# Patient Record
Sex: MALE | Race: WHITE | Employment: OTHER | URBAN - METROPOLITAN AREA
[De-identification: names, ages, dates, MRNs, and addresses within clinical notes are randomized per-mention and may not be internally consistent; named-entity substitution may affect disease eponyms.]

---

## 2017-02-14 PROBLEM — D72.820 LYMPHOCYTOSIS: Status: ACTIVE | Noted: 2017-02-14

## 2017-03-15 PROBLEM — L97.822 SKIN ULCER OF LEFT KNEE WITH FAT LAYER EXPOSED (HCC): Status: ACTIVE | Noted: 2017-03-15

## 2018-02-24 PROBLEM — T14.8XXA WOUND INFECTION: Status: ACTIVE | Noted: 2018-02-24

## 2018-02-24 PROBLEM — L08.9 WOUND INFECTION: Status: ACTIVE | Noted: 2018-02-24

## 2018-02-24 PROBLEM — R55 SYNCOPE AND COLLAPSE: Status: ACTIVE | Noted: 2018-02-24

## 2018-06-20 ENCOUNTER — HOSPITAL ENCOUNTER (OUTPATIENT)
Age: 83
Discharge: HOME | End: 2018-06-22
Payer: MEDICARE

## 2018-06-20 LAB
INR BLD: 3
PROTHROMBIN TIME: 33.1 SEC (ref 9.3–12.4)

## 2018-06-20 PROCEDURE — 85610 PROTHROMBIN TIME: CPT

## 2018-06-23 ENCOUNTER — APPOINTMENT (OUTPATIENT)
Dept: CT IMAGING | Age: 83
DRG: 193 | End: 2018-06-23
Payer: MEDICARE

## 2018-06-23 ENCOUNTER — HOSPITAL ENCOUNTER (INPATIENT)
Age: 83
LOS: 3 days | Discharge: HOME | DRG: 193 | End: 2018-06-26
Attending: EMERGENCY MEDICINE | Admitting: FAMILY MEDICINE
Payer: MEDICARE

## 2018-06-23 DIAGNOSIS — J44.1 COPD EXACERBATION (HCC): ICD-10-CM

## 2018-06-23 DIAGNOSIS — R09.02 HYPOXIA: ICD-10-CM

## 2018-06-23 DIAGNOSIS — A41.9 SEPSIS DUE TO PNEUMONIA (HCC): Primary | ICD-10-CM

## 2018-06-23 DIAGNOSIS — R07.2 PRECORDIAL CHEST PAIN: ICD-10-CM

## 2018-06-23 DIAGNOSIS — J18.9 PNEUMONIA OF BOTH LOWER LOBES DUE TO INFECTIOUS ORGANISM: ICD-10-CM

## 2018-06-23 DIAGNOSIS — D72.829 LEUKOCYTOSIS, UNSPECIFIED TYPE: ICD-10-CM

## 2018-06-23 DIAGNOSIS — J18.9 SEPSIS DUE TO PNEUMONIA (HCC): Primary | ICD-10-CM

## 2018-06-23 LAB
ALBUMIN SERPL-MCNC: 4 G/DL (ref 3.5–5.2)
ALP BLD-CCNC: 119 U/L (ref 40–129)
ALT SERPL-CCNC: 12 U/L (ref 0–40)
ANION GAP SERPL CALCULATED.3IONS-SCNC: 14 MMOL/L (ref 7–16)
ANISOCYTOSIS: ABNORMAL
APTT: 42.6 SEC (ref 24.5–35.1)
AST SERPL-CCNC: 18 U/L (ref 0–39)
B.E.: 1.2 MMOL/L (ref -3–3)
BASOPHILS ABSOLUTE: 0 E9/L (ref 0–0.2)
BASOPHILS RELATIVE PERCENT: 0 % (ref 0–2)
BILIRUB SERPL-MCNC: 0.9 MG/DL (ref 0–1.2)
BUN BLDV-MCNC: 15 MG/DL (ref 8–23)
CALCIUM SERPL-MCNC: 9.3 MG/DL (ref 8.6–10.2)
CHLORIDE BLD-SCNC: 100 MMOL/L (ref 98–107)
CO2: 23 MMOL/L (ref 22–29)
COHB: 1.5 % (ref 0–1.5)
CREAT SERPL-MCNC: 1 MG/DL (ref 0.7–1.2)
CRITICAL: ABNORMAL
DATE ANALYZED: ABNORMAL
DATE OF COLLECTION: ABNORMAL
EKG ATRIAL RATE: 101 BPM
EKG P AXIS: 34 DEGREES
EKG P-R INTERVAL: 150 MS
EKG Q-T INTERVAL: 340 MS
EKG QRS DURATION: 78 MS
EKG QTC CALCULATION (BAZETT): 440 MS
EKG R AXIS: 13 DEGREES
EKG T AXIS: 26 DEGREES
EKG VENTRICULAR RATE: 101 BPM
EOSINOPHILS ABSOLUTE: 0.91 E9/L (ref 0.05–0.5)
EOSINOPHILS RELATIVE PERCENT: 3.5 % (ref 0–6)
GFR AFRICAN AMERICAN: >60
GFR NON-AFRICAN AMERICAN: >60 ML/MIN/1.73
GLUCOSE BLD-MCNC: 124 MG/DL (ref 74–109)
HCO3: 23.3 MMOL/L (ref 22–26)
HCT VFR BLD CALC: 47.9 % (ref 37–54)
HEMOGLOBIN: 15 G/DL (ref 12.5–16.5)
HHB: 7.7 % (ref 0–5)
INR BLD: 1.4
LAB: ABNORMAL
LACTIC ACID: 1.1 MMOL/L (ref 0.5–2.2)
LYMPHOCYTES ABSOLUTE: 2.33 E9/L (ref 1.5–4)
LYMPHOCYTES RELATIVE PERCENT: 8.7 % (ref 20–42)
Lab: 1428
MCH RBC QN AUTO: 30.5 PG (ref 26–35)
MCHC RBC AUTO-ENTMCNC: 31.3 % (ref 32–34.5)
MCV RBC AUTO: 97.4 FL (ref 80–99.9)
METAMYELOCYTES RELATIVE PERCENT: 0.9 % (ref 0–1)
METHB: 0.3 % (ref 0–1.5)
MODE: ABNORMAL
MONOCYTES ABSOLUTE: 2.59 E9/L (ref 0.1–0.95)
MONOCYTES RELATIVE PERCENT: 10.4 % (ref 2–12)
NEUTROPHILS ABSOLUTE: 19.94 E9/L (ref 1.8–7.3)
NEUTROPHILS RELATIVE PERCENT: 76.5 % (ref 43–80)
NUCLEATED RED BLOOD CELLS: 0 /100 WBC
O2 CONTENT: 19.6 ML/DL
O2 SATURATION: 92.2 % (ref 92–98.5)
O2HB: 90.5 % (ref 94–97)
OPERATOR ID: 166
PATIENT TEMP: 37 C
PCO2: 30.7 MMHG (ref 35–45)
PDW BLD-RTO: 16.7 FL (ref 11.5–15)
PH BLOOD GAS: 7.5 (ref 7.35–7.45)
PLATELET # BLD: 173 E9/L (ref 130–450)
PMV BLD AUTO: 12.2 FL (ref 7–12)
PO2: 60.2 MMHG (ref 60–100)
POLYCHROMASIA: ABNORMAL
POTASSIUM SERPL-SCNC: 3.7 MMOL/L (ref 3.5–5)
PRO-BNP: 1367 PG/ML (ref 0–450)
PROTHROMBIN TIME: 16.3 SEC (ref 9.3–12.4)
RBC # BLD: 4.92 E12/L (ref 3.8–5.8)
SODIUM BLD-SCNC: 137 MMOL/L (ref 132–146)
SOURCE, BLOOD GAS: ABNORMAL
THB: 15.4 G/DL (ref 11.5–16.5)
TIME ANALYZED: 1437
TOTAL PROTEIN: 6.8 G/DL (ref 6.4–8.3)
TROPONIN: 0.04 NG/ML (ref 0–0.03)
TROPONIN: 0.05 NG/ML (ref 0–0.03)
WBC # BLD: 25.9 E9/L (ref 4.5–11.5)

## 2018-06-23 PROCEDURE — 94664 DEMO&/EVAL PT USE INHALER: CPT

## 2018-06-23 PROCEDURE — 94640 AIRWAY INHALATION TREATMENT: CPT

## 2018-06-23 PROCEDURE — 84484 ASSAY OF TROPONIN QUANT: CPT

## 2018-06-23 PROCEDURE — 82805 BLOOD GASES W/O2 SATURATION: CPT

## 2018-06-23 PROCEDURE — 99285 EMERGENCY DEPT VISIT HI MDM: CPT

## 2018-06-23 PROCEDURE — 93005 ELECTROCARDIOGRAM TRACING: CPT | Performed by: EMERGENCY MEDICINE

## 2018-06-23 PROCEDURE — 96367 TX/PROPH/DG ADDL SEQ IV INF: CPT

## 2018-06-23 PROCEDURE — 36415 COLL VENOUS BLD VENIPUNCTURE: CPT

## 2018-06-23 PROCEDURE — 96375 TX/PRO/DX INJ NEW DRUG ADDON: CPT

## 2018-06-23 PROCEDURE — 6370000000 HC RX 637 (ALT 250 FOR IP): Performed by: FAMILY MEDICINE

## 2018-06-23 PROCEDURE — 85025 COMPLETE CBC W/AUTO DIFF WBC: CPT

## 2018-06-23 PROCEDURE — 2580000003 HC RX 258: Performed by: FAMILY MEDICINE

## 2018-06-23 PROCEDURE — 85610 PROTHROMBIN TIME: CPT

## 2018-06-23 PROCEDURE — 96365 THER/PROPH/DIAG IV INF INIT: CPT

## 2018-06-23 PROCEDURE — 2060000000 HC ICU INTERMEDIATE R&B

## 2018-06-23 PROCEDURE — 85730 THROMBOPLASTIN TIME PARTIAL: CPT

## 2018-06-23 PROCEDURE — 6370000000 HC RX 637 (ALT 250 FOR IP): Performed by: EMERGENCY MEDICINE

## 2018-06-23 PROCEDURE — 6360000002 HC RX W HCPCS: Performed by: EMERGENCY MEDICINE

## 2018-06-23 PROCEDURE — 80053 COMPREHEN METABOLIC PANEL: CPT

## 2018-06-23 PROCEDURE — 83605 ASSAY OF LACTIC ACID: CPT

## 2018-06-23 PROCEDURE — 2580000003 HC RX 258: Performed by: EMERGENCY MEDICINE

## 2018-06-23 PROCEDURE — 71260 CT THORAX DX C+: CPT

## 2018-06-23 PROCEDURE — 6360000004 HC RX CONTRAST MEDICATION: Performed by: RADIOLOGY

## 2018-06-23 PROCEDURE — 87040 BLOOD CULTURE FOR BACTERIA: CPT

## 2018-06-23 PROCEDURE — 83880 ASSAY OF NATRIURETIC PEPTIDE: CPT

## 2018-06-23 RX ORDER — WARFARIN SODIUM 10 MG/1
10 TABLET ORAL DAILY
Status: DISCONTINUED | OUTPATIENT
Start: 2018-06-23 | End: 2018-06-26 | Stop reason: HOSPADM

## 2018-06-23 RX ORDER — SODIUM CHLORIDE 0.9 % (FLUSH) 0.9 %
10 SYRINGE (ML) INJECTION PRN
Status: DISCONTINUED | OUTPATIENT
Start: 2018-06-23 | End: 2018-06-26 | Stop reason: HOSPADM

## 2018-06-23 RX ORDER — IPRATROPIUM BROMIDE AND ALBUTEROL SULFATE 2.5; .5 MG/3ML; MG/3ML
1 SOLUTION RESPIRATORY (INHALATION)
Status: COMPLETED | OUTPATIENT
Start: 2018-06-23 | End: 2018-06-23

## 2018-06-23 RX ORDER — GABAPENTIN 100 MG/1
100 CAPSULE ORAL NIGHTLY
COMMUNITY
End: 2018-09-11 | Stop reason: ALTCHOICE

## 2018-06-23 RX ORDER — GABAPENTIN 100 MG/1
100 CAPSULE ORAL NIGHTLY
Status: DISCONTINUED | OUTPATIENT
Start: 2018-06-23 | End: 2018-06-26 | Stop reason: HOSPADM

## 2018-06-23 RX ORDER — ACETAMINOPHEN 325 MG/1
650 TABLET ORAL EVERY 4 HOURS PRN
Status: DISCONTINUED | OUTPATIENT
Start: 2018-06-23 | End: 2018-06-26 | Stop reason: HOSPADM

## 2018-06-23 RX ORDER — ASPIRIN 81 MG/1
324 TABLET, CHEWABLE ORAL ONCE
Status: COMPLETED | OUTPATIENT
Start: 2018-06-23 | End: 2018-06-23

## 2018-06-23 RX ORDER — LANOLIN ALCOHOL/MO/W.PET/CERES
3 CREAM (GRAM) TOPICAL NIGHTLY PRN
Status: DISCONTINUED | OUTPATIENT
Start: 2018-06-23 | End: 2018-06-26 | Stop reason: HOSPADM

## 2018-06-23 RX ORDER — SODIUM CHLORIDE 0.9 % (FLUSH) 0.9 %
10 SYRINGE (ML) INJECTION EVERY 12 HOURS SCHEDULED
Status: DISCONTINUED | OUTPATIENT
Start: 2018-06-23 | End: 2018-06-26 | Stop reason: HOSPADM

## 2018-06-23 RX ORDER — HYDROXYUREA 500 MG/1
500 CAPSULE ORAL DAILY
Status: DISCONTINUED | OUTPATIENT
Start: 2018-06-23 | End: 2018-06-26 | Stop reason: HOSPADM

## 2018-06-23 RX ORDER — NITROGLYCERIN 0.4 MG/1
0.4 TABLET SUBLINGUAL EVERY 5 MIN PRN
Status: DISCONTINUED | OUTPATIENT
Start: 2018-06-23 | End: 2018-06-26 | Stop reason: HOSPADM

## 2018-06-23 RX ORDER — METHYLPREDNISOLONE SODIUM SUCCINATE 125 MG/2ML
125 INJECTION, POWDER, LYOPHILIZED, FOR SOLUTION INTRAMUSCULAR; INTRAVENOUS ONCE
Status: COMPLETED | OUTPATIENT
Start: 2018-06-23 | End: 2018-06-23

## 2018-06-23 RX ORDER — LANOLIN ALCOHOL/MO/W.PET/CERES
3 CREAM (GRAM) TOPICAL NIGHTLY PRN
COMMUNITY
End: 2018-09-11 | Stop reason: DRUGHIGH

## 2018-06-23 RX ORDER — OXYCODONE HYDROCHLORIDE AND ACETAMINOPHEN 5; 325 MG/1; MG/1
1 TABLET ORAL EVERY 4 HOURS PRN
Status: DISCONTINUED | OUTPATIENT
Start: 2018-06-23 | End: 2018-06-26 | Stop reason: HOSPADM

## 2018-06-23 RX ORDER — OXYCODONE HYDROCHLORIDE AND ACETAMINOPHEN 5; 325 MG/1; MG/1
2 TABLET ORAL EVERY 4 HOURS PRN
Status: DISCONTINUED | OUTPATIENT
Start: 2018-06-23 | End: 2018-06-26 | Stop reason: HOSPADM

## 2018-06-23 RX ADMIN — AZITHROMYCIN MONOHYDRATE 500 MG: 500 INJECTION, POWDER, LYOPHILIZED, FOR SOLUTION INTRAVENOUS at 15:40

## 2018-06-23 RX ADMIN — IOPAMIDOL 70 ML: 755 INJECTION, SOLUTION INTRAVENOUS at 15:07

## 2018-06-23 RX ADMIN — IPRATROPIUM BROMIDE AND ALBUTEROL SULFATE 1 AMPULE: .5; 3 SOLUTION RESPIRATORY (INHALATION) at 14:29

## 2018-06-23 RX ADMIN — ASPIRIN 81 MG CHEWABLE TABLET 324 MG: 81 TABLET CHEWABLE at 14:07

## 2018-06-23 RX ADMIN — WARFARIN SODIUM 10 MG: 10 TABLET ORAL at 20:58

## 2018-06-23 RX ADMIN — MELATONIN 3 MG ORAL TABLET 3 MG: 3 TABLET ORAL at 23:00

## 2018-06-23 RX ADMIN — METHYLPREDNISOLONE SODIUM SUCCINATE 125 MG: 125 INJECTION, POWDER, FOR SOLUTION INTRAMUSCULAR; INTRAVENOUS at 14:07

## 2018-06-23 RX ADMIN — IPRATROPIUM BROMIDE AND ALBUTEROL SULFATE 1 AMPULE: .5; 3 SOLUTION RESPIRATORY (INHALATION) at 14:30

## 2018-06-23 RX ADMIN — GABAPENTIN 100 MG: 100 CAPSULE ORAL at 20:58

## 2018-06-23 RX ADMIN — Medication 10 ML: at 21:00

## 2018-06-23 RX ADMIN — DEXTROSE MONOHYDRATE 1 G: 50 INJECTION, SOLUTION INTRAVENOUS at 14:07

## 2018-06-23 RX ADMIN — NITROGLYCERIN 0.4 MG: 0.4 TABLET SUBLINGUAL at 14:07

## 2018-06-23 ASSESSMENT — PAIN DESCRIPTION - DESCRIPTORS: DESCRIPTORS: BURNING

## 2018-06-23 ASSESSMENT — PAIN DESCRIPTION - LOCATION: LOCATION: CHEST

## 2018-06-23 ASSESSMENT — PAIN SCALES - GENERAL
PAINLEVEL_OUTOF10: 0
PAINLEVEL_OUTOF10: 5

## 2018-06-23 ASSESSMENT — PAIN DESCRIPTION - PAIN TYPE: TYPE: ACUTE PAIN

## 2018-06-23 ASSESSMENT — HEART SCORE
ECG: 1
ECG: 1

## 2018-06-24 LAB
ALBUMIN SERPL-MCNC: 3.7 G/DL (ref 3.5–5.2)
ALP BLD-CCNC: 60 U/L (ref 40–129)
ALT SERPL-CCNC: 9 U/L (ref 0–40)
ANION GAP SERPL CALCULATED.3IONS-SCNC: 14 MMOL/L (ref 7–16)
ANISOCYTOSIS: ABNORMAL
AST SERPL-CCNC: 13 U/L (ref 0–39)
BASOPHILS ABSOLUTE: 0 E9/L (ref 0–0.2)
BASOPHILS RELATIVE PERCENT: 0 % (ref 0–2)
BILIRUB SERPL-MCNC: 0.4 MG/DL (ref 0–1.2)
BUN BLDV-MCNC: 18 MG/DL (ref 8–23)
CALCIUM SERPL-MCNC: 9 MG/DL (ref 8.6–10.2)
CHLORIDE BLD-SCNC: 102 MMOL/L (ref 98–107)
CO2: 20 MMOL/L (ref 22–29)
CREAT SERPL-MCNC: 0.9 MG/DL (ref 0.7–1.2)
EOSINOPHILS ABSOLUTE: 0 E9/L (ref 0.05–0.5)
EOSINOPHILS RELATIVE PERCENT: 0 % (ref 0–6)
FILM ARRAY ADENOVIRUS: ABNORMAL
FILM ARRAY BORDETELLA PERTUSSIS: ABNORMAL
FILM ARRAY CHLAMYDOPHILIA PNEUMONIAE: ABNORMAL
FILM ARRAY CORONAVIRUS 229E: ABNORMAL
FILM ARRAY CORONAVIRUS HKU1: ABNORMAL
FILM ARRAY CORONAVIRUS NL63: ABNORMAL
FILM ARRAY CORONAVIRUS OC43: ABNORMAL
FILM ARRAY INFLUENZA A VIRUS 09H1: ABNORMAL
FILM ARRAY INFLUENZA A VIRUS H1: ABNORMAL
FILM ARRAY INFLUENZA A VIRUS H3: ABNORMAL
FILM ARRAY INFLUENZA A VIRUS: ABNORMAL
FILM ARRAY INFLUENZA B: ABNORMAL
FILM ARRAY METAPNEUMOVIRUS: ABNORMAL
FILM ARRAY MYCOPLASMA PNEUMONIAE: ABNORMAL
FILM ARRAY PARAINFLUENZA VIRUS 1: ABNORMAL
FILM ARRAY PARAINFLUENZA VIRUS 2: ABNORMAL
FILM ARRAY PARAINFLUENZA VIRUS 3: ABNORMAL
FILM ARRAY PARAINFLUENZA VIRUS 4: ABNORMAL
FILM ARRAY RESPIRATORY SYNCITIAL VIRUS: ABNORMAL
GFR AFRICAN AMERICAN: >60
GFR NON-AFRICAN AMERICAN: >60 ML/MIN/1.73
GLUCOSE BLD-MCNC: 171 MG/DL (ref 74–109)
HCT VFR BLD CALC: 45.5 % (ref 37–54)
HEMOGLOBIN: 14 G/DL (ref 12.5–16.5)
INR BLD: 1.3
LYMPHOCYTES ABSOLUTE: 0.71 E9/L (ref 1.5–4)
LYMPHOCYTES RELATIVE PERCENT: 1.8 % (ref 20–42)
MCH RBC QN AUTO: 30.4 PG (ref 26–35)
MCHC RBC AUTO-ENTMCNC: 30.8 % (ref 32–34.5)
MCV RBC AUTO: 98.7 FL (ref 80–99.9)
MONOCYTES ABSOLUTE: 0.71 E9/L (ref 0.1–0.95)
MONOCYTES RELATIVE PERCENT: 1.7 % (ref 2–12)
NEUTROPHILS ABSOLUTE: 34.24 E9/L (ref 1.8–7.3)
NEUTROPHILS RELATIVE PERCENT: 96.5 % (ref 43–80)
NUCLEATED RED BLOOD CELLS: 0 /100 WBC
ORGANISM: ABNORMAL
PDW BLD-RTO: 16.3 FL (ref 11.5–15)
PLATELET # BLD: 180 E9/L (ref 130–450)
PMV BLD AUTO: 12.2 FL (ref 7–12)
POTASSIUM SERPL-SCNC: 4.4 MMOL/L (ref 3.5–5)
PROTHROMBIN TIME: 15.3 SEC (ref 9.3–12.4)
RBC # BLD: 4.61 E12/L (ref 3.8–5.8)
SODIUM BLD-SCNC: 136 MMOL/L (ref 132–146)
TOTAL PROTEIN: 6.5 G/DL (ref 6.4–8.3)
TROPONIN: 0.04 NG/ML (ref 0–0.03)
WBC # BLD: 35.3 E9/L (ref 4.5–11.5)

## 2018-06-24 PROCEDURE — 2060000000 HC ICU INTERMEDIATE R&B

## 2018-06-24 PROCEDURE — 2580000003 HC RX 258: Performed by: FAMILY MEDICINE

## 2018-06-24 PROCEDURE — APPSS60 APP SPLIT SHARED TIME 46-60 MINUTES: Performed by: NURSE PRACTITIONER

## 2018-06-24 PROCEDURE — 87502 INFLUENZA DNA AMP PROBE: CPT

## 2018-06-24 PROCEDURE — 85025 COMPLETE CBC W/AUTO DIFF WBC: CPT

## 2018-06-24 PROCEDURE — 6360000002 HC RX W HCPCS: Performed by: INTERNAL MEDICINE

## 2018-06-24 PROCEDURE — 87581 M.PNEUMON DNA AMP PROBE: CPT

## 2018-06-24 PROCEDURE — 87450 HC DIRECT STREP B ANTIGEN: CPT

## 2018-06-24 PROCEDURE — 87486 CHLMYD PNEUM DNA AMP PROBE: CPT

## 2018-06-24 PROCEDURE — 36415 COLL VENOUS BLD VENIPUNCTURE: CPT

## 2018-06-24 PROCEDURE — 80053 COMPREHEN METABOLIC PANEL: CPT

## 2018-06-24 PROCEDURE — 93005 ELECTROCARDIOGRAM TRACING: CPT | Performed by: NURSE PRACTITIONER

## 2018-06-24 PROCEDURE — 85610 PROTHROMBIN TIME: CPT

## 2018-06-24 PROCEDURE — 99223 1ST HOSP IP/OBS HIGH 75: CPT | Performed by: INTERNAL MEDICINE

## 2018-06-24 PROCEDURE — 6370000000 HC RX 637 (ALT 250 FOR IP): Performed by: FAMILY MEDICINE

## 2018-06-24 PROCEDURE — 2580000003 HC RX 258: Performed by: INTERNAL MEDICINE

## 2018-06-24 PROCEDURE — 87503 INFLUENZA DNA AMP PROB ADDL: CPT

## 2018-06-24 PROCEDURE — 84484 ASSAY OF TROPONIN QUANT: CPT

## 2018-06-24 PROCEDURE — 87798 DETECT AGENT NOS DNA AMP: CPT

## 2018-06-24 RX ORDER — ALBUTEROL SULFATE 2.5 MG/3ML
2.5 SOLUTION RESPIRATORY (INHALATION) EVERY 4 HOURS PRN
Status: DISCONTINUED | OUTPATIENT
Start: 2018-06-24 | End: 2018-06-26 | Stop reason: HOSPADM

## 2018-06-24 RX ADMIN — WATER 1 G: 1 INJECTION INTRAMUSCULAR; INTRAVENOUS; SUBCUTANEOUS at 16:34

## 2018-06-24 RX ADMIN — HYDROXYUREA 500 MG: 500 CAPSULE ORAL at 09:09

## 2018-06-24 RX ADMIN — Medication 10 ML: at 09:09

## 2018-06-24 RX ADMIN — AZITHROMYCIN MONOHYDRATE 500 MG: 500 INJECTION, POWDER, LYOPHILIZED, FOR SOLUTION INTRAVENOUS at 16:36

## 2018-06-24 RX ADMIN — Medication 10 ML: at 16:34

## 2018-06-24 RX ADMIN — MELATONIN 3 MG ORAL TABLET 3 MG: 3 TABLET ORAL at 23:11

## 2018-06-24 RX ADMIN — GABAPENTIN 100 MG: 100 CAPSULE ORAL at 20:23

## 2018-06-24 RX ADMIN — Medication 10 ML: at 20:23

## 2018-06-24 RX ADMIN — Medication 10 ML: at 16:37

## 2018-06-24 RX ADMIN — WARFARIN SODIUM 10 MG: 10 TABLET ORAL at 18:18

## 2018-06-24 ASSESSMENT — PAIN SCALES - GENERAL
PAINLEVEL_OUTOF10: 0

## 2018-06-25 LAB
EKG ATRIAL RATE: 86 BPM
EKG P AXIS: 49 DEGREES
EKG P-R INTERVAL: 162 MS
EKG Q-T INTERVAL: 380 MS
EKG QRS DURATION: 84 MS
EKG QTC CALCULATION (BAZETT): 454 MS
EKG R AXIS: 12 DEGREES
EKG T AXIS: 11 DEGREES
EKG VENTRICULAR RATE: 86 BPM
INR BLD: 2
L. PNEUMOPHILA SEROGP 1 UR AG: NORMAL
PROTHROMBIN TIME: 22.7 SEC (ref 9.3–12.4)
STREP PNEUMONIAE ANTIGEN, URINE: NORMAL

## 2018-06-25 PROCEDURE — 6360000002 HC RX W HCPCS: Performed by: INTERNAL MEDICINE

## 2018-06-25 PROCEDURE — 6370000000 HC RX 637 (ALT 250 FOR IP): Performed by: FAMILY MEDICINE

## 2018-06-25 PROCEDURE — 2580000003 HC RX 258: Performed by: FAMILY MEDICINE

## 2018-06-25 PROCEDURE — 99232 SBSQ HOSP IP/OBS MODERATE 35: CPT | Performed by: INTERNAL MEDICINE

## 2018-06-25 PROCEDURE — 2060000000 HC ICU INTERMEDIATE R&B

## 2018-06-25 PROCEDURE — 2700000000 HC OXYGEN THERAPY PER DAY

## 2018-06-25 PROCEDURE — 93010 ELECTROCARDIOGRAM REPORT: CPT | Performed by: INTERNAL MEDICINE

## 2018-06-25 PROCEDURE — 85610 PROTHROMBIN TIME: CPT

## 2018-06-25 PROCEDURE — 94150 VITAL CAPACITY TEST: CPT

## 2018-06-25 PROCEDURE — 2580000003 HC RX 258: Performed by: INTERNAL MEDICINE

## 2018-06-25 PROCEDURE — 94640 AIRWAY INHALATION TREATMENT: CPT

## 2018-06-25 PROCEDURE — 36415 COLL VENOUS BLD VENIPUNCTURE: CPT

## 2018-06-25 PROCEDURE — 94667 MNPJ CHEST WALL 1ST: CPT

## 2018-06-25 RX ORDER — ALBUTEROL SULFATE 2.5 MG/3ML
2.5 SOLUTION RESPIRATORY (INHALATION) EVERY 4 HOURS
Status: DISCONTINUED | OUTPATIENT
Start: 2018-06-25 | End: 2018-06-26 | Stop reason: HOSPADM

## 2018-06-25 RX ADMIN — AZITHROMYCIN MONOHYDRATE 500 MG: 500 INJECTION, POWDER, LYOPHILIZED, FOR SOLUTION INTRAVENOUS at 16:20

## 2018-06-25 RX ADMIN — Medication 10 ML: at 08:23

## 2018-06-25 RX ADMIN — GABAPENTIN 100 MG: 100 CAPSULE ORAL at 21:02

## 2018-06-25 RX ADMIN — Medication 10 ML: at 16:18

## 2018-06-25 RX ADMIN — WATER 1 G: 1 INJECTION INTRAMUSCULAR; INTRAVENOUS; SUBCUTANEOUS at 16:18

## 2018-06-25 RX ADMIN — ALBUTEROL SULFATE 2.5 MG: 2.5 SOLUTION RESPIRATORY (INHALATION) at 16:05

## 2018-06-25 RX ADMIN — ALBUTEROL SULFATE 2.5 MG: 2.5 SOLUTION RESPIRATORY (INHALATION) at 23:41

## 2018-06-25 RX ADMIN — Medication 10 ML: at 21:02

## 2018-06-25 RX ADMIN — ALBUTEROL SULFATE 2.5 MG: 2.5 SOLUTION RESPIRATORY (INHALATION) at 19:14

## 2018-06-25 RX ADMIN — HYDROXYUREA 500 MG: 500 CAPSULE ORAL at 08:23

## 2018-06-25 RX ADMIN — WARFARIN SODIUM 10 MG: 10 TABLET ORAL at 17:55

## 2018-06-25 ASSESSMENT — PAIN SCALES - GENERAL
PAINLEVEL_OUTOF10: 0

## 2018-06-26 VITALS
WEIGHT: 145 LBS | BODY MASS INDEX: 21.48 KG/M2 | HEIGHT: 69 IN | OXYGEN SATURATION: 93 % | DIASTOLIC BLOOD PRESSURE: 59 MMHG | HEART RATE: 85 BPM | TEMPERATURE: 97.3 F | SYSTOLIC BLOOD PRESSURE: 116 MMHG | RESPIRATION RATE: 18 BRPM

## 2018-06-26 LAB
INR BLD: 2.9
PROTHROMBIN TIME: 32.3 SEC (ref 9.3–12.4)

## 2018-06-26 PROCEDURE — 94640 AIRWAY INHALATION TREATMENT: CPT

## 2018-06-26 PROCEDURE — 85610 PROTHROMBIN TIME: CPT

## 2018-06-26 PROCEDURE — 36415 COLL VENOUS BLD VENIPUNCTURE: CPT

## 2018-06-26 PROCEDURE — 6370000000 HC RX 637 (ALT 250 FOR IP): Performed by: FAMILY MEDICINE

## 2018-06-26 PROCEDURE — 99232 SBSQ HOSP IP/OBS MODERATE 35: CPT | Performed by: INTERNAL MEDICINE

## 2018-06-26 PROCEDURE — 94668 MNPJ CHEST WALL SBSQ: CPT

## 2018-06-26 PROCEDURE — 6360000002 HC RX W HCPCS: Performed by: INTERNAL MEDICINE

## 2018-06-26 PROCEDURE — 2580000003 HC RX 258: Performed by: FAMILY MEDICINE

## 2018-06-26 RX ORDER — AZITHROMYCIN 500 MG/1
500 TABLET, FILM COATED ORAL DAILY
Qty: 5 TABLET | Refills: 0 | Status: SHIPPED | OUTPATIENT
Start: 2018-06-26 | End: 2018-07-01

## 2018-06-26 RX ORDER — AZITHROMYCIN 250 MG/1
500 TABLET, FILM COATED ORAL DAILY
Status: DISCONTINUED | OUTPATIENT
Start: 2018-06-26 | End: 2018-06-26 | Stop reason: HOSPADM

## 2018-06-26 RX ADMIN — ALBUTEROL SULFATE 2.5 MG: 2.5 SOLUTION RESPIRATORY (INHALATION) at 08:04

## 2018-06-26 RX ADMIN — ALBUTEROL SULFATE 2.5 MG: 2.5 SOLUTION RESPIRATORY (INHALATION) at 12:35

## 2018-06-26 RX ADMIN — Medication 10 ML: at 09:20

## 2018-06-26 RX ADMIN — DORNASE ALFA 2.5 MG: 1 SOLUTION RESPIRATORY (INHALATION) at 08:04

## 2018-06-26 RX ADMIN — ALBUTEROL SULFATE 2.5 MG: 2.5 SOLUTION RESPIRATORY (INHALATION) at 03:51

## 2018-06-26 RX ADMIN — HYDROXYUREA 500 MG: 500 CAPSULE ORAL at 09:20

## 2018-06-26 ASSESSMENT — PAIN SCALES - GENERAL: PAINLEVEL_OUTOF10: 0

## 2018-06-28 LAB — BLOOD CULTURE, ROUTINE: NORMAL

## 2018-06-29 LAB — CULTURE, BLOOD 2: NORMAL

## 2018-07-10 ENCOUNTER — HOSPITAL ENCOUNTER (OUTPATIENT)
Age: 83
Discharge: HOME | End: 2018-07-12
Payer: MEDICARE

## 2018-07-10 LAB
INR BLD: 3.7
PROTHROMBIN TIME: 40.8 SEC (ref 9.3–12.4)

## 2018-07-10 PROCEDURE — 85610 PROTHROMBIN TIME: CPT

## 2018-07-24 ENCOUNTER — HOSPITAL ENCOUNTER (OUTPATIENT)
Age: 83
Discharge: HOME | End: 2018-07-26
Payer: MEDICARE

## 2018-07-24 LAB
INR BLD: 3.8
PROTHROMBIN TIME: 41.9 SEC (ref 9.3–12.4)

## 2018-07-24 PROCEDURE — 85610 PROTHROMBIN TIME: CPT

## 2018-08-08 ENCOUNTER — HOSPITAL ENCOUNTER (OUTPATIENT)
Age: 83
Discharge: HOME | End: 2018-08-10
Payer: MEDICARE

## 2018-08-08 LAB
INR BLD: 2.3
PROTHROMBIN TIME: 26.3 SEC (ref 9.3–12.4)

## 2018-08-08 PROCEDURE — 85610 PROTHROMBIN TIME: CPT

## 2018-08-17 ENCOUNTER — HOSPITAL ENCOUNTER (OUTPATIENT)
Age: 83
Setting detail: OBSERVATION
Discharge: HOME | End: 2018-08-18
Attending: EMERGENCY MEDICINE | Admitting: FAMILY MEDICINE
Payer: MEDICARE

## 2018-08-17 ENCOUNTER — APPOINTMENT (OUTPATIENT)
Dept: GENERAL RADIOLOGY | Age: 83
End: 2018-08-17
Payer: MEDICARE

## 2018-08-17 DIAGNOSIS — R07.9 CHEST PAIN, UNSPECIFIED TYPE: Primary | ICD-10-CM

## 2018-08-17 LAB
ALBUMIN SERPL-MCNC: 4.1 G/DL (ref 3.5–5.2)
ALP BLD-CCNC: 61 U/L (ref 40–129)
ALT SERPL-CCNC: 10 U/L (ref 0–40)
ANION GAP SERPL CALCULATED.3IONS-SCNC: 12 MMOL/L (ref 7–16)
AST SERPL-CCNC: 19 U/L (ref 0–39)
BILIRUB SERPL-MCNC: 0.5 MG/DL (ref 0–1.2)
BUN BLDV-MCNC: 23 MG/DL (ref 8–23)
CALCIUM SERPL-MCNC: 8.8 MG/DL (ref 8.6–10.2)
CHLORIDE BLD-SCNC: 102 MMOL/L (ref 98–107)
CO2: 22 MMOL/L (ref 22–29)
CREAT SERPL-MCNC: 1 MG/DL (ref 0.7–1.2)
EKG ATRIAL RATE: 78 BPM
EKG P AXIS: 38 DEGREES
EKG P-R INTERVAL: 146 MS
EKG Q-T INTERVAL: 378 MS
EKG QRS DURATION: 84 MS
EKG QTC CALCULATION (BAZETT): 430 MS
EKG R AXIS: 22 DEGREES
EKG T AXIS: 27 DEGREES
EKG VENTRICULAR RATE: 78 BPM
GFR AFRICAN AMERICAN: >60
GFR NON-AFRICAN AMERICAN: >60 ML/MIN/1.73
GLUCOSE BLD-MCNC: 102 MG/DL (ref 74–109)
HCT VFR BLD CALC: 46.2 % (ref 37–54)
HEMOGLOBIN: 14.4 G/DL (ref 12.5–16.5)
INR BLD: 3
MCH RBC QN AUTO: 30.8 PG (ref 26–35)
MCHC RBC AUTO-ENTMCNC: 31.2 % (ref 32–34.5)
MCV RBC AUTO: 98.7 FL (ref 80–99.9)
PDW BLD-RTO: 16.1 FL (ref 11.5–15)
PLATELET # BLD: 233 E9/L (ref 130–450)
PMV BLD AUTO: 11.5 FL (ref 7–12)
POTASSIUM SERPL-SCNC: 4.6 MMOL/L (ref 3.5–5)
PROTHROMBIN TIME: 34.5 SEC (ref 9.3–12.4)
RBC # BLD: 4.68 E12/L (ref 3.8–5.8)
SODIUM BLD-SCNC: 136 MMOL/L (ref 132–146)
TOTAL PROTEIN: 6.6 G/DL (ref 6.4–8.3)
TROPONIN: <0.01 NG/ML (ref 0–0.03)
WBC # BLD: 21.8 E9/L (ref 4.5–11.5)

## 2018-08-17 PROCEDURE — 85027 COMPLETE CBC AUTOMATED: CPT

## 2018-08-17 PROCEDURE — 71046 X-RAY EXAM CHEST 2 VIEWS: CPT

## 2018-08-17 PROCEDURE — 84484 ASSAY OF TROPONIN QUANT: CPT

## 2018-08-17 PROCEDURE — G0378 HOSPITAL OBSERVATION PER HR: HCPCS

## 2018-08-17 PROCEDURE — 93005 ELECTROCARDIOGRAM TRACING: CPT | Performed by: EMERGENCY MEDICINE

## 2018-08-17 PROCEDURE — 85610 PROTHROMBIN TIME: CPT

## 2018-08-17 PROCEDURE — 6370000000 HC RX 637 (ALT 250 FOR IP): Performed by: EMERGENCY MEDICINE

## 2018-08-17 PROCEDURE — 99285 EMERGENCY DEPT VISIT HI MDM: CPT

## 2018-08-17 PROCEDURE — 80053 COMPREHEN METABOLIC PANEL: CPT

## 2018-08-17 PROCEDURE — 36415 COLL VENOUS BLD VENIPUNCTURE: CPT

## 2018-08-17 RX ORDER — SODIUM CHLORIDE 0.9 % (FLUSH) 0.9 %
10 SYRINGE (ML) INJECTION EVERY 12 HOURS SCHEDULED
Status: DISCONTINUED | OUTPATIENT
Start: 2018-08-17 | End: 2018-08-18 | Stop reason: HOSPADM

## 2018-08-17 RX ORDER — ASPIRIN 81 MG/1
324 TABLET, CHEWABLE ORAL ONCE
Status: COMPLETED | OUTPATIENT
Start: 2018-08-17 | End: 2018-08-17

## 2018-08-17 RX ORDER — ACETAMINOPHEN 325 MG/1
650 TABLET ORAL EVERY 4 HOURS PRN
Status: DISCONTINUED | OUTPATIENT
Start: 2018-08-17 | End: 2018-08-18 | Stop reason: HOSPADM

## 2018-08-17 RX ORDER — SODIUM CHLORIDE 0.9 % (FLUSH) 0.9 %
10 SYRINGE (ML) INJECTION PRN
Status: DISCONTINUED | OUTPATIENT
Start: 2018-08-17 | End: 2018-08-18 | Stop reason: HOSPADM

## 2018-08-17 RX ADMIN — ASPIRIN 81 MG CHEWABLE TABLET 324 MG: 81 TABLET CHEWABLE at 21:53

## 2018-08-17 ASSESSMENT — PAIN SCALES - GENERAL
PAINLEVEL_OUTOF10: 0
PAINLEVEL_OUTOF10: 0
PAINLEVEL_OUTOF10: 7

## 2018-08-17 ASSESSMENT — PAIN DESCRIPTION - DESCRIPTORS: DESCRIPTORS: CONSTANT

## 2018-08-17 ASSESSMENT — PAIN DESCRIPTION - LOCATION: LOCATION: CHEST

## 2018-08-17 ASSESSMENT — PAIN DESCRIPTION - PAIN TYPE: TYPE: ACUTE PAIN

## 2018-08-18 VITALS
DIASTOLIC BLOOD PRESSURE: 70 MMHG | BODY MASS INDEX: 22.97 KG/M2 | HEIGHT: 69 IN | TEMPERATURE: 97.7 F | SYSTOLIC BLOOD PRESSURE: 122 MMHG | OXYGEN SATURATION: 94 % | WEIGHT: 155.1 LBS | HEART RATE: 78 BPM | RESPIRATION RATE: 20 BRPM

## 2018-08-18 LAB
ALBUMIN SERPL-MCNC: 3.8 G/DL (ref 3.5–5.2)
ALP BLD-CCNC: 53 U/L (ref 40–129)
ALT SERPL-CCNC: 10 U/L (ref 0–40)
ANION GAP SERPL CALCULATED.3IONS-SCNC: 16 MMOL/L (ref 7–16)
AST SERPL-CCNC: 15 U/L (ref 0–39)
BILIRUB SERPL-MCNC: 0.5 MG/DL (ref 0–1.2)
BUN BLDV-MCNC: 26 MG/DL (ref 8–23)
CALCIUM SERPL-MCNC: 8.5 MG/DL (ref 8.6–10.2)
CHLORIDE BLD-SCNC: 103 MMOL/L (ref 98–107)
CK MB: 1.4 NG/ML (ref 0–7.7)
CO2: 22 MMOL/L (ref 22–29)
CREAT SERPL-MCNC: 1 MG/DL (ref 0.7–1.2)
GFR AFRICAN AMERICAN: >60
GFR NON-AFRICAN AMERICAN: >60 ML/MIN/1.73
GLUCOSE BLD-MCNC: 80 MG/DL (ref 74–109)
HCT VFR BLD CALC: 43.4 % (ref 37–54)
HEMOGLOBIN: 13.4 G/DL (ref 12.5–16.5)
INR BLD: 3.5
MCH RBC QN AUTO: 30.7 PG (ref 26–35)
MCHC RBC AUTO-ENTMCNC: 30.9 % (ref 32–34.5)
MCV RBC AUTO: 99.3 FL (ref 80–99.9)
PDW BLD-RTO: 16 FL (ref 11.5–15)
PLATELET # BLD: 212 E9/L (ref 130–450)
PMV BLD AUTO: 11.9 FL (ref 7–12)
POTASSIUM SERPL-SCNC: 4.4 MMOL/L (ref 3.5–5)
PROTHROMBIN TIME: 39.9 SEC (ref 9.3–12.4)
RBC # BLD: 4.37 E12/L (ref 3.8–5.8)
SODIUM BLD-SCNC: 141 MMOL/L (ref 132–146)
TOTAL CK: 20 U/L (ref 20–200)
TOTAL PROTEIN: 6.1 G/DL (ref 6.4–8.3)
TROPONIN: <0.01 NG/ML (ref 0–0.03)
WBC # BLD: 18.5 E9/L (ref 4.5–11.5)

## 2018-08-18 PROCEDURE — 82553 CREATINE MB FRACTION: CPT

## 2018-08-18 PROCEDURE — G0378 HOSPITAL OBSERVATION PER HR: HCPCS

## 2018-08-18 PROCEDURE — 2580000003 HC RX 258: Performed by: NEUROMUSCULOSKELETAL MEDICINE & OMM

## 2018-08-18 PROCEDURE — 84484 ASSAY OF TROPONIN QUANT: CPT

## 2018-08-18 PROCEDURE — 82550 ASSAY OF CK (CPK): CPT

## 2018-08-18 PROCEDURE — APPSS60 APP SPLIT SHARED TIME 46-60 MINUTES: Performed by: NURSE PRACTITIONER

## 2018-08-18 PROCEDURE — 85610 PROTHROMBIN TIME: CPT

## 2018-08-18 PROCEDURE — 80053 COMPREHEN METABOLIC PANEL: CPT

## 2018-08-18 PROCEDURE — 36415 COLL VENOUS BLD VENIPUNCTURE: CPT

## 2018-08-18 PROCEDURE — 6370000000 HC RX 637 (ALT 250 FOR IP): Performed by: INTERNAL MEDICINE

## 2018-08-18 PROCEDURE — 85027 COMPLETE CBC AUTOMATED: CPT

## 2018-08-18 PROCEDURE — 6370000000 HC RX 637 (ALT 250 FOR IP): Performed by: NEUROMUSCULOSKELETAL MEDICINE & OMM

## 2018-08-18 PROCEDURE — 99223 1ST HOSP IP/OBS HIGH 75: CPT | Performed by: INTERNAL MEDICINE

## 2018-08-18 RX ORDER — HYDROXYUREA 500 MG/1
500 CAPSULE ORAL DAILY
Status: DISCONTINUED | OUTPATIENT
Start: 2018-08-18 | End: 2018-08-18 | Stop reason: HOSPADM

## 2018-08-18 RX ORDER — ASPIRIN 81 MG/1
81 TABLET ORAL DAILY
Qty: 30 TABLET | Refills: 3 | Status: SHIPPED | OUTPATIENT
Start: 2018-08-18 | End: 2018-09-11 | Stop reason: ALTCHOICE

## 2018-08-18 RX ORDER — GABAPENTIN 100 MG/1
100 CAPSULE ORAL NIGHTLY
Status: DISCONTINUED | OUTPATIENT
Start: 2018-08-18 | End: 2018-08-18 | Stop reason: HOSPADM

## 2018-08-18 RX ORDER — NITROGLYCERIN 0.4 MG/1
TABLET SUBLINGUAL
Qty: 25 TABLET | Refills: 3 | Status: SHIPPED | OUTPATIENT
Start: 2018-08-18

## 2018-08-18 RX ORDER — ASPIRIN 81 MG/1
81 TABLET ORAL DAILY
Status: DISCONTINUED | OUTPATIENT
Start: 2018-08-18 | End: 2018-08-18 | Stop reason: HOSPADM

## 2018-08-18 RX ORDER — WARFARIN SODIUM 10 MG/1
10 TABLET ORAL DAILY
Status: DISCONTINUED | OUTPATIENT
Start: 2018-08-18 | End: 2018-08-18 | Stop reason: HOSPADM

## 2018-08-18 RX ORDER — NITROGLYCERIN 0.4 MG/1
0.4 TABLET SUBLINGUAL EVERY 5 MIN PRN
Status: DISCONTINUED | OUTPATIENT
Start: 2018-08-18 | End: 2018-08-18 | Stop reason: HOSPADM

## 2018-08-18 RX ORDER — LANOLIN ALCOHOL/MO/W.PET/CERES
3 CREAM (GRAM) TOPICAL NIGHTLY PRN
Status: DISCONTINUED | OUTPATIENT
Start: 2018-08-18 | End: 2018-08-18 | Stop reason: HOSPADM

## 2018-08-18 RX ADMIN — Medication 10 ML: at 08:23

## 2018-08-18 RX ADMIN — HYDROXYUREA 500 MG: 500 CAPSULE ORAL at 08:23

## 2018-08-18 RX ADMIN — Medication 10 ML: at 00:30

## 2018-08-18 RX ADMIN — ASPIRIN 81 MG: 81 TABLET, COATED ORAL at 11:57

## 2018-08-18 ASSESSMENT — PAIN SCALES - GENERAL
PAINLEVEL_OUTOF10: 0
PAINLEVEL_OUTOF10: 0

## 2018-08-18 NOTE — PROGRESS NOTES
Perfect served [facility] for cardiology consult and that patient had a nonsymptomatic 2.9 second pause this morning. Patient has no complaints of chest pain or shortness of breath.  Radha Lazo  8/18/2018

## 2018-08-18 NOTE — H&P
accordingly. DISPOSITION:  To home when medically stable.         Dano Elizondo, DO    D: 08/18/2018 8:08:00       T: 08/18/2018 8:09:44     [email or web address]  Job#: [id number]     Doc#: [id number]    CC:

## 2018-08-18 NOTE — CONSULTS
Patient seen and examined. Chart, labs, imaging studies, EKG and rhythm strips reviewed. Full consult to follow. We were asked to see the patient for chest pain. PHYSICAL EXAMINATION:  VITAL SIGNS:  Oral temperature 96.7, heart rate 67 beats per minute,  respiratory rate 18, blood pressure 113/57, oxygen saturation 93% on room  air. CONSTITUTIONAL:  In general, this is a well-developed, well-nourished  elderly  male who appears his stated age who is awake, alert,  cooperative, in no apparent distress. HEENT:  Eyes:  Conjunctivae pink. No noted xanthomas. Oral mucosa pink  and moist.  NECK:  No stridor. Symmetrical, nontender. No JVD. Left carotid bruit  noted. HEART:  Inspection shows no noted pulsations on palpation. No heaves or  thrills. PMI nondisplaced. Heart auscultation regular rate and rhythm,  2/6 systolic murmur. No rub. PERIPHERAL VASCULAR:  Bilateral BKA noted with bilateral prosthesis in  place. ABDOMEN:  Soft, nontender with light palpation. Bowel sounds present x4  quadrants. No palpable masses. No abdominal bruit. MUSCULOSKELETAL:  Good muscle strength and tone. No atrophy or abnormal  movements. :  Deferred. SKIN:  Warm and dry. No stasis dermatitis or ulcerations. NEURO:  Alert and oriented x3. Speech is clear and appropriate. Follows  simple commands. Moves all extremities. Flat affect.       IMPRESSION:  1. Chest pain, atypical, doubt cardiac, lasted several hours with no acute EKG changes and no rise in cardiac enzymes. 2. Moderate AS on echo in 02/2018 with normal EF   3. Hx of PAF: On Coumadin therapy with therapeutic INR. 4. Hx of Syncopal episode   5. Hx of up to 4.4 second pauses while on Cardizem which was stopped: 48 hour Holter monitor in 03/2018 reportedly showed SR/Junctional rhythm with runs of SVT   6. Hx of HTN. Normal BP this admission   7. Hx of HLD: Not on statins   8. Remote ex smoker   9.  Hx of Prostate CA treated with seed
WBC is 18.5, hemoglobin 13.4, hematocrit 43.4, platelets  879,038. INR 3.5. Calcium 8.5, GFR greater than 60. Chest x-ray:   Blunting of the costophrenic angles posteriorly, possibly representing tiny  pleural effusions, left basilar atelectasis. IMPRESSION as per Dr. Janel Arita:  1. Chest pain, atypical.  Doubt cardiac. Lasted several hours with no  acute EKG changes and no rise in cardiac enzymes. 2.  Moderate aortic stenosis on echocardiogram in 02/2018 with normal EF. 3.  History of paroxysmal atrial fibrillation:  On Coumadin therapy with  therapeutic INR. 4.  History of syncopal episode. 5.  History of up to 4.4-second pauses while on Cardizem which was stopped:  48-hour Holter monitor in 03/2018 reportedly showed sinus rhythm/junctional  rhythm with runs of SVT. 6.  History of hypertension. Normal BP this admission. 7.  History of hyperlipidemia:  On statins. 8.  Remote ex-smoker. 9.  History of prostate cancer treated with seed implants. 10.  Leukocytosis. Myeloproliferative disorder. PLAN as per Dr. Janel Arita:  1. Start low-dose aspirin therapy. 2.  Sublingual nitroglycerin p.r.n.  3.  Consider low-dose beta-blocker therapy. 4.  May be discharged from Cardiology standpoint when okay with others. 5.  Cardiology will sign off. Please call if needed. LAURA Geronimo     D: 08/18/2018 11:07:51       T: 08/18/2018 11:12:32     [email or web address]  Job#: [id number]     Doc#: [id number]    CC:    I personally and independently saw and examined patient and reviewed all done pertinent laboratory data, imaging studies, ECGs and rhythm strips. I have reviewed and agree with the APN history and physical exam as documented in the above note.     Electronically signed by Susy Felix MD on 8/19/2018 at 1:01 PM

## 2018-08-19 NOTE — DISCHARGE SUMMARY
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)    Critical lower limb ischemia    Hx of right BKA ([address]    Hyperlipidemia    Hypertension    Lymphocytosis    Skin ulcer of left knee with fat layer exposed ([address]    Syncope and collapse    Wound infection    Blurry vision, bilateral    PAF (paroxysmal atrial fibrillation) ([address]    Anticoagulated on Coumadin    Aortic stenosis, moderate    Sinus node dysfunction ([address]    Peripheral arterial disease ([address]    Sepsis due to pneumonia ([address]    Chest pain       Admission Condition:  fair    Discharged Condition:  fair    Hospital Course:   See chart for clinical details. Discharge Medications:    See medication reconciliation under discharge insructions. Consults:  cardiology    Significant Diagnostic Studies:  labs: negative cardiac enzymes. Treatments: Will add low dose ASA to medical regimen.     Discharge Exam:  /70   Pulse 78   Temp 97.7 °F (36.5 °C) (Oral)   Resp 20   Ht 5' 9\" (1.753 m)   Wt 155 lb 1.6 oz (70.4 kg) Comment: weighed w/ prostetic legs  SpO2 94%   BMI 22.90 kg/m²     General Appearance:    Alert, cooperative, no distress, appears stated age   Head:    Normocephalic, without obvious abnormality, atraumatic   Eyes:    PERRL, conjunctiva/corneas clear, EOM's intact, fundi     benign, both eyes        Ears:

## 2018-08-20 ENCOUNTER — HOSPITAL ENCOUNTER (OUTPATIENT)
Age: 83
Discharge: HOME | End: 2018-08-22
Payer: MEDICARE

## 2018-08-20 ENCOUNTER — TELEPHONE (OUTPATIENT)
Dept: ADMINISTRATIVE | Age: 83
End: 2018-08-20

## 2018-08-20 LAB
INR BLD: 3.3
PROTHROMBIN TIME: 37.8 SEC (ref 9.3–12.4)

## 2018-08-20 PROCEDURE — 85610 PROTHROMBIN TIME: CPT

## 2018-09-04 ENCOUNTER — HOSPITAL ENCOUNTER (OUTPATIENT)
Age: 83
Discharge: HOME | End: 2018-09-06
Payer: MEDICARE

## 2018-09-04 LAB
INR BLD: 3.4
PROTHROMBIN TIME: 38.3 SEC (ref 9.3–12.4)

## 2018-09-04 PROCEDURE — 85610 PROTHROMBIN TIME: CPT

## 2018-09-11 ENCOUNTER — HOSPITAL ENCOUNTER (INPATIENT)
Age: 83
LOS: 1 days | Discharge: HOME | DRG: 303 | End: 2018-09-12
Attending: EMERGENCY MEDICINE | Admitting: FAMILY MEDICINE
Payer: MEDICARE

## 2018-09-11 ENCOUNTER — APPOINTMENT (OUTPATIENT)
Dept: GENERAL RADIOLOGY | Age: 83
DRG: 303 | End: 2018-09-11
Payer: MEDICARE

## 2018-09-11 DIAGNOSIS — Z79.01 ANTICOAGULATED: ICD-10-CM

## 2018-09-11 DIAGNOSIS — R07.9 CHEST PAIN, UNSPECIFIED TYPE: Primary | ICD-10-CM

## 2018-09-11 DIAGNOSIS — I21.4 NSTEMI (NON-ST ELEVATED MYOCARDIAL INFARCTION) (HCC): ICD-10-CM

## 2018-09-11 DIAGNOSIS — Z86.79 HISTORY OF ATRIAL FIBRILLATION: ICD-10-CM

## 2018-09-11 DIAGNOSIS — Z86.79 HISTORY OF PERIPHERAL ARTERIAL DISEASE: ICD-10-CM

## 2018-09-11 LAB
ALBUMIN SERPL-MCNC: 4 G/DL (ref 3.5–5.2)
ALP BLD-CCNC: 58 U/L (ref 40–129)
ALT SERPL-CCNC: 9 U/L (ref 0–40)
ANION GAP SERPL CALCULATED.3IONS-SCNC: 12 MMOL/L (ref 7–16)
ANISOCYTOSIS: ABNORMAL
APTT: 44.6 SEC (ref 24.5–35.1)
AST SERPL-CCNC: 19 U/L (ref 0–39)
BASOPHILS ABSOLUTE: 0.1 E9/L (ref 0–0.2)
BASOPHILS RELATIVE PERCENT: 0.5 % (ref 0–2)
BILIRUB SERPL-MCNC: 0.6 MG/DL (ref 0–1.2)
BUN BLDV-MCNC: 20 MG/DL (ref 8–23)
CALCIUM SERPL-MCNC: 9.2 MG/DL (ref 8.6–10.2)
CHLORIDE BLD-SCNC: 100 MMOL/L (ref 98–107)
CK MB: 5.8 NG/ML (ref 0–7.7)
CO2: 25 MMOL/L (ref 22–29)
CREAT SERPL-MCNC: 1 MG/DL (ref 0.7–1.2)
EOSINOPHILS ABSOLUTE: 0.16 E9/L (ref 0.05–0.5)
EOSINOPHILS RELATIVE PERCENT: 0.8 % (ref 0–6)
GFR AFRICAN AMERICAN: >60
GFR NON-AFRICAN AMERICAN: >60 ML/MIN/1.73
GLUCOSE BLD-MCNC: 102 MG/DL (ref 74–109)
HCT VFR BLD CALC: 47.8 % (ref 37–54)
HEMOGLOBIN: 14.4 G/DL (ref 12.5–16.5)
IMMATURE GRANULOCYTES #: 0.23 E9/L
IMMATURE GRANULOCYTES %: 1.2 % (ref 0–5)
INR BLD: 2.2
LV EF: 48 %
LVEF MODALITY: NORMAL
LYMPHOCYTES ABSOLUTE: 1.85 E9/L (ref 1.5–4)
LYMPHOCYTES RELATIVE PERCENT: 9.7 % (ref 20–42)
MCH RBC QN AUTO: 30.1 PG (ref 26–35)
MCHC RBC AUTO-ENTMCNC: 30.1 % (ref 32–34.5)
MCV RBC AUTO: 100 FL (ref 80–99.9)
MONOCYTES ABSOLUTE: 2.57 E9/L (ref 0.1–0.95)
MONOCYTES RELATIVE PERCENT: 13.5 % (ref 2–12)
NEUTROPHILS ABSOLUTE: 14.19 E9/L (ref 1.8–7.3)
NEUTROPHILS RELATIVE PERCENT: 74.3 % (ref 43–80)
PDW BLD-RTO: 15.1 FL (ref 11.5–15)
PLATELET # BLD: 131 E9/L (ref 130–450)
PMV BLD AUTO: 11.3 FL (ref 7–12)
POLYCHROMASIA: ABNORMAL
POTASSIUM SERPL-SCNC: 4.1 MMOL/L (ref 3.5–5)
PRO-BNP: 6087 PG/ML (ref 0–450)
PROTHROMBIN TIME: 23.9 SEC (ref 9.3–12.4)
RBC # BLD: 4.78 E12/L (ref 3.8–5.8)
SODIUM BLD-SCNC: 137 MMOL/L (ref 132–146)
TOTAL CK: 35 U/L (ref 20–200)
TOTAL PROTEIN: 6.6 G/DL (ref 6.4–8.3)
TROPONIN: 0.05 NG/ML (ref 0–0.03)
WBC # BLD: 19.1 E9/L (ref 4.5–11.5)

## 2018-09-11 PROCEDURE — 99285 EMERGENCY DEPT VISIT HI MDM: CPT

## 2018-09-11 PROCEDURE — G0378 HOSPITAL OBSERVATION PER HR: HCPCS

## 2018-09-11 PROCEDURE — 71045 X-RAY EXAM CHEST 1 VIEW: CPT

## 2018-09-11 PROCEDURE — 85610 PROTHROMBIN TIME: CPT

## 2018-09-11 PROCEDURE — 93005 ELECTROCARDIOGRAM TRACING: CPT

## 2018-09-11 PROCEDURE — 83880 ASSAY OF NATRIURETIC PEPTIDE: CPT

## 2018-09-11 PROCEDURE — 36415 COLL VENOUS BLD VENIPUNCTURE: CPT

## 2018-09-11 PROCEDURE — 82550 ASSAY OF CK (CPK): CPT

## 2018-09-11 PROCEDURE — APPSS180 APP SPLIT SHARED TIME > 60 MINUTES: Performed by: NURSE PRACTITIONER

## 2018-09-11 PROCEDURE — 2060000000 HC ICU INTERMEDIATE R&B

## 2018-09-11 PROCEDURE — 84484 ASSAY OF TROPONIN QUANT: CPT

## 2018-09-11 PROCEDURE — 82553 CREATINE MB FRACTION: CPT

## 2018-09-11 PROCEDURE — 85730 THROMBOPLASTIN TIME PARTIAL: CPT

## 2018-09-11 PROCEDURE — 99222 1ST HOSP IP/OBS MODERATE 55: CPT | Performed by: INTERNAL MEDICINE

## 2018-09-11 PROCEDURE — 93306 TTE W/DOPPLER COMPLETE: CPT

## 2018-09-11 PROCEDURE — 80053 COMPREHEN METABOLIC PANEL: CPT

## 2018-09-11 PROCEDURE — 6370000000 HC RX 637 (ALT 250 FOR IP): Performed by: INTERNAL MEDICINE

## 2018-09-11 PROCEDURE — 85025 COMPLETE CBC W/AUTO DIFF WBC: CPT

## 2018-09-11 RX ORDER — LANOLIN ALCOHOL/MO/W.PET/CERES
3 CREAM (GRAM) TOPICAL NIGHTLY PRN
Status: DISCONTINUED | OUTPATIENT
Start: 2018-09-11 | End: 2018-09-12 | Stop reason: HOSPADM

## 2018-09-11 RX ORDER — NITROGLYCERIN 0.4 MG/1
0.4 TABLET SUBLINGUAL EVERY 5 MIN PRN
Status: DISCONTINUED | OUTPATIENT
Start: 2018-09-11 | End: 2018-09-12 | Stop reason: HOSPADM

## 2018-09-11 RX ORDER — ATORVASTATIN CALCIUM 40 MG/1
40 TABLET, FILM COATED ORAL NIGHTLY
Status: DISCONTINUED | OUTPATIENT
Start: 2018-09-11 | End: 2018-09-12 | Stop reason: HOSPADM

## 2018-09-11 RX ORDER — ASPIRIN 81 MG/1
81 TABLET ORAL DAILY
Status: DISCONTINUED | OUTPATIENT
Start: 2018-09-11 | End: 2018-09-12 | Stop reason: HOSPADM

## 2018-09-11 RX ORDER — HYDROXYUREA 500 MG/1
500 CAPSULE ORAL EVERY MORNING
Status: DISCONTINUED | OUTPATIENT
Start: 2018-09-11 | End: 2018-09-12 | Stop reason: HOSPADM

## 2018-09-11 RX ORDER — UREA 10 %
2 LOTION (ML) TOPICAL NIGHTLY PRN
Status: DISCONTINUED | OUTPATIENT
Start: 2018-09-11 | End: 2018-09-12 | Stop reason: HOSPADM

## 2018-09-11 RX ORDER — PANTOPRAZOLE SODIUM 40 MG/1
40 TABLET, DELAYED RELEASE ORAL
COMMUNITY

## 2018-09-11 RX ORDER — PANTOPRAZOLE SODIUM 40 MG/1
40 TABLET, DELAYED RELEASE ORAL
Status: DISCONTINUED | OUTPATIENT
Start: 2018-09-12 | End: 2018-09-12 | Stop reason: HOSPADM

## 2018-09-11 RX ORDER — METOPROLOL SUCCINATE 50 MG/1
50 TABLET, EXTENDED RELEASE ORAL DAILY
Status: DISCONTINUED | OUTPATIENT
Start: 2018-09-11 | End: 2018-09-12 | Stop reason: HOSPADM

## 2018-09-11 RX ADMIN — METOPROLOL SUCCINATE 50 MG: 50 TABLET, EXTENDED RELEASE ORAL at 12:46

## 2018-09-11 RX ADMIN — ASPIRIN 81 MG: 81 TABLET, COATED ORAL at 12:46

## 2018-09-11 RX ADMIN — ATORVASTATIN CALCIUM 40 MG: 40 TABLET, FILM COATED ORAL at 20:08

## 2018-09-11 RX ADMIN — NITROGLYCERIN 1 INCH: 20 OINTMENT TOPICAL at 12:47

## 2018-09-11 RX ADMIN — NITROGLYCERIN 1 INCH: 20 OINTMENT TOPICAL at 18:19

## 2018-09-11 RX ADMIN — NITROGLYCERIN 1 INCH: 20 OINTMENT TOPICAL at 23:31

## 2018-09-11 ASSESSMENT — PAIN DESCRIPTION - FREQUENCY
FREQUENCY: CONTINUOUS
FREQUENCY: INTERMITTENT

## 2018-09-11 ASSESSMENT — PAIN SCALES - GENERAL
PAINLEVEL_OUTOF10: 0
PAINLEVEL_OUTOF10: 4
PAINLEVEL_OUTOF10: 0
PAINLEVEL_OUTOF10: 0
PAINLEVEL_OUTOF10: 10

## 2018-09-11 ASSESSMENT — PAIN DESCRIPTION - ONSET: ONSET: ON-GOING

## 2018-09-11 ASSESSMENT — PAIN DESCRIPTION - ORIENTATION
ORIENTATION: LEFT
ORIENTATION: LEFT

## 2018-09-11 ASSESSMENT — PAIN DESCRIPTION - PAIN TYPE
TYPE: ACUTE PAIN
TYPE: ACUTE PAIN

## 2018-09-11 ASSESSMENT — PAIN DESCRIPTION - PROGRESSION
CLINICAL_PROGRESSION: NOT CHANGED
CLINICAL_PROGRESSION: NOT CHANGED

## 2018-09-11 ASSESSMENT — PAIN DESCRIPTION - LOCATION
LOCATION: CHEST
LOCATION: CHEST

## 2018-09-11 ASSESSMENT — PAIN DESCRIPTION - DESCRIPTORS
DESCRIPTORS: BURNING;ACHING
DESCRIPTORS: PATIENT UNABLE TO DESCRIBE

## 2018-09-11 NOTE — CONSULTS
Full Consult Dictated. Job #[id number].     Electronically signed by LAURA Chavez CNP on 9/11/18 at 1:02 PM

## 2018-09-11 NOTE — ED PROVIDER NOTES
surgical history that includes ECHO Complete 2D W Doppler W Color (1/25/2012); Tooth Extraction; Appendectomy; and Leg amputation below knee (Bilateral). Social History:  reports that he quit smoking about 39 years ago. His smoking use included Cigars and Cigarettes. He quit after 30.00 years of use. He has never used smokeless tobacco. He reports that he drinks about 3.0 oz of alcohol per week . He reports that he does not use drugs. Family History: family history includes Cancer in his sister; Heart Disease in his father; Stroke in his father. The patients home medications have been reviewed. Allergies: Patient has no known allergies. ---------------------------------------------------PHYSICAL EXAM--------------------------------------    Constitutional/General: Alert and oriented x3, well appearing, non toxic in NAD  Head: Normocephalic and atraumatic  Eyes: PERRL, EOMI  Mouth: Oropharynx clear, handling secretions  Neck: Supple, full ROM  Respiratory: Lungs clear to auscultation bilaterally, no wheezes, rales, or rhonchi. Not in respiratory distress  Cardiovascular:  Regular rate. Regular rhythm. No murmurs, gallops, or rubs. 2+ distal pulses  Chest: No chest wall tenderness  GI:  Abdomen Soft, Non tender, Non distended. +BS. No rebound, guarding, or rigidity. No pulsatile masses. Musculoskeletal: Moves all extremities. Has bilateral BKA. Warm and well perfused, no edema. Integument: skin warm and dry. No rashes. Neurologic: GCS 15, no focal deficits, symmetric strength 5/5 in the upper and lower extremities bilaterally  Psychiatric: Normal Affect      -------------------------------------------------- RESULTS -------------------------------------------------  I have personally reviewed all laboratory and imaging results for this patient. Results are listed below.      LABS:  Results for orders placed or performed during the hospital encounter of 09/11/18   CBC Auto Differential pain. Differential diagnosis includes, but is not limited to, ACS, unstable angina, CHF, pleural edema/effusion, PNA, bronchitis/URI, pericardial effusion, pericarditis/myocarditis, esophagitis, GERD, PUD, gastritis, anxiety, pleurisy, and costochondritis. EKG showing TWI and changes compared to most recent on 8/17/18. Pt with normal CMP. Elevated WBC count of 19.1 on CBC, which is chronically elevated. BNP elevated at 6,087 and troponin elevated at 0.05. INR therapeutic at 2.2. CXR stable. Given persistent chest pain, elevated troponin, and EKG changes, concerned for ischemic etiology for chest pain. Pt to be admitted with Cardiology consult. Pt updated and agreeable with the plan. This patient's ED course included: a personal history and physicial examination, re-evaluation prior to disposition, cardiac monitoring and continuous pulse oximetry    This patient has remained hemodynamically stable during their ED course. Consultations:  Cardiology     Counseling: The emergency provider has spoken with the patient and discussed todays results, in addition to providing specific details for the plan of care and counseling regarding the diagnosis and prognosis. Questions are answered at this time and they are agreeable with the plan.       --------------------------------- IMPRESSION AND DISPOSITION ---------------------------------    IMPRESSION  1. Chest pain, unspecified type    2. NSTEMI (non-ST elevated myocardial infarction) ([facility]    3. History of atrial fibrillation    4. History of peripheral arterial disease    5. Anticoagulated        DISPOSITION  Disposition: Admit to telemetry  Patient condition is fair    SCRIBE ATTESTATION  9/11/18, 8:25 AM.    This note is prepared by Carolyn Locke acting as Scribe for Dillan Fenton MD.    Dillan Fenton MD:  The scribe's documentation has been prepared under my direction and personally reviewed by me in its entirety.   I confirm that the note above accurately reflects all work, treatment, procedures, and medical decision making performed by me.             Negro Reagan MD  09/12/18 [id number]

## 2018-09-11 NOTE — ED NOTES
Report faxed to [phone] ready for transport to bed 403.      Rober Salvador RN  09/11/18 2012

## 2018-09-11 NOTE — CONSULTS
[address] [address]                                   CONSULTATION    PATIENT NAME: Ileana De La Vega                  :        1929  MED REC NO:   [id number]                            ROOM:       0403  ACCOUNT NO:   [de-identified]                           ADMIT DATE: 2018  PROVIDER:     Jasmyne Munoz DO    CONSULT DATE:  2018    CONSULTANT:  Jasmyne Munoz DO.    REQUESTING PHYSICIAN:  Dulce Maria Villegas DO.    REASON FOR CONSULTATION:  For chest pain. HISTORY OF PRESENT ILLNESS:  The patient is an 80-year-old  male  who is previously known to Dr. Mony Silva; most recently seen in the hospital  consultation on 2018 with Dr. Claritza Gant. During that hospital admission,  the patient had complaints of left-sided chest pain that he described as  \"heart burn\" for which, he reports taking Protonix. He was started on it a  few days prior to that admission and had no relief. His pain at that time  was no different with exertion, eating or drinking, and he denied any  accompanying shortness of breath, diaphoresis, nausea, vomiting, dizziness,  or feeling lightheaded at that time. He was evaluated by Cardiology and  was noted to have negative cardiac enzymes with no acute EKG changes,  therefore no additional cardiac testing was ordered during that admission. Past medical history includes hypertension; hyperlipidemia; paroxysmal  AFib, on chronic anticoagulation with Coumadin; peripheral vascular  disease, status post bilateral BKAs; current tobacco abuse; prostate  cancer, status post seed implantation; history of bradycardia with a 4.4  second pause while on Cardizem (discontinued on 2018); a recent Holter  monitor showing normal sinus rhythm with intermittent junctional rhythm  with multiple runs of SVT; reported current marijuana use per the patient's  wife.     The patient presented to [facility]. beats  per minutes. In the Emergency Department, he was given aspirin 81 mg,  Toprol-XL 50 mg, and nitroglycerin paste 1 inch. Cardiology was consulted  in the Emergency Department for chest pain. Upon initial consultation, the  patient is currently sitting up in bed and appears to be in no acute  distress. He reported having two episodes of left chest wall burning since  he arrived to the Emergency Department, again with no radiating pain or  associated symptoms. Vital signs have remained stable and currently on  telemetry monitor. He is in normal sinus rhythm. PAST MEDICAL/PAST SURGICAL HISTORY:  1. Hypertension. 2.  Hyperlipidemia. 3.  Paroxysmal atrial fibrillation:  A. Chronic anticoagulation with Coumadin. 4.  Moderate aortic stenosis:  A.  Echocardiogram on 11/20/2015 showing no wall motion abnormality, EF of  13%, stage I diastolic dysfunction. Left atrium is mildly enlarged, mild  AS. The aortic valve area is 1.8 cm squared with a maximum gradient of  _____ mmHg and a mean gradient of 10 mmHg. B:  Echocardiogram on 02/26/2018 (interpreted by Dr. Carlos Garcia) showing  moderately thickened trileaflet aortic valve with decreased excursion, mild  concentric LVH. EF visually estimated at 50% to 60%. Left atrium is  normal.  AV peak velocity of 2.6 mm per second, PROSPER 1.112 cm squared, AV  possibly bicuspid, mild aortic stenosis, no pulmonary hypertension, no  pericardial effusion. 5.  Nuclear medicine cardiac perfusion scan on 03/21/2012:  No evidence of  ischemia or infarction. Normal global and regional left ventricular  function. 6.  Peripheral vascular disease:  A. Developed ischemic right lower extremity and underwent a right BKA at  [facility] on 01/04/2016. B. Left lower extremity gangrene, status post left BKA, 02/2017 at [facility]. Prior to that, had delayed healing of left patellar wound. 7.  Current tobacco use. He smokes one cigar per day.   8.  History of prostate cancer, status post seed implantation in 2004, on  Hydrea. 9.  Status post appendectomy. 10.  Status post cancerous growth removal from scalp on 02/21/2018. 11. [address], 02/25/2018 with a \"syncopal  episode\" with visual changes, diaphoresis, disoriented and pale, watching  TV, sinus rhythm on EKG. Cardiac enzymes x2 negative. 2 - 4.4, second  pauses and bradycardia, on p.o. Cardizem. Cardizem stopped on 02/25/2018. 12.  A 48-hour Holter monitor on 03/07/2018 showing sinus rhythm with  intermittent junctional rhythm. UT and QRS within normal limits. 18 runs  of SVT. No diarrhea _____. No patient triggered events. 13.  Intermittent marijuana use per the patient's wife. 14.  Myeloproliferative disease. [facility] admission on 08/17/2018 with chest pain. The patient had left-sided burning and discomfort. No associated symptoms. No rise in cardiac enzymes and no EKG changes. No further testing was  needed at that time. FAMILY HISTORY:  Noncontributory due to advanced age. SOCIAL HISTORY:  The patient reported that he smokes a cigar usually once  per day. He occasionally has a glass of wine when he goes out to dinner,  and he occasionally uses marijuana, reported per the patient's wife. Caffeine intake includes three to four cups of coffee daily. ALLERGIES:  No known allergies. HOME MEDICATIONS:  Include melatonin, Protonix, hydroxyurea, and Coumadin. The patient has a p.r.n. prescription for nitroglycerin sublingual.    CURRENT HOSPITAL MEDICATIONS:  Include aspirin 81 mg daily, Lipitor 40 mg  daily, Toprol-XL 50 mg daily, and nitroglycerin paste 1 inch every 6 hours. REVIEW OF SYSTEMS:  CONSTITUTIONAL:  He denies any fevers, chills, night sweats, weight loss,  or fatigue. HEENT:  Denies headaches, nose bleeds, blurred vision, oral pain, abscess,  or lesion. MUSCULOSKELETAL:  Denies any falls.   Denies any pain in his bilateral lower  extremities with dermatitis or ulcerations. NEUROLOGIC AND PSYCHIATRIC:  The patient is alert and oriented x3. Speech  is clear and appropriate. Follows all commands and has a very pleasant  affect. LABORATORY DATA AND X-RAY STUDIES:  Most recent laboratory results on  09/11/2018:  Troponin was 0.05 with no rise in CK or CK-MB. ProBNP was  6087. Sodium 137, potassium 4.1, BUN 20, creatinine 1.0, white blood count  19.1, hemoglobin 14.4, hematocrit 47.8, and platelet count of 981. INR of  2.2. For remainder of diagnostic and laboratory tests, please see history  of present illness. Wing Baldwin DO    D: 09/11/2018 13:01:28       T: 09/11/2018 14:18:13     [id number]  Job#: [id number]     Doc#: [id number]    CC:    I independently performed an evaluation on the patient. I have reviewed the above documentation completed by the advance practitioner. Please see my additional contributions to the HPI, physical exam, assessment and medical decision making.     See accompanying documentation for full consult.     ASSESSMENT AND PLAN:  1. Chest pain/Elevated troponin/Elevated BNP:   ASA/statin/BB/nitrate.     Serial CE.     Stress versus cath.     Echo given CP/elevated BNP/AS.     2. VHD: Moderate AS by echo 2/18.      3. PAF: On therapeutic warfarin.      4. Hx of syncope and pauses     5. HTN: Liv May D.O.   Cardiologist  Cardiology, [address] [place] [address]

## 2018-09-11 NOTE — ED NOTES
Alicia Auguste is a 80 y.o. male who presented to the ED on 09/11/18 complaining of   Chief Complaint   Patient presents with    Chest Pain     L on and off, this episode onset 0300       Patient was evaluated at the main on 08/17/18 and dx with reflux, patient states that the pain has continued and wants re eval.      Rodríguez Najera RN  09/11/18 [id number]

## 2018-09-11 NOTE — CONSULTS
I independently performed an evaluation on the patient. I have reviewed the above documentation completed by the advance practitioner. Please see my additional contributions to the HPI, physical exam, assessment and medical decision making. See accompanying documentation for full consult. ASSESSMENT AND PLAN:  1. Chest pain/Elevated troponin/Elevated BNP:   ASA/statin/BB/nitrate. Serial CE. Stress versus cath. Echo given CP/elevated BNP/AS. 2. VHD: Moderate AS by echo 2/18. 3. PAF: On therapeutic warfarin. 4. Hx of syncope and pauses    5. HTN: Observe. Dariana Slaughter D.O.   Cardiologist  Cardiology, [facility]

## 2018-09-12 ENCOUNTER — APPOINTMENT (OUTPATIENT)
Dept: NUCLEAR MEDICINE | Age: 83
DRG: 303 | End: 2018-09-12
Payer: MEDICARE

## 2018-09-12 VITALS
WEIGHT: 140.5 LBS | DIASTOLIC BLOOD PRESSURE: 64 MMHG | BODY MASS INDEX: 20.81 KG/M2 | SYSTOLIC BLOOD PRESSURE: 103 MMHG | HEART RATE: 70 BPM | HEIGHT: 69 IN | OXYGEN SATURATION: 94 % | TEMPERATURE: 97.6 F | RESPIRATION RATE: 18 BRPM

## 2018-09-12 LAB
INR BLD: 2.2
LV EF: 56 %
LVEF MODALITY: NORMAL
PROTHROMBIN TIME: 24.6 SEC (ref 9.3–12.4)
TROPONIN: 0.08 NG/ML (ref 0–0.03)

## 2018-09-12 PROCEDURE — 84484 ASSAY OF TROPONIN QUANT: CPT

## 2018-09-12 PROCEDURE — 85610 PROTHROMBIN TIME: CPT

## 2018-09-12 PROCEDURE — A9500 TC99M SESTAMIBI: HCPCS | Performed by: RADIOLOGY

## 2018-09-12 PROCEDURE — 36415 COLL VENOUS BLD VENIPUNCTURE: CPT

## 2018-09-12 PROCEDURE — 99233 SBSQ HOSP IP/OBS HIGH 50: CPT | Performed by: INTERNAL MEDICINE

## 2018-09-12 PROCEDURE — G0378 HOSPITAL OBSERVATION PER HR: HCPCS

## 2018-09-12 PROCEDURE — 6360000002 HC RX W HCPCS: Performed by: INTERNAL MEDICINE

## 2018-09-12 PROCEDURE — 6370000000 HC RX 637 (ALT 250 FOR IP): Performed by: INTERNAL MEDICINE

## 2018-09-12 PROCEDURE — 3430000000 HC RX DIAGNOSTIC RADIOPHARMACEUTICAL: Performed by: RADIOLOGY

## 2018-09-12 PROCEDURE — 78452 HT MUSCLE IMAGE SPECT MULT: CPT

## 2018-09-12 PROCEDURE — 6370000000 HC RX 637 (ALT 250 FOR IP): Performed by: FAMILY MEDICINE

## 2018-09-12 PROCEDURE — 93017 CV STRESS TEST TRACING ONLY: CPT

## 2018-09-12 PROCEDURE — 96374 THER/PROPH/DIAG INJ IV PUSH: CPT

## 2018-09-12 RX ORDER — ASPIRIN 81 MG/1
81 TABLET ORAL DAILY
Qty: 30 TABLET | Refills: 3 | Status: SHIPPED | OUTPATIENT
Start: 2018-09-13 | End: 2019-02-08

## 2018-09-12 RX ORDER — ISOSORBIDE MONONITRATE 30 MG/1
30 TABLET, EXTENDED RELEASE ORAL DAILY
Status: DISCONTINUED | OUTPATIENT
Start: 2018-09-12 | End: 2018-09-12 | Stop reason: HOSPADM

## 2018-09-12 RX ORDER — ISOSORBIDE MONONITRATE 30 MG/1
30 TABLET, EXTENDED RELEASE ORAL DAILY
Qty: 30 TABLET | Refills: 3 | Status: ON HOLD | OUTPATIENT
Start: 2018-09-12 | End: 2020-08-10 | Stop reason: HOSPADM

## 2018-09-12 RX ORDER — ATORVASTATIN CALCIUM 40 MG/1
40 TABLET, FILM COATED ORAL NIGHTLY
Qty: 30 TABLET | Refills: 3 | Status: SHIPPED | OUTPATIENT
Start: 2018-09-12 | End: 2019-09-20 | Stop reason: SDUPTHER

## 2018-09-12 RX ORDER — METOPROLOL SUCCINATE 50 MG/1
50 TABLET, EXTENDED RELEASE ORAL DAILY
Qty: 30 TABLET | Refills: 3 | Status: SHIPPED | OUTPATIENT
Start: 2018-09-13

## 2018-09-12 RX ADMIN — Medication 30 MILLICURIE: at 12:48

## 2018-09-12 RX ADMIN — REGADENOSON 0.4 MG: 0.08 INJECTION, SOLUTION INTRAVENOUS at 13:08

## 2018-09-12 RX ADMIN — ISOSORBIDE MONONITRATE 30 MG: 30 TABLET, EXTENDED RELEASE ORAL at 15:37

## 2018-09-12 RX ADMIN — Medication 10 MILLICURIE: at 12:48

## 2018-09-12 RX ADMIN — HYDROXYUREA 500 MG: 500 CAPSULE ORAL at 09:03

## 2018-09-12 RX ADMIN — NITROGLYCERIN 1 INCH: 20 OINTMENT TOPICAL at 05:27

## 2018-09-12 ASSESSMENT — PAIN SCALES - GENERAL
PAINLEVEL_OUTOF10: 0
PAINLEVEL_OUTOF10: 0

## 2018-09-12 NOTE — CARE COORDINATION
Social Work/Discharge Planning:  Met with patient and his wife Hazel Blood and completed initial assessment. Explained Social Work role and discussed transition of care/discharge planning. Patient lives with his wife in a one story house with a ramp to enter. PTA patient independent with a cane. Patient has a front wheeled walker, shower chair, wheelchair and a bedside commode. Patient denies any need for home health care at discharge. Will continue to follow and assist with discharge planning.   Electronically signed by CECIL Dee on 9/12/2018 at 11:13 AM

## 2018-09-12 NOTE — PROGRESS NOTES
P Quality Flow/Interdisciplinary Rounds Progress Note        Quality Flow Rounds held on September 12, 2018    Disciplines Attending:  Bedside Nurse, ,  and Nursing Unit [facility] Ethan Duque was admitted on 9/11/2018  7:50 AM    Anticipated Discharge Date:  Expected Discharge Date: 09/14/18    Disposition:    Liborio Score:  Liborio Scale Score: 20    Readmission Risk              Risk of Unplanned Readmission:        12             Discussed patient goal for the day, patient clinical progression, and barriers to discharge.   The following Goal(s) of the Day/Commitment(s) have been identified:    Stress vs heart cath      [facility]  September 12, 2018

## 2018-09-12 NOTE — PROGRESS NOTES
Dulce Maria Buccino, DO   500 mg at 09/12/18 0903    pantoprazole (PROTONIX) tablet 40 mg  40 mg Oral QAM AC Dulce Maria Buccino, DO        melatonin tablet 3 mg  3 mg Oral Nightly PRN Dulce Maria Buccino, DO        And    melatonin tablet 2 mg  2 mg Oral Nightly PRN Dulce Maria Buccino, DO           Review of systems:   Heart: as above   Lungs: as above   Eyes: denies changes in vision or discharge. Ears: denies changes in hearing or pain. Nose: denies epistaxis or masses   Throat: denies sore throat or trouble swallowing. Neuro: denies numbness, tingling, tremors. Skin: denies rashes or itching. : denies hematuria, dysuria   GI: denies vomiting, diarrhea   Psych: denies mood changed, anxiety, depression. Physical Exam   /65   Pulse 68   Temp 97.6 °F (36.4 °C) (Oral)   Resp 16   Ht 5' 9\" (1.753 m)   Wt 140 lb 8 oz (63.7 kg)   SpO2 94%   BMI 20.75 kg/m²   Constitutional: Oriented to person, place, and time. No distress. Well developed. Head: Normocephalic and atraumatic. Neck: Neck supple. No hepatojugular reflux. No JVD present. Carotid bruit is not present. No tracheal deviation present. No thyromegaly present. Cardiovascular: Normal rate, regular rhythm, normal heart sounds. intact distal pulses. No gallop and no friction rub. No murmur heard. Pulmonary: Breath sounds normal. No respiratory distress. No wheezes. No rales. Chest: Effort normal. No tenderness. Abdominal: Soft. Bowel sounds are normal. No distension or mass. No tenderness, rebound or guarding. Musculoskeletal: . No tenderness. No clubbing or cyanosis. Extremitites: Intact distal pulses. No edema  Neurological: Alert and oriented to person, place, and time. Skin: Skin is warm and dry. No rash noted. Not diaphoretic. No erythema. Psychiatric: Normal mood and affect. Behavior is normal.   Lymphadenopathy: No cervical adenopathy. No groin adenopathy.       CBC:   Lab Results   Component Value Date    WBC 19.1 09/11/2018    RBC 4.78 09/11/2018    HGB 14.4 09/11/2018    HCT 47.8 09/11/2018    .0 09/11/2018    MCH 30.1 09/11/2018    MCHC 30.1 09/11/2018    RDW 15.1 09/11/2018     09/11/2018    MPV 11.3 09/11/2018     BMP:   Lab Results   Component Value Date     09/11/2018    K 4.1 09/11/2018     09/11/2018    CO2 25 09/11/2018    BUN 20 09/11/2018    LABALBU 4.0 09/11/2018    LABALBU 4.1 01/24/2012    CREATININE 1.0 09/11/2018    CALCIUM 9.2 09/11/2018    GFRAA >60 09/11/2018    LABGLOM >60 09/11/2018     Magnesium:  No results found for: MG  Cardiac Enzymes:   Lab Results   Component Value Date    CKTOTAL 35 09/11/2018    CKTOTAL 20 08/18/2018    CKTOTAL 30 02/25/2018    CKMB 5.8 09/11/2018    CKMB 1.4 08/18/2018    CKMB 1.3 02/25/2018    TROPONINI 0.05 (H) 09/11/2018    TROPONINI <0.01 08/18/2018    TROPONINI <0.01 08/17/2018      PT/INR:    Lab Results   Component Value Date    PROTIME 24.6 09/12/2018    PROTIME 16.0 02/02/2012    INR 2.2 09/12/2018     TSH:    Lab Results   Component Value Date    TSH 1.056 01/24/2012     Rhythm Strip: normal sinus rhythm. Echo Summary 9/11/18:   Ejection fraction is visually estimated at 45-50%.   The apex is hypokinetic.   Mild left ventricular concentric hypertrophy noted.  [place] is doppler evidence of stage I diastolic dysfunction.   Normal right ventricle structure and function.   Left atrial volume index of 34 ml per meters squared BSA.   Mild aortic stenosis is present.   Physiologic and/or trace mitral regurgitation is present.     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 Chest pain/Elevated troponin/Elevated BNP:   ASA/statin/BB/nitrate.     Serial CE marginally elevated. Echo with wall motion abnormality at apex. INR still 2.2 today.     Stress to risk stratify today. If significant burden of ischemia then cath.     2. VHD: Mild AS by echo 9/11/18.      3. PAF: Typically on therapeutic warfarin.      4. Hx of syncope and pauses     5. HTN: Jose Gonzalez D.O. Cardiologist  Cardiology, [facility]icians    Addendum:    Low risk stress. Advanced age and deconditioned. Medically manage, if further angina on medical therapy then consider cath. Abel Crocker D.O.   Cardiologist  Cardiology, [facility]

## 2018-09-12 NOTE — CARE COORDINATION
MET  WITH  PT  AT  BEDSIDE;  INTRODUCED  MYSELF  AS AN RN  CASE  MANAGER  AND  EXPLAINED  MY  ROLE. DISCUSSED  CURRENT  COURSE OF  TREATMENT/PLAN  OF  CARE. PT  AWAITING  RESULTS OF  STRESS T EST  TAKEN  TODAY; POSSIBLE  DISCHARGE  IF  NEGATIVE,  PT  BILATERAL  BKA  WITH  PROSTHESIS. USES  CANES/WALKER.  VERY  ACTIVE, TRAVELS  TO  JONNY  AND  OTHER  CASINOS  FREQUENTLY. PLAN IS  FOR  HOME  AT  DISCHARGE. WILL  FOLLOW  FOR  ANY  NEEDS . Opal Crump RN,CM.

## 2018-09-12 NOTE — PROGRESS NOTES
Spoke with Felicia Jj, NP with cardiology regarding patient stress test results. States Dr. Chivo Farr will review medications and patient is okay to discharge. Updated , states to have patient see him in one week.

## 2018-09-15 LAB
EKG ATRIAL RATE: 77 BPM
EKG P AXIS: 41 DEGREES
EKG P-R INTERVAL: 164 MS
EKG Q-T INTERVAL: 374 MS
EKG QRS DURATION: 78 MS
EKG QTC CALCULATION (BAZETT): 423 MS
EKG R AXIS: 13 DEGREES
EKG T AXIS: -19 DEGREES
EKG VENTRICULAR RATE: 77 BPM

## 2018-09-19 ENCOUNTER — HOSPITAL ENCOUNTER (OUTPATIENT)
Age: 83
Discharge: HOME | End: 2018-09-21
Payer: MEDICARE

## 2018-09-19 LAB
INR BLD: 3.7
PROTHROMBIN TIME: 41.6 SEC (ref 9.3–12.4)

## 2018-09-19 PROCEDURE — 85610 PROTHROMBIN TIME: CPT

## 2018-09-26 ENCOUNTER — HOSPITAL ENCOUNTER (OUTPATIENT)
Age: 83
Discharge: HOME | End: 2018-09-28
Payer: MEDICARE

## 2018-09-26 LAB
ALBUMIN SERPL-MCNC: 4.2 G/DL (ref 3.5–5.2)
ALP BLD-CCNC: 81 U/L (ref 40–129)
ALT SERPL-CCNC: 15 U/L (ref 0–40)
ANION GAP SERPL CALCULATED.3IONS-SCNC: 24 MMOL/L (ref 7–16)
ANISOCYTOSIS: ABNORMAL
AST SERPL-CCNC: 29 U/L (ref 0–39)
BASOPHILS ABSOLUTE: 0 E9/L (ref 0–0.2)
BASOPHILS RELATIVE PERCENT: 0.4 % (ref 0–2)
BILIRUB SERPL-MCNC: 0.7 MG/DL (ref 0–1.2)
BUN BLDV-MCNC: 20 MG/DL (ref 8–23)
BURR CELLS: ABNORMAL
CALCIUM SERPL-MCNC: 9.3 MG/DL (ref 8.6–10.2)
CHLORIDE BLD-SCNC: 100 MMOL/L (ref 98–107)
CHOLESTEROL, TOTAL: 90 MG/DL (ref 0–199)
CO2: 19 MMOL/L (ref 22–29)
CREAT SERPL-MCNC: 1.1 MG/DL (ref 0.7–1.2)
EOSINOPHILS ABSOLUTE: 0 E9/L (ref 0.05–0.5)
EOSINOPHILS RELATIVE PERCENT: 0.7 % (ref 0–6)
GFR AFRICAN AMERICAN: >60
GFR NON-AFRICAN AMERICAN: >60 ML/MIN/1.73
GLUCOSE BLD-MCNC: 27 MG/DL (ref 74–109)
HBA1C MFR BLD: 5.1 % (ref 4–5.6)
HCT VFR BLD CALC: 46.7 % (ref 37–54)
HDLC SERPL-MCNC: 25 MG/DL
HEMOGLOBIN: 13.1 G/DL (ref 12.5–16.5)
LDL CHOLESTEROL CALCULATED: 49 MG/DL (ref 0–99)
LYMPHOCYTES ABSOLUTE: 2.73 E9/L (ref 1.5–4)
LYMPHOCYTES RELATIVE PERCENT: 8.7 % (ref 20–42)
MCH RBC QN AUTO: 30.4 PG (ref 26–35)
MCHC RBC AUTO-ENTMCNC: 28.1 % (ref 32–34.5)
MCV RBC AUTO: 108.4 FL (ref 80–99.9)
MONOCYTES ABSOLUTE: 5.15 E9/L (ref 0.1–0.95)
MONOCYTES RELATIVE PERCENT: 17.4 % (ref 2–12)
NEUTROPHILS ABSOLUTE: 22.42 E9/L (ref 1.8–7.3)
NEUTROPHILS RELATIVE PERCENT: 73.9 % (ref 43–80)
OVALOCYTES: ABNORMAL
PDW BLD-RTO: 16.9 FL (ref 11.5–15)
PLATELET # BLD: 423 E9/L (ref 130–450)
PMV BLD AUTO: 12.1 FL (ref 7–12)
POIKILOCYTES: ABNORMAL
POTASSIUM SERPL-SCNC: 4.5 MMOL/L (ref 3.5–5)
PROSTATE SPECIFIC ANTIGEN: <0.01 NG/ML (ref 0–4)
RBC # BLD: 4.31 E12/L (ref 3.8–5.8)
SODIUM BLD-SCNC: 143 MMOL/L (ref 132–146)
T3 TOTAL: 176.2 NG/DL (ref 80–200)
T3 UPTAKE PERCENT: 35.5 % (ref 22.5–37)
TOTAL PROTEIN: 7 G/DL (ref 6.4–8.3)
TRIGL SERPL-MCNC: 81 MG/DL (ref 0–149)
TSH SERPL DL<=0.05 MIU/L-ACNC: 1.07 UIU/ML (ref 0.27–4.2)
VLDLC SERPL CALC-MCNC: 16 MG/DL
WBC # BLD: 30.3 E9/L (ref 4.5–11.5)

## 2018-09-26 PROCEDURE — 84480 ASSAY TRIIODOTHYRONINE (T3): CPT

## 2018-09-26 PROCEDURE — G0103 PSA SCREENING: HCPCS

## 2018-09-26 PROCEDURE — 84479 ASSAY OF THYROID (T3 OR T4): CPT

## 2018-09-26 PROCEDURE — 84443 ASSAY THYROID STIM HORMONE: CPT

## 2018-09-26 PROCEDURE — 83036 HEMOGLOBIN GLYCOSYLATED A1C: CPT

## 2018-09-26 PROCEDURE — 80053 COMPREHEN METABOLIC PANEL: CPT

## 2018-09-26 PROCEDURE — 84436 ASSAY OF TOTAL THYROXINE: CPT

## 2018-09-26 PROCEDURE — 85025 COMPLETE CBC W/AUTO DIFF WBC: CPT

## 2018-09-26 PROCEDURE — 80061 LIPID PANEL: CPT

## 2018-09-27 LAB — T4 TOTAL: 8.2 MCG/DL (ref 4.5–11.7)

## 2018-09-27 NOTE — DISCHARGE SUMMARY
[address] [address]                                 DISCHARGE SUMMARY    PATIENT NAME: Kale Thornton                  :        1929  MED REC NO:   [id number]                            ROOM:       0403  ACCOUNT NO:   [de-identified]                           ADMIT DATE: 2018  PROVIDER:     Santos Claros DO                  [facility], [place], operated by [facility] DATE: 2018    This is an observation status. FINAL DIAGNOSES:  1. Chest pain secondary to coronary artery disease with unstable angina  with Cardiology consult, stress test, aspirin, statin, beta-blocker, and  nitrate. 2.  History of aortic stenosis, moderate. 3.  GERD. 4.  Paroxysmal atrial fibrillation. 5.  Peripheral vascular disease. 6.  Hyperlipidemia. 7.  Myelodysplastic syndrome. CHIEF COMPLAINT, PRESENTING ILLNESS, AND PHYSICAL FINDINGS:  The patient is  an elderly male in his [de-identified] with moderate aortic stenosis, gastroesophageal  reflux disease, paroxysmal atrial fibrillation, peripheral vascular  disease, hyperlipidemia, myelodysplastic syndrome who presents to this  hospital emergency room with a chest pain. His cardiac enzymes were  negative. He was admitted with Cardiology consultation requested. His EKG  failed to reveal any evidence of acute changes. PHYSICAL ASSESSMENT:  VITAL SIGNS:  Reveals his temperature and vital signs to be stable. ENT:  Negative. HEART:  Regular. LUNGS:  Clear. ABDOMEN:  Noted to be soft with positive bowel sounds. EXTREMITIES:  Reveal no evidence for edema and/or cyanosis. HOSPITAL COURSE:  Cardiac enzymes came back negative x3. Cardiology was  consulted. Stress test shows no evidence for ongoing ischemia. Nitrate  was added to his aspirin, statin, and beta-blocker. It was felt that his  chest pain was secondary to coronary artery disease with unstable angina.    The patient was pain free at the time of his

## 2018-10-02 ENCOUNTER — OFFICE VISIT (OUTPATIENT)
Dept: CARDIOLOGY CLINIC | Age: 83
End: 2018-10-02
Payer: MEDICARE

## 2018-10-02 VITALS
WEIGHT: 154 LBS | SYSTOLIC BLOOD PRESSURE: 102 MMHG | BODY MASS INDEX: 22.81 KG/M2 | HEART RATE: 71 BPM | DIASTOLIC BLOOD PRESSURE: 62 MMHG | HEIGHT: 69 IN | RESPIRATION RATE: 14 BRPM

## 2018-10-02 DIAGNOSIS — I35.0 AORTIC STENOSIS, MODERATE: ICD-10-CM

## 2018-10-02 DIAGNOSIS — R07.2 PRECORDIAL PAIN: Primary | ICD-10-CM

## 2018-10-02 DIAGNOSIS — Z79.01 WARFARIN ANTICOAGULATION: ICD-10-CM

## 2018-10-02 DIAGNOSIS — I48.0 PAROXYSMAL ATRIAL FIBRILLATION (HCC): ICD-10-CM

## 2018-10-02 PROCEDURE — G8427 DOCREV CUR MEDS BY ELIG CLIN: HCPCS | Performed by: INTERNAL MEDICINE

## 2018-10-02 PROCEDURE — G8484 FLU IMMUNIZE NO ADMIN: HCPCS | Performed by: INTERNAL MEDICINE

## 2018-10-02 PROCEDURE — G8420 CALC BMI NORM PARAMETERS: HCPCS | Performed by: INTERNAL MEDICINE

## 2018-10-02 PROCEDURE — 1123F ACP DISCUSS/DSCN MKR DOCD: CPT | Performed by: INTERNAL MEDICINE

## 2018-10-02 PROCEDURE — 1111F DSCHRG MED/CURRENT MED MERGE: CPT | Performed by: INTERNAL MEDICINE

## 2018-10-02 PROCEDURE — G8598 ASA/ANTIPLAT THER USED: HCPCS | Performed by: INTERNAL MEDICINE

## 2018-10-02 PROCEDURE — 1036F TOBACCO NON-USER: CPT | Performed by: INTERNAL MEDICINE

## 2018-10-02 PROCEDURE — 1101F PT FALLS ASSESS-DOCD LE1/YR: CPT | Performed by: INTERNAL MEDICINE

## 2018-10-02 PROCEDURE — 99214 OFFICE O/P EST MOD 30 MIN: CPT | Performed by: INTERNAL MEDICINE

## 2018-10-02 PROCEDURE — 93000 ELECTROCARDIOGRAM COMPLETE: CPT | Performed by: INTERNAL MEDICINE

## 2018-10-02 PROCEDURE — 4040F PNEUMOC VAC/ADMIN/RCVD: CPT | Performed by: INTERNAL MEDICINE

## 2018-10-02 NOTE — PROGRESS NOTES
9/11/18 echo results and 90s 12/18 stress test results. Assessment of medication compliance. Discussed issues that would prompt earlier evaluation. Same cardiac medications. Follow-up office visit in 1 year.

## 2018-10-08 ENCOUNTER — HOSPITAL ENCOUNTER (OUTPATIENT)
Age: 83
Discharge: HOME | End: 2018-10-10

## 2018-10-08 PROCEDURE — 88342 IMHCHEM/IMCYTCHM 1ST ANTB: CPT

## 2018-10-08 PROCEDURE — 88305 TISSUE EXAM BY PATHOLOGIST: CPT

## 2018-10-19 ENCOUNTER — HOSPITAL ENCOUNTER (OUTPATIENT)
Age: 83
Discharge: HOME | End: 2018-10-21
Payer: MEDICARE

## 2018-10-19 LAB
ALBUMIN SERPL-MCNC: 3.2 G/DL (ref 3.5–5.2)
ALP BLD-CCNC: 125 U/L (ref 40–129)
ALT SERPL-CCNC: 11 U/L (ref 0–40)
ANION GAP SERPL CALCULATED.3IONS-SCNC: 17 MMOL/L (ref 7–16)
ANISOCYTOSIS: ABNORMAL
AST SERPL-CCNC: 22 U/L (ref 0–39)
BASOPHILS ABSOLUTE: 0.07 E9/L (ref 0–0.2)
BASOPHILS RELATIVE PERCENT: 0.3 % (ref 0–2)
BILIRUB SERPL-MCNC: 0.6 MG/DL (ref 0–1.2)
BUN BLDV-MCNC: 14 MG/DL (ref 8–23)
BURR CELLS: ABNORMAL
CALCIUM SERPL-MCNC: 8.2 MG/DL (ref 8.6–10.2)
CHLORIDE BLD-SCNC: 106 MMOL/L (ref 98–107)
CO2: 19 MMOL/L (ref 22–29)
CREAT SERPL-MCNC: 1.1 MG/DL (ref 0.7–1.2)
EOSINOPHILS ABSOLUTE: 0.24 E9/L (ref 0.05–0.5)
EOSINOPHILS RELATIVE PERCENT: 1.1 % (ref 0–6)
GFR AFRICAN AMERICAN: >60
GFR NON-AFRICAN AMERICAN: >60 ML/MIN/1.73
GLUCOSE BLD-MCNC: 70 MG/DL (ref 74–109)
HCT VFR BLD CALC: 42.6 % (ref 37–54)
HEMOGLOBIN: 12.2 G/DL (ref 12.5–16.5)
HYPOCHROMIA: ABNORMAL
IMMATURE GRANULOCYTES #: 0.27 E9/L
IMMATURE GRANULOCYTES %: 1.2 % (ref 0–5)
INR BLD: 9.9
LYMPHOCYTES ABSOLUTE: 1.57 E9/L (ref 1.5–4)
LYMPHOCYTES RELATIVE PERCENT: 6.9 % (ref 20–42)
MCH RBC QN AUTO: 29.8 PG (ref 26–35)
MCHC RBC AUTO-ENTMCNC: 28.6 % (ref 32–34.5)
MCV RBC AUTO: 104.2 FL (ref 80–99.9)
MONOCYTES ABSOLUTE: 4.5 E9/L (ref 0.1–0.95)
MONOCYTES RELATIVE PERCENT: 19.8 % (ref 2–12)
NEUTROPHILS ABSOLUTE: 16.05 E9/L (ref 1.8–7.3)
NEUTROPHILS RELATIVE PERCENT: 70.7 % (ref 43–80)
OVALOCYTES: ABNORMAL
PDW BLD-RTO: 15.8 FL (ref 11.5–15)
PLATELET # BLD: 497 E9/L (ref 130–450)
PMV BLD AUTO: 11.9 FL (ref 7–12)
POIKILOCYTES: ABNORMAL
POLYCHROMASIA: ABNORMAL
POTASSIUM SERPL-SCNC: 3.9 MMOL/L (ref 3.5–5)
PROTHROMBIN TIME: 108.2 SEC (ref 9.3–12.4)
RBC # BLD: 4.09 E12/L (ref 3.8–5.8)
SCHISTOCYTES: ABNORMAL
SODIUM BLD-SCNC: 142 MMOL/L (ref 132–146)
TEAR DROP CELLS: ABNORMAL
TOTAL PROTEIN: 6.8 G/DL (ref 6.4–8.3)
WBC # BLD: 22.7 E9/L (ref 4.5–11.5)

## 2018-10-19 PROCEDURE — 85025 COMPLETE CBC W/AUTO DIFF WBC: CPT

## 2018-10-19 PROCEDURE — 80053 COMPREHEN METABOLIC PANEL: CPT

## 2018-10-19 PROCEDURE — 95819 EEG AWAKE AND ASLEEP: CPT | Performed by: PSYCHIATRY & NEUROLOGY

## 2018-10-19 PROCEDURE — 85610 PROTHROMBIN TIME: CPT

## 2018-10-24 ENCOUNTER — HOSPITAL ENCOUNTER (OUTPATIENT)
Age: 83
Discharge: HOME | End: 2018-10-26
Payer: MEDICARE

## 2018-10-24 LAB
ANISOCYTOSIS: ABNORMAL
BASOPHILS ABSOLUTE: 0.09 E9/L (ref 0–0.2)
BASOPHILS RELATIVE PERCENT: 0.3 % (ref 0–2)
BURR CELLS: ABNORMAL
EOSINOPHILS ABSOLUTE: 0.24 E9/L (ref 0.05–0.5)
EOSINOPHILS RELATIVE PERCENT: 0.9 % (ref 0–6)
HCT VFR BLD CALC: 39.3 % (ref 37–54)
HEMOGLOBIN: 11.5 G/DL (ref 12.5–16.5)
IMMATURE GRANULOCYTES #: 0.45 E9/L
IMMATURE GRANULOCYTES %: 1.7 % (ref 0–5)
INR BLD: 3.9
LYMPHOCYTES ABSOLUTE: 1.62 E9/L (ref 1.5–4)
LYMPHOCYTES RELATIVE PERCENT: 6 % (ref 20–42)
MCH RBC QN AUTO: 29.3 PG (ref 26–35)
MCHC RBC AUTO-ENTMCNC: 29.3 % (ref 32–34.5)
MCV RBC AUTO: 100.3 FL (ref 80–99.9)
MONOCYTES ABSOLUTE: 5.03 E9/L (ref 0.1–0.95)
MONOCYTES RELATIVE PERCENT: 18.6 % (ref 2–12)
NEUTROPHILS ABSOLUTE: 19.68 E9/L (ref 1.8–7.3)
NEUTROPHILS RELATIVE PERCENT: 72.5 % (ref 43–80)
OVALOCYTES: ABNORMAL
PDW BLD-RTO: 15.6 FL (ref 11.5–15)
PLATELET # BLD: 497 E9/L (ref 130–450)
PMV BLD AUTO: 11.8 FL (ref 7–12)
POIKILOCYTES: ABNORMAL
POLYCHROMASIA: ABNORMAL
PROTHROMBIN TIME: 43.4 SEC (ref 9.3–12.4)
RBC # BLD: 3.92 E12/L (ref 3.8–5.8)
TEAR DROP CELLS: ABNORMAL
WBC # BLD: 27.1 E9/L (ref 4.5–11.5)

## 2018-10-24 PROCEDURE — 85610 PROTHROMBIN TIME: CPT

## 2018-10-24 PROCEDURE — 85025 COMPLETE CBC W/AUTO DIFF WBC: CPT

## 2018-10-29 ENCOUNTER — HOSPITAL ENCOUNTER (OUTPATIENT)
Age: 83
Discharge: HOME | End: 2018-10-31
Payer: MEDICARE

## 2018-10-29 LAB
INR BLD: 1.3
PROTHROMBIN TIME: 15.2 SEC (ref 9.3–12.4)

## 2018-10-29 PROCEDURE — 85610 PROTHROMBIN TIME: CPT

## 2018-12-07 ENCOUNTER — HOSPITAL ENCOUNTER (OUTPATIENT)
Age: 83
Discharge: HOME | End: 2018-12-09
Payer: MEDICARE

## 2018-12-07 DIAGNOSIS — Z79.01 LONG TERM CURRENT USE OF ANTICOAGULANT: ICD-10-CM

## 2018-12-07 DIAGNOSIS — I48.91 ATRIAL FIBRILLATION, UNSPECIFIED TYPE (HCC): ICD-10-CM

## 2018-12-07 LAB
INR BLD: 2.3
PROTHROMBIN TIME: 26 SEC (ref 9.3–12.4)

## 2018-12-07 PROCEDURE — 85610 PROTHROMBIN TIME: CPT

## 2019-01-16 ENCOUNTER — HOSPITAL ENCOUNTER (OUTPATIENT)
Age: 84
Discharge: HOME | End: 2019-01-18
Payer: MEDICARE

## 2019-01-16 DIAGNOSIS — I48.91 ATRIAL FIBRILLATION, UNSPECIFIED TYPE (HCC): ICD-10-CM

## 2019-01-16 LAB
INR BLD: 1.7
PROTHROMBIN TIME: 19.3 SEC (ref 9.3–12.4)

## 2019-01-16 PROCEDURE — 85610 PROTHROMBIN TIME: CPT

## 2019-02-08 ENCOUNTER — HOSPITAL ENCOUNTER (OUTPATIENT)
Age: 84
Discharge: HOME | End: 2019-02-10
Payer: MEDICARE

## 2019-02-08 PROBLEM — J00 ACUTE RHINITIS: Status: ACTIVE | Noted: 2019-02-08

## 2019-02-08 LAB
INR BLD: 1.9
PROTHROMBIN TIME: 21.4 SEC (ref 9.3–12.4)

## 2019-02-08 PROCEDURE — 85610 PROTHROMBIN TIME: CPT

## 2019-03-19 ENCOUNTER — HOSPITAL ENCOUNTER (OUTPATIENT)
Age: 84
Discharge: HOME | End: 2019-03-21
Payer: MEDICARE

## 2019-03-19 DIAGNOSIS — I48.0 PAROXYSMAL ATRIAL FIBRILLATION (HCC): ICD-10-CM

## 2019-03-19 DIAGNOSIS — Z79.01 LONG TERM CURRENT USE OF ANTICOAGULANT: ICD-10-CM

## 2019-03-19 LAB
INR BLD: 2
PROTHROMBIN TIME: 23.3 SEC (ref 9.3–12.4)

## 2019-03-19 PROCEDURE — 85610 PROTHROMBIN TIME: CPT

## 2019-05-08 ENCOUNTER — HOSPITAL ENCOUNTER (OUTPATIENT)
Age: 84
Discharge: HOME | End: 2019-05-10
Payer: MEDICARE

## 2019-05-08 DIAGNOSIS — I48.0 PAROXYSMAL ATRIAL FIBRILLATION (HCC): ICD-10-CM

## 2019-05-08 DIAGNOSIS — I48.91 ATRIAL FIBRILLATION, UNSPECIFIED TYPE (HCC): ICD-10-CM

## 2019-05-08 LAB
INR BLD: 2.5
PROTHROMBIN TIME: 28.1 SEC (ref 9.3–12.4)

## 2019-05-08 PROCEDURE — 85610 PROTHROMBIN TIME: CPT

## 2019-05-29 ENCOUNTER — HOSPITAL ENCOUNTER (OUTPATIENT)
Age: 84
Discharge: HOME | End: 2019-05-31
Payer: MEDICARE

## 2019-05-29 DIAGNOSIS — I48.91 ATRIAL FIBRILLATION, UNSPECIFIED TYPE (HCC): ICD-10-CM

## 2019-05-29 DIAGNOSIS — T14.8XXA BRUISING: ICD-10-CM

## 2019-05-29 DIAGNOSIS — Z79.01 ANTICOAGULATED ON COUMADIN: ICD-10-CM

## 2019-05-29 LAB
INR BLD: 3.3
PLATELET CONFIRMATION: NORMAL
PROTHROMBIN TIME: 36.9 SEC (ref 9.3–12.4)

## 2019-05-29 PROCEDURE — 85610 PROTHROMBIN TIME: CPT

## 2019-05-29 PROCEDURE — 80053 COMPREHEN METABOLIC PANEL: CPT

## 2019-05-29 PROCEDURE — 85025 COMPLETE CBC W/AUTO DIFF WBC: CPT

## 2019-05-30 LAB
ALBUMIN SERPL-MCNC: 4 G/DL (ref 3.5–5.2)
ALP BLD-CCNC: 73 U/L (ref 40–129)
ALT SERPL-CCNC: 14 U/L (ref 0–40)
ANION GAP SERPL CALCULATED.3IONS-SCNC: 16 MMOL/L (ref 7–16)
AST SERPL-CCNC: 20 U/L (ref 0–39)
BILIRUB SERPL-MCNC: 0.6 MG/DL (ref 0–1.2)
BUN BLDV-MCNC: 22 MG/DL (ref 8–23)
CALCIUM SERPL-MCNC: 8.7 MG/DL (ref 8.6–10.2)
CHLORIDE BLD-SCNC: 103 MMOL/L (ref 98–107)
CO2: 25 MMOL/L (ref 22–29)
CREAT SERPL-MCNC: 0.9 MG/DL (ref 0.7–1.2)
GFR AFRICAN AMERICAN: >60
GFR NON-AFRICAN AMERICAN: >60 ML/MIN/1.73
GLUCOSE BLD-MCNC: 33 MG/DL (ref 74–99)
POTASSIUM SERPL-SCNC: 4.6 MMOL/L (ref 3.5–5)
SODIUM BLD-SCNC: 144 MMOL/L (ref 132–146)
TOTAL PROTEIN: 6.4 G/DL (ref 6.4–8.3)

## 2019-05-31 LAB
ANISOCYTOSIS: ABNORMAL
BASOPHILS ABSOLUTE: 0.16 E9/L (ref 0–0.2)
BASOPHILS RELATIVE PERCENT: 0.9 % (ref 0–2)
BURR CELLS: ABNORMAL
EOSINOPHILS ABSOLUTE: 0.31 E9/L (ref 0.05–0.5)
EOSINOPHILS RELATIVE PERCENT: 1.7 % (ref 0–6)
HCT VFR BLD CALC: 41.9 % (ref 37–54)
HEMOGLOBIN: 12.2 G/DL (ref 12.5–16.5)
LYMPHOCYTES ABSOLUTE: 2.72 E9/L (ref 1.5–4)
LYMPHOCYTES RELATIVE PERCENT: 14.8 % (ref 20–42)
MCH RBC QN AUTO: 34.9 PG (ref 26–35)
MCHC RBC AUTO-ENTMCNC: 29.1 % (ref 32–34.5)
MCV RBC AUTO: 119.7 FL (ref 80–99.9)
MONOCYTES ABSOLUTE: 1.45 E9/L (ref 0.1–0.95)
MONOCYTES RELATIVE PERCENT: 7.8 % (ref 2–12)
NEUTROPHILS ABSOLUTE: 13.58 E9/L (ref 1.8–7.3)
NEUTROPHILS RELATIVE PERCENT: 74.8 % (ref 43–80)
OVALOCYTES: ABNORMAL
PDW BLD-RTO: 17.1 FL (ref 11.5–15)
PLATELET # BLD: 57 E9/L (ref 130–450)
PMV BLD AUTO: 13.9 FL (ref 7–12)
POIKILOCYTES: ABNORMAL
POLYCHROMASIA: ABNORMAL
RBC # BLD: 3.5 E12/L (ref 3.8–5.8)
WBC # BLD: 18.1 E9/L (ref 4.5–11.5)

## 2019-05-31 NOTE — RESULT ENCOUNTER NOTE
Spoke with pts wife and informed of both PT and low Platelet count. Was forwarded to Dr Kennedy Blair with note advising them to follow up with pt .

## 2019-07-08 ENCOUNTER — HOSPITAL ENCOUNTER (OUTPATIENT)
Age: 84
Discharge: HOME | End: 2019-07-10
Payer: MEDICARE

## 2019-07-08 DIAGNOSIS — I48.91 ATRIAL FIBRILLATION, UNSPECIFIED TYPE (HCC): ICD-10-CM

## 2019-07-08 DIAGNOSIS — Z79.01 ANTICOAGULATED ON COUMADIN: ICD-10-CM

## 2019-07-08 LAB
INR BLD: 5.6
PROTHROMBIN TIME: 61.6 SEC (ref 9.3–12.4)

## 2019-07-08 PROCEDURE — 85610 PROTHROMBIN TIME: CPT

## 2019-07-29 ENCOUNTER — HOSPITAL ENCOUNTER (OUTPATIENT)
Age: 84
Discharge: HOME | End: 2019-07-31
Payer: MEDICARE

## 2019-07-29 DIAGNOSIS — Z79.01 ANTICOAGULATED ON COUMADIN: ICD-10-CM

## 2019-07-29 DIAGNOSIS — I48.91 ATRIAL FIBRILLATION, UNSPECIFIED TYPE (HCC): ICD-10-CM

## 2019-07-29 DIAGNOSIS — I10 ESSENTIAL HYPERTENSION: ICD-10-CM

## 2019-07-29 LAB
ALBUMIN SERPL-MCNC: 3.9 G/DL (ref 3.5–5.2)
ALP BLD-CCNC: 62 U/L (ref 40–129)
ALT SERPL-CCNC: 8 U/L (ref 0–40)
ANION GAP SERPL CALCULATED.3IONS-SCNC: 20 MMOL/L (ref 7–16)
AST SERPL-CCNC: 14 U/L (ref 0–39)
BILIRUB SERPL-MCNC: 0.7 MG/DL (ref 0–1.2)
BUN BLDV-MCNC: 21 MG/DL (ref 8–23)
CALCIUM SERPL-MCNC: 8.8 MG/DL (ref 8.6–10.2)
CHLORIDE BLD-SCNC: 96 MMOL/L (ref 98–107)
CO2: 19 MMOL/L (ref 22–29)
CREAT SERPL-MCNC: 0.9 MG/DL (ref 0.7–1.2)
GFR AFRICAN AMERICAN: >60
GFR NON-AFRICAN AMERICAN: >60 ML/MIN/1.73
GLUCOSE BLD-MCNC: 61 MG/DL (ref 74–99)
INR BLD: 1.5
POTASSIUM SERPL-SCNC: 4.1 MMOL/L (ref 3.5–5)
PROTHROMBIN TIME: 17.1 SEC (ref 9.3–12.4)
SODIUM BLD-SCNC: 135 MMOL/L (ref 132–146)
TOTAL PROTEIN: 6.5 G/DL (ref 6.4–8.3)

## 2019-07-29 PROCEDURE — 80053 COMPREHEN METABOLIC PANEL: CPT

## 2019-07-29 PROCEDURE — 85610 PROTHROMBIN TIME: CPT

## 2019-07-29 PROCEDURE — 85025 COMPLETE CBC W/AUTO DIFF WBC: CPT

## 2019-07-30 LAB
ANISOCYTOSIS: ABNORMAL
BASOPHILS ABSOLUTE: 0 E9/L (ref 0–0.2)
BASOPHILS RELATIVE PERCENT: 0.2 % (ref 0–2)
BURR CELLS: ABNORMAL
EOSINOPHILS ABSOLUTE: 0.46 E9/L (ref 0.05–0.5)
EOSINOPHILS RELATIVE PERCENT: 2.7 % (ref 0–6)
HCT VFR BLD CALC: 40.6 % (ref 37–54)
HEMOGLOBIN: 12 G/DL (ref 12.5–16.5)
LYMPHOCYTES ABSOLUTE: 2.05 E9/L (ref 1.5–4)
LYMPHOCYTES RELATIVE PERCENT: 11.5 % (ref 20–42)
MCH RBC QN AUTO: 37.7 PG (ref 26–35)
MCHC RBC AUTO-ENTMCNC: 29.6 % (ref 32–34.5)
MCV RBC AUTO: 127.7 FL (ref 80–99.9)
MONOCYTES ABSOLUTE: 1.88 E9/L (ref 0.1–0.95)
MONOCYTES RELATIVE PERCENT: 10.6 % (ref 2–12)
NEUTROPHILS ABSOLUTE: 12.83 E9/L (ref 1.8–7.3)
NEUTROPHILS RELATIVE PERCENT: 75.2 % (ref 43–80)
OVALOCYTES: ABNORMAL
PDW BLD-RTO: 17.2 FL (ref 11.5–15)
PLATELET # BLD: 228 E9/L (ref 130–450)
PMV BLD AUTO: 13.2 FL (ref 7–12)
POIKILOCYTES: ABNORMAL
RBC # BLD: 3.18 E12/L (ref 3.8–5.8)
WBC # BLD: 17.1 E9/L (ref 4.5–11.5)

## 2019-08-28 ENCOUNTER — OFFICE VISIT (OUTPATIENT)
Dept: CARDIOLOGY CLINIC | Age: 84
End: 2019-08-28
Payer: MEDICARE

## 2019-08-28 VITALS
RESPIRATION RATE: 16 BRPM | DIASTOLIC BLOOD PRESSURE: 60 MMHG | HEART RATE: 73 BPM | WEIGHT: 150 LBS | SYSTOLIC BLOOD PRESSURE: 108 MMHG | HEIGHT: 69 IN | BODY MASS INDEX: 22.22 KG/M2

## 2019-08-28 DIAGNOSIS — I35.0 AORTIC STENOSIS, MODERATE: ICD-10-CM

## 2019-08-28 DIAGNOSIS — Z79.01 ANTICOAGULATED ON COUMADIN: ICD-10-CM

## 2019-08-28 DIAGNOSIS — I49.5 SINUS NODE DYSFUNCTION (HCC): ICD-10-CM

## 2019-08-28 DIAGNOSIS — I48.0 PAF (PAROXYSMAL ATRIAL FIBRILLATION) (HCC): Primary | ICD-10-CM

## 2019-08-28 DIAGNOSIS — I10 ESSENTIAL HYPERTENSION: ICD-10-CM

## 2019-08-28 PROCEDURE — G8598 ASA/ANTIPLAT THER USED: HCPCS | Performed by: INTERNAL MEDICINE

## 2019-08-28 PROCEDURE — 99213 OFFICE O/P EST LOW 20 MIN: CPT | Performed by: INTERNAL MEDICINE

## 2019-08-28 PROCEDURE — G8420 CALC BMI NORM PARAMETERS: HCPCS | Performed by: INTERNAL MEDICINE

## 2019-08-28 PROCEDURE — G8427 DOCREV CUR MEDS BY ELIG CLIN: HCPCS | Performed by: INTERNAL MEDICINE

## 2019-08-28 PROCEDURE — 4040F PNEUMOC VAC/ADMIN/RCVD: CPT | Performed by: INTERNAL MEDICINE

## 2019-08-28 PROCEDURE — 93000 ELECTROCARDIOGRAM COMPLETE: CPT | Performed by: INTERNAL MEDICINE

## 2019-08-28 PROCEDURE — 1036F TOBACCO NON-USER: CPT | Performed by: INTERNAL MEDICINE

## 2019-08-28 PROCEDURE — 1123F ACP DISCUSS/DSCN MKR DOCD: CPT | Performed by: INTERNAL MEDICINE

## 2019-08-28 RX ORDER — DIMENHYDRINATE 50 MG
TABLET ORAL DAILY
COMMUNITY

## 2019-08-28 RX ORDER — ACETAMINOPHEN 160 MG
TABLET,DISINTEGRATING ORAL DAILY
COMMUNITY

## 2019-08-28 RX ORDER — GABAPENTIN 300 MG/1
300 CAPSULE ORAL PRN
Status: ON HOLD | COMMUNITY
Start: 2019-08-05 | End: 2020-08-10 | Stop reason: HOSPADM

## 2019-09-16 ENCOUNTER — HOSPITAL ENCOUNTER (OUTPATIENT)
Age: 84
Discharge: HOME | End: 2019-09-18
Payer: MEDICARE

## 2019-09-16 DIAGNOSIS — I48.91 ATRIAL FIBRILLATION, UNSPECIFIED TYPE (HCC): ICD-10-CM

## 2019-09-16 DIAGNOSIS — E78.5 HYPERLIPIDEMIA, UNSPECIFIED HYPERLIPIDEMIA TYPE: ICD-10-CM

## 2019-09-16 DIAGNOSIS — Z12.5 SCREENING PSA (PROSTATE SPECIFIC ANTIGEN): ICD-10-CM

## 2019-09-16 LAB
ALBUMIN SERPL-MCNC: 4.2 G/DL (ref 3.5–5.2)
ALP BLD-CCNC: 53 U/L (ref 40–129)
ALT SERPL-CCNC: 14 U/L (ref 0–40)
ANION GAP SERPL CALCULATED.3IONS-SCNC: 13 MMOL/L (ref 7–16)
ANISOCYTOSIS: ABNORMAL
AST SERPL-CCNC: 20 U/L (ref 0–39)
BASOPHILS ABSOLUTE: 0 E9/L (ref 0–0.2)
BASOPHILS RELATIVE PERCENT: 0.2 % (ref 0–2)
BILIRUB SERPL-MCNC: 0.8 MG/DL (ref 0–1.2)
BUN BLDV-MCNC: 20 MG/DL (ref 8–23)
BURR CELLS: ABNORMAL
CALCIUM SERPL-MCNC: 8.8 MG/DL (ref 8.6–10.2)
CHLORIDE BLD-SCNC: 105 MMOL/L (ref 98–107)
CHOLESTEROL, TOTAL: 102 MG/DL (ref 0–199)
CO2: 25 MMOL/L (ref 22–29)
CREAT SERPL-MCNC: 0.9 MG/DL (ref 0.7–1.2)
EOSINOPHILS ABSOLUTE: 0.51 E9/L (ref 0.05–0.5)
EOSINOPHILS RELATIVE PERCENT: 3.5 % (ref 0–6)
GFR AFRICAN AMERICAN: >60
GFR NON-AFRICAN AMERICAN: >60 ML/MIN/1.73
GLUCOSE FASTING: 90 MG/DL (ref 74–99)
HCT VFR BLD CALC: 38.2 % (ref 37–54)
HDLC SERPL-MCNC: 43 MG/DL
HEMOGLOBIN: 11.6 G/DL (ref 12.5–16.5)
INR BLD: 2.4
LDL CHOLESTEROL CALCULATED: 47 MG/DL (ref 0–99)
LYMPHOCYTES ABSOLUTE: 1.47 E9/L (ref 1.5–4)
LYMPHOCYTES RELATIVE PERCENT: 9.6 % (ref 20–42)
MCH RBC QN AUTO: 40 PG (ref 26–35)
MCHC RBC AUTO-ENTMCNC: 30.4 % (ref 32–34.5)
MCV RBC AUTO: 131.7 FL (ref 80–99.9)
MONOCYTES ABSOLUTE: 0.88 E9/L (ref 0.1–0.95)
MONOCYTES RELATIVE PERCENT: 6.1 % (ref 2–12)
NEUTROPHILS ABSOLUTE: 11.91 E9/L (ref 1.8–7.3)
NEUTROPHILS RELATIVE PERCENT: 80.9 % (ref 43–80)
OVALOCYTES: ABNORMAL
PDW BLD-RTO: 16.5 FL (ref 11.5–15)
PLATELET # BLD: 84 E9/L (ref 130–450)
PLATELET CONFIRMATION: NORMAL
PMV BLD AUTO: 13.5 FL (ref 7–12)
POIKILOCYTES: ABNORMAL
POTASSIUM SERPL-SCNC: 5 MMOL/L (ref 3.5–5)
PROSTATE SPECIFIC ANTIGEN: <0.01 NG/ML (ref 0–4)
PROTHROMBIN TIME: 27.1 SEC (ref 9.3–12.4)
RBC # BLD: 2.9 E12/L (ref 3.8–5.8)
SODIUM BLD-SCNC: 143 MMOL/L (ref 132–146)
TOTAL PROTEIN: 6.8 G/DL (ref 6.4–8.3)
TRIGL SERPL-MCNC: 60 MG/DL (ref 0–149)
VLDLC SERPL CALC-MCNC: 12 MG/DL
WBC # BLD: 14.7 E9/L (ref 4.5–11.5)

## 2019-09-16 PROCEDURE — G0103 PSA SCREENING: HCPCS

## 2019-09-16 PROCEDURE — 85025 COMPLETE CBC W/AUTO DIFF WBC: CPT

## 2019-09-16 PROCEDURE — 85610 PROTHROMBIN TIME: CPT

## 2019-09-16 PROCEDURE — 80053 COMPREHEN METABOLIC PANEL: CPT

## 2019-09-16 PROCEDURE — 80061 LIPID PANEL: CPT

## 2019-10-22 ENCOUNTER — HOSPITAL ENCOUNTER (OUTPATIENT)
Age: 84
Discharge: HOME | End: 2019-10-24
Payer: MEDICARE

## 2019-10-22 DIAGNOSIS — Z79.01 WARFARIN ANTICOAGULATION: ICD-10-CM

## 2019-10-22 DIAGNOSIS — I48.91 ATRIAL FIBRILLATION, UNSPECIFIED TYPE (HCC): ICD-10-CM

## 2019-10-22 LAB
INR BLD: 2.7
PROTHROMBIN TIME: 31.2 SEC (ref 9.3–12.4)

## 2019-10-22 PROCEDURE — 85610 PROTHROMBIN TIME: CPT

## 2019-12-03 ENCOUNTER — HOSPITAL ENCOUNTER (OUTPATIENT)
Age: 84
Discharge: HOME | End: 2019-12-05
Payer: MEDICARE

## 2019-12-03 DIAGNOSIS — Z79.01 ANTICOAGULATED ON COUMADIN: ICD-10-CM

## 2019-12-03 DIAGNOSIS — I48.91 ATRIAL FIBRILLATION, UNSPECIFIED TYPE (HCC): ICD-10-CM

## 2019-12-03 LAB
INR BLD: 3
PROTHROMBIN TIME: 34.1 SEC (ref 9.3–12.4)

## 2019-12-03 PROCEDURE — 85610 PROTHROMBIN TIME: CPT

## 2020-01-14 ENCOUNTER — HOSPITAL ENCOUNTER (OUTPATIENT)
Age: 85
Discharge: HOME | End: 2020-01-16
Payer: MEDICARE

## 2020-01-14 LAB
ALBUMIN SERPL-MCNC: 4.4 G/DL (ref 3.5–5.2)
ALP BLD-CCNC: 93 U/L (ref 40–129)
ALT SERPL-CCNC: 16 U/L (ref 0–40)
ANION GAP SERPL CALCULATED.3IONS-SCNC: 17 MMOL/L (ref 7–16)
ANISOCYTOSIS: ABNORMAL
AST SERPL-CCNC: 18 U/L (ref 0–39)
ATYPICAL LYMPHOCYTE RELATIVE PERCENT: 0.9 % (ref 0–4)
BASOPHILS ABSOLUTE: 0 E9/L (ref 0–0.2)
BASOPHILS RELATIVE PERCENT: 0.3 % (ref 0–2)
BILIRUB SERPL-MCNC: 0.5 MG/DL (ref 0–1.2)
BUN BLDV-MCNC: 23 MG/DL (ref 8–23)
CALCIUM SERPL-MCNC: 9.3 MG/DL (ref 8.6–10.2)
CHLORIDE BLD-SCNC: 103 MMOL/L (ref 98–107)
CO2: 25 MMOL/L (ref 22–29)
CREAT SERPL-MCNC: 1 MG/DL (ref 0.7–1.2)
EOSINOPHILS ABSOLUTE: 0 E9/L (ref 0.05–0.5)
EOSINOPHILS RELATIVE PERCENT: 0.8 % (ref 0–6)
GFR AFRICAN AMERICAN: >60
GFR NON-AFRICAN AMERICAN: >60 ML/MIN/1.73
GLUCOSE BLD-MCNC: 64 MG/DL (ref 74–99)
HCT VFR BLD CALC: 42.9 % (ref 37–54)
HEMOGLOBIN: 12.5 G/DL (ref 12.5–16.5)
INR BLD: 3.1
LYMPHOCYTES ABSOLUTE: 1.12 E9/L (ref 1.5–4)
LYMPHOCYTES RELATIVE PERCENT: 2.6 % (ref 20–42)
MCH RBC QN AUTO: 37.2 PG (ref 26–35)
MCHC RBC AUTO-ENTMCNC: 29.1 % (ref 32–34.5)
MCV RBC AUTO: 127.7 FL (ref 80–99.9)
MONOCYTES ABSOLUTE: 1.12 E9/L (ref 0.1–0.95)
MONOCYTES RELATIVE PERCENT: 4.4 % (ref 2–12)
MYELOCYTE PERCENT: 0.9 % (ref 0–0)
NEUTROPHILS ABSOLUTE: 25.85 E9/L (ref 1.8–7.3)
NEUTROPHILS RELATIVE PERCENT: 91.2 % (ref 43–80)
OVALOCYTES: ABNORMAL
PDW BLD-RTO: 14.9 FL (ref 11.5–15)
PLATELET # BLD: 127 E9/L (ref 130–450)
PMV BLD AUTO: 13 FL (ref 7–12)
POIKILOCYTES: ABNORMAL
POTASSIUM SERPL-SCNC: 4.1 MMOL/L (ref 3.5–5)
PROTHROMBIN TIME: 35.5 SEC (ref 9.3–12.4)
RBC # BLD: 3.36 E12/L (ref 3.8–5.8)
SODIUM BLD-SCNC: 145 MMOL/L (ref 132–146)
TOTAL PROTEIN: 6.9 G/DL (ref 6.4–8.3)
WBC # BLD: 28.1 E9/L (ref 4.5–11.5)

## 2020-01-14 PROCEDURE — 85610 PROTHROMBIN TIME: CPT

## 2020-01-14 PROCEDURE — 80053 COMPREHEN METABOLIC PANEL: CPT

## 2020-01-14 PROCEDURE — 85025 COMPLETE CBC W/AUTO DIFF WBC: CPT

## 2020-04-06 ENCOUNTER — HOSPITAL ENCOUNTER (OUTPATIENT)
Age: 85
Discharge: HOME | End: 2020-04-08
Payer: MEDICARE

## 2020-04-06 LAB
INR BLD: 3.8
PROTHROMBIN TIME: 44.6 SEC (ref 9.3–12.4)

## 2020-04-06 PROCEDURE — 85610 PROTHROMBIN TIME: CPT

## 2020-07-03 ENCOUNTER — APPOINTMENT (OUTPATIENT)
Dept: GENERAL RADIOLOGY | Age: 85
End: 2020-07-03
Payer: MEDICARE

## 2020-07-03 ENCOUNTER — HOSPITAL ENCOUNTER (EMERGENCY)
Age: 85
Discharge: HOME | End: 2020-07-03
Attending: EMERGENCY MEDICINE
Payer: MEDICARE

## 2020-07-03 VITALS
TEMPERATURE: 97.7 F | RESPIRATION RATE: 16 BRPM | WEIGHT: 149 LBS | HEART RATE: 79 BPM | HEIGHT: 67 IN | OXYGEN SATURATION: 98 % | SYSTOLIC BLOOD PRESSURE: 129 MMHG | DIASTOLIC BLOOD PRESSURE: 68 MMHG | BODY MASS INDEX: 23.39 KG/M2

## 2020-07-03 LAB
INR BLD: 3.7
PROTHROMBIN TIME: 44.9 SEC (ref 9.3–12.4)

## 2020-07-03 PROCEDURE — 99284 EMERGENCY DEPT VISIT MOD MDM: CPT

## 2020-07-03 PROCEDURE — 73562 X-RAY EXAM OF KNEE 3: CPT

## 2020-07-03 PROCEDURE — 96374 THER/PROPH/DIAG INJ IV PUSH: CPT

## 2020-07-03 PROCEDURE — 36415 COLL VENOUS BLD VENIPUNCTURE: CPT

## 2020-07-03 PROCEDURE — 6360000002 HC RX W HCPCS: Performed by: STUDENT IN AN ORGANIZED HEALTH CARE EDUCATION/TRAINING PROGRAM

## 2020-07-03 PROCEDURE — 85610 PROTHROMBIN TIME: CPT

## 2020-07-03 RX ORDER — FENTANYL CITRATE 50 UG/ML
25 INJECTION, SOLUTION INTRAMUSCULAR; INTRAVENOUS ONCE
Status: COMPLETED | OUTPATIENT
Start: 2020-07-03 | End: 2020-07-03

## 2020-07-03 RX ORDER — HYDROCODONE BITARTRATE AND ACETAMINOPHEN 5; 325 MG/1; MG/1
1 TABLET ORAL EVERY 4 HOURS PRN
Qty: 4 TABLET | Refills: 0 | Status: SHIPPED | OUTPATIENT
Start: 2020-07-03 | End: 2020-07-05

## 2020-07-03 RX ADMIN — FENTANYL CITRATE 25 MCG: 50 INJECTION, SOLUTION INTRAMUSCULAR; INTRAVENOUS at 19:13

## 2020-07-03 ASSESSMENT — ENCOUNTER SYMPTOMS
ABDOMINAL PAIN: 0
NAUSEA: 0
PHOTOPHOBIA: 0
VOMITING: 0
DIARRHEA: 0
CHEST TIGHTNESS: 0
BACK PAIN: 0
ABDOMINAL DISTENTION: 0
SHORTNESS OF BREATH: 0

## 2020-07-03 ASSESSMENT — PAIN DESCRIPTION - LOCATION: LOCATION: KNEE

## 2020-07-03 ASSESSMENT — PAIN DESCRIPTION - ORIENTATION: ORIENTATION: RIGHT

## 2020-07-03 ASSESSMENT — PAIN SCALES - GENERAL: PAINLEVEL_OUTOF10: 8

## 2020-07-03 NOTE — ED PROVIDER NOTES
Hyacinth Zhao is a 80year old male who presented to ED with mechanical fall that occurred while at home while working out. Patient was walking in place when he lost his balance and fell on his right knee. Patient began to have pain and took 1 Vicodin at 4 PM following the injury. Patient removed his prosthesis and felt increasing pain without numbness or weakness. Patient arrived from home by EMS. Patient is on coumadin. Patient has a history of PVD that cause his double amputation over one year ago. Patient has not had any recent surgery, fever, chills, nausea, vomiting. Patient has not tried to ambulate following fall. Patient did not suffer any additional injuries and did not hit his head. The history is provided by the patient. Knee Problem   Location:  Knee  Time since incident:  2 hours  Injury: yes    Mechanism of injury comment:  Fall  Knee location:  R knee  Pain details:     Quality:  Throbbing    Radiates to:  Does not radiate    Severity:  Moderate    Onset quality:  Sudden    Progression:  Worsening  Chronicity:  New  Dislocation: no    Prior injury to area:  No  Relieved by:  Nothing  Worsened by:  Nothing  Ineffective treatments: vicodin   Associated symptoms: no back pain, no decreased ROM, no fever, no neck pain, no numbness and no tingling    Risk factors: no concern for non-accidental trauma and no frequent fractures         Review of Systems   Constitutional: Negative for chills and fever. Eyes: Negative for photophobia and visual disturbance. Respiratory: Negative for chest tightness and shortness of breath. Cardiovascular: Negative for chest pain. Gastrointestinal: Negative for abdominal distention, abdominal pain, diarrhea, nausea and vomiting. Genitourinary: Negative for dysuria. Musculoskeletal: Negative for back pain, neck pain and neck stiffness. Allergic/Immunologic: Negative for immunocompromised state. Neurological: Negative for dizziness and headaches. Psychiatric/Behavioral: Negative for confusion. Physical Exam  Vitals signs and nursing note reviewed. Constitutional:       General: He is not in acute distress. Appearance: Normal appearance. He is not ill-appearing, toxic-appearing or diaphoretic. HENT:      Head: Normocephalic and atraumatic. Mouth/Throat:      Pharynx: Oropharynx is clear. Eyes:      General: No scleral icterus. Conjunctiva/sclera: Conjunctivae normal.      Pupils: Pupils are equal, round, and reactive to light. Neck:      Musculoskeletal: Normal range of motion and neck supple. No neck rigidity or muscular tenderness. Cardiovascular:      Rate and Rhythm: Normal rate and regular rhythm. Pulmonary:      Effort: Pulmonary effort is normal.      Breath sounds: Normal breath sounds. Abdominal:      General: Bowel sounds are normal. There is no distension. Palpations: Abdomen is soft. Tenderness: There is no abdominal tenderness. There is no guarding or rebound. Musculoskeletal:         General: Swelling (mild swelling to the right knee) and tenderness present. Right lower leg: No edema. Left lower leg: No edema. Comments: Bilateral below the knee amputation   No skin changes to RLE  Mild tenderness medial knee  Full ROM  Normal skin color   Skin:     General: Skin is warm and dry. Capillary Refill: Capillary refill takes less than 2 seconds. Neurological:      General: No focal deficit present. Mental Status: He is alert and oriented to person, place, and time. Psychiatric:         Mood and Affect: Mood normal.          Procedures     MDM  Number of Diagnoses or Management Options  Diagnosis management comments: Michael Ford is a 80-year-old male who presented to the emergency room with right knee pain following a mechanical fall. INR 3.7. Patient does not have any evidence of hematoma or effusion to knee.   Patient has very mild swelling with point tenderness medial fracture or dislocation in the   right kidney. IMPRESSION:.          ------------------------- NURSING NOTES AND VITALS REVIEWED ---------------------------  Date / Time Roomed:  7/3/2020  6:44 PM  ED Bed Assignment:  25/25    The nursing notes within the ED encounter and vital signs as below have been reviewed. /68   Pulse 79   Temp 97.7 °F (36.5 °C)   Resp 16   Ht 5' 7\" (1.702 m)   Wt 149 lb (67.6 kg)   SpO2 98%   BMI 23.34 kg/m²   Oxygen Saturation Interpretation: Normal      ------------------------------------------ PROGRESS NOTES ------------------------------------------  9:02 PM EDT  I have spoken with the patient and discussed todays results, in addition to providing specific details for the plan of care and counseling regarding the diagnosis and prognosis. Their questions are answered at this time and they are agreeable with the plan. I discussed at length with them reasons for immediate return here for re evaluation. They will followup with their primary care physician by calling their office tomorrow. --------------------------------- ADDITIONAL PROVIDER NOTES ---------------------------------  At this time the patient is without objective evidence of an acute process requiring hospitalization or inpatient management. They have remained hemodynamically stable throughout their entire ED visit and are stable for discharge with outpatient follow-up. The plan has been discussed in detail and they are aware of the specific conditions for emergent return, as well as the importance of follow-up. New Prescriptions    HYDROCODONE-ACETAMINOPHEN (NORCO) 5-325 MG PER TABLET    Take 1 tablet by mouth every 4 hours as needed for Pain for up to 2 days. Intended supply: 3 days. Take lowest dose possible to manage pain       Diagnosis:  1. Acute pain of right knee    2.  Fall, initial encounter        Disposition:  Patient's disposition: Discharge to home  Patient's condition is stable.               Gil Chapman MD  Resident  07/03/20 7124

## 2020-07-04 NOTE — ED NOTES
Bed: 25  Expected date:   Expected time:   Means of arrival:   Comments:  ems     Marlo Jarquin RN  07/03/20 [id number]
[facility] Dusty Saba RN  07/03/20 2021
The patient is a 88y Male complaining of hip pain/injury.

## 2020-07-24 ENCOUNTER — HOSPITAL ENCOUNTER (INPATIENT)
Age: 85
LOS: 5 days | Discharge: NURSING, REHAB OR LONG-TERM CARE | DRG: 481 | End: 2020-07-29
Attending: EMERGENCY MEDICINE | Admitting: FAMILY MEDICINE
Payer: MEDICARE

## 2020-07-24 ENCOUNTER — HOSPITAL ENCOUNTER (OUTPATIENT)
Dept: GENERAL RADIOLOGY | Age: 85
Discharge: HOME | DRG: 481 | End: 2020-07-26
Payer: MEDICARE

## 2020-07-24 ENCOUNTER — OFFICE VISIT (OUTPATIENT)
Dept: ORTHOPEDIC SURGERY | Age: 85
DRG: 481 | End: 2020-07-24
Payer: MEDICARE

## 2020-07-24 ENCOUNTER — APPOINTMENT (OUTPATIENT)
Dept: GENERAL RADIOLOGY | Age: 85
DRG: 481 | End: 2020-07-24
Payer: MEDICARE

## 2020-07-24 PROBLEM — S72.001A CLOSED FRACTURE OF RIGHT HIP (HCC): Status: ACTIVE | Noted: 2020-07-24

## 2020-07-24 LAB
ABO/RH: NORMAL
ALBUMIN SERPL-MCNC: 4 G/DL (ref 3.5–5.2)
ALP BLD-CCNC: 184 U/L (ref 40–129)
ALT SERPL-CCNC: 17 U/L (ref 0–40)
ANION GAP SERPL CALCULATED.3IONS-SCNC: 14 MMOL/L (ref 7–16)
ANISOCYTOSIS: ABNORMAL
ANTIBODY SCREEN: NORMAL
APTT: 38 SEC (ref 24.5–35.1)
AST SERPL-CCNC: 23 U/L (ref 0–39)
BASOPHILS ABSOLUTE: 0.45 E9/L (ref 0–0.2)
BASOPHILS RELATIVE PERCENT: 0.9 % (ref 0–2)
BILIRUB SERPL-MCNC: 1.1 MG/DL (ref 0–1.2)
BUN BLDV-MCNC: 21 MG/DL (ref 8–23)
CALCIUM SERPL-MCNC: 9 MG/DL (ref 8.6–10.2)
CHLORIDE BLD-SCNC: 103 MMOL/L (ref 98–107)
CO2: 23 MMOL/L (ref 22–29)
CREAT SERPL-MCNC: 1.1 MG/DL (ref 0.7–1.2)
EKG ATRIAL RATE: 81 BPM
EKG P AXIS: 66 DEGREES
EKG P-R INTERVAL: 156 MS
EKG Q-T INTERVAL: 392 MS
EKG QRS DURATION: 72 MS
EKG QTC CALCULATION (BAZETT): 455 MS
EKG R AXIS: 70 DEGREES
EKG T AXIS: -37 DEGREES
EKG VENTRICULAR RATE: 81 BPM
EOSINOPHILS ABSOLUTE: 0 E9/L (ref 0.05–0.5)
EOSINOPHILS RELATIVE PERCENT: 0.5 % (ref 0–6)
GFR AFRICAN AMERICAN: >60
GFR NON-AFRICAN AMERICAN: >60 ML/MIN/1.73
GLUCOSE BLD-MCNC: 104 MG/DL (ref 74–99)
HCT VFR BLD CALC: 33 % (ref 37–54)
HEMOGLOBIN: 10.4 G/DL (ref 12.5–16.5)
INR BLD: 1.4
LYMPHOCYTES ABSOLUTE: 3.02 E9/L (ref 1.5–4)
LYMPHOCYTES RELATIVE PERCENT: 6.1 % (ref 20–42)
MCH RBC QN AUTO: 39.8 PG (ref 26–35)
MCHC RBC AUTO-ENTMCNC: 31.5 % (ref 32–34.5)
MCV RBC AUTO: 126.4 FL (ref 80–99.9)
MONOCYTES ABSOLUTE: 2.01 E9/L (ref 0.1–0.95)
MONOCYTES RELATIVE PERCENT: 4.3 % (ref 2–12)
NEUTROPHILS ABSOLUTE: 44.77 E9/L (ref 1.8–7.3)
NEUTROPHILS RELATIVE PERCENT: 88.7 % (ref 43–80)
PDW BLD-RTO: 17.4 FL (ref 11.5–15)
PLATELET # BLD: 237 E9/L (ref 130–450)
PMV BLD AUTO: 12 FL (ref 7–12)
POLYCHROMASIA: ABNORMAL
POTASSIUM SERPL-SCNC: 4.1 MMOL/L (ref 3.5–5)
PROTHROMBIN TIME: 16.2 SEC (ref 9.3–12.4)
RBC # BLD: 2.61 E12/L (ref 3.8–5.8)
SODIUM BLD-SCNC: 140 MMOL/L (ref 132–146)
TOTAL PROTEIN: 6.6 G/DL (ref 6.4–8.3)
WBC # BLD: 50.3 E9/L (ref 4.5–11.5)

## 2020-07-24 PROCEDURE — 85025 COMPLETE CBC W/AUTO DIFF WBC: CPT

## 2020-07-24 PROCEDURE — 99202 OFFICE O/P NEW SF 15 MIN: CPT

## 2020-07-24 PROCEDURE — 73502 X-RAY EXAM HIP UNI 2-3 VIEWS: CPT

## 2020-07-24 PROCEDURE — 80053 COMPREHEN METABOLIC PANEL: CPT

## 2020-07-24 PROCEDURE — 6370000000 HC RX 637 (ALT 250 FOR IP): Performed by: FAMILY MEDICINE

## 2020-07-24 PROCEDURE — 96374 THER/PROPH/DIAG INJ IV PUSH: CPT

## 2020-07-24 PROCEDURE — G8420 CALC BMI NORM PARAMETERS: HCPCS | Performed by: PHYSICIAN ASSISTANT

## 2020-07-24 PROCEDURE — 99285 EMERGENCY DEPT VISIT HI MDM: CPT

## 2020-07-24 PROCEDURE — 86923 COMPATIBILITY TEST ELECTRIC: CPT

## 2020-07-24 PROCEDURE — 1123F ACP DISCUSS/DSCN MKR DOCD: CPT | Performed by: PHYSICIAN ASSISTANT

## 2020-07-24 PROCEDURE — 86900 BLOOD TYPING SEROLOGIC ABO: CPT

## 2020-07-24 PROCEDURE — 93010 ELECTROCARDIOGRAM REPORT: CPT | Performed by: INTERNAL MEDICINE

## 2020-07-24 PROCEDURE — 93005 ELECTROCARDIOGRAM TRACING: CPT | Performed by: EMERGENCY MEDICINE

## 2020-07-24 PROCEDURE — 86901 BLOOD TYPING SEROLOGIC RH(D): CPT

## 2020-07-24 PROCEDURE — 4040F PNEUMOC VAC/ADMIN/RCVD: CPT | Performed by: PHYSICIAN ASSISTANT

## 2020-07-24 PROCEDURE — 86850 RBC ANTIBODY SCREEN: CPT

## 2020-07-24 PROCEDURE — 96375 TX/PRO/DX INJ NEW DRUG ADDON: CPT

## 2020-07-24 PROCEDURE — 85610 PROTHROMBIN TIME: CPT

## 2020-07-24 PROCEDURE — 99222 1ST HOSP IP/OBS MODERATE 55: CPT | Performed by: INTERNAL MEDICINE

## 2020-07-24 PROCEDURE — 2060000000 HC ICU INTERMEDIATE R&B

## 2020-07-24 PROCEDURE — 71045 X-RAY EXAM CHEST 1 VIEW: CPT

## 2020-07-24 PROCEDURE — 73552 X-RAY EXAM OF FEMUR 2/>: CPT

## 2020-07-24 PROCEDURE — 85730 THROMBOPLASTIN TIME PARTIAL: CPT

## 2020-07-24 PROCEDURE — 6360000002 HC RX W HCPCS: Performed by: EMERGENCY MEDICINE

## 2020-07-24 PROCEDURE — P9016 RBC LEUKOCYTES REDUCED: HCPCS

## 2020-07-24 PROCEDURE — 6360000002 HC RX W HCPCS: Performed by: STUDENT IN AN ORGANIZED HEALTH CARE EDUCATION/TRAINING PROGRAM

## 2020-07-24 PROCEDURE — G8428 CUR MEDS NOT DOCUMENT: HCPCS | Performed by: PHYSICIAN ASSISTANT

## 2020-07-24 PROCEDURE — 1036F TOBACCO NON-USER: CPT | Performed by: PHYSICIAN ASSISTANT

## 2020-07-24 PROCEDURE — 99202 OFFICE O/P NEW SF 15 MIN: CPT | Performed by: PHYSICIAN ASSISTANT

## 2020-07-24 PROCEDURE — APPSS60 APP SPLIT SHARED TIME 46-60 MINUTES: Performed by: NURSE PRACTITIONER

## 2020-07-24 RX ORDER — ATORVASTATIN CALCIUM 40 MG/1
40 TABLET, FILM COATED ORAL NIGHTLY
Status: DISCONTINUED | OUTPATIENT
Start: 2020-07-24 | End: 2020-07-29 | Stop reason: HOSPADM

## 2020-07-24 RX ORDER — ISOSORBIDE MONONITRATE 30 MG/1
30 TABLET, EXTENDED RELEASE ORAL DAILY
Status: DISCONTINUED | OUTPATIENT
Start: 2020-07-24 | End: 2020-07-29 | Stop reason: HOSPADM

## 2020-07-24 RX ORDER — OXYCODONE HYDROCHLORIDE AND ACETAMINOPHEN 5; 325 MG/1; MG/1
1 TABLET ORAL EVERY 6 HOURS PRN
Status: DISCONTINUED | OUTPATIENT
Start: 2020-07-24 | End: 2020-07-25 | Stop reason: DRUGHIGH

## 2020-07-24 RX ORDER — MORPHINE SULFATE 4 MG/ML
4 INJECTION, SOLUTION INTRAMUSCULAR; INTRAVENOUS
Status: DISCONTINUED | OUTPATIENT
Start: 2020-07-24 | End: 2020-07-25 | Stop reason: SDUPTHER

## 2020-07-24 RX ORDER — METOPROLOL SUCCINATE 50 MG/1
50 TABLET, EXTENDED RELEASE ORAL DAILY
Status: DISCONTINUED | OUTPATIENT
Start: 2020-07-24 | End: 2020-07-29 | Stop reason: HOSPADM

## 2020-07-24 RX ORDER — ONDANSETRON 2 MG/ML
4 INJECTION INTRAMUSCULAR; INTRAVENOUS EVERY 6 HOURS PRN
Status: DISCONTINUED | OUTPATIENT
Start: 2020-07-24 | End: 2020-07-25 | Stop reason: SDUPTHER

## 2020-07-24 RX ORDER — FENTANYL CITRATE 50 UG/ML
25 INJECTION, SOLUTION INTRAMUSCULAR; INTRAVENOUS ONCE
Status: COMPLETED | OUTPATIENT
Start: 2020-07-24 | End: 2020-07-24

## 2020-07-24 RX ORDER — KETOROLAC TROMETHAMINE 30 MG/ML
15 INJECTION, SOLUTION INTRAMUSCULAR; INTRAVENOUS ONCE
Status: COMPLETED | OUTPATIENT
Start: 2020-07-24 | End: 2020-07-24

## 2020-07-24 RX ORDER — HYDROXYUREA 500 MG/1
500 CAPSULE ORAL DAILY
Status: DISCONTINUED | OUTPATIENT
Start: 2020-07-24 | End: 2020-07-29 | Stop reason: HOSPADM

## 2020-07-24 RX ADMIN — FENTANYL CITRATE 25 MCG: 50 INJECTION INTRAMUSCULAR; INTRAVENOUS at 12:18

## 2020-07-24 RX ADMIN — METOPROLOL SUCCINATE 50 MG: 50 TABLET, EXTENDED RELEASE ORAL at 15:16

## 2020-07-24 RX ADMIN — ONDANSETRON 4 MG: 2 INJECTION INTRAMUSCULAR; INTRAVENOUS at 12:18

## 2020-07-24 RX ADMIN — ATORVASTATIN CALCIUM 40 MG: 40 TABLET, FILM COATED ORAL at 19:55

## 2020-07-24 RX ADMIN — HYDROXYUREA 500 MG: 500 CAPSULE ORAL at 15:16

## 2020-07-24 RX ADMIN — KETOROLAC TROMETHAMINE 15 MG: 30 INJECTION, SOLUTION INTRAMUSCULAR at 12:18

## 2020-07-24 ASSESSMENT — PAIN SCALES - GENERAL
PAINLEVEL_OUTOF10: 7
PAINLEVEL_OUTOF10: 6
PAINLEVEL_OUTOF10: 9
PAINLEVEL_OUTOF10: 8

## 2020-07-24 ASSESSMENT — PAIN DESCRIPTION - LOCATION: LOCATION: HIP

## 2020-07-24 ASSESSMENT — PAIN DESCRIPTION - ORIENTATION: ORIENTATION: RIGHT

## 2020-07-24 NOTE — CONSULTS
Inpatient Cardiology Consultation      Reason for Consult:  PAF (maintaining SR), cardiac risk assessment    Consulting Physician: Dr. Ethel Acosta     Requesting Physician:  Dr. Victoria Frederick     Date of Consultation: 2020    HISTORY OF PRESENT ILLNESS:   Mr. Galina Cotto is a 80year old male who is known to Dr. [facility]; most recent OP visit 2019 -- clinically stable. PMH: Hypertension; hyperlipidemia; PAF on chronic anticoagulation with Coumadin; peripheral vascular disease, status post bilateral BKAs; current tobacco abuse; prostate cancer, status post seed implantation; history of bradycardia with a 4.4 second pause while on Cardizem (discontinued on 2018); a recent Holter monitor showing normal sinus rhythm with intermittent junctional rhythm with multiple runs of SVT; reported current marijuana use per the patient's wife. [facility]- 2020 Mechanical fall -- lost his balance and fell onto his right knee. Discharged home. [facility]-ED 2020 with complaints of abnormal outpatient Xray / complaining of right hip pain x 2 weeks / Xray 2 weeks ago negative. Denies CP, SOB, palpitations or feeling of his heart racing. Patient is on Coumadin therapy. INR on 2020 (1.2); instructed to take Coumadin 5 mg with alternating days of 7 mg per Dr. Raphael Brady. XR Right Femur: proximal right femur fracture, no fracture of the mid or distal right femoral shaft. CXR: pending. BMP pending  Labs: WBC 50.3, H/H 10.4/33.0, platelet count 367. INR 1.4. EK2020 SR, PVCs, NSSTT changes, rate 81 bpm.   Patient was seen in radiology and denies CP and SOB. Please note: past medical records were reviewed per electronic medical record (EMR) - see detailed reports under Past Medical/ Surgical History. Past Medical History:    1. Hypertension. 2.  Hyperlipidemia. 3.  Paroxysmal atrial fibrillation:  · Chronic anticoagulation with Coumadin.   4.  Moderate aortic stenosis:  · Echocardiogram on 2015 showing no wall motion abnormality, EF of 76%, stage I diastolic dysfunction. Left atrium is mildly enlarged, mild AS. The aortic valve area is 1.8 cm squared with a maximum gradient of mmHg and a mean gradient of 10 mmHg. · Echocardiogram on 02/26/2018 (interpreted by Dr. Goldie Marcial) showing moderately thickened trileaflet aortic valve with decreased excursion, mild concentric LVH. EF visually estimated at 50% to 60%. Left atrium is normal.  AV peak velocity of 2.6 mm per second, PROSPER 1.112 cm squared, AV possibly bicuspid, mild aortic stenosis, no pulmonary hypertension, no pericardial effusion. 5.  Nuclear medicine cardiac perfusion scan on 03/21/2012:  No evidence of ischemia or infarction. Normal global and regional left ventricular  function. 6.  Peripheral vascular disease:  · Developed ischemic right lower extremity and underwent a right BKA at [facility] on 01/04/2016. · Left lower extremity gangrene, status post left BKA, 02/2017 at [facility]. · Prior to that, had delayed healing of left patellar wound. 7.  Current tobacco use. He smokes one cigar per day. 8.  History of prostate cancer, status post seed implantation in 2004, on  Hydrea. 9.  Status post appendectomy. 10.  Status post cancerous growth removal from scalp on 02/21/2018. 11. [facility], 02/25/2018 with a \"syncopal episode\" with visual changes, diaphoresis, disoriented and pale, watching TV, sinus rhythm on EKG. Cardiac enzymes x2 negative. 2 - 4.4, second pauses and bradycardia, on p.o. Cardizem. Cardizem stopped on 02/25/2018. 12.  A 48-hour Holter monitor on 03/07/2018 showing sinus rhythm with intermittent junctional rhythm. OH and QRS within normal limits. 18 runs  of SVT. No patient triggered events. 13.  Intermittent marijuana use per the patient's wife. 14.  Myeloproliferative disease. [facility] admission on 08/17/2018 with chest pain.  The patient had left-sided burning and discomfort. No associated symptoms. No rise in cardiac enzymes and no EKG changes. No further testing was needed at that time. 16. [facility]-ED 9/11/2018 CP/elevated troponin. Troponin 0.05, 0.08.   17. ECHO: 9/11/2018 (Dr. Aide Leggett): Ejection fraction is visually estimated at 45-50%. The apex is hypokinetic. Mild left ventricular concentric hypertrophy noted. There is doppler evidence of stage I diastolic dysfunction. Normal right ventricle structure and function. Left atrial volume index of 34 ml per meters squared BSA. Mild aortic stenosis is present. Physiologic and/or trace mitral regurgitation is present. 18. Lexiscan MPS: 9/2018: No significant reversible perfusion defect, EF 56%, Wall motion appears to be hypokinetic. Medications Prior to admit:  Prior to Admission medications    Medication Sig Start Date End Date Taking? Authorizing Provider   atorvastatin (LIPITOR) 40 MG tablet Take 1 tablet by mouth nightly 7/8/20   [facility], DO   warfarin (COUMADIN) 7.5 MG tablet Take 1 tablet by mouth daily 4/24/20   [facility], DO   warfarin (COUMADIN) 5 MG tablet Take 1 tablet by mouth daily 4/7/20   [facility], DO   clonazePAM (KLONOPIN) 0.5 MG tablet Take 1 tablet by mouth 2 times daily as needed for Anxiety for up to 60 days. 10/25/19 12/24/19  [facility], DO   Cholecalciferol (VITAMIN D3) 2000 units CAPS Take by mouth daily    Historical Provider, MD   Coenzyme Q10 (CO Q-10) 100 MG CAPS Take by mouth daily    Historical Provider, MD   gabapentin (NEURONTIN) 300 MG capsule Take 300 mg by mouth as needed.   8/5/19   Historical Provider, MD   isosorbide mononitrate (IMDUR) 30 MG extended release tablet Take 1 tablet by mouth daily 9/12/18   [facility], DO   metoprolol succinate (TOPROL XL) 50 MG extended release tablet Take 1 tablet by mouth daily 9/13/18   [facility], DO   Melatonin 5 MG CAPS Take 1 capsule by mouth nightly as needed    Historical Provider, MD   pantoprazole (PROTONIX) 40 MG tablet Take 40 mg by mouth every morning (before breakfast)    Historical Provider, MD   nitroGLYCERIN (NITROSTAT) 0.4 MG SL tablet up to max of 3 total doses. If no relief after 1 dose, call 911. Patient taking differently: Place 0.4 mg under the tongue every 5 minutes as needed for Chest pain up to max of 3 total doses. If no relief after 1 dose, call 911. 8/18/18   Woo Cifuentes, DO   hydroxyurea (HYDREA) 500 MG chemo capsule Take 500 mg by mouth every morning     Historical Provider, MD       Current Medications:    Current Facility-Administered Medications: morphine sulfate (PF) injection 4 mg, 4 mg, Intravenous, Q3H PRN  ondansetron (ZOFRAN) injection 4 mg, 4 mg, Intravenous, Q6H PRN  oxyCODONE-acetaminophen (PERCOCET) 5-325 MG per tablet 1 tablet, 1 tablet, Oral, Q6H PRN  atorvastatin (LIPITOR) tablet 40 mg, 40 mg, Oral, Nightly  metoprolol succinate (TOPROL XL) extended release tablet 50 mg, 50 mg, Oral, Daily  hydroxyurea (HYDREA) chemo capsule 500 mg, 500 mg, Oral, Daily  isosorbide mononitrate (IMDUR) extended release tablet 30 mg, 30 mg, Oral, Daily    Allergies:  Patient has no known allergies. Social History: Former smoker: quit in 1979 -- 30 pack years. Denies alcohol and illicit drug use (wife reported that he occasionally uses   Patient lives with his wife; uses a walker; has bilateral below the knee prosthetics. Denies home oxygen use. Family History: Noncontributory due to advanced age. REVIEW OF SYSTEMS:     · Constitutional: Denies fatigue, fevers, chills or night sweats  · Eyes: Denies visual changes or drainage  · ENT: Denies headaches or hearing loss. No mouth sores or sore throat. No epistaxis   · Cardiovascular: Denies chest pain, pressure or palpitations. No lower extremity swelling. · Respiratory: Denies [place], cough, orthopnea or PND. No hemoptysis   · Gastrointestinal: Denies hematemesis or anorexia.  No hematochezia or melena    · Genitourinary: Denies urgency, dysuria or hematuria. · Musculoskeletal: +Right knee pain / right hip pain. + uses a walker. · Integumentary: Denies rash, hives or pruritis   · Neurological: Denies dizziness, headaches or seizures. No numbness or tingling  · Psychiatric: Denies anxiety or depression. · Endocrine: Denies temperature intolerance. No recent weight change. .  · Hematologic/Lymphatic: Denies abnormal bruising or bleeding. No swollen lymph nodes    PHYSICAL EXAM:   /63   Pulse 58   Temp 97.3 °F (36.3 °C)   Resp 16   Ht 5' 9\" (1.753 m)   Wt 150 lb (68 kg)   SpO2 97%   BMI 22.15 kg/m²   CONST:  Well developed, well nourished elderly male who appears of stated age. Awake, alert and cooperative. No apparent distress. HEENT:   Head- Normocephalic, atraumatic   Eyes- Conjunctivae pink, anicteric  Throat- Oral mucosa pink and moist  Neck-  No stridor, trachea midline, no jugular venous distention. No carotid bruit. CHEST: Chest symmetrical and non-tender to palpation. No accessory muscle use or intercostal retractions  RESPIRATORY: Lung sounds - clear throughout fields   CARDIOVASCULAR:     Heart Inspection- shows no noted pulsations  Heart Palpation- no heaves or thrills; PMI is non-displaced   Heart Ausculation- Regular rate and rhythm, no murmur. No s3, s4 or rub   PV: Bilateral below the knee amputations with bilateral BK prosthetics. Swelling over right knee and inner right thigh. no lower extremity edema. No varicosities. Good popliteal pulses bilaterally. ABDOMEN: Soft, non-tender to light palpation. Bowel sounds present. No palpable masses no organomegaly; no abdominal bruit  MS: Good muscle strength and tone. No atrophy or abnormal movements. : Deferred  SKIN: Warm and dry no statis dermatitis or ulcers   NEURO / PSYCH: Oriented to person, place and time. Speech clear and appropriate. Follows all commands. Pleasant affect     DATA:    ECG: Please see HPI.   Tele strips: (Bedside monitor showing sinus rhythm with occasional PVC, heart rate 70). Diagnostic:    Labs:   CBC:   Recent Labs     07/24/20  1206   WBC 50.3*   HGB 10.4*   HCT 33.0*        HgA1c:   Lab Results   Component Value Date    LABA1C 5.1 09/26/2018     PT/INR:   Recent Labs     07/23/20   INR 1.30     FASTING LIPID PANEL:  Lab Results   Component Value Date    CHOL 102 09/16/2019    HDL 43 09/16/2019    LDLCALC 47 09/16/2019    TRIG 60 09/16/2019     XR Right Femur: 7/24/2020 proximal right femur fracture, no fracture of the mid or distal right femoral shaft. CXR: 7/24/2020 pending. Assessment/plan:  1. Admitted with abnormal x-ray right femur showing proximal right femoral fracture (7/24/2020). 2.  PAF: On chronic anticoagulation. INR 1.4. Currently in sinus rhythm. 3. VHD: Mild AS on TTE 9/11/2018   4. Chronic HF with reduced EF: LVEF 45-50% on TTE 9/11/2018. Compensated. 5.  PVD status post bilateral BKA's (1/2016, 2/2017). 6.  Current tobacco use: Smokes 1 to 2 cigars/day; 30 pack year cigarette smoker. 7.  HTN: controlled. //56  8. HLD: on statin therapy   9. Nonischemic Lexiscan MPS 9/2018       -For this patient, patient is at an acceptable risk for surgery (RCRI risk: Class I Risk, 0.4% risk of MACE)  -Cardiac risk not prohibitive if non-operative risk is greater. No cardiac test or change in medication likely to alter risk. -Recommend an ECHO to further assess LV function and AS (okay to be done after surgery). -Hold Coumadin prior to surgery  -Resume coumadin after surgery and once okay with orthopedics for a goal INR 2.0-3.0.   -Continue rest of same cardiac medications  -Will follow. Above assessment and plan as per Dr. Fabricio Maciel. Electronically signed by LAURA Vazquez - CNP on 7/24/20 at 3:17 PM EDT        Reason for consult: Preop clearance prior to right femur fracture. Patient seen with LAURA Lynn. Agree with the findings and A/P. Management plan was discussed.  I have personally interviewed the patient, independently performed a focused cardiac exam, reviewed the pertinent laboratory and diagnostic testing results and directly participated in the medical decision-making. HPI: 26-year-old chronic smoker of cigar is seen in consultation for clearance prior to right femur surgery. He has history of hypertension, hyperlipidemia, PAF on warfarin, mild aortic stenosis by echocardiogram done in September 2018, peripheral arterial disease, status post bilateral below-knee amputation, SVT, prostate CA treated with seed implantation. Patient had a mechanical fall 2 weeks ago. He uses 2 canes to ambulate on his prosthesis. He is able to exercise with lifting with his upper extremities. He denies chest pain, dyspnea, palpitations, dizziness or syncope. Reviewed the PMH, social history, FH and ROS from APRN note. Agree with the findings. See the full consult note for details. PE:   BP (!) 111/52   Pulse 83   Temp 97.8 °F (36.6 °C)   Resp 22   Ht 5' 9\" (1.753 m)   Wt 150 lb (68 kg)   SpO2 97%   BMI 22.15 kg/m²   CONST: Elderly male who appears of stated age. Awake, alert, cooperative, no apparent distress. HEENT: Head- normocephalic, atraumatic. Neck:  no jugular venous distention. Radiating heart murmur versus bilateral carotid bruit. LUNGS: Bibasilar Rales. CARDIOVASCULAR:  RRR, 3/6 systolic murmur best heard at the apex with decreased S2 intensity, no s3, s4 or rub noted. PV: Status post bilateral below-knee amputation. Bruising noted over the right thigh. ABDOMEN: Soft, non-tender to light palpation. Bowel sounds present. No palpable masses no hepatosplenomegaly or splenomegaly; no abdominal bruit / pulsation  SKIN: Warm and dry. NEURO / PSYCH: Oriented to person, place and time. Speech clear and appropriate. Follows all commands. Pleasant affect.      EKG as per my interpretation: Sinus rhythm at 81 bpm, leads misplacement in the precordial leads, PVCs and nonspecific ST-T wave changes. Lab Review     Recent Labs     07/24/20  1206   WBC 50.3*   HGB 10.4*   HCT 33.0*          Recent Labs     07/24/20  1206      K 4.1      CO2 23   BUN 21   CREATININE 1.1       Recent Labs     07/24/20  1206   AST 23   ALT 17   ALKPHOS 184*         Last 3 Troponin:    Lab Results   Component Value Date    TROPONINI 0.08 09/12/2018    TROPONINI 0.05 09/11/2018    TROPONINI <0.01 08/18/2018        Recent Labs     07/23/20 07/24/20  1206   INR 1.30 1.4       Echocardiogram done in September 2018 revealed mild aortic stenosis, mild concentric left ventricular hypertrophy with apical hypokinesis, grade 1 diastolic dysfunction with an ejection fraction of 45 to 50%. Assessment:  -Preop clearance: Patient orthopedic surgery carries an intermediate risk for perioperative cardiovascular events. However, since patient is asymptomatic and since the urgency of the surgery, he is cleared to undergo surgery from a cardiac standpoint of view. -PAF: Currently in sinus rhythm with subtherapeutic INR. -Aortic stenosis: Mild by echocardiogram done 2 years ago. -Probable ischemic cardiomyopathy with mild systolic dysfunction by echocardiogram done 2 years ago. Currently compensated. -Hypertension: Controlled. -Hyperlipidemia.  -Anemia.  -Significant leukocytosis. Plan:  -Hold Coumadin prior to surgery and resume it ASAP in post op.  -Obtain an echocardiogram to reassess the significance of his aortic stenosis and his EF.  -Continue other cardiac medications.  -Avoid fluid overload to prevent CHF. -Good pain management in postop.  -Follow-up with  [facility] after hospital discharge. Thank you for the consult. Will follow. Electronically signed by Pavithra Patel MD on 7/24/2020 at 3:32 PM  [facility] Cardiology.

## 2020-07-24 NOTE — PROGRESS NOTES
Patient presented to the orthopedic office today due to persistent right knee and right hip pain after fall on 7/3/2020. Patient has history of bilateral BKA at [facility]. Patient was having increased pain to the right hip that was not improving, patient had been unable to tolerate wearing his prosthesis to the RLE due to pain. While patient on x-ray table patient's RLE is shortened and appears externally rotated with his prosthesis on. XR demonstrated obvious intertrochanteric hip fracture, displaced cephalad. Patient NVI distally. No pain to the left hip or hemipelvis. Denies pain to palpation elsewhere. Discussed with patient and his wife that this will need surgically stabilized to allow patient to weightbearing, discussed plan of care to go to the ED for admission to medical team and consultation by the orthopedic surgeon on call for surgical fixation of the right hip fracture. Questions answered for patient and his wife and patient was transported to the ED by [facility] RN. Called ED for handoff to Dr. Alessandro Ferrer.    Electronically signed by Cici Patterson PA-C on 7/24/2020 at 12:45 PM

## 2020-07-24 NOTE — PROGRESS NOTES
Dr. Steve Butt notified of WBC count of 50.3, physician states this is normal due to pts history of myelodysplasia syndrome.

## 2020-07-24 NOTE — ED PROVIDER NOTES
use included cigars and cigarettes. He started smoking about 71 years ago. He has a 30.00 pack-year smoking history. He has never used smokeless tobacco. He reports current alcohol use of about 5.0 standard drinks of alcohol per week. He reports current drug use. Drug: Marijuana. Family History: family history includes Cancer in his sister; Heart Disease in his father; Stroke in his father. . Unless otherwise noted, family history is non contributory    The patients home medications have been reviewed. Allergies: Patient has no known allergies. ---------------------------------------------------PHYSICAL EXAM--------------------------------------    Constitutional/General: Alert and oriented x3  Head: Normocephalic and atraumatic  Eyes: PERRL, EOMI, sclera non icteric  Mouth: Oropharynx clear, handling secretions, no trismus, no asymmetry of the posterior oropharynx or uvular edema  Neck: Supple, full ROM, no stridor, no meningeal signs  Respiratory: Lungs clear to auscultation bilaterally,Not in respiratory distress  Cardiovascular:  Regular rate. Regular rhythm. 2+ distal pulses. Equal extremity pulses. Chest: No chest wall tenderness  GI:  Abdomen Soft, Non tender, Non distended. No rebound, guarding, or rigidity. Musculoskeletal: Moves all extremities x 3. Prior below the knee amputations. His right hip is slightly externally rotated and is tender to palpation. Integument: skin warm and dry. No rashes. Neurologic: GCS 15, no focal deficits, symmetric strength 5/5 in the upper and lower extremities bilaterally  Psychiatric: Normal Affect      EKG: Interpreted by emergency department physician, Dr. Mague Smalls   This EKG is signed and interpreted by me. Rate: 81  Rhythm: Sinus  Interpretation: Sinus rhythm with PVC, normal axis, CA is 156, QRS of 2, QTc is 455.   Comparison: no previous EKG available      -------------------------------------------------- RESULTS -------------------------------------------------  I have personally reviewed all laboratory and imaging results for this patient. Results are listed below. LABS: (Lab results interpreted by me)  Results for orders placed or performed during the hospital encounter of 07/24/20   EKG 12 Lead   Result Value Ref Range    Ventricular Rate 81 BPM    Atrial Rate 81 BPM    P-R Interval 156 ms    QRS Duration 72 ms    Q-T Interval 392 ms    QTc Calculation (Bazett) 455 ms    P Axis 66 degrees    R Axis 70 degrees    T Axis -37 degrees   ,       RADIOLOGY:  Interpreted by Radiologist unless otherwise specified  XR CHEST 1 VIEW    (Results Pending)                    ------------------------- NURSING NOTES AND VITALS REVIEWED ---------------------------   The nursing notes within the ED encounter and vital signs as below have been reviewed by myself  /63   Pulse 83   Temp 97.3 °F (36.3 °C)   Resp 16   Ht 5' 9\" (1.753 m)   Wt 150 lb (68 kg)   SpO2 97%   BMI 22.15 kg/m²     Oxygen Saturation Interpretation: Normal    The cardiac monitor revealed NSR  with a heart rate in the 80s as interpreted by me. The cardiac monitor was ordered secondary to the patient's heart rate and to monitor the patient for dysrhythmia. CPT 16137    The patients available past medical records and past encounters were reviewed.         ------------------------------ ED COURSE/MEDICAL DECISION MAKING----------------------  Medications   ketorolac (TORADOL) injection 15 mg (has no administration in time range)   fentaNYL (SUBLIMAZE) injection 25 mcg (has no administration in time range)   morphine sulfate (PF) injection 4 mg (has no administration in time range)   ondansetron (ZOFRAN) injection 4 mg (has no administration in time range)   oxyCODONE-acetaminophen (PERCOCET) 5-325 MG per tablet 1 tablet (has no administration in time range)   atorvastatin (LIPITOR) tablet 40 mg (has no administration in time range)   metoprolol succinate (TOPROL XL) extended release tablet 50 mg (has no administration in time range)   hydroxyurea (HYDREA) chemo capsule 500 mg (has no administration in time range)   isosorbide mononitrate (IMDUR) extended release tablet 30 mg (has no administration in time range)                    Medical Decision Making:     I, Dr. Marylen Schooling in the primary provider of record    Outpatient x-ray reviewed which does demonstrate a right displaced intertrochanteric hip fracture. Preop labs ordered, spoke with medicine had the patient admitted. Pain medicines initiated. Orthopedics was consulted      Re-Evaluations:       Time: 1230  Re-evaluation. Patients symptoms show no change  Repeat physical examination is not changed        This patient's ED course included: a personal history and physicial examination, re-evaluation prior to disposition, IV medications, cardiac monitoring, continuous pulse oximetry and complex medical decision making and emergency management    This patient has remained hemodynamically stable during their ED course. Consultations:  Dulce Maria Villegas DO   Ortho            Counseling: The emergency provider has spoken with the patient and discussed todays results, in addition to providing specific details for the plan of care and counseling regarding the diagnosis and prognosis. Questions are answered at this time and they are agreeable with the plan.       --------------------------------- IMPRESSION AND DISPOSITION ---------------------------------    IMPRESSION  1. Closed fracture of right hip, initial encounter ([facility] [address]    2. Chronic anticoagulation        DISPOSITION  Disposition: Admit to telemetry  Patient condition is stable        NOTE: This report was transcribed using voice recognition software.  Every effort was made to ensure accuracy; however, inadvertent computerized transcription errors may be present       Georgina Diss, DO  07/24/20 0699

## 2020-07-24 NOTE — CONSULTS
Department of Orthopedic Surgery  Resident Consult Note          CHIEF COMPLAINT: Right hip pain    HISTORY OF PRESENT ILLNESS:                The patient is a 80 y.o. male who presents with right hip pain after fall from 2 weeks ago. Patient states since that time he has been having trouble bearing weight through the leg. 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 Patient states he redone roughly 3 years ago. He was originally seen in office and sent over to the ER for further evaluation and treatment. He denies any loss of consciousness or any other orthopedic complaints at this time. 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 ([facility] 12/28/2015    Blood circulation, collateral     Cancer ([facility] 2004    prostate, seed implant    Chronic kidney disease     Critical lower limb ischemia 12/29/2015    Hyperlipidemia     Pneumonia      Past Surgical History:        Procedure Laterality Date    APPENDECTOMY      ECHOCARDIOGRAM COMPLETE 2D W DOPPLER W COLOR  1/25/2012         LEG AMPUTATION BELOW KNEE Bilateral     Right    TOOTH EXTRACTION       Current Medications:   Current Facility-Administered Medications: morphine sulfate (PF) injection 4 mg, 4 mg, Intravenous, Q3H PRN  ondansetron (ZOFRAN) injection 4 mg, 4 mg, Intravenous, Q6H PRN  oxyCODONE-acetaminophen (PERCOCET) 5-325 MG per tablet 1 tablet, 1 tablet, Oral, Q6H PRN  atorvastatin (LIPITOR) tablet 40 mg, 40 mg, Oral, Nightly  metoprolol succinate (TOPROL XL) extended release tablet 50 mg, 50 mg, Oral, Daily  hydroxyurea (HYDREA) chemo capsule 500 mg, 500 mg, Oral, Daily  isosorbide mononitrate (IMDUR) extended release tablet 30 mg, 30 mg, Oral, Daily  Allergies:  Patient has no known allergies. Social History:   TOBACCO:   reports that he quit smoking about 40 years ago. His smoking use included cigars and cigarettes.  He started smoking about 71 years ago. He has a 30.00 pack-year smoking history. He has never used smokeless tobacco.  ETOH:   reports current alcohol use of about 5.0 standard drinks of alcohol per week. DRUGS:   reports current drug use. Drug: Marijuana. ACTIVITIES OF DAILY LIVING:    OCCUPATION:    Family History:       Problem Relation Age of Onset    Stroke Father     Heart Disease Father     Cancer Sister        General ROS: negative  Cardiovascular ROS: no chest pain or dyspnea on exertion  Respiratory ROS: no cough, shortness of breath, or wheezing  Gastrointestinal ROS: no abdominal pain, change in bowel habits, or black or bloody stools  Neurological ROS: no TIA or stroke symptoms  Musculoskeletal ROS: Right hip pain    PHYSICAL EXAM:    VITALS:  BP (!) 121/56   Pulse 58   Temp 97.4 °F (36.3 °C) (Oral)   Resp 16   Ht 5' 9\" (1.753 m)   Wt 150 lb (68 kg)   SpO2 99%   BMI 22.15 kg/m²   CONSTITUTIONAL:  awake, alert, cooperative, no apparent distress, and appears stated age  MUSCULOSKELETAL:  RLE  · Skin intact  · BKA  · No tenderness palpation about the knee  · Compartment soft compressible  · Sensation intact distally about the stump  · Positive pain with logroll  · Leg is shortened and externally rotated compared to his other side    SECONDARY EXAM    LUE: skin intact, -TTP, Radial pulses +2, cap refill <2 seconds, +sensation to radial/ulnar/median nerves sensation, +motor to AIN/PIN/ulnar nerves, compartments soft and compressible    RUE: skin intact, -TTP, Radial pulses +2, cap refill <2 seconds, +sensation to radial/ulnar/median nerves sensation, +motor to AIN/PIN/ulnar nerves, compartments soft and compressible    LLE:skin intact, patient has a BKA. Compartments soft and compressible sensation intact about stump.   No tenderness palpation about the knee or pain with logroll        DATA:    CBC:   Lab Results   Component Value Date    WBC 50.3 07/24/2020    RBC 2.61 07/24/2020    HGB 10.4 07/24/2020 HCT 33.0 07/24/2020    .4 07/24/2020    MCH 39.8 07/24/2020    MCHC 31.5 07/24/2020    RDW 17.4 07/24/2020     07/24/2020    MPV 12.0 07/24/2020     PT/INR:    Lab Results   Component Value Date    PROTIME 16.2 07/24/2020    PROTIME 28.4 02/18/2020    INR 1.4 07/24/2020     Radiology Review:      X-ray right hip, femur, knee: Demonstrates previous BKA. There is disuse osteopenia present diffusely. There is also a displaced intertrochanteric hip fracture. With shortening external rotation and varus angulation.     IMPRESSION:  · Right intertrochanteric hip fracture    PLAN:  · Nonweightbearing right lower extremity  · Admit to medicine  · Medical optimization  · Pain control per admitting  · N.p.o. after midnight  · Treatment consent  · Preoperative antibiotics  · Type and screen  · We will plan for operative intervention in the near future likely 7/25  · Discussed plan with Dr. Hazel Ruggiero

## 2020-07-25 ENCOUNTER — APPOINTMENT (OUTPATIENT)
Dept: GENERAL RADIOLOGY | Age: 85
DRG: 481 | End: 2020-07-25
Payer: MEDICARE

## 2020-07-25 ENCOUNTER — ANESTHESIA EVENT (OUTPATIENT)
Dept: OPERATING ROOM | Age: 85
DRG: 481 | End: 2020-07-25
Payer: MEDICARE

## 2020-07-25 ENCOUNTER — ANESTHESIA (OUTPATIENT)
Dept: OPERATING ROOM | Age: 85
DRG: 481 | End: 2020-07-25
Payer: MEDICARE

## 2020-07-25 VITALS
RESPIRATION RATE: 3 BRPM | OXYGEN SATURATION: 100 % | SYSTOLIC BLOOD PRESSURE: 120 MMHG | TEMPERATURE: 98.2 F | DIASTOLIC BLOOD PRESSURE: 66 MMHG

## 2020-07-25 PROBLEM — S72.144A CLOSED NONDISPLACED INTERTROCHANTERIC FRACTURE OF RIGHT FEMUR (HCC): Status: ACTIVE | Noted: 2020-07-24

## 2020-07-25 LAB
INR BLD: 1.4
LV EF: 52 %
LVEF MODALITY: NORMAL
PROTHROMBIN TIME: 15.7 SEC (ref 9.3–12.4)

## 2020-07-25 PROCEDURE — 7100000000 HC PACU RECOVERY - FIRST 15 MIN: Performed by: ORTHOPAEDIC SURGERY

## 2020-07-25 PROCEDURE — C1713 ANCHOR/SCREW BN/BN,TIS/BN: HCPCS | Performed by: ORTHOPAEDIC SURGERY

## 2020-07-25 PROCEDURE — 36415 COLL VENOUS BLD VENIPUNCTURE: CPT

## 2020-07-25 PROCEDURE — 3700000001 HC ADD 15 MINUTES (ANESTHESIA): Performed by: ORTHOPAEDIC SURGERY

## 2020-07-25 PROCEDURE — 0QH836Z INSERTION OF INTRAMEDULLARY INTERNAL FIXATION DEVICE INTO RIGHT FEMORAL SHAFT, PERCUTANEOUS APPROACH: ICD-10-PCS | Performed by: ORTHOPAEDIC SURGERY

## 2020-07-25 PROCEDURE — 3600000005 HC SURGERY LEVEL 5 BASE: Performed by: ORTHOPAEDIC SURGERY

## 2020-07-25 PROCEDURE — 6360000002 HC RX W HCPCS

## 2020-07-25 PROCEDURE — 2709999900 HC NON-CHARGEABLE SUPPLY: Performed by: ORTHOPAEDIC SURGERY

## 2020-07-25 PROCEDURE — 99232 SBSQ HOSP IP/OBS MODERATE 35: CPT | Performed by: INTERNAL MEDICINE

## 2020-07-25 PROCEDURE — 7100000001 HC PACU RECOVERY - ADDTL 15 MIN: Performed by: ORTHOPAEDIC SURGERY

## 2020-07-25 PROCEDURE — 2580000003 HC RX 258

## 2020-07-25 PROCEDURE — 93306 TTE W/DOPPLER COMPLETE: CPT

## 2020-07-25 PROCEDURE — 3700000000 HC ANESTHESIA ATTENDED CARE: Performed by: ORTHOPAEDIC SURGERY

## 2020-07-25 PROCEDURE — C1769 GUIDE WIRE: HCPCS | Performed by: ORTHOPAEDIC SURGERY

## 2020-07-25 PROCEDURE — 6370000000 HC RX 637 (ALT 250 FOR IP): Performed by: ORTHOPAEDIC SURGERY

## 2020-07-25 PROCEDURE — 2500000003 HC RX 250 WO HCPCS

## 2020-07-25 PROCEDURE — 73502 X-RAY EXAM HIP UNI 2-3 VIEWS: CPT

## 2020-07-25 PROCEDURE — 2580000003 HC RX 258: Performed by: ORTHOPAEDIC SURGERY

## 2020-07-25 PROCEDURE — 6360000002 HC RX W HCPCS: Performed by: ORTHOPAEDIC SURGERY

## 2020-07-25 PROCEDURE — 3600000015 HC SURGERY LEVEL 5 ADDTL 15MIN: Performed by: ORTHOPAEDIC SURGERY

## 2020-07-25 PROCEDURE — 3209999900 FLUORO FOR SURGICAL PROCEDURES

## 2020-07-25 PROCEDURE — 85610 PROTHROMBIN TIME: CPT

## 2020-07-25 PROCEDURE — 6370000000 HC RX 637 (ALT 250 FOR IP): Performed by: FAMILY MEDICINE

## 2020-07-25 PROCEDURE — 2500000003 HC RX 250 WO HCPCS: Performed by: STUDENT IN AN ORGANIZED HEALTH CARE EDUCATION/TRAINING PROGRAM

## 2020-07-25 PROCEDURE — 2720000010 HC SURG SUPPLY STERILE: Performed by: ORTHOPAEDIC SURGERY

## 2020-07-25 PROCEDURE — 2580000003 HC RX 258: Performed by: STUDENT IN AN ORGANIZED HEALTH CARE EDUCATION/TRAINING PROGRAM

## 2020-07-25 PROCEDURE — 2060000000 HC ICU INTERMEDIATE R&B

## 2020-07-25 PROCEDURE — 6360000002 HC RX W HCPCS: Performed by: ANESTHESIOLOGY

## 2020-07-25 DEVICE — SCREW BNE L110MM DIA10.35MM G TI CANN PERF FOR PROX FEM: Type: IMPLANTABLE DEVICE | Site: HIP | Status: FUNCTIONAL

## 2020-07-25 DEVICE — SCREW BNE L42MM DIA5MM TIB LT GRN TI ST CANN LOK FULL THRD: Type: IMPLANTABLE DEVICE | Site: HIP | Status: FUNCTIONAL

## 2020-07-25 DEVICE — IMPLANTABLE DEVICE: Type: IMPLANTABLE DEVICE | Site: HIP | Status: FUNCTIONAL

## 2020-07-25 RX ORDER — PROPOFOL 10 MG/ML
INJECTION, EMULSION INTRAVENOUS PRN
Status: DISCONTINUED | OUTPATIENT
Start: 2020-07-25 | End: 2020-07-25 | Stop reason: SDUPTHER

## 2020-07-25 RX ORDER — MORPHINE SULFATE 2 MG/ML
2 INJECTION, SOLUTION INTRAMUSCULAR; INTRAVENOUS
Status: DISCONTINUED | OUTPATIENT
Start: 2020-07-25 | End: 2020-07-29 | Stop reason: HOSPADM

## 2020-07-25 RX ORDER — SODIUM CHLORIDE 0.9 % (FLUSH) 0.9 %
10 SYRINGE (ML) INJECTION EVERY 12 HOURS SCHEDULED
Status: DISCONTINUED | OUTPATIENT
Start: 2020-07-25 | End: 2020-07-29 | Stop reason: HOSPADM

## 2020-07-25 RX ORDER — LIDOCAINE HYDROCHLORIDE 20 MG/ML
INJECTION, SOLUTION INTRAVENOUS PRN
Status: DISCONTINUED | OUTPATIENT
Start: 2020-07-25 | End: 2020-07-25 | Stop reason: SDUPTHER

## 2020-07-25 RX ORDER — MORPHINE SULFATE 4 MG/ML
4 INJECTION, SOLUTION INTRAMUSCULAR; INTRAVENOUS
Status: DISCONTINUED | OUTPATIENT
Start: 2020-07-25 | End: 2020-07-29 | Stop reason: HOSPADM

## 2020-07-25 RX ORDER — NEOSTIGMINE METHYLSULFATE 1 MG/ML
INJECTION, SOLUTION INTRAVENOUS PRN
Status: DISCONTINUED | OUTPATIENT
Start: 2020-07-25 | End: 2020-07-25 | Stop reason: SDUPTHER

## 2020-07-25 RX ORDER — PROMETHAZINE HYDROCHLORIDE 25 MG/ML
25 INJECTION, SOLUTION INTRAMUSCULAR; INTRAVENOUS PRN
Status: DISCONTINUED | OUTPATIENT
Start: 2020-07-25 | End: 2020-07-25 | Stop reason: HOSPADM

## 2020-07-25 RX ORDER — SENNA AND DOCUSATE SODIUM 50; 8.6 MG/1; MG/1
1 TABLET, FILM COATED ORAL 2 TIMES DAILY
Status: DISCONTINUED | OUTPATIENT
Start: 2020-07-25 | End: 2020-07-29 | Stop reason: HOSPADM

## 2020-07-25 RX ORDER — SODIUM CHLORIDE 0.9 % (FLUSH) 0.9 %
10 SYRINGE (ML) INJECTION PRN
Status: DISCONTINUED | OUTPATIENT
Start: 2020-07-25 | End: 2020-07-29 | Stop reason: HOSPADM

## 2020-07-25 RX ORDER — FENTANYL CITRATE 50 UG/ML
INJECTION, SOLUTION INTRAMUSCULAR; INTRAVENOUS PRN
Status: DISCONTINUED | OUTPATIENT
Start: 2020-07-25 | End: 2020-07-25 | Stop reason: SDUPTHER

## 2020-07-25 RX ORDER — HYDROCODONE BITARTRATE AND ACETAMINOPHEN 5; 325 MG/1; MG/1
1 TABLET ORAL EVERY 6 HOURS PRN
Qty: 28 TABLET | Refills: 0 | Status: ON HOLD | OUTPATIENT
Start: 2020-07-25 | End: 2020-08-10

## 2020-07-25 RX ORDER — CEFAZOLIN SODIUM 1 G/3ML
INJECTION, POWDER, FOR SOLUTION INTRAMUSCULAR; INTRAVENOUS PRN
Status: DISCONTINUED | OUTPATIENT
Start: 2020-07-25 | End: 2020-07-25 | Stop reason: SDUPTHER

## 2020-07-25 RX ORDER — OXYCODONE HYDROCHLORIDE 5 MG/1
5 TABLET ORAL EVERY 4 HOURS PRN
Status: DISCONTINUED | OUTPATIENT
Start: 2020-07-25 | End: 2020-07-29 | Stop reason: HOSPADM

## 2020-07-25 RX ORDER — GLYCOPYRROLATE 1 MG/5 ML
SYRINGE (ML) INTRAVENOUS PRN
Status: DISCONTINUED | OUTPATIENT
Start: 2020-07-25 | End: 2020-07-25 | Stop reason: SDUPTHER

## 2020-07-25 RX ORDER — PHENYLEPHRINE HYDROCHLORIDE 10 MG/ML
INJECTION INTRAVENOUS PRN
Status: DISCONTINUED | OUTPATIENT
Start: 2020-07-25 | End: 2020-07-25 | Stop reason: SDUPTHER

## 2020-07-25 RX ORDER — DEXAMETHASONE SODIUM PHOSPHATE 10 MG/ML
INJECTION INTRAMUSCULAR; INTRAVENOUS PRN
Status: DISCONTINUED | OUTPATIENT
Start: 2020-07-25 | End: 2020-07-25 | Stop reason: SDUPTHER

## 2020-07-25 RX ORDER — OXYCODONE HYDROCHLORIDE 10 MG/1
10 TABLET ORAL EVERY 4 HOURS PRN
Status: DISCONTINUED | OUTPATIENT
Start: 2020-07-25 | End: 2020-07-29 | Stop reason: HOSPADM

## 2020-07-25 RX ORDER — MEPERIDINE HYDROCHLORIDE 25 MG/ML
12.5 INJECTION INTRAMUSCULAR; INTRAVENOUS; SUBCUTANEOUS EVERY 5 MIN PRN
Status: DISCONTINUED | OUTPATIENT
Start: 2020-07-25 | End: 2020-07-25 | Stop reason: HOSPADM

## 2020-07-25 RX ORDER — ACETAMINOPHEN 325 MG/1
650 TABLET ORAL EVERY 4 HOURS PRN
Status: DISCONTINUED | OUTPATIENT
Start: 2020-07-25 | End: 2020-07-29 | Stop reason: HOSPADM

## 2020-07-25 RX ORDER — PROMETHAZINE HYDROCHLORIDE 25 MG/1
12.5 TABLET ORAL EVERY 6 HOURS PRN
Status: DISCONTINUED | OUTPATIENT
Start: 2020-07-25 | End: 2020-07-29 | Stop reason: HOSPADM

## 2020-07-25 RX ORDER — SODIUM CHLORIDE 9 MG/ML
INJECTION, SOLUTION INTRAVENOUS CONTINUOUS PRN
Status: DISCONTINUED | OUTPATIENT
Start: 2020-07-25 | End: 2020-07-25 | Stop reason: SDUPTHER

## 2020-07-25 RX ORDER — ONDANSETRON 2 MG/ML
INJECTION INTRAMUSCULAR; INTRAVENOUS PRN
Status: DISCONTINUED | OUTPATIENT
Start: 2020-07-25 | End: 2020-07-25 | Stop reason: SDUPTHER

## 2020-07-25 RX ORDER — ROCURONIUM BROMIDE 10 MG/ML
INJECTION, SOLUTION INTRAVENOUS PRN
Status: DISCONTINUED | OUTPATIENT
Start: 2020-07-25 | End: 2020-07-25 | Stop reason: SDUPTHER

## 2020-07-25 RX ORDER — LABETALOL HYDROCHLORIDE 5 MG/ML
5 INJECTION, SOLUTION INTRAVENOUS EVERY 10 MIN PRN
Status: DISCONTINUED | OUTPATIENT
Start: 2020-07-25 | End: 2020-07-25 | Stop reason: HOSPADM

## 2020-07-25 RX ORDER — SODIUM CHLORIDE 9 MG/ML
INJECTION, SOLUTION INTRAVENOUS CONTINUOUS
Status: DISCONTINUED | OUTPATIENT
Start: 2020-07-25 | End: 2020-07-26

## 2020-07-25 RX ORDER — ONDANSETRON 2 MG/ML
4 INJECTION INTRAMUSCULAR; INTRAVENOUS EVERY 6 HOURS PRN
Status: DISCONTINUED | OUTPATIENT
Start: 2020-07-25 | End: 2020-07-29 | Stop reason: HOSPADM

## 2020-07-25 RX ORDER — WARFARIN SODIUM 5 MG/1
7.5 TABLET ORAL
Status: COMPLETED | OUTPATIENT
Start: 2020-07-25 | End: 2020-07-25

## 2020-07-25 RX ADMIN — WARFARIN SODIUM 7.5 MG: 5 TABLET ORAL at 17:11

## 2020-07-25 RX ADMIN — MORPHINE SULFATE 2 MG: 2 INJECTION, SOLUTION INTRAMUSCULAR; INTRAVENOUS at 14:40

## 2020-07-25 RX ADMIN — FENTANYL CITRATE 25 MCG: 50 INJECTION, SOLUTION INTRAMUSCULAR; INTRAVENOUS at 11:45

## 2020-07-25 RX ADMIN — Medication 10 ML: at 21:00

## 2020-07-25 RX ADMIN — FENTANYL CITRATE 25 MCG: 50 INJECTION, SOLUTION INTRAMUSCULAR; INTRAVENOUS at 11:47

## 2020-07-25 RX ADMIN — OXYCODONE HYDROCHLORIDE 10 MG: 10 TABLET ORAL at 21:15

## 2020-07-25 RX ADMIN — HYDROMORPHONE HYDROCHLORIDE 0.5 MG: 1 INJECTION, SOLUTION INTRAMUSCULAR; INTRAVENOUS; SUBCUTANEOUS at 13:13

## 2020-07-25 RX ADMIN — ENOXAPARIN SODIUM 40 MG: 40 INJECTION SUBCUTANEOUS at 12:42

## 2020-07-25 RX ADMIN — HYDROMORPHONE HYDROCHLORIDE 0.5 MG: 1 INJECTION, SOLUTION INTRAMUSCULAR; INTRAVENOUS; SUBCUTANEOUS at 12:53

## 2020-07-25 RX ADMIN — PHENYLEPHRINE HYDROCHLORIDE 100 MCG: 10 INJECTION INTRAVENOUS at 10:41

## 2020-07-25 RX ADMIN — ROCURONIUM BROMIDE 30 MG: 10 INJECTION, SOLUTION INTRAVENOUS at 10:39

## 2020-07-25 RX ADMIN — Medication 0.6 MG: at 11:43

## 2020-07-25 RX ADMIN — TRANEXAMIC ACID 1 G: 100 INJECTION, SOLUTION INTRAVENOUS at 11:00

## 2020-07-25 RX ADMIN — MORPHINE SULFATE 2 MG: 2 INJECTION, SOLUTION INTRAMUSCULAR; INTRAVENOUS at 17:44

## 2020-07-25 RX ADMIN — Medication 3 MG: at 11:43

## 2020-07-25 RX ADMIN — ISOSORBIDE MONONITRATE 30 MG: 30 TABLET ORAL at 09:28

## 2020-07-25 RX ADMIN — PROPOFOL 100 MG: 10 INJECTION, EMULSION INTRAVENOUS at 10:39

## 2020-07-25 RX ADMIN — LIDOCAINE HYDROCHLORIDE 60 MG: 20 INJECTION, SOLUTION INTRAVENOUS at 10:39

## 2020-07-25 RX ADMIN — ONDANSETRON HYDROCHLORIDE 4 MG: 2 INJECTION, SOLUTION INTRAMUSCULAR; INTRAVENOUS at 11:50

## 2020-07-25 RX ADMIN — METOPROLOL SUCCINATE 50 MG: 50 TABLET, EXTENDED RELEASE ORAL at 09:28

## 2020-07-25 RX ADMIN — SODIUM CHLORIDE, PRESERVATIVE FREE 10 ML: 5 INJECTION INTRAVENOUS at 14:40

## 2020-07-25 RX ADMIN — PHENYLEPHRINE HYDROCHLORIDE 100 MCG: 10 INJECTION INTRAVENOUS at 11:18

## 2020-07-25 RX ADMIN — CEFAZOLIN 2000 MG: 1 INJECTION, POWDER, FOR SOLUTION INTRAMUSCULAR; INTRAVENOUS at 11:07

## 2020-07-25 RX ADMIN — DOCUSATE SODIUM 50 MG AND SENNOSIDES 8.6 MG 1 TABLET: 8.6; 5 TABLET, FILM COATED ORAL at 16:03

## 2020-07-25 RX ADMIN — ROCURONIUM BROMIDE 10 MG: 10 INJECTION, SOLUTION INTRAVENOUS at 11:10

## 2020-07-25 RX ADMIN — SODIUM CHLORIDE: 9 INJECTION, SOLUTION INTRAVENOUS at 16:03

## 2020-07-25 RX ADMIN — ATORVASTATIN CALCIUM 40 MG: 40 TABLET, FILM COATED ORAL at 21:15

## 2020-07-25 RX ADMIN — Medication 2 G: at 18:33

## 2020-07-25 RX ADMIN — SODIUM CHLORIDE: 9 INJECTION, SOLUTION INTRAVENOUS at 11:54

## 2020-07-25 RX ADMIN — DEXAMETHASONE SODIUM PHOSPHATE 10 MG: 10 INJECTION INTRAMUSCULAR; INTRAVENOUS at 10:39

## 2020-07-25 RX ADMIN — HYDROXYUREA 500 MG: 500 CAPSULE ORAL at 09:28

## 2020-07-25 RX ADMIN — PHENYLEPHRINE HYDROCHLORIDE 100 MCG: 10 INJECTION INTRAVENOUS at 11:00

## 2020-07-25 RX ADMIN — HYDROMORPHONE HYDROCHLORIDE 0.5 MG: 1 INJECTION, SOLUTION INTRAMUSCULAR; INTRAVENOUS; SUBCUTANEOUS at 12:25

## 2020-07-25 RX ADMIN — SODIUM CHLORIDE: 9 INJECTION, SOLUTION INTRAVENOUS at 10:35

## 2020-07-25 RX ADMIN — FENTANYL CITRATE 50 MCG: 50 INJECTION, SOLUTION INTRAMUSCULAR; INTRAVENOUS at 10:39

## 2020-07-25 RX ADMIN — DOCUSATE SODIUM 50 MG AND SENNOSIDES 8.6 MG 1 TABLET: 8.6; 5 TABLET, FILM COATED ORAL at 21:15

## 2020-07-25 RX ADMIN — HYDROMORPHONE HYDROCHLORIDE 0.5 MG: 1 INJECTION, SOLUTION INTRAMUSCULAR; INTRAVENOUS; SUBCUTANEOUS at 12:40

## 2020-07-25 ASSESSMENT — PULMONARY FUNCTION TESTS
PIF_VALUE: 16
PIF_VALUE: 21
PIF_VALUE: 22
PIF_VALUE: 15
PIF_VALUE: 1
PIF_VALUE: 22
PIF_VALUE: 8
PIF_VALUE: 1
PIF_VALUE: 22
PIF_VALUE: 3
PIF_VALUE: 21
PIF_VALUE: 21
PIF_VALUE: 22
PIF_VALUE: 3
PIF_VALUE: 21
PIF_VALUE: 20
PIF_VALUE: 21
PIF_VALUE: 4
PIF_VALUE: 21
PIF_VALUE: 22
PIF_VALUE: 22
PIF_VALUE: 15
PIF_VALUE: 19
PIF_VALUE: 22
PIF_VALUE: 21
PIF_VALUE: 13
PIF_VALUE: 19
PIF_VALUE: 1
PIF_VALUE: 21
PIF_VALUE: 20
PIF_VALUE: 22
PIF_VALUE: 21
PIF_VALUE: 3
PIF_VALUE: 16
PIF_VALUE: 22
PIF_VALUE: 1
PIF_VALUE: 22
PIF_VALUE: 5
PIF_VALUE: 21
PIF_VALUE: 21
PIF_VALUE: 14
PIF_VALUE: 2
PIF_VALUE: 3
PIF_VALUE: 23
PIF_VALUE: 5
PIF_VALUE: 16
PIF_VALUE: 23
PIF_VALUE: 22
PIF_VALUE: 19
PIF_VALUE: 20
PIF_VALUE: 22
PIF_VALUE: 22
PIF_VALUE: 21
PIF_VALUE: 26
PIF_VALUE: 20
PIF_VALUE: 21
PIF_VALUE: 22
PIF_VALUE: 19
PIF_VALUE: 21
PIF_VALUE: 20
PIF_VALUE: 22
PIF_VALUE: 21
PIF_VALUE: 20
PIF_VALUE: 21
PIF_VALUE: 22
PIF_VALUE: 3
PIF_VALUE: 3
PIF_VALUE: 20
PIF_VALUE: 22
PIF_VALUE: 21
PIF_VALUE: 22
PIF_VALUE: 4
PIF_VALUE: 6
PIF_VALUE: 3
PIF_VALUE: 1
PIF_VALUE: 19
PIF_VALUE: 5
PIF_VALUE: 4
PIF_VALUE: 20

## 2020-07-25 ASSESSMENT — PAIN SCALES - GENERAL
PAINLEVEL_OUTOF10: 7
PAINLEVEL_OUTOF10: 0
PAINLEVEL_OUTOF10: 8
PAINLEVEL_OUTOF10: 6
PAINLEVEL_OUTOF10: 10
PAINLEVEL_OUTOF10: 0
PAINLEVEL_OUTOF10: 10
PAINLEVEL_OUTOF10: 0
PAINLEVEL_OUTOF10: 0
PAINLEVEL_OUTOF10: 10
PAINLEVEL_OUTOF10: 10
PAINLEVEL_OUTOF10: 9
PAINLEVEL_OUTOF10: 0

## 2020-07-25 ASSESSMENT — PAIN DESCRIPTION - PROGRESSION
CLINICAL_PROGRESSION: GRADUALLY IMPROVING
CLINICAL_PROGRESSION: GRADUALLY IMPROVING
CLINICAL_PROGRESSION: GRADUALLY WORSENING
CLINICAL_PROGRESSION: NOT CHANGED

## 2020-07-25 ASSESSMENT — PAIN DESCRIPTION - FREQUENCY
FREQUENCY: CONTINUOUS

## 2020-07-25 ASSESSMENT — PAIN DESCRIPTION - DESCRIPTORS
DESCRIPTORS: CONSTANT
DESCRIPTORS: CONSTANT;SHARP
DESCRIPTORS: ACHING;CONSTANT
DESCRIPTORS: CONSTANT
DESCRIPTORS: CONSTANT

## 2020-07-25 ASSESSMENT — PAIN DESCRIPTION - ORIENTATION
ORIENTATION: RIGHT

## 2020-07-25 ASSESSMENT — PAIN DESCRIPTION - PAIN TYPE
TYPE: SURGICAL PAIN
TYPE: SURGICAL PAIN;PHANTOM PAIN

## 2020-07-25 ASSESSMENT — PAIN DESCRIPTION - LOCATION
LOCATION: HIP
LOCATION: HIP;LEG
LOCATION: HIP

## 2020-07-25 ASSESSMENT — PAIN DESCRIPTION - ONSET: ONSET: ON-GOING

## 2020-07-25 NOTE — ANESTHESIA PRE PROCEDURE
capsule Take 500 mg by mouth every morning     Historical Provider, MD       Current medications:    Current Facility-Administered Medications   Medication Dose Route Frequency Provider Last Rate Last Dose    ceFAZolin (ANCEF) 2 g in sterile water 20 mL IV syringe  2 g Intravenous See Admin Instructions Kolby Claudio,         morphine sulfate (PF) injection 4 mg  4 mg Intravenous Q3H PRN Kolby Claudio,         ondansetron [facility]) injection 4 mg  4 mg Intravenous Q6H PRN Kolby Claudio, DO   4 mg at 07/24/20 1218    oxyCODONE-acetaminophen (PERCOCET) 5-325 MG per tablet 1 tablet  1 tablet Oral Q6H PRN Kolby ShanonDO        atorvastatin (LIPITOR) tablet 40 mg  40 mg Oral Nightly Dulce Maria Buccino, DO   40 mg at 07/24/20 1955    metoprolol succinate (TOPROL XL) extended release tablet 50 mg  50 mg Oral Daily Dulce Maria Buccino, DO   50 mg at 07/24/20 1516    hydroxyurea (HYDREA) chemo capsule 500 mg  500 mg Oral Daily Dulce Maria Buccino, DO   500 mg at 07/24/20 1516    isosorbide mononitrate (IMDUR) extended release tablet 30 mg  30 mg Oral Daily Dulce Maria Buccino, DO           Allergies:  No Known Allergies    Problem List:    Patient Active Problem List   Diagnosis Code    Atrial fibrillation ([facility]) I48.91    PVD (peripheral vascular disease) I73.9    Hyperlipemia E78.5    Anticoagulant long-term use Z79.01    Claudication of left lower extremity ([facility]) I73.9    Pneumonia of both lower lobes due to infectious organism ([facility] J18.1    Paroxysmal atrial fibrillation ([facility]) I48.0    Warfarin anticoagulation Z79.01    Warfarin overdosage T45.511A    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 ([facility]) I70.244    Critical lower limb ischemia I99.8    Hx of right BKA ([facility]) Z89.511    Hyperlipidemia E78.5    Hypertension I10    Lymphocytosis D72.820    Skin ulcer of left knee with fat layer exposed ([facility] X92.011    Syncope and collapse R55    Wound Encounters:   07/25/20 (!) 99/50   07/03/20 129/68   10/25/19 (!) 148/80       NPO Status:                                                                                 BMI:   Wt Readings from Last 3 Encounters:   07/24/20 150 lb (68 kg)   07/03/20 149 lb (67.6 kg)   10/25/19 149 lb (67.6 kg)     Body mass index is 22.15 kg/m². CBC:   Lab Results   Component Value Date    WBC 50.3 07/24/2020    RBC 2.61 07/24/2020    HGB 10.4 07/24/2020    HCT 33.0 07/24/2020    .4 07/24/2020    RDW 17.4 07/24/2020     07/24/2020       CMP:   Lab Results   Component Value Date     07/24/2020    K 4.1 07/24/2020     07/24/2020    CO2 23 07/24/2020    BUN 21 07/24/2020    CREATININE 1.1 07/24/2020    GFRAA >60 07/24/2020    LABGLOM >60 07/24/2020    GLUCOSE 104 07/24/2020    GLUCOSE 94 01/24/2012    PROT 6.6 07/24/2020    CALCIUM 9.0 07/24/2020    BILITOT 1.1 07/24/2020    ALKPHOS 184 07/24/2020    AST 23 07/24/2020    ALT 17 07/24/2020       POC Tests: No results for input(s): POCGLU, POCNA, POCK, POCCL, POCBUN, POCHEMO, POCHCT in the last 72 hours.     Coags:   Lab Results   Component Value Date    PROTIME 15.7 07/25/2020    PROTIME 28.4 02/18/2020    INR 1.4 07/25/2020    APTT 38.0 07/24/2020       HCG (If Applicable): No results found for: PREGTESTUR, PREGSERUM, HCG, HCGQUANT     ABGs: No results found for: PHART, PO2ART, KRI7CBH, RMG9IEE, BEART, G6BACAYH     Type & Screen (If Applicable):  No results found for: LABABO, LABRH    Drug/Infectious Status (If Applicable):  No results found for: HIV, HEPCAB    COVID-19 Screening (If Applicable): No results found for: COVID19      Anesthesia Evaluation  Patient summary reviewed no history of anesthetic complications:   Airway: Mallampati: II  TM distance: >3 FB   Neck ROM: full  Mouth opening: > = 3 FB Dental:    (+) upper dentures      Pulmonary:Negative Pulmonary ROS breath sounds clear to auscultation                             Cardiovascular:    (+) hypertension:, dysrhythmias (Paroxysmal Afib): atrial fibrillation, hyperlipidemia      ECG reviewed  Rhythm: regular  Rate: normal  Echocardiogram reviewed  Stress test reviewed                Neuro/Psych:   Negative Neuro/Psych ROS              GI/Hepatic/Renal:   (+) renal disease: CRI,           Endo/Other:    (+) blood dyscrasia: anticoagulation therapy:., malignancy/cancer (Prostate). ROS comment: Closed nondisplaced intertrochanteric fracture of right femur Abdominal:           Vascular:   + PVD, aortic or cerebral (Claudication of left lower extremity), .        ROS comment: Skin ulcer of left knee with fat layer exposed. Anesthesia Plan      general     ASA 3       Induction: intravenous. MIPS: Postoperative opioids intended and Prophylactic antiemetics administered. Anesthetic plan and risks discussed with patient. Plan discussed with CRNA.                   Gene Read MD   7/25/2020

## 2020-07-25 NOTE — PROGRESS NOTES
Maylon Renetta, DO        morphine sulfate (PF) injection 4 mg  4 mg Intravenous Q3H PRN Maylon Renetta, DO        ondansetron [facility]) injection 4 mg  4 mg Intravenous Q6H PRN Maylon Renetta, DO   4 mg at 07/24/20 1218    oxyCODONE-acetaminophen (PERCOCET) 5-325 MG per tablet 1 tablet  1 tablet Oral Q6H PRN Maylon Renetta, DO        atorvastatin (LIPITOR) tablet 40 mg  40 mg Oral Nightly Dulce Maria Buccino, DO   40 mg at 07/24/20 1955    metoprolol succinate (TOPROL XL) extended release tablet 50 mg  50 mg Oral Daily Dulce Maria Buccino, DO   50 mg at 07/24/20 1516    hydroxyurea (HYDREA) chemo capsule 500 mg  500 mg Oral Daily Dulce Maria Buccino, DO   500 mg at 07/24/20 1516    isosorbide mononitrate (IMDUR) extended release tablet 30 mg  30 mg Oral Daily Dulce Maria Buccino, DO             Physical Exam:  BP (!) 93/53   Pulse 77   Temp 98.4 °F (36.9 °C) (Oral)   Resp 18   Ht 5' 9\" (1.753 m)   Wt 150 lb (68 kg)   SpO2 93%   BMI 22.15 kg/m²   Weight change: Wt Readings from Last 3 Encounters:   07/24/20 150 lb (68 kg)   07/03/20 149 lb (67.6 kg)   10/25/19 149 lb (67.6 kg)     The patient is sleeping comfortably and in no discomfort or distress. No gross musculoskeletal deformity is present and that of known bilateral below-knee amputations. No significant skin or nail changes are present. Gross examination of head, eyes, nose and throat are negative. Jugular venous pressure is normal and no carotid bruits are present. Normal respiratory effort is noted with no accessory muscle usage present. Lung fields are clear to ascultation. Cardiac examination is notable for a regular rate and rhythm with no palpable thrill. No gallop rhythm and a mid peaking systolic ejection murmur at the right upper sternal border radiating to the left sternal border and both carotid distributions are identified. A benign abdominal examination is present with no masses or organomegaly.  Intact pulses are present throughout all extremities. No focal neurologic deficits are present. Intake/Output:    Intake/Output Summary (Last 24 hours) at 7/25/2020 0623  Last data filed at 7/24/2020 1819  Gross per 24 hour   Intake 120 ml   Output --   Net 120 ml     No intake/output data recorded. Laboratory Tests:  Lab Results   Component Value Date    CREATININE 1.1 07/24/2020    BUN 21 07/24/2020     07/24/2020    K 4.1 07/24/2020     07/24/2020    CO2 23 07/24/2020     No results for input(s): CKTOTAL, CKMB in the last 72 hours. Invalid input(s): TROPONONI  No results found for: BNP  Lab Results   Component Value Date    WBC 50.3 07/24/2020    RBC 2.61 07/24/2020    HGB 10.4 07/24/2020    HCT 33.0 07/24/2020    .4 07/24/2020    MCH 39.8 07/24/2020    MCHC 31.5 07/24/2020    RDW 17.4 07/24/2020     07/24/2020    MPV 12.0 07/24/2020     Recent Labs     07/24/20  1206   ALKPHOS 184*   ALT 17   AST 23   PROT 6.6   BILITOT 1.1   LABALBU 4.0     No results found for: MG  Lab Results   Component Value Date    PROTIME 15.7 07/25/2020    PROTIME 28.4 02/18/2020    INR 1.4 07/25/2020     Lab Results   Component Value Date    TSH 1.070 09/26/2018     No components found for: CHLPL  Lab Results   Component Value Date    TRIG 60 09/16/2019    TRIG 81 09/26/2018    TRIG 65 02/26/2018     Lab Results   Component Value Date    HDL 43 09/16/2019    HDL 25 09/26/2018    HDL 40 02/26/2018     Lab Results   Component Value Date    LDLCALC 47 09/16/2019    LDLCALC 49 09/26/2018    [address] 02/26/2018       Cardiac Tests:  Telemetry findings reviewed: sinus rhythm with occasional ventricular ectopy, no new tachy/bradyarrhythmias overnight      ASSESSMENT / PLAN: On a clinical basis, the patient appears stable from a cardiovascular standpoint with plans of orthopedic intervention of his right femoral fracture later today.   Continued administration of his beta-blocker will be advisable to assist in reducing risk of perioperative ischemic events and arrhythmia stabilization in the face of his paroxysmal atrial fibrillation with needs of cautious periprocedural fluid administration to reduce risk of iatrogenic volume overload. Once appropriate hemostasis has been achieved and likely within the next 24 hours resumption of oral anticoagulation will be advisable based on the lag time following the resumption of his warfarin both to assist reducing risk of embolic events as well as to reduce risk of perioperative thromboembolic complications related to his orthopedic surgical procedure. Continued aggressive risk factor modification of blood pressure and serum lipids will remain essential to reducing risk of future atherosclerotic development. His echocardiogram will be reviewed with the findings not influencing his needs of noncardiac surgery      Note: This report was completed utilizing computer voice recognition software. Every effort has been made to ensure accuracy, however; inadvertent computerized transcription errors may be present. Blayne Quispe.  [facility], [address] [facility]

## 2020-07-25 NOTE — ANESTHESIA POSTPROCEDURE EVALUATION
Department of Anesthesiology  Postprocedure Note    Patient: Risa Roberts  MRN: [id number]  YOB: 1929  Date of evaluation: 7/25/2020  Time:  6:46 PM     Procedure Summary     Date:  07/25/20 Room / Location:  [facility] OR  / [place] [facility]    Anesthesia Start:  6571 Anesthesia Stop:  7555    Procedure:  RIGHT HIP CEPHALO-MEDULLARY NAIL (Right Hip) Diagnosis:  (.)    Surgeon:  Anu Smith MD Responsible Provider:  Alexis Chang MD    Anesthesia Type:  general ASA Status:  3          Anesthesia Type: general    Kellie Phase I: Kellie Score: 9    Kellie Phase II:      Last vitals: Reviewed and per EMR flowsheets.        Anesthesia Post Evaluation    Patient location during evaluation: PACU  Patient participation: complete - patient participated  Level of consciousness: awake  Airway patency: patent  Nausea & Vomiting: no nausea and no vomiting  Complications: no  Cardiovascular status: hemodynamically stable  Respiratory status: acceptable  Hydration status: stable

## 2020-07-25 NOTE — CONSULTS
Orthopaedic Consultation  BECKY Rangel MD    CHIEF COMPLAINT: Right hip pain     HISTORY OF PRESENT ILLNESS:                 The patient is a 80 y.o. male who presents with right hip pain after fall from 2 weeks ago. Patient states since that time he has been having trouble bearing weight through the leg. 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 Patient states he redone roughly 3 years ago. He was originally seen in office and sent over to the ER for further evaluation and treatment.   He denies any loss of consciousness or any other orthopedic complaints at this time.     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 ([facility] 12/28/2015    Blood circulation, collateral      Cancer ([facility] 2004     prostate, seed implant    Chronic kidney disease      Critical lower limb ischemia 12/29/2015    Hyperlipidemia      Pneumonia          Past Surgical History:    Past Surgical History             Procedure Laterality Date    APPENDECTOMY        ECHOCARDIOGRAM COMPLETE 2D W DOPPLER W COLOR   1/25/2012          LEG AMPUTATION BELOW KNEE Bilateral       Right    TOOTH EXTRACTION            Current Medications:     Current Hospital Medications   Current Facility-Administered Medications: morphine sulfate (PF) injection 4 mg, 4 mg, Intravenous, Q3H PRN  ondansetron (ZOFRAN) injection 4 mg, 4 mg, Intravenous, Q6H PRN  oxyCODONE-acetaminophen (PERCOCET) 5-325 MG per tablet 1 tablet, 1 tablet, Oral, Q6H PRN  atorvastatin (LIPITOR) tablet 40 mg, 40 mg, Oral, Nightly  metoprolol succinate (TOPROL XL) extended release tablet 50 mg, 50 mg, Oral, Daily  hydroxyurea (HYDREA) chemo capsule 500 mg, 500 mg, Oral, Daily  isosorbide mononitrate (IMDUR) extended release tablet 30 mg, 30 mg, Oral, Daily     Allergies:  Patient has no known allergies.     Social History:   TOBACCO:   reports logroll           DATA:    CBC:         Lab Results   Component Value Date     WBC 50.3 07/24/2020     RBC 2.61 07/24/2020     HGB 10.4 07/24/2020     HCT 33.0 07/24/2020     .4 07/24/2020     MCH 39.8 07/24/2020     MCHC 31.5 07/24/2020     RDW 17.4 07/24/2020      07/24/2020     MPV 12.0 07/24/2020     PT/INR:          Lab Results   Component Value Date     PROTIME 16.2 07/24/2020     PROTIME 28.4 02/18/2020     INR 1.4 07/24/2020     Radiology Review:       X-ray right hip, femur, knee: Demonstrates previous BKA. There is disuse osteopenia present diffusely. There is also a displaced intertrochanteric hip fracture. With shortening external rotation and varus angulation.     ASSESSMENT:    80year-old male with right intertrochanteric hip fracture      PLAN:  I had a long discussion with the patient regarding displaced intertrochanteric hip fractures. Operative management was discussed. The risks and benefits of surgery were discussed and all questions were answered. She would like to proceed with surgery.     - Medical clearance  - NPO   - IVF  - NWB RLE  - Pain control  - Plan for IMN fixation right hip fracture     I spent over 50 minutes reviewing the EMR, radiographs, examining the patient, and discussing the injury and treatment plan with the patient and his wife.

## 2020-07-25 NOTE — OP NOTE
Operative Report  Jose Thakkar  [id number]  7/25/2020    Pre-operative diagnosis: Right intertrochanteric hip fracture    Post-operative diagnosis: Right intertrochanteric hip fracture    Procedure: Right Intramedullary trochanteric nail     Surgeon: Roger Bridges MD    Assistant:  Baldomero Vargas DO, PGY-2    Anesthesia:  General    Operative Indication:  80 y.o. male who fell sustaining a right intertrochanteric hip fracture. Patient was admitted for pain control, medical optimization and definitive surgical treatment. The rationale behind femoral intramedullary nail fixation as a means of fracture treatment and early mobilization / rehabilitation was explained and informed consent was obtained following a thorough review of risks, benefits, and alternative treatment options. The risks for surgery that were discussed include bleeding, infection, damage to blood vessels, damage to nerves, risk of further surgery, restricted range of motion, risk of continued discomfort, risk of malunion, risk of nonunion, risk of need for further reconstructive procedures, risk of need for altered activities and altered gait, risk of blood clots, pulmonary embolism, myocardial infarction, and risk of death were discussed. The patient understood these risks and consented for surgery. The typical post-operative recovery process was also discussed. EBL: 729 cc    Complications: None    Components:    1. Synthes TFNA Nail, 125° 11 mm short nail        2. Synthes lag screw 10.5 mm x 110 mm        3. Synthes 5.0 mm cortical screw size 42 mm            Operative Procedure: The patient was positioned supine on the fracture table and general anaesthesia was achieved. The patient was then positioned on the table and the right foot was placed in a fracture boot. The fracture was then manipulated under fluoroscopic control until we could obtain near anatomic alignment.  At this point, the left hip and leg was then prepped and draped in the usual sterile manner. A timeout was performed identifying the patient, operative site procedure being performed and administration of an IV antibiotic. A guide pin was then placed percutaneously into the tip of the greater trochanter and a small incision was made overlying the guide pin. An overlying drill was inserted to the proper depth. A Synthes TFNA Nail, 125° 11 mm short nail was chosen and inserted into the intramedullary canal to the proper depth. Proper rotation was obtained and the guide for the lag screw was inserted. A small incision was made for this as well. A guide pin was inserted and felt to be in proper position. The hole for the lag screw was then drilled and a 110 mm lag screw was placed. At this point, an incision was made distally and a distal screw was placed. Reduction was then confirmed with fluoroscopy. The wounds were then thoroughly irrigated with normal saline. The wound was closed with 0, 2-0 Vicryl sutures, and the skin was reapproximated with staples. The area was then infiltrated with a mixture of a 0.5% Marcaine. A sterile dressing was then applied. There were no complications and the patient tolerated the procedure well. The patient was taken to the recovery room in stable condition.     Kumar Rouse MD

## 2020-07-25 NOTE — H&P
[address]                  [address] [facility],  [facility]                              HISTORY AND PHYSICAL    PATIENT NAME: Jillian Jarrett                  :        1929  MED REC NO:   [id number]                            ROOM:       4720  ACCOUNT NO:   [de-identified]                           ADMIT DATE: 2020  PROVIDER:     Patrizia Yeboah DO    CHIEF COMPLAINT/REASON FOR THIS HOSPITAL ADMISSION:  Fractured right  hip. HISTORY OF PRESENT ILLNESS:  The patient is a 72-year-old male with  difficulty bearing weight to his leg, has a history of bilateral  below-the-knee amputations secondary to vascular disease. He was  originally seen in the office over the weekend at the [facility]  and sent over to the ER for evaluation and treatment. Imaging studies  performed revealed a right intertrochanteric hip fracture. The patient  was admitted. Orthopedics were consulted and Cardiology was asked to  see him for cardiac clearance. MEDICATIONS:  His recent and present medications are noted. PAST SURGICAL HISTORY:  Consistent with bilateral below-the-knee  amputations. SOCIAL HISTORY:  Nonsmoker. No use of illicit drugs or alcohol. FAMILY HISTORY:  Negative with regard to above chief complaint. ALLERGIES:  None known. REVIEW OF SYSTEMS:  Remainder of systems is otherwise negative except  the above-mentioned. PHYSICAL ASSESSMENT:  GENERAL:  Reveals a 72-year-old male in no acute distress. VITAL SIGNS:  /63, pulse rate of 58, temperature of 97.3,  respiratory rate of 16. ENT:  Negative. HEART:  Regular. LUNGS:  Clear to auscultation bilaterally. ABDOMEN:  Noted to be soft with positive bowel sounds. EXTREMITIES:  Reveal no evidence for edema and/or cyanosis. ASSESSMENT:  1. Right intertrochanteric hip fracture. 2.  Peripheral arterial disease, status post bilateral below-the-knee  amputations.   3. Hypertension. 4.  Hyperlipidemia. 5.  Paroxysmal atrial fibrillation, on chronic anticoagulation therapy  with Coumadin. 6.  Moderate aortic stenosis. 7.  History of prostate cancer, in remission. 8.  Myeloproliferative disease. PLAN OF TREATMENT:  See orders. Appropriate consultations. Discharge  plan to be determined based on hospital outcome.         Gunnar Finch DO    D: 07/24/2020 17:06:00       T: 07/24/2020 17:16:27     [email or web address]  Job#: [id number]     Doc#: [id number]

## 2020-07-26 LAB
ANION GAP SERPL CALCULATED.3IONS-SCNC: 13 MMOL/L (ref 7–16)
BUN BLDV-MCNC: 24 MG/DL (ref 8–23)
CALCIUM SERPL-MCNC: 7.9 MG/DL (ref 8.6–10.2)
CHLORIDE BLD-SCNC: 103 MMOL/L (ref 98–107)
CO2: 19 MMOL/L (ref 22–29)
CREAT SERPL-MCNC: 1.1 MG/DL (ref 0.7–1.2)
GFR AFRICAN AMERICAN: >60
GFR NON-AFRICAN AMERICAN: >60 ML/MIN/1.73
GLUCOSE BLD-MCNC: 100 MG/DL (ref 74–99)
HCT VFR BLD CALC: 22.9 % (ref 37–54)
HEMOGLOBIN: 7 G/DL (ref 12.5–16.5)
INR BLD: 1.6
MCH RBC QN AUTO: 39.3 PG (ref 26–35)
MCHC RBC AUTO-ENTMCNC: 30.6 % (ref 32–34.5)
MCV RBC AUTO: 128.7 FL (ref 80–99.9)
PDW BLD-RTO: 17.3 FL (ref 11.5–15)
PLATELET # BLD: 248 E9/L (ref 130–450)
PMV BLD AUTO: 12 FL (ref 7–12)
POTASSIUM REFLEX MAGNESIUM: 4.3 MMOL/L (ref 3.5–5)
PROTHROMBIN TIME: 17.7 SEC (ref 9.3–12.4)
RBC # BLD: 1.78 E12/L (ref 3.8–5.8)
SODIUM BLD-SCNC: 135 MMOL/L (ref 132–146)
WBC # BLD: 75 E9/L (ref 4.5–11.5)

## 2020-07-26 PROCEDURE — 85610 PROTHROMBIN TIME: CPT

## 2020-07-26 PROCEDURE — 6370000000 HC RX 637 (ALT 250 FOR IP): Performed by: FAMILY MEDICINE

## 2020-07-26 PROCEDURE — 2060000000 HC ICU INTERMEDIATE R&B

## 2020-07-26 PROCEDURE — 2580000003 HC RX 258: Performed by: ORTHOPAEDIC SURGERY

## 2020-07-26 PROCEDURE — 6360000002 HC RX W HCPCS: Performed by: ORTHOPAEDIC SURGERY

## 2020-07-26 PROCEDURE — 97535 SELF CARE MNGMENT TRAINING: CPT

## 2020-07-26 PROCEDURE — 99221 1ST HOSP IP/OBS SF/LOW 40: CPT | Performed by: PHYSICAL MEDICINE & REHABILITATION

## 2020-07-26 PROCEDURE — 85027 COMPLETE CBC AUTOMATED: CPT

## 2020-07-26 PROCEDURE — 97530 THERAPEUTIC ACTIVITIES: CPT

## 2020-07-26 PROCEDURE — 36415 COLL VENOUS BLD VENIPUNCTURE: CPT

## 2020-07-26 PROCEDURE — 97162 PT EVAL MOD COMPLEX 30 MIN: CPT

## 2020-07-26 PROCEDURE — 80048 BASIC METABOLIC PNL TOTAL CA: CPT

## 2020-07-26 PROCEDURE — 6370000000 HC RX 637 (ALT 250 FOR IP): Performed by: ORTHOPAEDIC SURGERY

## 2020-07-26 PROCEDURE — 99232 SBSQ HOSP IP/OBS MODERATE 35: CPT | Performed by: INTERNAL MEDICINE

## 2020-07-26 PROCEDURE — 97166 OT EVAL MOD COMPLEX 45 MIN: CPT

## 2020-07-26 RX ORDER — WARFARIN SODIUM 5 MG/1
5 TABLET ORAL
Status: COMPLETED | OUTPATIENT
Start: 2020-07-26 | End: 2020-07-26

## 2020-07-26 RX ADMIN — HYDROXYUREA 500 MG: 500 CAPSULE ORAL at 08:34

## 2020-07-26 RX ADMIN — ENOXAPARIN SODIUM 40 MG: 40 INJECTION SUBCUTANEOUS at 08:34

## 2020-07-26 RX ADMIN — DOCUSATE SODIUM 50 MG AND SENNOSIDES 8.6 MG 1 TABLET: 8.6; 5 TABLET, FILM COATED ORAL at 20:01

## 2020-07-26 RX ADMIN — Medication 2 G: at 03:37

## 2020-07-26 RX ADMIN — Medication 10 ML: at 20:02

## 2020-07-26 RX ADMIN — OXYCODONE HYDROCHLORIDE 10 MG: 10 TABLET ORAL at 06:38

## 2020-07-26 RX ADMIN — ISOSORBIDE MONONITRATE 30 MG: 30 TABLET ORAL at 08:34

## 2020-07-26 RX ADMIN — OXYCODONE HYDROCHLORIDE 10 MG: 10 TABLET ORAL at 20:02

## 2020-07-26 RX ADMIN — ATORVASTATIN CALCIUM 40 MG: 40 TABLET, FILM COATED ORAL at 20:01

## 2020-07-26 RX ADMIN — Medication 10 ML: at 08:34

## 2020-07-26 RX ADMIN — WARFARIN SODIUM 5 MG: 5 TABLET ORAL at 18:16

## 2020-07-26 RX ADMIN — DOCUSATE SODIUM 50 MG AND SENNOSIDES 8.6 MG 1 TABLET: 8.6; 5 TABLET, FILM COATED ORAL at 08:34

## 2020-07-26 RX ADMIN — METOPROLOL SUCCINATE 50 MG: 50 TABLET, EXTENDED RELEASE ORAL at 08:34

## 2020-07-26 ASSESSMENT — PAIN SCALES - GENERAL
PAINLEVEL_OUTOF10: 0
PAINLEVEL_OUTOF10: 8
PAINLEVEL_OUTOF10: 10
PAINLEVEL_OUTOF10: 0

## 2020-07-26 NOTE — PROGRESS NOTES
Department of Orthopedic Surgery  Resident Progress Note    Patient seen and examined, resting comfortably in bed. Pain controlled. No new complaints. Denies chest pain, shortness of breath, dizziness/lightheadedness. VITALS:  BP (!) 115/52   Pulse 79   Temp 98 °F (36.7 °C) (Temporal)   Resp 16   Ht 5' 9\" (1.753 m)   Wt 150 lb (68 kg)   SpO2 94%   BMI 22.15 kg/m²     General: alert and oriented to person, place and time, well-developed and well-nourished, in no acute distress    MUSCULOSKELETAL:   right lower extremity:  · Dressing C/D/I  · Compartments soft and compressible  · Patient actively moving at hip and knee joint  · +2/4 DP & PT pulses, Brisk Cap refill, Toes warm and perfused  · Distal sensation grossly intact to stump    CBC:   Lab Results   Component Value Date    WBC 75.0 07/26/2020    HGB 7.0 07/26/2020    HCT 22.9 07/26/2020     07/26/2020     PT/INR:    Lab Results   Component Value Date    PROTIME 17.7 07/26/2020    PROTIME 28.4 02/18/2020    INR 1.6 07/26/2020       ASSESSMENT  · S/P Right cephalomedullary nail on 7/25    PLAN      · Continue physical therapy and protocol: WBAT - RLE   · 24 hour abx coverage  · Deep venous thrombosis prophylaxis - coumadin, early mobilization  · PT/OT  · Pain Control: IV and PO  · Monitor H&H. Hgb 7.0 today, asymptomatic.  Consider transfusion if hgb drops or patient becomes symptomatic  · D/C Planning

## 2020-07-26 NOTE — PROGRESS NOTES
Physical Therapy    Physical Therapy Initial Assessment     Name: Alaina Juarez  : 1929  MRN: [id number]    Referring Provider:  Eben Lam DO    Date of Service: 2020    Evaluating PT:  Camila Keene PT, DPT PT [id number]    Room #:  3895/0087-Q  Diagnosis:  R Intertrochanteric Hip Fx  PMHx/PSHx:  HLD, Prostate Cancer, Bilateral BKA  Procedure/Surgery:  R Intramedullary Trochanteric Nail 2020  Precautions:  WBAT RLE, BLE BKA, BLE Prosthesis  Equipment Needs:      SUBJECTIVE:    Pt lives with spouse in a 1 story home with ramped entrance. Pt was independent and active ambulating community distance with 2x canes and BLE prostheses independently PTA. Equipment Owned: Manual W/c with removable arm rests; BLE Prostheses, SPC x 2; [employer]    OBJECTIVE:   Initial Evaluation  Date: 2020 Treatment Short Term/ Long Term   Goals   AM-PAC 6 Clicks 95/48     Was pt agreeable to Eval/treatment? Yes     Does pt have pain? Mild pain R Hip     Bed Mobility  Rolling: NT  Supine to sit: Kristan  Sit to supine: NT  Scooting: Kristan  Rolling: Independent    Supine to sit:  Independent    Sit to supine: Independent    Scooting: Independent     Transfers Sit to stand: ModA x 2 [employer]  Stand to sit: ModA x 2 [employer]  Stand pivot: ModA x 2 [employer]  Sit to stand: Modified Independent  Stand to sit: Modified Independent  Stand pivot: Modified Independent     Ambulation    5 feet with ModA x 2 [employer]  >150 feet with Modified Independent  [employer]   Stair negotiation: ascended and descended  NT  Not a goal of care   ROM BUE:  See OT Note  BLE:  WFL     Strength BUE:  See OT Note  R Hip 3-/5   R knee 3/5  LLE: 4/5  Improve Strength 1/3 MMT Grade   Balance Sitting EOB:  SBA  Dynamic Standing:  ModA x 2 [employer]  Sitting EOB:  Independent    Dynamic Standing:  Modified Independent  WW     Pt is A & O x 4  Sensation:  Pt denies numbness and tingling to extremities  Edema:  Mild R Residual Limb    Vitals:  HR 80  Spo2 99% RA  PRE  HR 82  Spo2 99% RA  POST        Patient education  Pt educated on role of Pt    Patient response to education:   Pt verbalized understanding Pt demonstrated skill Pt requires further education in this area   x x x     ASSESSMENT:    Comments:  Pt received in supine agreeable to PT evaluation, highly motivated. Pt requiring assistance for bed mobility, due to mild R hip pain and strength deficits. Pt tolerating ~ 10 min EOB, donning BLE prosthesis without assist. Pt requiring cues and assistance to perform sit to stand transfer using [employer]. Pt ambulating with antalgic gait pattern, at decreased speed requiring steadying assistance throughout. Patient would benefit from intensive rehabilitation program to restore patient to prior level of function which was independent and active prior to admit. Patient would benefit from PM&R consult. Treatment:  Patient practiced and was instructed in the following treatment:     Bed mobility- verbal cues and assistance to facilitate independence   Functional transfers-Verbal cues for proper positioning and sequencing to perform transfers safely with maximum independence.  Gait training-Verbal cues for proper positioning and sequencing using assistive device to maximize functional mobility independence. Pt's/ family goals   1. Get stronger and more independent at rehab    Patient and or family understand(s) diagnosis, prognosis, and plan of care. yes    PLAN:    PT care will be provided in accordance with the objectives noted above. Exercises and functional mobility practice will be used as well as appropriate assistive devices or modalities to obtain goals. Patient and family education will also be administered as needed. Frequency of treatments: 5-7x/week x 1-2 weeks.     Time in  1127  Time out  1157    Total Treatment Time  25 minutes     Evaluation Time includes thorough review of current medical information, gathering information on past medical history/social history and prior level of function, completion of standardized testing/informal observation of tasks, assessment of data and education on plan of care and goals.     CPT codes:  [] Low Complexity PT evaluation 89679  [x] Moderate Complexity PT evaluation 73559  [] High Complexity PT evaluation 70585  [] PT Re-evaluation 77927  [] Gait training 25465 0 minutes  [] Manual therapy 57286 0 minutes  [x] Therapeutic activities 09475 25 minutes  [] Therapeutic exercises 91948 0 minutes  [] Neuromuscular reeducation 10643 0 minutes       Darlene Arzola PT, DPT   [id number]

## 2020-07-26 NOTE — PROGRESS NOTES
Admit Date: 7/24/2020    Subjective:     Feels fine no complaint     Objective:     Patient Vitals for the past 8 hrs:   BP Temp Temp src Pulse Resp SpO2   07/26/20 0723 (!) 115/52 98 °F (36.7 °C) Temporal 79 16 94 %     I/O last 3 completed shifts: In: 9938 [P.O.:300; I.V.:3035]  Out: 500 [Urine:450; Blood:50]  I/O this shift: In: 26 [P.O.:520]  Out: -     HEENT: Normal  NECK: Thyroid normal. No carotid bruit. No lymphphadenopathy. CVS: IRR  RS: Clear. No wheeze. No rhonchi. Good airflow bilaterally. ABD: Soft. Non tender. No mass. Normal BS.   EXT: incision right hip clean ,BKA  NEURO: no focal deficit       Scheduled Meds:   warfarin  5 mg Oral Once    sodium chloride flush  10 mL Intravenous 2 times per day    sennosides-docusate sodium  1 tablet Oral BID    enoxaparin  40 mg Subcutaneous Daily    warfarin (COUMADIN) daily dosing (placeholder)   Other RX Placeholder    ceFAZolin  2 g Intravenous See Admin Instructions    atorvastatin  40 mg Oral Nightly    metoprolol succinate  50 mg Oral Daily    hydroxyurea  500 mg Oral Daily    isosorbide mononitrate  30 mg Oral Daily     Continuous Infusions:   sodium chloride 50 mL/hr at 07/26/20 0634       CBC with Differential:    Lab Results   Component Value Date    WBC 75.0 07/26/2020    RBC 1.78 07/26/2020    HGB 7.0 07/26/2020    HCT 22.9 07/26/2020     07/26/2020    .7 07/26/2020    MCH 39.3 07/26/2020    MCHC 30.6 07/26/2020    RDW 17.3 07/26/2020    NRBC 0.0 06/24/2018    BANDSPCT 2 10/12/2016    METASPCT 0.9 06/23/2018    LYMPHOPCT 6.1 07/24/2020    MONOPCT 4.3 07/24/2020    MYELOPCT 0.9 01/14/2020    BASOPCT 0.9 07/24/2020    MONOSABS 2.01 07/24/2020    LYMPHSABS 3.02 07/24/2020    EOSABS 0.00 07/24/2020    BASOSABS 0.45 07/24/2020     CMP:    Lab Results   Component Value Date     07/26/2020    K 4.3 07/26/2020     07/26/2020    CO2 19 07/26/2020    BUN 24 07/26/2020    CREATININE 1.1 07/26/2020    GFRAA >60 07/26/2020 LABGLOM >60 07/26/2020    PROT 6.6 07/24/2020    LABALBU 4.0 07/24/2020    LABALBU 4.1 01/24/2012    CALCIUM 7.9 07/26/2020    BILITOT 1.1 07/24/2020    ALKPHOS 184 07/24/2020    AST 23 07/24/2020    ALT 17 07/24/2020     PT/INR:    Lab Results   Component Value Date    PROTIME 17.7 07/26/2020    PROTIME 28.4 02/18/2020    INR 1.6 07/26/2020       Assessment:     Active Problems:    Closed nondisplaced intertrochanteric fracture of right femur (HCC)    A. Fib    HTN    PVD      Plan:   Continue same as per ortho.

## 2020-07-26 NOTE — PROGRESS NOTES
Comprehensive Nutrition Assessment    Type and Reason for Visit:  Initial, Positive Nutrition Screen    Nutrition Recommendations/Plan: Continue current diet, Start Ensure BID      Nutrition Assessment:  Pt nutritionally at risk w/ increased nutrient needs for surgical healing s/p R CMN. Noted BL BKA. Will provide ONS and monitor    Malnutrition Assessment:  Malnutrition Status:  Insufficient data    Context:  Acute Illness     Findings of the 6 clinical characteristics of malnutrition:  Energy Intake:  Mild decrease in energy intake (Comment)  Weight Loss:  Unable to assess(d/t lack of wt hx on file)     Body Fat Loss:  Unable to assess(d/t lack of proper PPE)     Muscle Mass Loss:  Unable to assess    Fluid Accumulation:  No significant fluid accumulation     Strength:  Not Performed    Estimated Daily Nutrient Needs:  Energy (kcal):  ; Weight Used for Energy Requirements:  Current     Protein (g):  85-95; Weight Used for Protein Requirements:  Current(1.2-1.4)        Fluid (ml/day):  ; Weight Used for Fluid Requirements:  Current      Nutrition Related Findings:  pt alert, abd WDL, BL BKA, no noted edema, +I/Os      Wounds:  Surgical Wound       Current Nutrition Therapies:    DIET GENERAL; Anthropometric Measures:  · Height: 5' 9\" (175.3 cm)  · Current Body Weight: 150 lb (68 kg)   · Admission Body Weight:      · Usual Body Weight: 150 lb (68 kg)(no method per EMR hx)     · Ideal Body Weight: 160 lbs; % Ideal Body Weight 93.8 %   · Adjusted Body Weight: 167.7;  Amputation   · Adjusted BMI: 24.7    · BMI Categories: Normal Weight (BMI 22.0 to 24.9) age over 72       Nutrition Diagnosis:   · Increased nutrient needs related to increase demand for energy/nutrients as evidenced by wounds(s/p CMN)      Nutrition Interventions:   Food and/or Nutrient Delivery:  Continue Current Diet, Start Oral Nutrition Supplement(Ensure BID)  Nutrition Education/Counseling:  Education not indicated Coordination of Nutrition Care:  Continued Inpatient Monitoring    Goals:  Consume >50% meals/ONS       Nutrition Monitoring and Evaluation:   Food/Nutrient Intake Outcomes:  Food and Nutrient Intake, Supplement Intake  Physical Signs/Symptoms Outcomes:  Biochemical Data, GI Status, Fluid Status or Edema, Nutrition Focused Physical Findings, Skin, Weight     Discharge Planning:     Too soon to determine     Electronically signed by Khalif Edgar MS, RD, LD on 7/26/20 at 11:09 AM EDT    Contact: [phone]

## 2020-07-26 NOTE — PROGRESS NOTES
OCCUPATIONAL THERAPY INITIAL EVALUATION      Date:2020  Patient Name: Teena Magallon  MRN: [id number]  : 1929  Room: [address] [place] [address]    Referring Provider: Collins Gramajo DO    Evaluating OT: Abhishek Kong OTR/L #[id number]    AM-PAC Daily Activity Raw Score:     Recommended Adaptive Equipment: WW, [facility]- To be further assessed      Diagnosis: closed fx of R hip,  S/P Right cephalomedullary nail on       Pertinent Medical History: CKD, B BLE,      Precautions:  Falls, WBAT RLE, BLE BKA and BLE prosthetic     Home Living: Pt lives with wife in a 1 story with ramp to enter    Bathroom setup: walk in shower with grab bars and shower chair  Equipment owned: w/c, ww, 2 canes,   Prior Level of Function: Modified [place]  with ADLs , Modified [place]  with IADLs; using 2 canes for ambulation in home and community. Driving: no    Pain Level: none at rest   Cognition: A&O: 4/4; Follows 2 step directions   Memory:  good    Sequencing:  good    Problem solving:  good    Judgement/safety:  good      Functional Assessment:   Initial Eval Status  Date: 20 Treatment Status  Date: STG/LTG  Frequency/duration  2-3x/week, 5-7 days   Feeding Independent   Independent    Grooming Stand by Assist   Independent    UB Dressing Stand by Assist   Independent    LB Dressing Minimal Assist   Modified [place]    Bathing Moderate Assist  Modified [place]    Toileting Maximal Assist   Modified [place]    Bed Mobility  Supine to sit: Minimal Assist   Sit to supine: NT   Supine to sit: Modified [place]   Sit to supine: Modified [place]    Functional Transfers Sit to stand: Moderate Assist x 2  Stand to sit: Moderate Assist x 2  Stand pivot:  Moderate Assist x 2  Stand by Assist    Functional Mobility  (Ambulation) Mod A x 2 with ww  3-4 side steps  Pt is very motivated  SBA with AAD   Balance Sitting:     Static:  CGA    Dynamic:CGA  Standing: mod A x 2     Activity Tolerance Fair+ Visual/  Perceptual Glasses: no          BUE  ROM/Strength/  Fine motor Coordination RUE: ROM WFL     Strength: grossly 4/5      Strength:  WFL     Coordination: WFL    LUE: ROM  WFL     Strength: grossly 4/5      Strength:  WFL     Coordination: WFL       Hand dominance: R    Hearing: WFL  Sensation:  No c/o numbness or tingling  Tone:  WFL  Edema: None noted                          Treatment: OT treatment provided this date includes:    ADL-  Instruction/training on safety and adapted techniques for completion of ADLs: donning prosthetic legs,     Mobility-  Instruction/training on safety and improved independence with bed mobility/functional transfers  and functional mobility. Pt education on hand and feet positioning to improve safety. Comments: Upon arrival, patient in bed. At end of session, patient up in chair with call light and phone within reach, all lines and tubes intact. Pt demonstrating good understanding of education/techniques. Patient would benefit from continued skilled OT  to improve safety, functional independence and quality of life.     Assessment of current deficits   Functional mobility [x]  ADLs [x] Strength [x]  Cognition []  Functional transfers  [x] IADLs [] Safety Awareness [x]  Endurance [x]  Fine Motor Coordination [] Balance [x] Vision/perception [] Sensation []   Gross Motor Coordination [] ROM [] Delirium []                  Motor Control []    Plan of Care:   ADL retraining [x]    Equipment needs [x]   Neuromuscular re-education [x]  Energy Conservation Techniques [x]  Functional Transfer training [x]  Patient and/or Family Education [x]  Functional Mobility training [x]              Environmental Modifications [x]  Cognitive re-training []    Compensatory techniques for ADLs [x]  Splinting Needs []    Positioning to improve overall function [x]   Therapeutic Activity [x]               Therapeutic Exercise  [x]  Visual/Perceptual: []     Delirium prevention/treatment  []   Other:  []    Rehab Potential: Good for established goals    Patient / Family Goal: get more therapy - Acute Rehab     Evaluation time includes thorough review of current medical information, gathering information on past medical & social history & PLOF, completion of standardized testing, informal observation of tasks, consultation with other medical professions/disciplines, assessment of data & development of POC/goals. · Evaluation Complexity: Mod Complexity  · History: Expanded review of medical records and additional review of physical, cognitive, or psychosocial history related to current functional performance  · Exam: 5+ performance deficits  · Assistance/Modification: mod/max assistance or modifications required to perform tasks. May have comorbidities that affect occupational performance.     Treatment Time In: 6751            Treatment Time Out:  1147             Treatment Charges: Mins Units   Ther Ex  14014     Manual Therapy 98774     Thera Activities 64531 8 1   ADL/Home Mgt 67362 15 1   Neuro Re-ed 80284     Group Therapy      Orthotic manage/training  95070     Non-Billable Time     Total Timed Treatment [address] [place] [address] [id number]

## 2020-07-26 NOTE — PROGRESS NOTES
Pharmacy Consultation Note  (Anticoagulant Dosing and Monitoring)    Initial consult date: 7/25/2020  Consulting physician: Dr. Cristian Hernandez    Allergies:  Patient has no known allergies. Ht Readings from Last 1 Encounters:   07/24/20 5' 9\" (1.753 m)     Wt Readings from Last 1 Encounters:   07/24/20 150 lb (68 kg)         Warfarin Indication Target   INR Range Home Dose   (if applicable) Diet/Feeding Tube   Atrial fibrillation 2-3 Warfarin 5 mg/7.5 mg alternating General diet       Vitamin K or Blood product  Administration Date     N/A            Warfarin drug-drug interactions  Start  Stop Home Med?  Comments                                 TSH:    Lab Results   Component Value Date    TSH 1.070 09/26/2018        Hepatic Function Panel:                      Lab Results   Component Value Date    ALKPHOS 184 07/24/2020    ALT 17 07/24/2020    AST 23 07/24/2020    PROT 6.6 07/24/2020    BILITOT 1.1 07/24/2020    LABALBU 4.0 07/24/2020    LABALBU 4.1 01/24/2012       Date Warfarin Dose INR Heparin or LMWH HBG/  HCT PLT Comment   7/25 7.5 mg 1.4 Enoxaparin 40 mg daily 10.4/33  (7/24) 237  (7/24)    7/26 5 mg 1.6 Enoxaparin 40 mg daily 7/22.9 248                                 Assessment:  · Patient is a 80year old male with atrial fibrillation on alternating doses of warfarin 5 mg and 7.5 mg  · Warfarin had been held for OR on 7/25  · INR goal = 2-3; INR today = 1.6 (subtherapeutic, increased from 1.4 yesterday)    Plan:  · Will give warfarin 5 mg tonight  · Daily PT/INR until the INR is stable within the therapeutic range  · Pharmacist will follow and monitor/adjust dosing as necessary    Amber Baugh, Karissa, BCCCP  7/26/2020  8:44 AM

## 2020-07-26 NOTE — PROGRESS NOTES
Peripheral arterial disease ([facility]    Sepsis due to pneumonia ([facility] [address]    Chest pain    Precordial pain    Acute rhinitis    Closed nondisplaced intertrochanteric fracture of right femur (HCC)    Aortic valve disease       Allergies:  No Known Allergies    Current Medications:  Current Facility-Administered Medications   Medication Dose Route Frequency Provider Last Rate Last Dose    0.9 % sodium chloride infusion   Intravenous Continuous Ching Mahesh,  mL/hr at 07/25/20 1603      sodium chloride flush 0.9 % injection 10 mL  10 mL Intravenous 2 times per day Ching Mahesh, DO   10 mL at 07/25/20 2100    sodium chloride flush 0.9 % injection 10 mL  10 mL Intravenous PRN Ching Mahesh, DO   10 mL at 07/25/20 1440    morphine (PF) injection 2 mg  2 mg Intravenous Q3H PRN Ching Mahesh, DO   2 mg at 07/25/20 1744    Or    morphine sulfate (PF) injection 4 mg  4 mg Intravenous Q3H PRN Ching Mahesh, DO        sennosides-docusate sodium (SENOKOT-S) 8.6-50 MG tablet 1 tablet  1 tablet Oral BID Ching Mahesh, DO   1 tablet at 07/25/20 2115    magnesium hydroxide (MILK OF MAGNESIA) 400 MG/5ML suspension 30 mL  30 mL Oral Daily PRN Ching Mahesh, DO        acetaminophen (TYLENOL) tablet 650 mg  650 mg Oral Q4H PRN Ching Mahesh, DO        oxyCODONE (ROXICODONE) immediate release tablet 5 mg  5 mg Oral Q4H PRN Ching Mahesh, DO        Or    oxyCODONE HCl (OXY-IR) immediate release tablet 10 mg  10 mg Oral Q4H PRN Ching Mahesh, DO   10 mg at 07/25/20 2115    promethazine (PHENERGAN) tablet 12.5 mg  12.5 mg Oral Q6H PRN Ching Mahesh, DO        Or    [facility] injection 4 mg  4 mg Intravenous Q6H PRN Ching Mahesh, DO        enoxaparin (LOVENOX) injection 40 mg  40 mg Subcutaneous Daily Ching Mahesh, DO   40 mg at 07/25/20 1242    warfarin (COUMADIN) daily dosing (placeholder)   Other RX Placeholder Ching Mahesh, DO        ceFAZolin (ANCEF) 2 g in sterile water 20 mL IV syringe 2 g Intravenous See Admin Instructions Norman Partida, DO        atorvastatin (LIPITOR) tablet 40 mg  40 mg Oral Nightly Dulce Maria Buccino, DO   40 mg at 07/25/20 2115    metoprolol succinate (TOPROL XL) extended release tablet 50 mg  50 mg Oral Daily Dulce Maria Buccino, DO   50 mg at 07/25/20 9372    hydroxyurea (HYDREA) chemo capsule 500 mg  500 mg Oral Daily Dulce Maria Buccino, DO   500 mg at 07/25/20 0059    isosorbide mononitrate (IMDUR) extended release tablet 30 mg  30 mg Oral Daily Dulce Maria Buccino, DO   30 mg at 07/25/20 3958      sodium chloride 100 mL/hr at 07/25/20 1603       Physical Exam:  BP (!) 126/57   Pulse 84   Temp 98.7 °F (37.1 °C) (Temporal)   Resp 16   Ht 5' 9\" (1.753 m)   Wt 150 lb (68 kg)   SpO2 96%   BMI 22.15 kg/m²   Weight change: Wt Readings from Last 3 Encounters:   07/24/20 150 lb (68 kg)   07/03/20 149 lb (67.6 kg)   10/25/19 149 lb (67.6 kg)     The patient is awake, alert and in no discomfort or distress. No gross musculoskeletal deformity is present beyond that of bilateral below-knee amputations. No significant skin or nail changes are present. Gross examination of head, eyes, nose and throat are negative. Jugular venous pressure is normal and no carotid bruits are present. Normal respiratory effort is noted with no accessory muscle usage present. Lung fields are clear to ascultation. Cardiac examination is notable for a regular rate and rhythm with no palpable thrill. No gallop rhythm and a mid peaking systolic ejection murmur at the right upper sternal border radiating to the left sternal border and both carotid distributions are identified. A benign abdominal examination is present with no masses or organomegaly. Intact pulses are present throughout upper extremities and no peripheral edema is present. No focal neurologic deficits are present.     Intake/Output:    Intake/Output Summary (Last 24 hours) at 7/26/2020 0625  Last data filed at 7/25/2020 1330  Gross per 24 hour Intake 1400 ml   Output 250 ml   Net 1150 ml     No intake/output data recorded. Laboratory Tests:  Lab Results   Component Value Date    CREATININE 1.1 07/24/2020    BUN 21 07/24/2020     07/24/2020    K 4.1 07/24/2020     07/24/2020    CO2 23 07/24/2020     No results for input(s): CKTOTAL, CKMB in the last 72 hours.     Invalid input(s): TROPONONI  No results found for: BNP  Lab Results   Component Value Date    WBC 50.3 07/24/2020    RBC 2.61 07/24/2020    HGB 10.4 07/24/2020    HCT 33.0 07/24/2020    .4 07/24/2020    MCH 39.8 07/24/2020    MCHC 31.5 07/24/2020    RDW 17.4 07/24/2020     07/24/2020    MPV 12.0 07/24/2020     Recent Labs     07/24/20  1206   ALKPHOS 184*   ALT 17   AST 23   PROT 6.6   BILITOT 1.1   LABALBU 4.0     No results found for: MG  Lab Results   Component Value Date    PROTIME 15.7 07/25/2020    PROTIME 28.4 02/18/2020    INR 1.4 07/25/2020     Lab Results   Component Value Date    TSH 1.070 09/26/2018     No components found for: CHLPL  Lab Results   Component Value Date    TRIG 60 09/16/2019    TRIG 81 09/26/2018    TRIG 65 02/26/2018     Lab Results   Component Value Date    HDL 43 09/16/2019    HDL 25 09/26/2018    HDL 40 02/26/2018     Lab Results   Component Value Date    LDLCALC 47 09/16/2019    LDLCALC 49 09/26/2018    [address] 02/26/2018       Cardiac Tests:  Telemetry findings reviewed: sinus rhythm with occasional ventricular ectopy, no new tachy/bradyarrhythmias overnight  Last Echocardiogram: An echocardiogram demonstrates evidence of a normal-sized left ventricular chamber with mild concentric left ventricular hypertrophy and left ventricular systolic function at the lower limits of normal with an estimated ejection fraction of approximately 50 to 55% with borderline left atrial enlargement, focal mitral leaflet thickening and annular calcification with mild mitral regurgitation and moderate aortic stenosis (mean transaortic gradient 29 mmHg)      ASSESSMENT / PLAN: On a clinical basis, the patient presently appears stable from a cardiovascular standpoint with no apparent perioperative cardiovascular difficulties and needs of continued careful monitoring of his volume status with plans to reduce fluid administration to reduce reduce risk of iatrogenic volume overload pending review of additional laboratory studies. Continued careful monitoring of his volume status will be necessary and aided by nutritional support to assist fluid mobilization. Careful monitoring of anticoagulation with its resumption will be necessary with goals of maintaining prothrombin times in the range of 2.0-2.5 times INR control to reduce risk of embolic events. His echocardiogram continues to demonstrate evidence of left ventricular systolic function at the lower limits of normal with moderate aortic stenosis again identified. Continued appropriate risk factor modification of blood pressure and serum lipids will remain essential to reducing risk of future atherosclerotic development. If he remains stable, likely transfer to subacute rehabilitation within the next 24 to 48 hours would be anticipated. Note: This report was completed utilizing computer voice recognition software. Every effort has been made to ensure accuracy, however; inadvertent computerized transcription errors may be present. Nico Carvajal.  Rainer Mccabe, [address] [facility]

## 2020-07-26 NOTE — CONSULTS
PM&R Consult Note  Patient: Prudence Course  Age/sex: 80 y.o. male  Medical Record #: [id number]      Referring physician: Cedric Collado DO  Consulting physician: Dr. Prashant Alfaro MD  Primary care provider: Cedric Collado DO      Chief complaint:   Impairments and acitivities limitations in ADLs and mobility secondary to right intertrochanteric hip fracture. HPI:   Prudence Course is a 80 y.o. male with past medical history significant for bilateral BKA who presented to [facility] on 7/[facility]2020 due to abnormal outpatient xrays. Patient was previously ambulatory with bilateral lower extremity prosthesis and bilateral canes. He had a fall about 2 weeks ago and since that time has had right hip pain with attempts to bear weight through the right leg. He was seen by Orthopedics in the office and xray revealed displaced right intertrochanteric hip fracture. He was admitted and underwent right intramedullary trochanteric nail on 7/25/2020. He is WB[facility] to the [facility]. Hospital course noted for acute blood loss anemia. He has participated in post operative therapy evaluations.        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 ([facility] [address] 12/28/2015    Blood circulation, collateral     Cancer ([facility] [address] 2004    prostate, seed implant    Chronic kidney disease     Critical lower limb ischemia 12/29/2015    Hyperlipidemia     Pneumonia      Past Surgical History:   Procedure Laterality Date    APPENDECTOMY      ECHOCARDIOGRAM COMPLETE 2D W DOPPLER W COLOR  1/25/2012         LEG AMPUTATION BELOW KNEE Bilateral     Right    TOOTH EXTRACTION         Family History   Problem Relation Age of Onset    Stroke Father     Heart Disease Father     Cancer Sister        No Known Allergies    Scheduled Meds:  warfarin, 5 mg, Once  sodium chloride flush, 10 mL, 2 times per day  sennosides-docusate sodium, 1 tablet, BID  enoxaparin, 40 mg, Daily  warfarin (COUMADIN) daily dosing (placeholder), , RX Placeholder  ceFAZolin, 2 g, See Admin Instructions  atorvastatin, 40 mg, Nightly  metoprolol succinate, 50 mg, Daily  hydroxyurea, 500 mg, Daily  isosorbide mononitrate, 30 mg, Daily        PRN Meds  sodium chloride flush, 10 mL, PRN  morphine, 2 mg, Q3H PRN    Or  morphine, 4 mg, Q3H PRN  magnesium hydroxide, 30 mL, Daily PRN  acetaminophen, 650 mg, Q4H PRN  oxyCODONE, 5 mg, Q4H PRN    Or  oxyCODONE, 10 mg, Q4H PRN  promethazine, 12.5 mg, Q6H PRN    Or  ondansetron, 4 mg, Q6H PRN        Social History:  Social History     Socioeconomic History    Marital status:      Spouse name: Not on file    Number of children: Not on file    Years of education: Not on file    Highest education level: Not on file   Occupational History    Not on file   Social Needs    Financial resource strain: Not on file    Food insecurity     Worry: Not on file     Inability: Not on file    Transportation needs     Medical: Not on file     Non-medical: Not on file   Tobacco Use    Smoking status: Former Smoker     Packs/day: 1.00     Years: 30.00     Pack years: 30.00     Types: Cigars, Cigarettes     Start date:      Last attempt to quit: 1979     Years since quittin.9    Smokeless tobacco: Never Used   Substance and Sexual Activity    Alcohol use:  Yes     Alcohol/week: 5.0 standard drinks     Types: 5 Glasses of wine per week    Drug use: Yes     Types: Marijuana    Sexual activity: Never   Lifestyle    Physical activity     Days per week: Not on file     Minutes per session: Not on file    Stress: Not on file   Relationships    Social connections     Talks on phone: Not on file     Gets together: Not on file     Attends [employer] service: Not on file     Active member of club or organization: Not on file     Attends meetings of clubs or organizations: Not on file     Relationship status: Not on file    Intimate partner violence     Fear of current or ex partner: Not on file     Emotionally abused: Not on file     Physically abused: Not on file     Forced sexual activity: Not on file   Other Topics Concern    Not on file   Social History Narrative    Not on file       Home situation: Lives with wife and sister in law in 1 level home with ramp entry      Functional History:  Premorbid ADL's: independent- Mod I   Premorbid Mobility: ambulating community distances with bilateral lower extremity prosthesis and bilateral canes. Present: significant decrease in mobility and function     Physical Therapy:  Bed mobility: Min A  Transfers: Mod Ax2  Ambulation: 5 ft Mod A x2 [employer]    Occupational Therapy:  Feeding: Independent  Grooming: SBA  UB dressing: SBA  LB dressing: Min A    Review Of Systems:   Constitutional: No Fever, chills  HEENT: No HA, blurry vision  Respiratory: No Cough, SOB  Cardiovascular: No CP  Gastrointestinal: No nausea, vomiting, diarrhea, constipation, abdominal discomfort; + continent   Genitourinary: No Dysuria  Musculoskeletal: Bilateral BKA; rest as per HPI  Neurologic: per HPI  Psychiatric: No Depression, No anxiety      Physical Examination:  Vitals:   Vitals:    07/25/20 1530 07/25/20 1930 07/26/20 0723 07/26/20 1104   BP: (!) 100/55 (!) 126/57 (!) 115/52    Pulse: 70 84 79    Resp: 16 16 16    Temp: 97.8 °F (36.6 °C) 98.7 °F (37.1 °C) 98 °F (36.7 °C)    TempSrc: Temporal Temporal Temporal    SpO2: 99% 96% 94%    Weight:       Height:    5' 9\" (1.753 m)       GEN: NAD, resting in bed   HEENT: NC/AT, PERRL, EOMI  RESP: CTAB, no R/R/W  CV: RRR  ABD: soft, NT, ND, normal BS   EXT: No erythema/clubbing/cyanosis.  Bilateral BKA  Skin: bilateral BKA, old,well healed incision scars  Psych: appropriate mood and affect    Neuro Exam:  AAOx4  Speech clear, content appropriate  Follows multi step commands      Cranial Nerves:  CN II-XII grossly intact     Coordination:  F - N: intact bilaterally    Sensory:    LT: intact throughout     Motor: Tone was normal    Motor Findings  Strength: Right  Strength: Left    Deltoid  5/5  5/5    Biceps  5/5  5/5    Triceps  5/5  5/5    Wrist Extensors  5/5  5/5    FDP 5/5 5/5   Finger abductors 5/5 5/5   Iliospoas  2/5  5/5    Quadriceps  2/5  5/5    Tibialis anterior        EHL     Gastroc soleus            DTRs:  Reflex Right Left   Biceps 2+ 2+   Triceps 2+ 2+   Brachioradialis 2+ 2+       Laboratory:    Lab Results   Component Value Date    WBC 75.0 (H) 07/26/2020    HGB 7.0 (L) 07/26/2020    HCT 22.9 (L) 07/26/2020    .7 (H) 07/26/2020     07/26/2020     Lab Results   Component Value Date     07/26/2020    K 4.3 07/26/2020     07/26/2020    CO2 19 07/26/2020    BUN 24 07/26/2020    CREATININE 1.1 07/26/2020    GLUCOSE 100 07/26/2020    GLUCOSE 94 01/24/2012    CALCIUM 7.9 07/26/2020      Lab Results   Component Value Date    ALT 17 07/24/2020    AST 23 07/24/2020    ALKPHOS 184 (H) 07/24/2020    BILITOT 1.1 07/24/2020       Lab Results   Component Value Date    COLORU Yellow 02/14/2017    NITRU Negative 02/14/2017    GLUCOSEU Negative 02/14/2017    KETUA Negative 02/14/2017    UROBILINOGEN 1.0 02/14/2017    BILIRUBINUR Negative 02/14/2017     Lab Results   Component Value Date    LABA1C 5.1 09/26/2018         Pertinent Imaging:    Right hip xray with pelvis:  Impression         Right hip intertrochanteric fracture which is displaced cephalad         Osteopenia            IMPRESSION:  Pepper Allen is a 80 y.o. male with impairments and acitivities limitations in ADLs and mobility secondary to right intertrochanteric hip fracture in patient with history of bilateral BKA. Demonstrating impaired strength, impaired ROM, impaired balance, decreased endurance, impaired functional mobility and impaired self care. Recommendations:   Patient is appropriate and would benefit from Acute Rehabilitation to improve functional outcomes when medically stable.      Thank you for the consult.       Suresh Myles MD  Physical Medicine and Rehabilitation

## 2020-07-27 LAB
ANION GAP SERPL CALCULATED.3IONS-SCNC: 15 MMOL/L (ref 7–16)
BLOOD BANK DISPENSE STATUS: NORMAL
BLOOD BANK DISPENSE STATUS: NORMAL
BLOOD BANK PRODUCT CODE: NORMAL
BLOOD BANK PRODUCT CODE: NORMAL
BPU ID: NORMAL
BPU ID: NORMAL
BUN BLDV-MCNC: 27 MG/DL (ref 8–23)
CALCIUM SERPL-MCNC: 7.9 MG/DL (ref 8.6–10.2)
CHLORIDE BLD-SCNC: 101 MMOL/L (ref 98–107)
CO2: 19 MMOL/L (ref 22–29)
CREAT SERPL-MCNC: 1.2 MG/DL (ref 0.7–1.2)
DESCRIPTION BLOOD BANK: NORMAL
DESCRIPTION BLOOD BANK: NORMAL
GFR AFRICAN AMERICAN: >60
GFR NON-AFRICAN AMERICAN: 57 ML/MIN/1.73
GLUCOSE BLD-MCNC: 92 MG/DL (ref 74–99)
HCT VFR BLD CALC: 21.1 % (ref 37–54)
HEMOGLOBIN: 6.4 G/DL (ref 12.5–16.5)
INR BLD: 2.3
MCH RBC QN AUTO: 39 PG (ref 26–35)
MCHC RBC AUTO-ENTMCNC: 30.3 % (ref 32–34.5)
MCV RBC AUTO: 128.7 FL (ref 80–99.9)
PDW BLD-RTO: 17.5 FL (ref 11.5–15)
PLATELET # BLD: 272 E9/L (ref 130–450)
PMV BLD AUTO: 12.2 FL (ref 7–12)
POTASSIUM REFLEX MAGNESIUM: 4 MMOL/L (ref 3.5–5)
PROTHROMBIN TIME: 26.2 SEC (ref 9.3–12.4)
RBC # BLD: 1.64 E12/L (ref 3.8–5.8)
SODIUM BLD-SCNC: 135 MMOL/L (ref 132–146)
WBC # BLD: 55.2 E9/L (ref 4.5–11.5)

## 2020-07-27 PROCEDURE — 2580000003 HC RX 258: Performed by: ORTHOPAEDIC SURGERY

## 2020-07-27 PROCEDURE — 85027 COMPLETE CBC AUTOMATED: CPT

## 2020-07-27 PROCEDURE — 36415 COLL VENOUS BLD VENIPUNCTURE: CPT

## 2020-07-27 PROCEDURE — 6370000000 HC RX 637 (ALT 250 FOR IP): Performed by: FAMILY MEDICINE

## 2020-07-27 PROCEDURE — 6370000000 HC RX 637 (ALT 250 FOR IP): Performed by: ORTHOPAEDIC SURGERY

## 2020-07-27 PROCEDURE — 99232 SBSQ HOSP IP/OBS MODERATE 35: CPT | Performed by: INTERNAL MEDICINE

## 2020-07-27 PROCEDURE — 80048 BASIC METABOLIC PNL TOTAL CA: CPT

## 2020-07-27 PROCEDURE — 85610 PROTHROMBIN TIME: CPT

## 2020-07-27 PROCEDURE — 2060000000 HC ICU INTERMEDIATE R&B

## 2020-07-27 PROCEDURE — 2580000003 HC RX 258: Performed by: FAMILY MEDICINE

## 2020-07-27 PROCEDURE — 36430 TRANSFUSION BLD/BLD COMPNT: CPT

## 2020-07-27 PROCEDURE — 6360000002 HC RX W HCPCS: Performed by: ORTHOPAEDIC SURGERY

## 2020-07-27 PROCEDURE — P9016 RBC LEUKOCYTES REDUCED: HCPCS

## 2020-07-27 RX ORDER — WARFARIN SODIUM 5 MG/1
5 TABLET ORAL DAILY
Status: DISCONTINUED | OUTPATIENT
Start: 2020-07-27 | End: 2020-07-27

## 2020-07-27 RX ORDER — 0.9 % SODIUM CHLORIDE 0.9 %
20 INTRAVENOUS SOLUTION INTRAVENOUS ONCE
Status: COMPLETED | OUTPATIENT
Start: 2020-07-27 | End: 2020-07-27

## 2020-07-27 RX ADMIN — Medication 10 ML: at 20:52

## 2020-07-27 RX ADMIN — ISOSORBIDE MONONITRATE 30 MG: 30 TABLET ORAL at 10:22

## 2020-07-27 RX ADMIN — HYDROXYUREA 500 MG: 500 CAPSULE ORAL at 10:22

## 2020-07-27 RX ADMIN — OXYCODONE 5 MG: 5 TABLET ORAL at 14:03

## 2020-07-27 RX ADMIN — METOPROLOL SUCCINATE 50 MG: 50 TABLET, EXTENDED RELEASE ORAL at 10:23

## 2020-07-27 RX ADMIN — ATORVASTATIN CALCIUM 40 MG: 40 TABLET, FILM COATED ORAL at 20:51

## 2020-07-27 RX ADMIN — SODIUM CHLORIDE 20 ML: 9 INJECTION, SOLUTION INTRAVENOUS at 13:55

## 2020-07-27 RX ADMIN — DOCUSATE SODIUM 50 MG AND SENNOSIDES 8.6 MG 1 TABLET: 8.6; 5 TABLET, FILM COATED ORAL at 10:22

## 2020-07-27 RX ADMIN — OXYCODONE 5 MG: 5 TABLET ORAL at 14:47

## 2020-07-27 RX ADMIN — ENOXAPARIN SODIUM 40 MG: 40 INJECTION SUBCUTANEOUS at 10:23

## 2020-07-27 RX ADMIN — DOCUSATE SODIUM 50 MG AND SENNOSIDES 8.6 MG 1 TABLET: 8.6; 5 TABLET, FILM COATED ORAL at 20:51

## 2020-07-27 ASSESSMENT — PAIN SCALES - GENERAL
PAINLEVEL_OUTOF10: 7
PAINLEVEL_OUTOF10: 0
PAINLEVEL_OUTOF10: 7
PAINLEVEL_OUTOF10: 0
PAINLEVEL_OUTOF10: 0

## 2020-07-27 NOTE — PROGRESS NOTES
[phone]  Secondary Phone: [phone]  Secondary  Contact: King Che  Relationship: daughter  Primary Phone: [phone]  Secondary Phone: [phone]    Previous Community Services: none noted  Caregiver available: [x] Yes [] No Hours per day available: to be determined  Patient previously employed:  [] Yes [] Part Time [] Full Time [x] No [x] Retired  Occupation/Profession: retired  Prior living arrangements: [x] Home  [] Assisted living  [] SNF [] Other  Lived with:  [] Alone  [x] Spouse  [] Family  [] Other  Lived with: [address] phone: [phone]  Home:  1 story home  Ramped entry    Bedroom: [x] 1st floor  [] 2nd floor    Bathroom:  [x] 1st floor  [] 2nd floor    Prior Functional Level: Independent for: self care, mobility    Dominant hand: [x] Right  [] Left    Previous Home Equipment:  [x] Canes x2 [] Grab bars [] Orthotic / prosthetic   [] Shower chair [] Tub bench  [] 3-in-1 Commode [] Long handle sponge   [] Oxygen [] Sock aide  [x] Wheelchair  [] motorized wc/scooter  [] Wheelchair cushion   [] Crutches [] Long handle shoehorn  [] Reachers [] Toilet seat elevator [] Rollator  [x] Walker(wheeled)   [] Walker(standard) [] Mechanical lift    [] None of the above     Has patient had 2 or more falls in the past year or any fall with injury in the past year? [x] yes   [] no   []unknown    Has patient had major surgery during past 100 days prior to admission?    [x] yes   [] no Type/ Date: 7/25/2020 -Right Intramedullary trochanteric nailing     Surgical History:  Past Surgical History:   Procedure Laterality Date    APPENDECTOMY      ECHOCARDIOGRAM COMPLETE 2D W DOPPLER W COLOR  1/25/2012         HIP FRACTURE SURGERY Right 7/25/2020    RIGHT HIP CEPHALO-MEDULLARY NAIL performed by Earlene Schmid MD at [facility] [address] Bilateral     Right    TOOTH EXTRACTION         Past Medical:  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 [facility] 12/28/2015    Blood circulation, collateral     Cancer ([facility] 2004    prostate, seed implant    Chronic kidney disease     Critical lower limb ischemia 12/29/2015    Hyperlipidemia     Pneumonia        Current Co-morbidities:  [] Alzheimer's   [] Dysphasia     [] Parkinsonism  [] Amputation   [] GERD  [] Peripheral artery disease   [] Anemia      [] Encephalopathy  [] Peripheral vascular disease  [] Anxiety   [] Gangrene   [] Pneumonia  [] Aphasia   [] Gout    [] Polyneuropathy  [] Asthma   [] Heart Failure (diastolic) [] Post-polio syndrome  [] Atrial fibrillation  [] Heart Failure (left-sided) [] Pseudomonas enteritis   [] Blind    [] Heart Failure (right-sided) [] Pulmonary embolism  [] Cellulitis     [] Heart Failure (systolic) [] Renal dialysis  [] Clostridium difficile  [] Hemiparesis   [] Renal failure  [] Congestive heart failure [] Hypertension   [] Rheumatoid arthritis  [] COPD   [] Hypotension   [] Seizure disorder   [] Coronary Artery Disease [] Hypothyroidism  [] Septicemia   [] Deaf    [x] Hyperlipidemia   [] Sleep apnea  [] Depression   [] Morbid obesity  [] Spinal cord injury  [] Diabetes   [] MRSA   [] Stroke  [] Diabetic nephropathy  [] Myocardial infarction  [] Tracheostomy  [] Diabetic neuropathy  [] Osteoarthritis  [] Traumatic brain injury   [] Diabetic retinopathy  [] Osteoporosis   [] Urinary tract infection  [] DVT    [] Pancytopenia  [] Vocal cord paralysis  []  Spinal stenosis   []  kidney disease [] VRE  [x] Post op IM Hip nailing  [x] Prior B BKA   []        Medical/Functional Conditions requiring inpatient rehabilitation: Patient has  functional deficits in ADLS, mobility, , impaired balance, and needs ongoing medical management for post op IM hip nailing, Bilateral below knee amputations     Risk for Medical/Clinical Complications: Falls, injury, pain, skin breakdown, abnormal vitals, abnormal labs, DVT, PE, pneumonia, decreased mobility, neuro changes     CLINICAL DATA:     Height : 5'9\"     Weight:  150#   BMI: 22.15       Date: 7/27/2020 Date: 07/29/20 Date:    temperature 98 97.1    pulse 77 75    respirations 18 18    Blood pressure 101/55 110/53    Pulse oximeter 95% room air 96%ra       ALLERGIES: Patient has no known allergies. DIET : DIET GENERAL;  Dietary Nutrition Supplements: Standard High Calorie Oral Supplement    Current Lab and Diagnostic Tests:   Recent Results (from the past 24 hour(s))   Protime-INR    Collection Time: 07/29/20  4:57 AM   Result Value Ref Range    Protime 20.6 (H) 9.3 - 12.4 sec    INR 1.8    CBC WITH AUTO DIFFERENTIAL    Collection Time: 07/29/20  4:57 AM   Result Value Ref Range    WBC 46.5 (H) 4.5 - 11.5 E9/L    RBC 2.31 (L) 3.80 - 5.80 E12/L    Hemoglobin 8.3 (L) 12.5 - 16.5 g/dL    Hematocrit 26.3 (L) 37.0 - 54.0 %    .9 (H) 80.0 - 99.9 fL    MCH 35.9 (H) 26.0 - 35.0 pg    MCHC 31.6 (L) 32.0 - 34.5 %    RDW 24.0 (H) 11.5 - 15.0 fL    Platelets 209 677 - 112 E9/L    MPV 12.6 (H) 7.0 - 12.0 fL     Xr Hip Right (2-3 Views)  Result Date: 7/25/2020  Status post post-ORIF the right hip which appears to be in anatomical position with orthopedic hardware is in place. Xr Femur Right (min 2 Views)  Result Date: 7/24/2020  1. Proximal right femur fracture. 2. No fracture of the mid or distal right femoral shaft. Xr Chest 1 View  Result Date: 7/24/2020  Scarring or atelectasis left base.      Xr Hip 2-3 Vw W Pelvis Right  Result Date: 7/24/2020   Right hip intertrochanteric fracture which is displaced cephalad Osteopenia    Additional labs or diagnostic studies needed before admission to rehabilitation unit:  none    Medications:   warfarin  8 mg Oral Daily    sodium chloride flush  10 mL Intravenous 2 times per day    sennosides-docusate sodium  1 tablet Oral BID    ceFAZolin  2 g Intravenous See Admin Instructions    atorvastatin  40 mg Oral Nightly    metoprolol succinate  50 mg Oral Daily    hydroxyurea  500 mg Oral Daily    isosorbide mononitrate  30 mg Oral Daily       sodium chloride flush, morphine **OR** morphine, magnesium hydroxide, acetaminophen, oxyCODONE **OR** oxyCODONE, promethazine **OR** ondansetron    SPECIAL PRECAUTIONS: [] No current precautions  [] Cardiac  [] Renal [] Sternal [] Respiratory      [] Neurological           [x] Hip  [] Spinal [] Seizure  [] Aspiration  [] Isolation precautions:    [] Contact   [] Respiratory   [] Protective     [] Droplet    [x] Weight Bearing precautions:         [] Non Weight Bearing :         [] Toe Touch Weight Bearing :        [] Partial Weight Bearing :         [x] Weight Bearing as Tolerated :         [x] Fall Risk:   [x] Recent history of falls [x] Falls risk level (Ray Scale): high      [x] Bed Alarm    [] Do not leave alone in the bathroom    [] Chair Alarm    [] Cognitive impairment      [] One to One supervision  [] Sitter / Edwena Bran sitter   [] Safety enclosure bed  [] Decreased balance     SPECIAL REHABILITATION NEEDS:   [x] IV Therapy: [x] PRN Adapter  [] Midline  [] PICC      [] Central Line    [] TPN       [] Oxygen: [] Trach [] Bi-PAP [] CPAP  [] Nasal cannula  [] Liters:      [x] Wound Care:   [] Pressure ulcers(stage and location) -    [] Wound vac   [x] Wound or incision care    [x] Pain Management (level of pain, meds): right hip pain ranges 10-0, oxy IR effective     [] Incontinence Bladder [] Aponte  Insertion date:    []Hemodialysis and  Frequency:   [] Incontinence Bowel    [x] Last bowel movement : 7/27/2020    Substance use history: [x] Yes  [] No   [] Tobacco (quit 1979) -  [x] Alcohol  [] Other     [] Ethnic  [] Cultural  [] Spiritual  [] Language [] Needs  [] Other than English  [] Hearing Impaired  [] Visually Impaired  [] Speaking Impaired  [] Blind  [] Special equipment:  [] Devices/Splints  [] Type   [] Brace   [] Type  [] Bariatric bed  [] Extra wide commode  [] Extra wide wheelchair [] Extra wide walker  [] Indra walker  [] Indra wheelchair  [] Transfer lift    [] Other equipment     FUNCTIONAL STATUS PT / [place] / Garrett Runner:  FIM / EVAL Discipline Initial: 7/26/2020 Follow Up: 07/28 Current:    Eating OT Independent  Independent    Grooming OT Stand by Assist  Stand By Assist    Bathing OT Moderate Assist  Moderate Assist    Dressing Upper Extremity OT Stand by Assist  Stand by assist    Dressing Lower Extremity OT Minimum assistance  Minimal assisst    Toileting OT Max Assist  Moderate assist x2    Toilet Transfers OT Moderate Assist x2 Moderate assist x2    Tub/Shower Transfers OT nt nt    Homemaking OT nt nt    Bed Mobility PT Minimum assistance  Stand by Assist      Bed/Wheelchair Transfers PT Moderate Assist  Moderate Assist   x2    Locomotion Walk / Wheelchair  Device:  Distance: PT 5' Moderate Assist x2 Wheeled walker  3 ft with [employer] moderate assist    Endurance PT nt Fair+    Expression SP nt nt    Social Interaction SP nt nt    Problem Solving SP nt nt    Memory SP nt nt    Comprehension SP nt nt    Swallowing SP nt nt    Bowel Management NSG continent continent    Bladder Management NSG continent continent      Comments on Functional Status: bilateral BKAs with prosthesis    [x] Able to participate a minimum of 3 hours per day of therapy intervention    Required treatments/services: [x] Rehabilitation nursing [] Dietitian / nurtition                 [x] Case management  [] Respiratory Therapy      [x] Social work   [] Other     Required Therapy:  Therapy Hours per Day Days per Week Therapeutic Interventions Required   [x] Physical Therapy 1.5 5-7 Gait, transfers, Safety, strength, education, endurance   [x] Occupational Therapy 1.5 5-7 ADL   [] Speech Pathology      [] Prosthetics / Orthotics       []         Anticipated Discharge Plan:   Anticipated DME Needs:  [x] Home     [] Commode   [] Alone    [] Wheelchair   [x] Supervised    [] Mindi Deshpande   [] Assist    [] Oxygen        [] Hospital Bed  [] Assisted Living    [] Ramp        [x] To Be Determined    Anticipated Home Health Services:  Anticipated Outpatient Services:  [] PT       [] PT  [] OT      [] OT  [] Speech     [] Speech  [] Nursing     [] Dialysis  [] Aide      [x] To Be Determined  [x] To Be Determined    Anticipated support group:  [] Amputation  [] Multiple Sclerosis  [] Stroke  [] Brain Injury  [] Spinal cord injury  [] Other     Barriers to discharge: safety    Discharge Support: [] Patient lives alone and does not have a caregiver available     [] Patient has a caregiver available     [] Discharge plan has been verified with patient's caregiver      [] Caregiver is in agreement with the discharge plan     Expected functional status for safe discharge: Modified Independent    Patient/support person goals: go home    Expected length of stay: 7-14 days    Discussed expected length of stay and agreeable to IRF plan: [] Yes   [] No    Impairment Group Category: 8.11    Etiological Diagnosis: right hip fracture    Primary Rehabilitation Diagnosis: right hip fracture    Electronically signed by Kita Balderas RN on 7/29/2020 at 10:32 AM    Prescreen completed __________________________________ (signature of prescreener)    Date:   07/29/20 Time:  4239    JUSTIFICATION FOR ADMISSION TO ACUTE REHABILITATION:  Patient has suffered decline in functional abilities for gait, transfers,  ADL's and IADL's as well as endurance. Patient has functional deficits requiring intensive therapy across multiple disciplines in order to return home safely. Patient will need physician oversight for respiratory issues, abnormal vital signs, nutritional and hydration status, safety issues, medications and therapy modalities. PT and OT will work on deficits as noted in evaluations. Case management and social work will provide services for DME and management of a safe discharge home.           RECOMMEND LEVEL OF CARE  Recommend inpatient rehabilitation: [x] Yes   [] No  If no indicate reason:  [] Functional level too high  [] Unmotivated  [] No insurance carrier approval [] Unlikely to return to community  [] No medical necessity  [] Patient or family chose other facility  [] Too medically complex  [] Inadequate discharge plan  [] Rehabilitation bed unavailable [] Functional level too low  [] patient or family refused ARU    If patient not accepted for IRF admission, recommended level of care:  [] [address]  [] [address]  [] [place]   [] Home Care  [] Other      [] LTAC       Physician Assigned:  [x] Dr. Fritz Wisdom         [] Dr. Carlyle Salazar              [] Dr. Jude Gomez [] Dr. Shana Lorenz  [] Dr. Marciano Mulligan (if not admitted within 48 hours of initial pre-screen)    Medical Update/Changes: Prescreen updated to reflect current data since initiated. Functional Update/Changes: Therapy notes updated on prescreen graph to reflect current status.     Reviewer Signature:_____________________________________    Date:  07/29/20 Time: 1040    PHYSICIAN ADMISSION DETERMINATION AND REVIEW UPDATE:     ____________________________________________________________________  ____________________________________________________________________  ____________________________________________________________________  ____________________________________________________________________  ____________________________________________________________________    Physician Signature:_____________________________________    Print Signature:_________________________________________    Date: 07/29/20    Time:  3931

## 2020-07-27 NOTE — PROGRESS NOTES
Occupational Therapy  OT BEDSIDE TREATMENT NOTE      Date:2020  Patient Name: Bettye Lowry  MRN: [id number]  : 1929  Room: [address] [place] [address]-A     Pt's chart reviewed and treatment attempted, pt held due to receiving 2 units of blood. Will attempt at a later time/date.             Gustavo Green, Robert [address]

## 2020-07-27 NOTE — PROGRESS NOTES
Pharmacy Consultation Note  (Anticoagulant Dosing and Monitoring)    Initial consult date: 7/25/2020  Consulting physician: Dr. Nano Medrano    Allergies:  Patient has no known allergies. 89 yo/M, 175.3 cm, 68 kg    Warfarin Indication Target   INR Range Home Dose   (if applicable) Diet/Feeding Tube   Atrial fibrillation 2-3 Warfarin 5 mg/7.5 mg alternating 7/25: General diet     Vitamin K or Blood product  Administration Date     PRBC 2 units ordered 7/27         Warfarin drug-drug interactions  Start  Stop Home Med? Comments                   TSH: 1.070 (9-)   Hepatic Function Panel:                      Lab Results   Component Value Date    ALKPHOS 184 07/24/2020    ALT 17 07/24/2020    AST 23 07/24/2020    PROT 6.6 07/24/2020    BILITOT 1.1 07/24/2020    LABALBU 4.0 07/24/2020    LABALBU 4.1 01/24/2012     Date Warfarin Dose INR Heparin or LMWH HBG/HCT PLT Comment   7/25 7.5 mg 1.4 Enoxaparin 40 mg daily 10.4/33  (7/24) 237  (7/24)    7/26 5 mg 1.6 Enoxaparin 40 mg daily 7/22.9 248    7/27 No Dose 2.3 enoxa 40 SC daily  stop @ 1244 6.4/21.1 272                        Assessment:  · Patient is a 80year old male with atrial fibrillation on alternating doses of warfarin 5 mg and 7.5 mg.  · Warfarin had been held for OR on 7/25. · 7/27: INR = 2.3; H/H dropped to 6.4/21.1 --- no warfarin today per PCP note and to receive PRBCs today. Plan:  · No warfarin today. · Stop enoxaparin (done). · Resume warfarin dosing on 7/28 if OK w/PCP. · Daily PT/INR until the INR is stable within the therapeutic range. · Pharmacist will follow and monitor/adjust dosing as necessary.     Leonarda Le PharmD  7/27/2020  12:50 PM  Pager: [phone]

## 2020-07-27 NOTE — PROGRESS NOTES
Results   Component Value Date     07/27/2020    K 4.0 07/27/2020     07/27/2020    CO2 19 07/27/2020    BUN 27 07/27/2020    LABALBU 4.0 07/24/2020    LABALBU 4.1 01/24/2012    CREATININE 1.2 07/27/2020    CALCIUM 7.9 07/27/2020    GFRAA >60 07/27/2020    LABGLOM 57 07/27/2020     PT/INR:    Lab Results   Component Value Date    PROTIME 26.2 07/27/2020    PROTIME 28.4 02/18/2020    INR 2.3 07/27/2020     Last 3 Troponin:    Lab Results   Component Value Date    TROPONINI 0.08 09/12/2018    TROPONINI 0.05 09/11/2018    TROPONINI <0.01 08/18/2018     TSH:    Lab Results   Component Value Date    TSH 1.070 09/26/2018      Assessment:     Active Problems:    Closed nondisplaced intertrochanteric fracture of right femur (HCC)    PAD s/p B BKA's    HTN    HLD    PAF    Moderate AS    Myeloproliferative disease    Acute post-op blood loss anemia    Plan:       Continue same plan and orders    Awaiting on transfer to acute rehab  Will transfuse and hold on coumadin dosing today

## 2020-07-27 NOTE — PROGRESS NOTES
Date: 2020       Patient Name: Rashad Ramon  : 1929      MRN: [id number]    PT held, pt to receive 2 units PRBC  Will follow up as appropriate    Aye Johnson PT, DPT  [id number]

## 2020-07-27 NOTE — PROGRESS NOTES
INPATIENT CARDIOLOGY FOLLOW-UP    Name: Juan Simpson    Age: 80 y.o. Date of Admission: 7/24/2020 11:35 AM    Date of Service: 7/27/2020    Chief Complaint: Follow-up for right femoral fracture, aortic valve disease, paroxysmal atrial fibrillation, hypertension, peripheral arterial disease, chronic obstructive lung disease    Interim History:  No new overnight cardiac complaints. Currently with no complaints of CP, SOB, palpitations, dizziness, or lightheadedness. SR on telemetry.     Review of Systems:   Cardiac: As per HPI  General: No fever, chills  Pulmonary: As per HPI  HEENT: No visual disturbances, difficult swallowing  GI: No nausea, vomiting  Endocrine: No thyroid disease or DM  Musculoskeletal: MELGOZA x 4, no focal motor deficits  Skin: Intact, no rashes  Neuro/Psych: No headache or seizures    Problem List:  Patient Active Problem List   Diagnosis    Atrial fibrillation ([address]    PVD (peripheral vascular disease)    Hyperlipemia    Anticoagulant long-term use    Claudication of left lower extremity ([address]    Pneumonia of both lower lobes due to infectious organism ([address]    Paroxysmal atrial fibrillation (HCC)    Warfarin anticoagulation    Warfarin overdosage    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)    Critical lower limb ischemia    Hx of right BKA ([address]    Hyperlipidemia    Hypertension    Lymphocytosis    Skin ulcer of left knee with fat layer exposed ([address]    Syncope and collapse    Wound infection    Blurry vision, bilateral    PAF (paroxysmal atrial fibrillation) ([address]    Anticoagulated on Coumadin    Aortic stenosis, moderate    Sinus node dysfunction (HCC)    Peripheral arterial disease ([address]    Sepsis due to pneumonia (HCC)    Chest pain    Precordial pain    Acute rhinitis    Closed nondisplaced intertrochanteric fracture of right femur (HCC)    Aortic valve disease       Allergies:  No Known extended release tablet 30 mg  30 mg Oral Daily Dulce Maria Villegas DO   30 mg at 07/27/20 1022         Physical Exam:  BP (!) 95/51   Pulse 81   Temp 98 °F (36.7 °C) (Temporal)   Resp 14   Ht 5' 9\" (1.753 m)   Wt 150 lb (68 kg)   SpO2 95%   BMI 22.15 kg/m²   Wt Readings from Last 3 Encounters:   07/24/20 150 lb (68 kg)   07/03/20 149 lb (67.6 kg)   10/25/19 149 lb (67.6 kg)     Appearance: Awake, alert, no acute respiratory distress  Skin: Intact, no rash  Head: Normocephalic, atraumatic  Eyes: EOMI, no conjunctival erythema  ENMT: No pharyngeal erythema, MMM, no rhinorrhea  Neck: Supple, no elevated JVP, no carotid bruits  Lungs: Clear to auscultation bilaterally. No wheezes, rales, or rhonchi. Cardiac: Regular rate and rhythm, +S1S2, ESM 4/6 apparent over right upper sternal border. Abdomen: Soft, nontender, +bowel sounds  Extremities: Moves all extremities x 4, no lower extremity edema  Neurologic: No focal motor deficits apparent, normal mood and affect  Peripheral Pulses: Intact posterior tibial pulses bilaterally    Intake/Output:    Intake/Output Summary (Last 24 hours) at 7/27/2020 1709  Last data filed at 7/27/2020 1024  Gross per 24 hour   Intake 240 ml   Output 850 ml   Net -610 ml     No intake/output data recorded. Laboratory Tests:  Recent Labs     07/26/20  0705 07/27/20  0442    135   K 4.3 4.0    101   CO2 19* 19*   BUN 24* 27*   CREATININE 1.1 1.2   GLUCOSE 100* 92   CALCIUM 7.9* 7.9*     No results found for: MG  No results for input(s): ALKPHOS, ALT, AST, PROT, BILITOT, BILIDIR, LABALBU in the last 72 hours.   Recent Labs     07/26/20  0705 07/27/20  0442   WBC 75.0* 55.2*   RBC 1.78* 1.64*   HGB 7.0* 6.4*   HCT 22.9* 21.1*   .7* 128.7*   MCH 39.3* 39.0*   MCHC 30.6* 30.3*   RDW 17.3* 17.5*    272   MPV 12.0 12.2*     Lab Results   Component Value Date    CKTOTAL 35 09/11/2018    CKMB 5.8 09/11/2018    TROPONINI 0.08 (H) 09/12/2018    TROPONINI 0.05 (H) 09/11/2018 ORIF postop day #2  · Severe anemia with hemoglobin of 6.4, status post PRBC x2  · VHD: Moderate aortic stenosis and mild MR and mild to moderate TR  · History of paroxysmal atrial fibrillation off anticoagulation therapy for hip surgery on warfarin with INR of 2.3  · PAD with bilateral below-knee amputation  · Hypertension, stable  · Hyperlipidemia on statin  · Active tobacco use disorder  · 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  · Continue Toprol-XL for rate control  · Continue atorvastatin for hyperlipidemia. Missael Grant MD., Zeina Dela Cruz.   [facility]) Cardiology

## 2020-07-27 NOTE — PROGRESS NOTES
Department of Orthopedic Surgery  Resident Progress Note    Patient seen and examined, resting comfortably in bed. Pain controlled. No new complaints. Denies chest pain, shortness of breath, dizziness/lightheadedness. VITALS:  BP (!) 101/55   Pulse 77   Temp 98 °F (36.7 °C) (Oral)   Resp 18   Ht 5' 9\" (1.753 m)   Wt 150 lb (68 kg)   SpO2 95%   BMI 22.15 kg/m²     General: alert and oriented to person, place and time, well-developed and well-nourished, in no acute distress    MUSCULOSKELETAL:   right lower extremity:  · Dressing C/D/I  · Compartments soft and compressible  · Patient actively moving at hip and knee joint  · RLE warm to touch, appropriate hue to skin  · Distal sensation grossly intact to stump    CBC:   Lab Results   Component Value Date    WBC 55.2 07/27/2020    HGB 6.4 07/27/2020    HCT 21.1 07/27/2020     07/27/2020     PT/INR:    Lab Results   Component Value Date    PROTIME 26.2 07/27/2020    PROTIME 28.4 02/18/2020    INR 2.3 07/27/2020       ASSESSMENT  · S/P Right cephalomedullary nail on 7/25    PLAN      · Continue physical therapy and protocol: WBAT - RLE   · 24 hour abx coverage completed  · Deep venous thrombosis prophylaxis - coumadin, early mobilization  · PT/OT  · Pain Control: IV and PO. Wean to PO as able  · Monitor H&H. Hgb 6.4 today. Will receive 2 units PRBC today. · D/C Planning.  Plan for ARU when medically stable  · Discuss with Dr. Yaneth Burks

## 2020-07-28 LAB
ANION GAP SERPL CALCULATED.3IONS-SCNC: 15 MMOL/L (ref 7–16)
BUN BLDV-MCNC: 21 MG/DL (ref 8–23)
CALCIUM SERPL-MCNC: 8.1 MG/DL (ref 8.6–10.2)
CHLORIDE BLD-SCNC: 103 MMOL/L (ref 98–107)
CO2: 19 MMOL/L (ref 22–29)
CREAT SERPL-MCNC: 1 MG/DL (ref 0.7–1.2)
GFR AFRICAN AMERICAN: >60
GFR NON-AFRICAN AMERICAN: >60 ML/MIN/1.73
GLUCOSE BLD-MCNC: 68 MG/DL (ref 74–99)
HCT VFR BLD CALC: 25.2 % (ref 37–54)
HEMOGLOBIN: 8.2 G/DL (ref 12.5–16.5)
INR BLD: 2.1
MCH RBC QN AUTO: 36.8 PG (ref 26–35)
MCHC RBC AUTO-ENTMCNC: 32.5 % (ref 32–34.5)
MCV RBC AUTO: 113 FL (ref 80–99.9)
PDW BLD-RTO: 24.2 FL (ref 11.5–15)
PLATELET # BLD: 189 E9/L (ref 130–450)
PMV BLD AUTO: 12.1 FL (ref 7–12)
POTASSIUM REFLEX MAGNESIUM: 3.9 MMOL/L (ref 3.5–5)
POTASSIUM SERPL-SCNC: 3.9 MMOL/L (ref 3.5–5)
PROTHROMBIN TIME: 24.1 SEC (ref 9.3–12.4)
RBC # BLD: 2.23 E12/L (ref 3.8–5.8)
SODIUM BLD-SCNC: 137 MMOL/L (ref 132–146)
WBC # BLD: 45.7 E9/L (ref 4.5–11.5)

## 2020-07-28 PROCEDURE — 80048 BASIC METABOLIC PNL TOTAL CA: CPT

## 2020-07-28 PROCEDURE — 6370000000 HC RX 637 (ALT 250 FOR IP): Performed by: FAMILY MEDICINE

## 2020-07-28 PROCEDURE — 2580000003 HC RX 258: Performed by: ORTHOPAEDIC SURGERY

## 2020-07-28 PROCEDURE — 6370000000 HC RX 637 (ALT 250 FOR IP): Performed by: ORTHOPAEDIC SURGERY

## 2020-07-28 PROCEDURE — 97535 SELF CARE MNGMENT TRAINING: CPT

## 2020-07-28 PROCEDURE — 97530 THERAPEUTIC ACTIVITIES: CPT

## 2020-07-28 PROCEDURE — 99232 SBSQ HOSP IP/OBS MODERATE 35: CPT | Performed by: INTERNAL MEDICINE

## 2020-07-28 PROCEDURE — 85027 COMPLETE CBC AUTOMATED: CPT

## 2020-07-28 PROCEDURE — 2060000000 HC ICU INTERMEDIATE R&B

## 2020-07-28 PROCEDURE — 85610 PROTHROMBIN TIME: CPT

## 2020-07-28 PROCEDURE — 36415 COLL VENOUS BLD VENIPUNCTURE: CPT

## 2020-07-28 RX ORDER — WARFARIN SODIUM 5 MG/1
7.5 TABLET ORAL
Status: DISCONTINUED | OUTPATIENT
Start: 2020-07-28 | End: 2020-07-28

## 2020-07-28 RX ORDER — WARFARIN SODIUM 5 MG/1
5 TABLET ORAL DAILY
Status: DISCONTINUED | OUTPATIENT
Start: 2020-07-28 | End: 2020-07-28

## 2020-07-28 RX ORDER — WARFARIN SODIUM 5 MG/1
7.5 TABLET ORAL
Status: COMPLETED | OUTPATIENT
Start: 2020-07-28 | End: 2020-07-28

## 2020-07-28 RX ADMIN — WARFARIN SODIUM 7.5 MG: 5 TABLET ORAL at 18:22

## 2020-07-28 RX ADMIN — Medication 10 ML: at 20:23

## 2020-07-28 RX ADMIN — Medication 10 ML: at 10:27

## 2020-07-28 RX ADMIN — ISOSORBIDE MONONITRATE 30 MG: 30 TABLET ORAL at 10:26

## 2020-07-28 RX ADMIN — ATORVASTATIN CALCIUM 40 MG: 40 TABLET, FILM COATED ORAL at 20:23

## 2020-07-28 RX ADMIN — DOCUSATE SODIUM 50 MG AND SENNOSIDES 8.6 MG 1 TABLET: 8.6; 5 TABLET, FILM COATED ORAL at 20:23

## 2020-07-28 RX ADMIN — ACETAMINOPHEN 650 MG: 325 TABLET, FILM COATED ORAL at 12:09

## 2020-07-28 RX ADMIN — DOCUSATE SODIUM 50 MG AND SENNOSIDES 8.6 MG 1 TABLET: 8.6; 5 TABLET, FILM COATED ORAL at 10:26

## 2020-07-28 RX ADMIN — METOPROLOL SUCCINATE 50 MG: 50 TABLET, EXTENDED RELEASE ORAL at 10:26

## 2020-07-28 RX ADMIN — OXYCODONE HYDROCHLORIDE 10 MG: 10 TABLET ORAL at 15:34

## 2020-07-28 RX ADMIN — HYDROXYUREA 500 MG: 500 CAPSULE ORAL at 10:27

## 2020-07-28 ASSESSMENT — PAIN DESCRIPTION - DESCRIPTORS
DESCRIPTORS: ACHING;DISCOMFORT
DESCRIPTORS: ACHING;DISCOMFORT

## 2020-07-28 ASSESSMENT — PAIN DESCRIPTION - PAIN TYPE
TYPE: ACUTE PAIN;SURGICAL PAIN
TYPE: ACUTE PAIN;SURGICAL PAIN

## 2020-07-28 ASSESSMENT — PAIN DESCRIPTION - FREQUENCY
FREQUENCY: CONTINUOUS
FREQUENCY: CONTINUOUS

## 2020-07-28 ASSESSMENT — PAIN DESCRIPTION - LOCATION
LOCATION: HIP
LOCATION: HIP

## 2020-07-28 ASSESSMENT — PAIN SCALES - GENERAL
PAINLEVEL_OUTOF10: 3
PAINLEVEL_OUTOF10: 7
PAINLEVEL_OUTOF10: 0
PAINLEVEL_OUTOF10: 0

## 2020-07-28 ASSESSMENT — PAIN DESCRIPTION - ORIENTATION
ORIENTATION: RIGHT
ORIENTATION: RIGHT

## 2020-07-28 ASSESSMENT — PAIN DESCRIPTION - ONSET
ONSET: ON-GOING
ONSET: ON-GOING

## 2020-07-28 NOTE — PRE-CERTIFICATION NOTE
Precertification initiated for ARu at [facility], case loaded. Will advise when determination is made.

## 2020-07-28 NOTE — PROGRESS NOTES
Pharmacy Consultation Note  (Anticoagulant Dosing and Monitoring)    Initial consult date: 7/25/2020  Consulting physician: Dr. Jang Branch    Allergies:  Patient has no known allergies. 89 yo/M, 175.3 cm, 68 kg    Warfarin Indication Target   INR Range Home Dose   (if applicable) Diet/Feeding Tube   Atrial fibrillation 2-3 Warfarin 5 mg/7.5 mg alternating 7/25: General diet     Vitamin K or Blood product  Administration Date     PRBC 2 units transfused 7/27         Warfarin drug-drug interactions  Start  Stop Home Med? Comments                   TSH: 1.070 (9-)   Hepatic Function Panel:                      Lab Results   Component Value Date    ALKPHOS 184 07/24/2020    ALT 17 07/24/2020    AST 23 07/24/2020    PROT 6.6 07/24/2020    BILITOT 1.1 07/24/2020    LABALBU 4.0 07/24/2020    LABALBU 4.1 01/24/2012     Date Warfarin Dose INR Heparin or LMWH HBG/HCT PLT Comment   7/25 7.5 mg 1.4 Enoxaparin 40 mg daily 10.4/33  (7/24) 237  (7/24)    7/26 5 mg 1.6 Enoxaparin 40 mg daily 7/22.9 248    7/27 No Dose 2.3 enoxa 40 SC daily  stop @ 1244 6.4/21.1 272 PRBCs x2 units   7/28 7.5 mg 2.1 X 8.2/25.2 189               Assessment:  · Patient is a 80year old male with atrial fibrillation on alternating doses of warfarin 5 mg and 7.5 mg.  · Warfarin had been held for OR on 7/25. · 7/27: INR = 2.3; H/H dropped to 6.4/21.1 --- no warfarin today per PCP note and to receive PRBCs today. · 7/28: INR = 2.1 after warfarin dose held on 7/27. S/P 2 units PRBCs transfused on 7/27; H/H = 8.2/25.2 today. Plan:  · Give warfarin 7.5 mg x1 tonight if OK w/PCP (Dr. Rincon [facility]). · Daily PT/INR until the INR is stable within the therapeutic range. · Pharmacist will follow and monitor/adjust dosing as necessary.     Norma Zepeda PharmD  7/28/2020  12:38 PM  Pager: [phone]

## 2020-07-28 NOTE — PROGRESS NOTES
to sit: SBA  Sit to supine: SBA   Supine to sit: Modified [place]   Sit to supine: Modified [place]    Functional Transfers Sit to stand: Moderate Assist x 2  Stand to sit: Moderate Assist x 2  Stand pivot: Moderate Assist x 2 Sit <> stand: Mod A of 2     Stand by Assist    Functional Mobility  (Ambulation) Mod A x 2 with ww  3-4 side steps  Pt is very motivated  Mod A of 2 w/w   SBA with AAD   Balance Sitting:     Static:  CGA    Dynamic:CGA  Standing: mod A x 2 Sitting EOB: Min A    Standing: mod A x 2 with w/w     Activity Tolerance Fair+  Good-     Visual/  Perceptual Glasses: no             BUE  ROM/Strength/  Fine motor Coordination RUE: ROM WFL     Strength: grossly 4/5      Strength:  WFL     Coordination: WFL     LUE: ROM  WFL     Strength: grossly 4/5      Strength:  WFL     Coordination: WFL          Hand dominance: R     Hearing: WFL  Sensation:  No c/o numbness or tingling  Tone:  WFL  Edema: None noted                          Treatment: OT treatment provided this date includes:  ·  ADL-  Instruction/training on safety and adapted techniques for completion of ADLs: donning prosthetic legs  ·  Mobility-  Instruction/training on safety and improved independence with bed mobility/functional transfers  and functional mobility. Pt education on hand and feet positioning to improve safety. Comments: Upon arrival, patient in bed. At end of session, patient up in chair with call light and phone within reach, all lines and tubes intact. Pt demonstrating good understanding of education/techniques.  Patient would benefit from continued skilled OT  to improve safety, functional independence and quality of life.     Treatment Time In: 1110           Treatment Time Out:  8128  Treatment Charges: Mins Units   Ther Ex  87611       Manual Therapy 85603       Thera Activities 26068 11 1   ADL/Home Mgt 61619 14 1   Neuro Re-ed 28718       Group Therapy        Orthotic manage/training  93479     Non-Billable Time       Total Timed Treatment 25 [id number] [facility], [address] [place] [address]

## 2020-07-28 NOTE — CARE COORDINATION
7/28/2020 Pt received 2 units PRBC yesterday. Hgb 8.4 today. Needs PT/OT therapy to be done today. [facility] following. Pt active with [facility] for skilled nursing, PT, and HHA. Plan is to [address] [place] [address] when medically stable. SW/CM will continue to follow.

## 2020-07-28 NOTE — PROGRESS NOTES
Admit Date: 7/24/2020      Subjective:     Patient: no acute issues reported overnight  Medication side effects: none    Scheduled Meds:   sodium chloride flush  10 mL Intravenous 2 times per day    sennosides-docusate sodium  1 tablet Oral BID    warfarin (COUMADIN) daily dosing (placeholder)   Other RX Placeholder    ceFAZolin  2 g Intravenous See Admin Instructions    atorvastatin  40 mg Oral Nightly    metoprolol succinate  50 mg Oral Daily    hydroxyurea  500 mg Oral Daily    isosorbide mononitrate  30 mg Oral Daily     Continuous Infusions:  PRN Meds:sodium chloride flush, morphine **OR** morphine, magnesium hydroxide, acetaminophen, oxyCODONE **OR** oxyCODONE, promethazine **OR** ondansetron    Objective:   I/O last 3 completed shifts: In: 616.7 [P.O.:240; Blood:376.7]  Out: 650 [Urine:650]  I/O this shift:  In: 130 [P.O.:120;  I.V.:10]  Out: 450 [Urine:450]    BP (!) 104/52   Pulse 72   Temp 98.1 °F (36.7 °C) (Oral)   Resp 16   Ht 5' 9\" (1.753 m)   Wt 150 lb (68 kg)   SpO2 96%   BMI 22.15 kg/m²   General appearance: alert, appears stated age and cooperative  Skin:negative  Heent:negative  Lungs: clear to auscultation bilaterally  Heart: regular rate and rhythm, S1, S2 normal, no murmur, click, rub or gallop  Abdomen: soft, non-tender; bowel sounds normal; no masses,  no organomegaly  Extremities:no edema  Neurologic: Grossly normal    Labs:  CBC with Differential:    Lab Results   Component Value Date    WBC 55.2 07/27/2020    RBC 1.64 07/27/2020    HGB 6.4 07/27/2020    HCT 21.1 07/27/2020     07/27/2020    .7 07/27/2020    MCH 39.0 07/27/2020    MCHC 30.3 07/27/2020    RDW 17.5 07/27/2020    NRBC 0.0 06/24/2018    BANDSPCT 2 10/12/2016    METASPCT 0.9 06/23/2018    LYMPHOPCT 6.1 07/24/2020    MONOPCT 4.3 07/24/2020    MYELOPCT 0.9 01/14/2020    BASOPCT 0.9 07/24/2020    MONOSABS 2.01 07/24/2020    LYMPHSABS 3.02 07/24/2020    EOSABS 0.00 07/24/2020    BASOSABS 0.45 07/24/2020 BMP:    Lab Results   Component Value Date     07/28/2020    K 3.9 07/28/2020     07/28/2020    CO2 19 07/28/2020    BUN 21 07/28/2020    LABALBU 4.0 07/24/2020    LABALBU 4.1 01/24/2012    CREATININE 1.0 07/28/2020    CALCIUM 8.1 07/28/2020    GFRAA >60 07/28/2020    LABGLOM >60 07/28/2020     PT/INR:    Lab Results   Component Value Date    PROTIME 24.1 07/28/2020    PROTIME 28.4 02/18/2020    INR 2.1 07/28/2020     Last 3 Troponin:    Lab Results   Component Value Date    TROPONINI 0.08 09/12/2018    TROPONINI 0.05 09/11/2018    TROPONINI <0.01 08/18/2018     TSH:    Lab Results   Component Value Date    TSH 1.070 09/26/2018      Assessment:     Active Problems:    Closed nondisplaced intertrochanteric fracture of right femur (HCC)    Acute post op blood loss anemia    Moderate AS    Myeloproliferative disorder     PAF      Plan:       Continue same plan and orders    Labs pending this am; AR candidate-dc planning in progress

## 2020-07-28 NOTE — PROGRESS NOTES
Department of Orthopedic Surgery  Resident Progress Note    Patient seen and examined, resting comfortably in bed. Pain controlled. No new complaints. Denies chest pain, shortness of breath, dizziness/lightheadedness. Received 2 units PRBC yesterday and is feeling more alert today. VITALS:  BP (!) 106/51   Pulse 78   Temp 97.2 °F (36.2 °C) (Temporal)   Resp 18   Ht 5' 9\" (1.753 m)   Wt 150 lb (68 kg)   SpO2 96%   BMI 22.15 kg/m²     General: alert and oriented to person, place and time, well-developed and well-nourished, in no acute distress    MUSCULOSKELETAL:   right lower extremity:  · Dressing C/D/I  · Compartments soft and compressible  · Patient actively moving at hip and knee joint  · RLE warm to touch, appropriate hue to skin  · Distal sensation grossly intact to stump    CBC:   Lab Results   Component Value Date    WBC 45.7 07/28/2020    HGB 8.2 07/28/2020    HCT 25.2 07/28/2020     07/28/2020     PT/INR:    Lab Results   Component Value Date    PROTIME 24.1 07/28/2020    PROTIME 28.4 02/18/2020    INR 2.1 07/28/2020       ASSESSMENT  · S/P Right cephalomedullary nail on 7/25    PLAN      · Continue physical therapy and protocol: WBAT - RLE   · 24 hour abx coverage completed  · Deep venous thrombosis prophylaxis - coumadin, early mobilization  · PT/OT  · Pain Control: IV and PO. Wean to PO as able  · Monitor H&H. Hgb 8.2 today. · D/C Planning. Plan for ARU when medically stable  · No further acute orthopedic intervention at this time.  Patient to follow up with Dr. Liliam Enriquez in office  · Discuss with Dr. Liliam Enriquez

## 2020-07-28 NOTE — PROGRESS NOTES
500 mg Oral Daily Dulce Maria Buccino, DO   500 mg at 07/28/20 1027    isosorbide mononitrate (IMDUR) extended release tablet 30 mg  30 mg Oral Daily Dulce Maria Buccino, DO   30 mg at 07/28/20 1026         Physical Exam:  BP (!) 106/51   Pulse 78   Temp 97.2 °F (36.2 °C) (Temporal)   Resp 18   Ht 5' 9\" (1.753 m)   Wt 150 lb (68 kg)   SpO2 96%   BMI 22.15 kg/m²   Wt Readings from Last 3 Encounters:   07/24/20 150 lb (68 kg)   07/03/20 149 lb (67.6 kg)   10/25/19 149 lb (67.6 kg)     Appearance: Awake, alert, no acute respiratory distress, today he is sitting up in the chair with bilateral prostheses in his legs. Skin: Intact, no rash  Head: Normocephalic, atraumatic  Eyes: EOMI, no conjunctival erythema  ENMT: No pharyngeal erythema, MMM, no rhinorrhea  Neck: Supple, no elevated JVP, no carotid bruits  Lungs: Clear to auscultation bilaterally. No wheezes, rales, or rhonchi. Cardiac: Regular rate and rhythm, +S1S2, ESM 4/6 apparent over right upper sternal border. Abdomen: Soft, nontender, +bowel sounds  Extremities: Moves all extremities x 4, no lower extremity edema  Neurologic: No focal motor deficits apparent, normal mood and affect  Peripheral Pulses: Intact posterior tibial pulses bilaterally    Intake/Output:    Intake/Output Summary (Last 24 hours) at 7/28/2020 1612  Last data filed at 7/28/2020 0625  Gross per 24 hour   Intake 506.67 ml   Output 450 ml   Net 56.67 ml     No intake/output data recorded. Laboratory Tests:  Recent Labs     07/26/20  0705 07/27/20  0442 07/28/20  0433    135 137   K 4.3 4.0 3.9  3.9    101 103   CO2 19* 19* 19*   BUN 24* 27* 21   CREATININE 1.1 1.2 1.0   GLUCOSE 100* 92 68*   CALCIUM 7.9* 7.9* 8.1*     No results found for: MG  No results for input(s): ALKPHOS, ALT, AST, PROT, BILITOT, BILIDIR, LABALBU in the last 72 hours.   Recent Labs     07/26/20  0705 07/27/20  0442 07/28/20  0433   WBC 75.0* 55.2* 45.7*   RBC 1.78* 1.64* 2.23*   HGB 7.0* 6.4* 8.2*   HCT 22.9* 21.1* 25.2*   .7* 128.7* 113.0*   MCH 39.3* 39.0* 36.8*   MCHC 30.6* 30.3* 32.5   RDW 17.3* 17.5* 24.2*    272 189   MPV 12.0 12.2* 12.1*     Lab Results   Component Value Date    CKTOTAL 35 09/11/2018    CKMB 5.8 09/11/2018    TROPONINI 0.08 (H) 09/12/2018    TROPONINI 0.05 (H) 09/11/2018    TROPONINI <0.01 08/18/2018     Lab Results   Component Value Date    INR 2.1 07/28/2020    INR 2.3 07/27/2020    INR 1.6 07/26/2020    PROTIME 24.1 (H) 07/28/2020    PROTIME 26.2 (H) 07/27/2020    PROTIME 17.7 (H) 07/26/2020     Lab Results   Component Value Date    TSH 1.070 09/26/2018     Lab Results   Component Value Date    LABA1C 5.1 09/26/2018     No results found for: EAG  Lab Results   Component Value Date    CHOL 102 09/16/2019    CHOL 90 09/26/2018    CHOL 152 02/26/2018     Lab Results   Component Value Date    TRIG 60 09/16/2019    TRIG 81 09/26/2018    TRIG 65 02/26/2018     Lab Results   Component Value Date    HDL 43 09/16/2019    HDL 25 09/26/2018    HDL 40 02/26/2018     Lab Results   Component Value Date    LDLCALC 47 09/16/2019    LDLCALC 49 09/26/2018    [facility] 99 02/26/2018     Lab Results   Component Value Date    LABVLDL 12 09/16/2019    LABVLDL 16 09/26/2018    LABVLDL 13 02/26/2018     No results found for: CHOLHDLRATIO    Cardiac Tests:    Telemetry findings reviewed: Normal sinus rhythm heart rate in the 70s with occasional premature ventricular complex. Vitals: Blood pressure 101/55 with heart rate of 77>> 106/51 with heart rate of 78. .    Labs were reviewed: BUN/creatinine 27/1.2>> 21/1 rest of the chemistries normal.  WBC 55.2>> 45.7 H&H 6.4/21.1>> 8.2/25. 2 platelets 298>> 971. INR 2.3>> 2.1    Chest X-ray:       Echocardiogram 7/25/2020:   Left ventricular internal dimensions were normal in diastole and systole. LVEF 55% with indeterminate diastolic function   Mild left ventricular concentric hypertrophy noted. No regional wall motion abnormalities seen.    Low normal left ventricular systolic function. The left atrium is borderline dilated. Focal calcification mitral valve leaflets. Moderate mitral annular calcification. Mild mitral regurgitation is present. The aortic valve appears moderately sclerotic. Moderate aortic stenosis. Mild to moderate tricuspid regurgitation. 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 INR is therapeutic at 2.1, INR as per pharmacy  · Continue Toprol-XL for rate control  · Continue atorvastatin for hyperlipidemia. · He is otherwise stable from the cardiology standpoint. We will see him as needed, please call if having further questions or concerns. Missael Grant MD., Zeina Dela Cruz.   [facility]) Cardiology

## 2020-07-28 NOTE — PROGRESS NOTES
Physical Therapy    Physical Therapy Treatment Note    Name: Bettey Lowry  : 1929  MRN: [id number]    Referring Provider:  Cristian Hernandez DO    Date of Service: 2020    Evaluating PT:  Roosevelt Doctor PT, DPT PT [id number]    Room #:  6816/9928-F  Diagnosis:  R Intertrochanteric Hip Fx  PMHx/PSHx:  HLD, Prostate Cancer, Bilateral BKA  Procedure/Surgery:  R Intramedullary Trochanteric Nail 2020  Precautions:  WBAT RLE, BLE BKA, BLE Prosthesis  Equipment Needs:      SUBJECTIVE:    Pt lives with spouse in a 1 story home with ramped entrance. Pt was independent and active ambulating community distance with 2x canes and BLE prostheses independently PTA. Equipment Owned: Manual W/c with removable arm rests; BLE Prostheses, SPC x 2; [employer]    OBJECTIVE:   Initial Evaluation  Date: 2020 Treatment   Short Term/ Long Term   Goals   AM-PAC 6 Clicks 64/80 32/99    Was pt agreeable to Eval/treatment? Yes Yes     Does pt have pain? Mild pain R Hip No complaints    Bed Mobility  Rolling: NT  Supine to sit: Kristan  Sit to supine: NT  Scooting: Kristan Rolling: NT  Supine to sit: SBA  Sit to supine: NT  Scooting: SBA Rolling: Independent    Supine to sit:  Independent    Sit to supine: Independent    Scooting: Independent     Transfers Sit to stand: ModA x 2 [employer]  Stand to sit: ModA x 2 [employer]  Stand pivot: ModA x 2 [employer] Sit to stand: [address] [place] x2  Stand to sit: ModA x2  Stand pivot: ModA x 2 with [employer] Sit to stand: Modified Independent  Stand to sit: Modified Independent  Stand pivot: Modified Independent     Ambulation    5 feet with ModA x 2 [employer] 3 ft with [employer] ModA x2 >150 feet with Modified Independent  [employer]   Stair negotiation: ascended and descended  NT  Not a goal of care   ROM BUE:  See OT Note  BLE:  WFL     Strength BUE:  See OT Note  R Hip 3-/5   R knee 3/5  LLE: 4/5  Improve Strength 1/3 MMT Grade   Balance Sitting EOB:  SBA  Dynamic Standing:  ModA x 2 [employer] Sitting SBA  Standing ModA x2 with [employer] Sitting EOB: Independent    Dynamic Standing:  Modified Independent  WW     Pt is A & O x 4  Sensation:  Pt denies numbness and tingling to extremities  Edema:  Mild R Residual Limb with ecchymosis       Patient education  Pt educated on sequencing, hand and foot placement    Patient response to education:   Pt verbalized understanding Pt demonstrated skill Pt requires further education in this area   x x x     ASSESSMENT:    Comments:  Pt received in supine agreeable to PT session. Motivated to participate and get moving. Able to sit up to EOB without physical assist this session using UEs on bedrail. Sitting balance fair with some minor support needed during donning of prosthetic limbs. Pt able to stand from bedside with lift assist and cues for hand placement. Demonstrated flexed posture with difficulty extended hips and obtaining terminal knee extension. Took a few steps to bedside chair with use of walker and assist for balance and advancement of RLE. Seated in chair to end session. Patient would benefit from continued PT POC in intense rehab setting. Treatment:  Patient practiced and was instructed in the following treatment:     Bed mobility- verbal cues and assistance to facilitate independence   Functional transfers-Verbal cues for proper positioning and sequencing to perform transfers safely with maximum independence.  Gait training-Verbal cues for proper positioning and sequencing using assistive device to maximize functional mobility independence. Assisted for balance and advancement of RLE with max verbal cues during pivot.        PLAN:    Fair progress, continue PT POC    Time in  1113  Time out  1140    Total Treatment Time  25 minutes     CPT codes:  [] Gait training 48096 0 minutes  [] Manual therapy 67173 0 minutes  [x] Therapeutic activities 33689 25 minutes  [] Therapeutic exercises 10946 0 minutes  [] Neuromuscular reeducation 58733 0 minutes       Melly Bee, PT, DPT  [id number]

## 2020-07-29 ENCOUNTER — HOSPITAL ENCOUNTER (INPATIENT)
Age: 85
LOS: 13 days | Discharge: HOME HEALTH | DRG: 561 | End: 2020-08-11
Attending: PHYSICAL MEDICINE & REHABILITATION | Admitting: PHYSICAL MEDICINE & REHABILITATION
Payer: MEDICARE

## 2020-07-29 VITALS
HEIGHT: 69 IN | RESPIRATION RATE: 18 BRPM | OXYGEN SATURATION: 96 % | DIASTOLIC BLOOD PRESSURE: 49 MMHG | HEART RATE: 77 BPM | WEIGHT: 150 LBS | BODY MASS INDEX: 22.22 KG/M2 | TEMPERATURE: 97.4 F | SYSTOLIC BLOOD PRESSURE: 105 MMHG

## 2020-07-29 DIAGNOSIS — S72.001A CLOSED FRACTURE OF RIGHT HIP, INITIAL ENCOUNTER (HCC): ICD-10-CM

## 2020-07-29 LAB
ANISOCYTOSIS: ABNORMAL
BASOPHILS ABSOLUTE: 0 E9/L (ref 0–0.2)
BASOPHILS RELATIVE PERCENT: 0.2 % (ref 0–2)
BURR CELLS: ABNORMAL
EOSINOPHILS ABSOLUTE: 0 E9/L (ref 0.05–0.5)
EOSINOPHILS RELATIVE PERCENT: 0.4 % (ref 0–6)
HCT VFR BLD CALC: 26.3 % (ref 37–54)
HEMOGLOBIN: 8.3 G/DL (ref 12.5–16.5)
INR BLD: 1.8
LYMPHOCYTES ABSOLUTE: 1.86 E9/L (ref 1.5–4)
LYMPHOCYTES RELATIVE PERCENT: 3.5 % (ref 20–42)
MCH RBC QN AUTO: 35.9 PG (ref 26–35)
MCHC RBC AUTO-ENTMCNC: 31.6 % (ref 32–34.5)
MCV RBC AUTO: 113.9 FL (ref 80–99.9)
METAMYELOCYTES RELATIVE PERCENT: 0.9 % (ref 0–1)
MONOCYTES ABSOLUTE: 2.32 E9/L (ref 0.1–0.95)
MONOCYTES RELATIVE PERCENT: 5.3 % (ref 2–12)
NEUTROPHILS ABSOLUTE: 42.32 E9/L (ref 1.8–7.3)
NEUTROPHILS RELATIVE PERCENT: 90.4 % (ref 43–80)
OVALOCYTES: ABNORMAL
PDW BLD-RTO: 24 FL (ref 11.5–15)
PLATELET # BLD: 180 E9/L (ref 130–450)
PMV BLD AUTO: 12.6 FL (ref 7–12)
POIKILOCYTES: ABNORMAL
POLYCHROMASIA: ABNORMAL
PROTHROMBIN TIME: 20.6 SEC (ref 9.3–12.4)
RBC # BLD: 2.31 E12/L (ref 3.8–5.8)
TEAR DROP CELLS: ABNORMAL
WBC # BLD: 46.5 E9/L (ref 4.5–11.5)

## 2020-07-29 PROCEDURE — 1280000000 HC REHAB R&B

## 2020-07-29 PROCEDURE — 6370000000 HC RX 637 (ALT 250 FOR IP): Performed by: PHYSICAL MEDICINE & REHABILITATION

## 2020-07-29 PROCEDURE — 6370000000 HC RX 637 (ALT 250 FOR IP): Performed by: FAMILY MEDICINE

## 2020-07-29 PROCEDURE — 2580000003 HC RX 258: Performed by: FAMILY MEDICINE

## 2020-07-29 PROCEDURE — 2580000003 HC RX 258: Performed by: ORTHOPAEDIC SURGERY

## 2020-07-29 PROCEDURE — 85025 COMPLETE CBC W/AUTO DIFF WBC: CPT

## 2020-07-29 PROCEDURE — 6370000000 HC RX 637 (ALT 250 FOR IP): Performed by: ORTHOPAEDIC SURGERY

## 2020-07-29 PROCEDURE — 36415 COLL VENOUS BLD VENIPUNCTURE: CPT

## 2020-07-29 PROCEDURE — 99222 1ST HOSP IP/OBS MODERATE 55: CPT | Performed by: PHYSICAL MEDICINE & REHABILITATION

## 2020-07-29 PROCEDURE — 85610 PROTHROMBIN TIME: CPT

## 2020-07-29 RX ORDER — ACETAMINOPHEN 325 MG/1
650 TABLET ORAL EVERY 4 HOURS PRN
Status: DISCONTINUED | OUTPATIENT
Start: 2020-07-29 | End: 2020-08-11 | Stop reason: HOSPADM

## 2020-07-29 RX ORDER — PROMETHAZINE HYDROCHLORIDE 25 MG/1
12.5 TABLET ORAL EVERY 6 HOURS PRN
Status: DISCONTINUED | OUTPATIENT
Start: 2020-07-29 | End: 2020-07-29

## 2020-07-29 RX ORDER — SODIUM CHLORIDE 0.9 % (FLUSH) 0.9 %
10 SYRINGE (ML) INJECTION PRN
Status: DISCONTINUED | OUTPATIENT
Start: 2020-07-29 | End: 2020-08-05

## 2020-07-29 RX ORDER — ISOSORBIDE MONONITRATE 30 MG/1
30 TABLET, EXTENDED RELEASE ORAL DAILY
Status: DISCONTINUED | OUTPATIENT
Start: 2020-07-30 | End: 2020-08-10

## 2020-07-29 RX ORDER — ATORVASTATIN CALCIUM 40 MG/1
40 TABLET, FILM COATED ORAL NIGHTLY
Status: DISCONTINUED | OUTPATIENT
Start: 2020-07-29 | End: 2020-07-29 | Stop reason: HOSPADM

## 2020-07-29 RX ORDER — WARFARIN SODIUM 4 MG/1
8 TABLET ORAL DAILY
Status: DISCONTINUED | OUTPATIENT
Start: 2020-07-30 | End: 2020-07-29 | Stop reason: HOSPADM

## 2020-07-29 RX ORDER — METOPROLOL SUCCINATE 50 MG/1
50 TABLET, EXTENDED RELEASE ORAL DAILY
Status: DISCONTINUED | OUTPATIENT
Start: 2020-07-30 | End: 2020-08-11 | Stop reason: HOSPADM

## 2020-07-29 RX ORDER — SENNA AND DOCUSATE SODIUM 50; 8.6 MG/1; MG/1
1 TABLET, FILM COATED ORAL 2 TIMES DAILY
Status: DISCONTINUED | OUTPATIENT
Start: 2020-07-29 | End: 2020-07-31

## 2020-07-29 RX ORDER — CLONAZEPAM 0.5 MG/1
0.5 TABLET ORAL 2 TIMES DAILY
Status: DISCONTINUED | OUTPATIENT
Start: 2020-07-29 | End: 2020-07-29 | Stop reason: HOSPADM

## 2020-07-29 RX ORDER — ONDANSETRON 4 MG/1
4 TABLET, ORALLY DISINTEGRATING ORAL EVERY 6 HOURS PRN
Status: DISCONTINUED | OUTPATIENT
Start: 2020-07-29 | End: 2020-08-11 | Stop reason: HOSPADM

## 2020-07-29 RX ORDER — ACETAMINOPHEN 325 MG/1
650 TABLET ORAL EVERY 4 HOURS PRN
Status: CANCELLED | OUTPATIENT
Start: 2020-07-29

## 2020-07-29 RX ORDER — SENNA AND DOCUSATE SODIUM 50; 8.6 MG/1; MG/1
1 TABLET, FILM COATED ORAL 2 TIMES DAILY
Status: CANCELLED | OUTPATIENT
Start: 2020-07-29

## 2020-07-29 RX ORDER — WARFARIN SODIUM 4 MG/1
8 TABLET ORAL DAILY
Status: CANCELLED | OUTPATIENT
Start: 2020-07-29

## 2020-07-29 RX ORDER — HYDROCODONE BITARTRATE AND ACETAMINOPHEN 5; 325 MG/1; MG/1
1 TABLET ORAL EVERY 6 HOURS PRN
Status: DISCONTINUED | OUTPATIENT
Start: 2020-07-29 | End: 2020-07-29 | Stop reason: HOSPADM

## 2020-07-29 RX ORDER — HYDROXYUREA 500 MG/1
500 CAPSULE ORAL DAILY
Status: DISCONTINUED | OUTPATIENT
Start: 2020-07-30 | End: 2020-08-11 | Stop reason: HOSPADM

## 2020-07-29 RX ORDER — POLYETHYLENE GLYCOL 3350 17 G/17G
17 POWDER, FOR SOLUTION ORAL DAILY PRN
Status: DISCONTINUED | OUTPATIENT
Start: 2020-07-29 | End: 2020-08-11 | Stop reason: HOSPADM

## 2020-07-29 RX ORDER — WARFARIN SODIUM 4 MG/1
8 TABLET ORAL DAILY
Status: DISCONTINUED | OUTPATIENT
Start: 2020-07-29 | End: 2020-07-31

## 2020-07-29 RX ORDER — ONDANSETRON 2 MG/ML
4 INJECTION INTRAMUSCULAR; INTRAVENOUS EVERY 6 HOURS PRN
Status: CANCELLED | OUTPATIENT
Start: 2020-07-29

## 2020-07-29 RX ORDER — ISOSORBIDE MONONITRATE 30 MG/1
30 TABLET, EXTENDED RELEASE ORAL DAILY
Status: CANCELLED | OUTPATIENT
Start: 2020-07-30

## 2020-07-29 RX ORDER — SODIUM CHLORIDE 0.9 % (FLUSH) 0.9 %
10 SYRINGE (ML) INJECTION PRN
Status: CANCELLED | OUTPATIENT
Start: 2020-07-29

## 2020-07-29 RX ORDER — SENNA AND DOCUSATE SODIUM 50; 8.6 MG/1; MG/1
1 TABLET, FILM COATED ORAL 2 TIMES DAILY
Qty: 30 TABLET | Refills: 0 | Status: SHIPPED | OUTPATIENT
Start: 2020-07-29

## 2020-07-29 RX ORDER — HYDROXYUREA 500 MG/1
500 CAPSULE ORAL DAILY
Status: CANCELLED | OUTPATIENT
Start: 2020-07-30

## 2020-07-29 RX ORDER — HYDROXYUREA 500 MG/1
500 CAPSULE ORAL DAILY
Status: DISCONTINUED | OUTPATIENT
Start: 2020-07-29 | End: 2020-07-29 | Stop reason: HOSPADM

## 2020-07-29 RX ORDER — ISOSORBIDE MONONITRATE 30 MG/1
30 TABLET, EXTENDED RELEASE ORAL DAILY
Status: DISCONTINUED | OUTPATIENT
Start: 2020-07-29 | End: 2020-07-29 | Stop reason: HOSPADM

## 2020-07-29 RX ORDER — WARFARIN SODIUM 4 MG/1
8 TABLET ORAL DAILY
Status: DISCONTINUED | OUTPATIENT
Start: 2020-07-29 | End: 2020-07-29 | Stop reason: HOSPADM

## 2020-07-29 RX ORDER — METOPROLOL SUCCINATE 50 MG/1
50 TABLET, EXTENDED RELEASE ORAL DAILY
Status: CANCELLED | OUTPATIENT
Start: 2020-07-30

## 2020-07-29 RX ORDER — WARFARIN SODIUM 4 MG/1
8 TABLET ORAL DAILY
Qty: 30 TABLET | Refills: 3 | Status: ON HOLD | OUTPATIENT
Start: 2020-07-29 | End: 2020-08-10 | Stop reason: HOSPADM

## 2020-07-29 RX ORDER — SODIUM CHLORIDE 0.9 % (FLUSH) 0.9 %
10 SYRINGE (ML) INJECTION EVERY 12 HOURS SCHEDULED
Status: CANCELLED | OUTPATIENT
Start: 2020-07-29

## 2020-07-29 RX ORDER — ACETAMINOPHEN 325 MG/1
650 TABLET ORAL EVERY 4 HOURS PRN
Status: DISCONTINUED | OUTPATIENT
Start: 2020-07-29 | End: 2020-07-29

## 2020-07-29 RX ORDER — ONDANSETRON 2 MG/ML
4 INJECTION INTRAMUSCULAR; INTRAVENOUS EVERY 6 HOURS PRN
Status: DISCONTINUED | OUTPATIENT
Start: 2020-07-29 | End: 2020-07-29

## 2020-07-29 RX ORDER — ATORVASTATIN CALCIUM 40 MG/1
40 TABLET, FILM COATED ORAL NIGHTLY
Status: DISCONTINUED | OUTPATIENT
Start: 2020-07-29 | End: 2020-07-29 | Stop reason: SDUPTHER

## 2020-07-29 RX ORDER — ATORVASTATIN CALCIUM 40 MG/1
40 TABLET, FILM COATED ORAL NIGHTLY
Status: CANCELLED | OUTPATIENT
Start: 2020-07-29

## 2020-07-29 RX ORDER — PROMETHAZINE HYDROCHLORIDE 25 MG/1
12.5 TABLET ORAL EVERY 6 HOURS PRN
Status: CANCELLED | OUTPATIENT
Start: 2020-07-29

## 2020-07-29 RX ORDER — SODIUM CHLORIDE 0.9 % (FLUSH) 0.9 %
10 SYRINGE (ML) INJECTION EVERY 12 HOURS SCHEDULED
Status: DISCONTINUED | OUTPATIENT
Start: 2020-07-29 | End: 2020-08-05

## 2020-07-29 RX ORDER — METOPROLOL SUCCINATE 50 MG/1
50 TABLET, EXTENDED RELEASE ORAL DAILY
Status: DISCONTINUED | OUTPATIENT
Start: 2020-07-29 | End: 2020-07-29 | Stop reason: HOSPADM

## 2020-07-29 RX ADMIN — ACETAMINOPHEN 650 MG: 325 TABLET ORAL at 21:47

## 2020-07-29 RX ADMIN — SODIUM CHLORIDE, PRESERVATIVE FREE 10 ML: 5 INJECTION INTRAVENOUS at 20:51

## 2020-07-29 RX ADMIN — HYDROXYUREA 500 MG: 500 CAPSULE ORAL at 09:25

## 2020-07-29 RX ADMIN — METOPROLOL SUCCINATE 50 MG: 50 TABLET, EXTENDED RELEASE ORAL at 09:23

## 2020-07-29 RX ADMIN — ACETAMINOPHEN 650 MG: 325 TABLET, FILM COATED ORAL at 09:23

## 2020-07-29 RX ADMIN — OXYCODONE HYDROCHLORIDE 10 MG: 10 TABLET ORAL at 12:39

## 2020-07-29 RX ADMIN — DOCUSATE SODIUM 50 MG AND SENNOSIDES 8.6 MG 1 TABLET: 8.6; 5 TABLET, FILM COATED ORAL at 09:23

## 2020-07-29 RX ADMIN — ISOSORBIDE MONONITRATE 30 MG: 30 TABLET ORAL at 09:23

## 2020-07-29 RX ADMIN — WARFARIN SODIUM 8 MG: 4 TABLET ORAL at 20:51

## 2020-07-29 RX ADMIN — DOCUSATE SODIUM 50 MG AND SENNOSIDES 8.6 MG 1 TABLET: 8.6; 5 TABLET, FILM COATED ORAL at 20:51

## 2020-07-29 RX ADMIN — Medication 10 ML: at 09:25

## 2020-07-29 ASSESSMENT — PAIN DESCRIPTION - DESCRIPTORS: DESCRIPTORS: CONSTANT;ACHING

## 2020-07-29 ASSESSMENT — PAIN SCALES - GENERAL
PAINLEVEL_OUTOF10: 7
PAINLEVEL_OUTOF10: 0
PAINLEVEL_OUTOF10: 3
PAINLEVEL_OUTOF10: 7

## 2020-07-29 ASSESSMENT — PAIN DESCRIPTION - ONSET: ONSET: ON-GOING

## 2020-07-29 ASSESSMENT — PAIN - FUNCTIONAL ASSESSMENT: PAIN_FUNCTIONAL_ASSESSMENT: PREVENTS OR INTERFERES SOME ACTIVE ACTIVITIES AND ADLS

## 2020-07-29 ASSESSMENT — PAIN DESCRIPTION - PAIN TYPE: TYPE: SURGICAL PAIN

## 2020-07-29 ASSESSMENT — PAIN DESCRIPTION - ORIENTATION: ORIENTATION: RIGHT

## 2020-07-29 ASSESSMENT — PAIN DESCRIPTION - FREQUENCY: FREQUENCY: INTERMITTENT

## 2020-07-29 ASSESSMENT — PAIN DESCRIPTION - LOCATION: LOCATION: HIP

## 2020-07-29 NOTE — PROGRESS NOTES
Patient oriented to room and new admission folder given. Patient Guide not present. Patient and wife given a signed copy of The Important Message From Medicare.   Chad Capone  7/29/2020  3:56 PM

## 2020-07-29 NOTE — LETTER
PORTABLE PATIENT PROFILE  Tari Easton  [id number]    MEDICAL DIAGNOSIS/CONDITION:   Patient Active Problem List   Diagnosis    [facility]rial fibrillation ([facility]    PVD (peripheral vascular disease)    Hyperlipemia    Anticoagulant long-term use    Claudication of left lower extremity ([facility]    Pneumonia of both lower lobes due to infectious organism ([facility] [address] [facility]    Paroxysmal atrial fibrillation (HCC)    Warfarin anticoagulation    Warfarin overdosage    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)    Critical lower limb ischemia    Hx of right BKA ([facility] [address] [facility]    Hyperlipidemia    Hypertension    Lymphocytosis    Skin ulcer of left knee with fat layer exposed ([address]    Syncope and collapse    Wound infection    Blurry vision, bilateral    PAF (paroxysmal atrial fibrillation) (HCC)    Anticoagulated on Coumadin    Aortic stenosis, moderate    Sinus node dysfunction (HCC)    Peripheral arterial disease (HCC)    Sepsis due to pneumonia (HCC)    Chest pain    Precordial pain    Acute rhinitis    Closed nondisplaced intertrochanteric fracture of right femur (HCC)    Aortic valve disease    Closed right hip fracture, initial encounter ([facility] [address] [facility]    Moderate protein-calorie malnutrition ([facility] [address] [facility]       INSURANCE INFORMATION:  Payor: Jimbo Coleman /  /  /     ADVANCED DIRECTIVES:   Advance Directives (For Healthcare)  Healthcare Directive: Yes, patient has an advance directive for healthcare treatment  Healthcare Agent Appointed: Spouse  [unfilled]     EMERGENCY CONTACT:       RISK FACTORS:   Social History     Tobacco Use    Smoking status: Former Smoker     Packs/day: 1.00     Years: 30.00     Pack years: 30.00     Types: Cigars, Cigarettes     Start date:      Last attempt to quit: 1979     Years since quittin.9    Smokeless tobacco: Never Used   Substance Use Topics    Alcohol use:  Yes     Alcohol/week: 5.0 standard drinks Types: 5 Glasses of wine per week       ALLERGIES:  No Known Allergies    IMMUNIZATIONS:  Immunization History   Administered Date(s) Administered    Influenza, High Dose (Fluzone 65 yrs and older) 09/16/2019    MMR 05/06/2019, 05/08/2019    Pneumococcal Polysaccharide ([id number]) 10/25/2019    Tdap (Boostrix, Adacel) 07/31/2017       SWALLOWING:   Difficulty Chewing or Swallowing Food: No    VISION AND HEARING:        PHYSICIANS INVOLVED WITH CARE:    Ignacio Arellano MD  No ref.  provider found  Danelle Montalvo MD

## 2020-07-29 NOTE — PROGRESS NOTES
Pharmacy Consultation Note  (Anticoagulant Dosing and Monitoring)    Initial consult date: 7/25/2020  Consulting physician: Dr. Brendan Ramirez    Allergies:  Patient has no known allergies. 89 yo/M, 175.3 cm, 68 kg    Warfarin Indication Target   INR Range Home Dose   (if applicable) Diet/Feeding Tube   Atrial fibrillation 2-3 Warfarin 5 mg/7.5 mg alternating 7/25: General diet     Vitamin K or Blood product  Administration Date     PRBC 2 units transfused 7/27         Warfarin drug-drug interactions  Start  Stop Home Med? Comments                   TSH: 1.070 (9-)   Hepatic Function Panel:                      Lab Results   Component Value Date    ALKPHOS 184 07/24/2020    ALT 17 07/24/2020    AST 23 07/24/2020    PROT 6.6 07/24/2020    BILITOT 1.1 07/24/2020    LABALBU 4.0 07/24/2020    LABALBU 4.1 01/24/2012     Date Warfarin Dose INR Heparin or LMWH HBG/HCT PLT Comment   7/25 7.5 mg 1.4 Enoxaparin 40 mg daily 10.4/33  (7/24) 237  (7/24)    7/26 5 mg 1.6 Enoxaparin 40 mg daily 7/22.9 248    7/27 No Dose 2.3 enoxa 40 SC daily  stop @ 1244 6.4/21.1 272 PRBCs x2 units   7/28 7.5 mg 2.1 X 8.2/25.2 189    7/29 8 mg  1.8         Assessment:  · Patient is a 80year old male with atrial fibrillation on alternating doses of warfarin 5 mg and 7.5 mg.  · Warfarin had been held for OR on 7/25. · 7/27: INR = 2.3; H/H dropped to 6.4/21.1 --- no warfarin today per PCP note and to receive PRBCs today. · 7/28: INR = 2.1 after warfarin dose held on 7/27. S/P 2 units PRBCs transfused on 7/27; H/H = 8.2/25.2 today. · 7/29: INR = 1.8; Dr. Lewis Villegas ordered 8 mg daily starting today. Pending transfer to ARU today. Plan:  · Warfarin dosing and monitoring per Dr. Lewis Villegas. · Clinical Pharmacy sign off.     Mehran Cooley PharmD  7/29/2020  6:52 AM  Pager: [phone]

## 2020-07-29 NOTE — PROGRESS NOTES
Admit Date: 7/24/2020      Subjective:     Patient: no acute issues reported overnight  Medication side effects: none    Scheduled Meds:   sodium chloride flush  10 mL Intravenous 2 times per day    sennosides-docusate sodium  1 tablet Oral BID    warfarin (COUMADIN) daily dosing (placeholder)   Other RX Placeholder    ceFAZolin  2 g Intravenous See Admin Instructions    atorvastatin  40 mg Oral Nightly    metoprolol succinate  50 mg Oral Daily    hydroxyurea  500 mg Oral Daily    isosorbide mononitrate  30 mg Oral Daily     Continuous Infusions:  PRN Meds:sodium chloride flush, morphine **OR** morphine, magnesium hydroxide, acetaminophen, oxyCODONE **OR** oxyCODONE, promethazine **OR** ondansetron    Objective:   I/O last 3 completed shifts: In: 350 [P.O.:340; I.V.:10]  Out: 450 [Urine:450]  I/O this shift:  In: 250 [P.O.:240;  I.V.:10]  Out: 200 [Urine:200]    BP (!) 110/53   Pulse 75   Temp 97.1 °F (36.2 °C) (Oral)   Resp 18   Ht 5' 9\" (1.753 m)   Wt 150 lb (68 kg)   SpO2 96%   BMI 22.15 kg/m²   General appearance: alert, appears stated age and cooperative  Skin:negative  Heent:negative  Lungs: clear to auscultation bilaterally  Heart: regular rate and rhythm, S1, S2 normal, no murmur, click, rub or gallop  Abdomen: soft, non-tender; bowel sounds normal; no masses,  no organomegaly  Extremities:no edema  Neurologic: Grossly normal    Labs:  CBC with Differential:    Lab Results   Component Value Date    WBC 46.5 07/29/2020    RBC 2.31 07/29/2020    HGB 8.3 07/29/2020    HCT 26.3 07/29/2020     07/29/2020    .9 07/29/2020    MCH 35.9 07/29/2020    MCHC 31.6 07/29/2020    RDW 24.0 07/29/2020    NRBC 0.0 06/24/2018    BANDSPCT 2 10/12/2016    METASPCT 0.9 06/23/2018    LYMPHOPCT 6.1 07/24/2020    MONOPCT 4.3 07/24/2020    MYELOPCT 0.9 01/14/2020    BASOPCT 0.9 07/24/2020    MONOSABS 2.01 07/24/2020    LYMPHSABS 3.02 07/24/2020    EOSABS 0.00 07/24/2020    BASOSABS 0.45 07/24/2020 BMP:    Lab Results   Component Value Date     07/28/2020    K 3.9 07/28/2020    K 3.9 07/28/2020     07/28/2020    CO2 19 07/28/2020    BUN 21 07/28/2020    LABALBU 4.0 07/24/2020    LABALBU 4.1 01/24/2012    CREATININE 1.0 07/28/2020    CALCIUM 8.1 07/28/2020    GFRAA >60 07/28/2020    LABGLOM >60 07/28/2020     PT/INR:    Lab Results   Component Value Date    PROTIME 20.6 07/29/2020    PROTIME 28.4 02/18/2020    INR 1.8 07/29/2020     Last 3 Troponin:    Lab Results   Component Value Date    TROPONINI 0.08 09/12/2018    TROPONINI 0.05 09/11/2018    TROPONINI <0.01 08/18/2018     TSH:    Lab Results   Component Value Date    TSH 1.070 09/26/2018      Assessment:     Active Problems:    Closed nondisplaced intertrochanteric fracture of right femur (HCC)    Acute post op blood loss anemia    Moderate AS    Myeloproliferative disorder    PAF    Plan:      For dc to ACU today-he is medically stable for transfer  Repeat CBC  results noted; coumadin dosed

## 2020-07-29 NOTE — PROGRESS NOTES
Precert approved. Patient will admit to [facility] today. Charge nurse notified. Order under [address].

## 2020-07-29 NOTE — DISCHARGE INSTR - COC
Continuity of Care Form    Patient Name: May Alejandro   :  1929  MRN:  [id number]    Admit date:  2020  Discharge date:  20    Code Status Order: Full Code   Advance Directives:   Advance Care Flowsheet Documentation     Date/Time Healthcare Directive Type of Healthcare Directive Copy in [address] Agent's Name Healthcare Agent's Phone Number    20 1245  Yes, patient has an advance directive for healthcare treatment  Living will  --  Spouse  --  --    20 1438  Yes, patient has an advance directive for healthcare treatment  Living will  --  --  --  --          Admitting Physician:  Myrna Velez DO  PCP: Myrna Velez DO    Discharging Nurse: [address] [place] [address] Unit/Room#: 1363/2362-A  Discharging Unit Phone Number: [phone]    Emergency Contact:   Extended Emergency Contact Information  Primary Emergency Contact: Klaudia Ortega  Address: [place] [address] [address] [place] [address] Phone: [phone]  Work Phone: [phone]  Mobile Phone: [phone]  Relation: Spouse  Secondary Emergency Contact: [facility] Phone: [phone]  Work Phone: [phone]  Relation: Child    Past Surgical History:  Past Surgical History:   Procedure Laterality Date    APPENDECTOMY      ECHOCARDIOGRAM COMPLETE 2D W DOPPLER W COLOR  2012         HIP FRACTURE SURGERY Right 2020    RIGHT HIP CEPHALO-MEDULLARY NAIL performed by Rubin Styles MD at [address] [place] [address] Bilateral     Right    TOOTH EXTRACTION         Immunization History:   Immunization History   Administered Date(s) Administered    Influenza, High Dose (Fluzone 65 yrs and older) 2019    MMR 2019, 2019    Pneumococcal Polysaccharide (Dhafonmoq05) 10/25/2019    Tdap (Boostrix, Adacel) 2017       Active Problems:  Patient Active Problem List   Diagnosis Code    Atrial fibrillation ([facility]) I48.91    PVD (peripheral vascular disease) I73.9    Hyperlipemia E78.5    Anticoagulant long-term use Z79.01    Claudication of left lower extremity ([facility]) I73.9    Pneumonia of both lower lobes due to infectious organism ([facility] J18.1    Paroxysmal atrial fibrillation ([facility]) I48.0    Warfarin anticoagulation Z79.01    Warfarin overdosage T45.511A    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 8XXA, L08.9    Blurry vision, bilateral H53.8    PAF (paroxysmal atrial fibrillation) ([facility]) I48.0    Anticoagulated on Coumadin Z79.01    Aortic stenosis, moderate I35.0    Sinus node dysfunction ([facility]) I49.5    Peripheral arterial disease ([facility]) I73.9    Sepsis due to pneumonia ([facility]) J18.9, A41.9    Chest pain R07.9    Precordial pain R07.2    Acute rhinitis J00    Closed nondisplaced intertrochanteric fracture of right femur ([facility] S72.144A    Aortic valve disease I35.9       Isolation/Infection:   Isolation          No Isolation        Patient Infection Status     None to display          Nurse Assessment:  Last Vital Signs: BP (!) 105/49   Pulse 77   Temp 97.4 °F (36.3 °C) (Temporal)   Resp 18   Ht 5' 9\" (1.753 m)   Wt 150 lb (68 kg)   SpO2 96%   BMI 22.15 kg/m²     Last documented pain score (0-10 scale): Pain Level: 3  Last Weight:   Wt Readings from Last 1 Encounters:   07/24/20 150 lb (68 kg)     Mental Status:  oriented and alert    IV Access:  - None    Nursing Mobility/ADLs:  Walking   Assisted  Transfer  Assisted  Bathing  Assisted  Dressing  Assisted  Toileting  Assisted  Feeding  Independent  Med Admin  Assisted  Med Delivery   whole    Wound Care Documentation and Therapy:        Elimination:  Continence:   · Bowel: Yes  · Bladder: Yes  Urinary Catheter: None   Colostomy/Ileostomy/Ileal Conduit: No       Date of Last BM: unknown    Intake/Output Summary (Last 24 hours) at 7/29/2020 1105  Last data filed at 7/29/2020 0816  Gross per 24 hour   Intake 350 ml   Output 400 ml   Net -50 ml     I/O last 3 completed shifts: In: 26 [P.O.:460; I.V.:10]  Out: 200 [Urine:200]    Safety Concerns: At Risk for Falls    Impairments/Disabilities:      None    Nutrition Therapy:  Current Nutrition Therapy:   - Oral Diet:  General    Routes of Feeding: Oral  Liquids: Thin Liquids  Daily Fluid Restriction: no  Last Modified Barium Swallow with Video (Video Swallowing Test): not done    Treatments at the Time of Hospital Discharge:   Respiratory Treatments: NA  Oxygen Therapy:  is not on home oxygen therapy.   Ventilator:    - No ventilator support    Rehab Therapies: Physical Therapy and Occupational Therapy  Weight Bearing Status/Restrictions: No weight bearing restirctions  Other Medical Equipment (for information only, NOT a DME order):  walker  Other Treatments: NA    Patient's personal belongings (please select all that are sent with patient):  None    RN SIGNATURE: Mao Santamaria    CASE MANAGEMENT/SOCIAL WORK SECTION    Inpatient Status Date: ***    Readmission Risk Assessment Score:  Readmission Risk              Risk of Unplanned Readmission:        15           Discharging to Facility/ Agency   · Name:   · Address:  · Phone:  · Fax:    Dialysis Facility (if applicable)   · Name:  · Address:  · Dialysis Schedule:  · Phone:  · Fax:    / signature: {Esignature:[id number]}    PHYSICIAN SECTION    Prognosis: {Prognosis:1535807865}    Condition at Discharge: [address] [place] [address] Patient Condition:[id number]}    Rehab Potential (if transferring to Rehab): {Prognosis:8333563881}    Recommended Labs or Other Treatments After Discharge: ***    Physician Certification: I certify the above information and transfer of May Alejandro  is necessary for the continuing treatment of the diagnosis listed and that he requires {Admit to Appropriate Level of Care:88436} for {GREATER/LESS:615124991} 30 days.      Update Admission H&P: {CHP DME Changes in EBCFD:187205974}    PHYSICIAN SIGNATURE:  {Esignature:[id number]}

## 2020-07-29 NOTE — PLAN OF CARE
Problem: Skin Integrity:  Goal: Will show no infection signs and symptoms  Description: Will show no infection signs and symptoms  Outcome: Met This Shift     Problem: Falls - Risk of:  Goal: Will remain free from falls  Description: Will remain free from falls  Outcome: Met This Shift  Goal: Absence of physical injury  Description: Absence of physical injury  Outcome: Met This Shift     Problem: Skin Integrity:  Goal: Absence of new skin breakdown  Description: Absence of new skin breakdown  Outcome: Ongoing

## 2020-07-29 NOTE — H&P
History and Physical Exam    Date of Admission:  7/29/2020  Primary Care Physician: Zahida Mart DO  Attending Physician:  Shari Giles MD      Chief Complaint:   Impairments and acitivities limitations in ADLs and mobility secondary to right intertrochanteric hip fracture. History of Present Illness:  Wilberto Short is a 80 y.o. male with past medical history significant for bilateral BKA who presented to [facility] on 7/24/2020 due to abnormal outpatient xrays. Patient was previously ambulatory with bilateral lower extremity prosthesis and bilateral canes. He had a fall about 2 weeks prior to admission and since that time had right hip pain with attempts to bear weight through the right leg. He was seen by Orthopedics in the office and xray revealed displaced right intertrochanteric hip fracture. He was admitted and underwent right intramedullary trochanteric nail on 7/25/2020. He is WBAT to the [facility]. Hospital course noted for acute blood loss anemia and he received 2 units pRBC on 7/27/2020.  He has participated in post operative therapy evaluations and is now admitted for the acute inpatient rehab program.     Histories:   Past Medical History:   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 ([address] 12/28/2015    Blood circulation, collateral     Cancer ([facility] 2004    prostate, seed implant    Chronic kidney disease     Critical lower limb ischemia 12/29/2015    Hyperlipidemia     Pneumonia       Past Surgical History:    Past Surgical History:   Procedure Laterality Date    APPENDECTOMY      ECHOCARDIOGRAM COMPLETE 2D W DOPPLER W COLOR  1/25/2012         HIP FRACTURE SURGERY Right 7/25/2020    RIGHT HIP CEPHALO-MEDULLARY NAIL performed by Doug Holm MD at [address] [place] [address] Bilateral     Right    TOOTH EXTRACTION           Family History:     Family History   Problem Relation Age of Onset    Stroke Father     Heart Disease Father     Cancer Sister      Social History:   Social History     Socioeconomic History    Marital status:      Spouse name: Not on file    Number of children: Not on file    Years of education: Not on file    Highest education level: Not on file   Occupational History    Not on file   Social Needs    Financial resource strain: Not on file    Food insecurity     Worry: Not on file     Inability: Not on file   [employer] needs     Medical: Not on file     Non-medical: Not on file   Tobacco Use    Smoking status: Former Smoker     Packs/day: 1.00     Years: 30.00     Pack years: 30.00     Types: Cigars, Cigarettes     Start date:      Last attempt to quit: 1979     Years since quittin.9    Smokeless tobacco: Never Used   Substance and Sexual Activity    Alcohol use:  Yes     Alcohol/week: 5.0 standard drinks     Types: 5 Glasses of wine per week    Drug use: Yes     Types: Marijuana    Sexual activity: Never   Lifestyle    Physical activity     Days per week: Not on file     Minutes per session: Not on file    Stress: Not on file   Relationships    Social connections     Talks on phone: Not on file     Gets together: Not on file     Attends Nondenominational service: Not on file     Active member of club or organization: Not on file     Attends meetings of clubs or organizations: Not on file     Relationship status: Not on file    Intimate partner violence     Fear of current or ex partner: Not on file     Emotionally abused: Not on file     Physically abused: Not on file     Forced sexual activity: Not on file   Other Topics Concern    Not on file   Social History Narrative    Not on file       Home Environment:    Lives with wife and sister in law in 1 level home with ramp entry    Functional Status:        Premorbid ADL's: independent- Mod I   Premorbid Mobility: ambulating community distances with bilateral lower extremity prosthesis and bilateral canes. Present: significant decrease in mobility and function     Current Functional Status:    Physical Therapy:  Bed mobility: SBA  Transfers: Mod Ax2  Ambulation: 3 ft Mod A x2 [address]     Occupational Therapy:  Feeding: Independent  Grooming: Setup  UB dressing: SBA/Min A  LB dressing: Min A    Medications    Scheduled Meds:  sennosides-docusate sodium, 1 tablet, BID  sodium chloride flush, 10 mL, 2 times per day  atorvastatin, 40 mg, Nightly  [START ON 7/30/2020] hydroxyurea, 500 mg, Daily  [START ON 7/30/2020] isosorbide mononitrate, 30 mg, Daily  [START ON 7/30/2020] metoprolol succinate, 50 mg, Daily  warfarin, 8 mg, Daily        PRN Meds:  magnesium hydroxide, 30 mL, Daily PRN  sodium chloride flush, 10 mL, PRN  ondansetron, 4 mg, Q6H PRN  acetaminophen, 650 mg, Q4H PRN  polyethylene glycol, 17 g, Daily PRN        Drug Allergies   Patient has no known allergies. Review of Systems   Constitutional:  No fevers/chills. Eye:  Negative for vision changes. Ear/Nose/Mouth/Throat:  Negative for hearing changes. Respiratory:  No SOB, No cough. Cardiovascular:  No CP, No palpitations. Gastrointestinal:  No nausea, No vomiting  Genitourinary:  Voiding normally, no dysuria. Hematology/Lymphatics:  Negative. Endocrine:  Negative. Musculoskeletal: Bilateral BKA, rest as per HPI   Integumentary:  No rashes. Neurologic:  Per HPI  Psychiatric:  No anxiety, No depression. Physical Examination:   Vitals:    07/29/20 1501   BP: (!) 87/48   Pulse: 73   Resp: 16   Temp: 97.9 °F (36.6 °C)   TempSrc: Oral   Weight: 149 lb 2 oz (67.6 kg)   Height: 5' 9\" (1.753 m)       GEN: NAD, resting in bed   HEENT: NC/AT, PERRL, EOMI  RESP: CTAB, no R/R/W  CV: RRR  ABD: soft, NT, ND, normal BS   EXT: No erythema/clubbing/cyanosis.  Bilateral BKA  Skin: bilateral BKA, old,well healed incision scars  Psych: appropriate mood and affect     Neuro Exam:  AAOx4  Speech clear, content appropriate  Follows multi step commands        Cranial Nerves:  CN II-XII grossly intact      Coordination:  F - N: intact bilaterally     Sensory:    LT: intact throughout      Motor:   Tone was normal     Motor Findings  Strength: Right  Strength: Left    Deltoid  5/5  5/5    Biceps  5/5  5/5    Triceps  5/5  5/5    Wrist Extensors  5/5  5/5    FDP 5/5 5/5   Finger abductors 5/5 5/5   Iliospoas  2/5  5/5    Quadriceps  2/5  5/5    Tibialis anterior        EHL       Gastroc soleus              DTRs:  Reflex Right Left   Biceps 2+ 2+   Triceps 2+ 2+   Brachioradialis 2+ 2+           Laboratory:    Lab Results   Component Value Date    WBC 46.5 (H) 07/29/2020    HGB 8.3 (L) 07/29/2020    HCT 26.3 (L) 07/29/2020    .9 (H) 07/29/2020     07/29/2020     Lab Results   Component Value Date     07/28/2020    K 3.9 07/28/2020    K 3.9 07/28/2020     07/28/2020    CO2 19 07/28/2020    BUN 21 07/28/2020    CREATININE 1.0 07/28/2020    GLUCOSE 68 07/28/2020    GLUCOSE 94 01/24/2012    CALCIUM 8.1 07/28/2020      Lab Results   Component Value Date    ALT 17 07/24/2020    AST 23 07/24/2020    ALKPHOS 184 (H) 07/24/2020    BILITOT 1.1 07/24/2020       Lab Results   Component Value Date    COLORU Yellow 02/14/2017    NITRU Negative 02/14/2017    GLUCOSEU Negative 02/14/2017    KETUA Negative 02/14/2017    UROBILINOGEN 1.0 02/14/2017    BILIRUBINUR Negative 02/14/2017     Lab Results   Component Value Date    LABA1C 5.1 09/26/2018         Imaging:  Right hip xray with pelvis:  Impression         Right hip intertrochanteric fracture which is displaced cephalad         Osteopenia           Impression and Plan:  Veto Salgado is a 80 y.o. male with impairments and acitivities limitations in ADLs and mobility secondary to right intertrochanteric hip fracture in patient with history of bilateral BKA.       Primary Medical Rehabilitation Diagnosis and Impression:       Dx: right intertrochanteric hip fracture    Functional Deficits Summary: see above   Dx specific Risks &/or Potential complications: fall, hardware failure, DVT/PE, bleeding    Preliminary Overall Medical Rehabilitation Plan    Daily physiatrist evaluation and management     24 hour specialty rehabilitation nursing care       Medical prognosis is good   The following licensed rehabilitation therapy disciplines will be ordered: PT, OT. Patient to have 90 min of each therapy Mon-Fri and 30 min of each therapy Sat or Sun   Functional Goals: SBA-Mod I   Potential: good potential to reach functional goals   ELOS: 2 weeks with anticipated discharge to home    Co-morbidities :  Patient Active Problem List   Diagnosis    Atrial fibrillation ([address]    PVD (peripheral vascular disease)    Hyperlipemia    Anticoagulant long-term use    Claudication of left lower extremity ([address]    Pneumonia of both lower lobes due to infectious organism ([address]    Paroxysmal atrial fibrillation (HCC)    Warfarin anticoagulation    Warfarin overdosage    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 ([address]    Critical lower limb ischemia    Hx of right BKA ([address]    Hyperlipidemia    Hypertension    Lymphocytosis    Skin ulcer of left knee with fat layer exposed ([address]    Syncope and collapse    Wound infection    Blurry vision, bilateral    PAF (paroxysmal atrial fibrillation) ([address]    Anticoagulated on Coumadin    Aortic stenosis, moderate    Sinus node dysfunction (HCC)    Peripheral arterial disease ([address]    Sepsis due to pneumonia ([address]    Chest pain    Precordial pain    Acute rhinitis    Closed nondisplaced intertrochanteric fracture of right femur ([address]    Aortic valve disease    Closed right hip fracture, initial encounter ([address]     -Right intertrochanteric hip fracture: s/p  right intramedullary trochanteric nail on 7/25/2020. WBAT RLE. Begin Acute Rehab evaluations.  -Acute post operative pain: oxycodone or tylenol PRN.  Wean narcotics as tolerated.   -Acute blood loss anemia: received 2 units pRBC on 7/27. Monitor H&H, has been stable since transfusion. -HTN: continue Imdur, Toprol XL. Monitor. BP controlled. -Paroxysmal Afib: Rate controlled. Coumadin resumed. Monitor INR daily until stable.   -History of bilateral BKA: previously ambulatory with BLE prosthesis and canes.  -History of myeloproloferative disease   -DVT prophylaxis: on Coumadin as above       Summary of Medical Appropriateness for Admission to [address] [place] [address],[address]    1. The functional deficits and medical and nursing needs outlined above require:   a. close medical supervision by a physiatrist;   b. 24 hour availability of nurses skilled in rehabilitation; and   c. treatment by multiple other licensed rehabilitation professionals in a time intensive and medically coordinated program.   2. The medical stability of the patient and management of medical or surgical co-morbidities are:   a. manageable in the rehabilitation hospital; and   b. sufficiently under control so as to permit simultaneous participation in the rehabilitation program.   3. The patient is now capable of fully participating in the inpatient rehabilitation program.   4. The patient has a high probability of benefiting from the program of care.    5. The patient has a home and available family or care providers         Electronically signed by Katie Owens MD on 7/29/2020 at 3:32 PM

## 2020-07-30 LAB
ANION GAP SERPL CALCULATED.3IONS-SCNC: 11 MMOL/L (ref 7–16)
ANISOCYTOSIS: ABNORMAL
BASOPHILS ABSOLUTE: 0 E9/L (ref 0–0.2)
BASOPHILS RELATIVE PERCENT: 0.2 % (ref 0–2)
BUN BLDV-MCNC: 19 MG/DL (ref 8–23)
CALCIUM SERPL-MCNC: 8.2 MG/DL (ref 8.6–10.2)
CHLORIDE BLD-SCNC: 108 MMOL/L (ref 98–107)
CO2: 23 MMOL/L (ref 22–29)
CREAT SERPL-MCNC: 0.9 MG/DL (ref 0.7–1.2)
EOSINOPHILS ABSOLUTE: 0.41 E9/L (ref 0.05–0.5)
EOSINOPHILS RELATIVE PERCENT: 0.9 % (ref 0–6)
GFR AFRICAN AMERICAN: >60
GFR NON-AFRICAN AMERICAN: >60 ML/MIN/1.73
GLUCOSE BLD-MCNC: 79 MG/DL (ref 74–99)
HCT VFR BLD CALC: 27.6 % (ref 37–54)
HEMOGLOBIN: 8.7 G/DL (ref 12.5–16.5)
INR BLD: 1.6
LYMPHOCYTES ABSOLUTE: 1.81 E9/L (ref 1.5–4)
LYMPHOCYTES RELATIVE PERCENT: 3.5 % (ref 20–42)
MCH RBC QN AUTO: 36.3 PG (ref 26–35)
MCHC RBC AUTO-ENTMCNC: 31.5 % (ref 32–34.5)
MCV RBC AUTO: 115 FL (ref 80–99.9)
MONOCYTES ABSOLUTE: 1.81 E9/L (ref 0.1–0.95)
MONOCYTES RELATIVE PERCENT: 3.5 % (ref 2–12)
NEUTROPHILS ABSOLUTE: 41.68 E9/L (ref 1.8–7.3)
NEUTROPHILS RELATIVE PERCENT: 92.2 % (ref 43–80)
OVALOCYTES: ABNORMAL
PDW BLD-RTO: 23.2 FL (ref 11.5–15)
PLATELET # BLD: 164 E9/L (ref 130–450)
PMV BLD AUTO: 12.8 FL (ref 7–12)
POIKILOCYTES: ABNORMAL
POLYCHROMASIA: ABNORMAL
POTASSIUM REFLEX MAGNESIUM: 4.3 MMOL/L (ref 3.5–5)
PROTHROMBIN TIME: 18.3 SEC (ref 9.3–12.4)
RBC # BLD: 2.4 E12/L (ref 3.8–5.8)
SODIUM BLD-SCNC: 142 MMOL/L (ref 132–146)
WBC # BLD: 45.3 E9/L (ref 4.5–11.5)

## 2020-07-30 PROCEDURE — 80048 BASIC METABOLIC PNL TOTAL CA: CPT

## 2020-07-30 PROCEDURE — 97110 THERAPEUTIC EXERCISES: CPT

## 2020-07-30 PROCEDURE — 1280000000 HC REHAB R&B

## 2020-07-30 PROCEDURE — 2580000003 HC RX 258: Performed by: FAMILY MEDICINE

## 2020-07-30 PROCEDURE — 36415 COLL VENOUS BLD VENIPUNCTURE: CPT

## 2020-07-30 PROCEDURE — 97530 THERAPEUTIC ACTIVITIES: CPT

## 2020-07-30 PROCEDURE — 97535 SELF CARE MNGMENT TRAINING: CPT

## 2020-07-30 PROCEDURE — 6370000000 HC RX 637 (ALT 250 FOR IP): Performed by: FAMILY MEDICINE

## 2020-07-30 PROCEDURE — 97162 PT EVAL MOD COMPLEX 30 MIN: CPT

## 2020-07-30 PROCEDURE — 99232 SBSQ HOSP IP/OBS MODERATE 35: CPT | Performed by: PHYSICAL MEDICINE & REHABILITATION

## 2020-07-30 PROCEDURE — 6370000000 HC RX 637 (ALT 250 FOR IP): Performed by: PHYSICAL MEDICINE & REHABILITATION

## 2020-07-30 PROCEDURE — 97166 OT EVAL MOD COMPLEX 45 MIN: CPT

## 2020-07-30 PROCEDURE — 85610 PROTHROMBIN TIME: CPT

## 2020-07-30 PROCEDURE — 85025 COMPLETE CBC W/AUTO DIFF WBC: CPT

## 2020-07-30 RX ORDER — OXYCODONE HYDROCHLORIDE 5 MG/1
5 TABLET ORAL EVERY 6 HOURS PRN
Status: DISCONTINUED | OUTPATIENT
Start: 2020-07-30 | End: 2020-08-05

## 2020-07-30 RX ADMIN — ISOSORBIDE MONONITRATE 30 MG: 30 TABLET ORAL at 09:51

## 2020-07-30 RX ADMIN — METOPROLOL SUCCINATE 50 MG: 50 TABLET, EXTENDED RELEASE ORAL at 09:51

## 2020-07-30 RX ADMIN — OXYCODONE 5 MG: 5 TABLET ORAL at 14:56

## 2020-07-30 RX ADMIN — SODIUM CHLORIDE, PRESERVATIVE FREE 10 ML: 5 INJECTION INTRAVENOUS at 08:37

## 2020-07-30 RX ADMIN — DOCUSATE SODIUM 50 MG AND SENNOSIDES 8.6 MG 1 TABLET: 8.6; 5 TABLET, FILM COATED ORAL at 21:17

## 2020-07-30 RX ADMIN — WARFARIN SODIUM 8 MG: 4 TABLET ORAL at 17:50

## 2020-07-30 RX ADMIN — DOCUSATE SODIUM 50 MG AND SENNOSIDES 8.6 MG 1 TABLET: 8.6; 5 TABLET, FILM COATED ORAL at 08:37

## 2020-07-30 RX ADMIN — ACETAMINOPHEN 650 MG: 325 TABLET ORAL at 21:17

## 2020-07-30 RX ADMIN — HYDROXYUREA 500 MG: 500 CAPSULE ORAL at 08:36

## 2020-07-30 ASSESSMENT — PAIN SCALES - GENERAL
PAINLEVEL_OUTOF10: 8
PAINLEVEL_OUTOF10: 5
PAINLEVEL_OUTOF10: 0
PAINLEVEL_OUTOF10: 4

## 2020-07-30 ASSESSMENT — PAIN DESCRIPTION - FREQUENCY
FREQUENCY: INTERMITTENT
FREQUENCY: INTERMITTENT

## 2020-07-30 ASSESSMENT — PAIN DESCRIPTION - PAIN TYPE
TYPE: SURGICAL PAIN
TYPE: ACUTE PAIN;SURGICAL PAIN

## 2020-07-30 ASSESSMENT — PAIN DESCRIPTION - LOCATION
LOCATION: HIP
LOCATION: HIP

## 2020-07-30 ASSESSMENT — PAIN DESCRIPTION - PROGRESSION: CLINICAL_PROGRESSION: GRADUALLY IMPROVING

## 2020-07-30 ASSESSMENT — PAIN DESCRIPTION - ORIENTATION
ORIENTATION: RIGHT
ORIENTATION: RIGHT

## 2020-07-30 ASSESSMENT — PAIN DESCRIPTION - ONSET
ONSET: PROGRESSIVE
ONSET: ON-GOING

## 2020-07-30 ASSESSMENT — PAIN - FUNCTIONAL ASSESSMENT: PAIN_FUNCTIONAL_ASSESSMENT: PREVENTS OR INTERFERES SOME ACTIVE ACTIVITIES AND ADLS

## 2020-07-30 ASSESSMENT — PAIN DESCRIPTION - DESCRIPTORS: DESCRIPTORS: ACHING;CONSTANT;DISCOMFORT

## 2020-07-30 NOTE — CARE COORDINATION
Social Work Assessment Summary    PCP: Yonatan Villegas    Patient Resides: own home in [address] [place] [address] with his wife and sister in law     Home Architecture : Single story with ramped entrance. Walk in shower with rails and tub seat    Will you return to your own home? yes        Primary Caregiver: Wife who is in good health and drives. Daughter Trip Cunha is also supportive and lives nearby        Level of Function PTA :Patient ambulated in home and short distance in community with bilateral canes and bi lateral prosthesis. He does not drive.   Wife and sister in law do all home management    Employment: retired    Receives SSD no   Reason:  no    DME Pta  : bilateral prosthesis, 2 single point canes, wheeled walker, wheelchair with removable arms, shower seat     Community Agency Involvement PTA : Active with [facility] for PT and HHA    Do they have a medical profile:no    Family Teaching:TBS    Strength: Strong for my age    Preference: Rehab and go home as soon as I can       BENJAMIN [address] # WORK # CELL #   Sukhdeep Prior spouse [phone]   Ankita Tolentino  daughter [phone]            Height: 5'9\"  [id number]

## 2020-07-30 NOTE — PROGRESS NOTES
Occupational Therapy   Occupational Therapy Initial Assessment  Date: 2020   Patient Name: Ginette Vazquez  MRN: [id number]     : 1929  Room: 5502A    Referring Practitioner: Yash Barbosa MD  Diagnosis: R hip fx- s/p cephalo medullary nailing  Additional Pertinent Hx: B BKA- B prosthesis, CKD, HLD, hx prostate ca  Restrictions: Fall Risk, WBAT RLE, B prosthesis    Date of Service: 2020    Discharge Recommendations:  Home with assist PRN, Home with Home health OT     Subjective   Chart Reviewed: Yes  Family / Caregiver Present: No  Subjective: Pt presents supine in bed & was agreeable to OT intervention.   Pain Comments: Pt did c/o R hip pain during activity in OT     Social/Functional History  Lives With: Spouse & sister in law  Type of Home: House  Home Layout: One level  Home Access: Ramped entrance, Stairs to enter with rails  Entrance Stairs - Number of Steps: 3 KEVYN 1HR  Bathroom Shower/Tub: Walk-in shower, Shower chair with back  [employer]: Grab bars in [address]: Rolling walker, [employer] canes he used for [employer])  ADL Assistance: Independent  Ambulation Assistance: Independent  Transfer Assistance: Independent  Active : No  Occupation: Retired  Leisure & Hobbies: 1 dog  IADL Comments: PLOF pt independent in all areas & used B canes for ambulation PTA     Objective   Vision: Within Functional Limits  Hearing: Exceptions to [facility]  Hearing Exceptions: Hard of hearing/hearing concerns(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)       Observation/Palpation  Posture: Fair     Balance  Sitting Balance: Contact guard assistance  Standing Balance: Minimal assistance  Functional Mobility  Functional - Mobility Device: Rolling Walker  Activity: Other  Assist Level: Minimal assistance  Functional Mobility Comments: Pt require occ Min A to advance RLE & min vc's for sequencing & walker safety  Toilet Transfers  Toilet - Technique: Stand pivot  Equipment Used: Standard toilet  Toilet Transfer: Moderate assistance  Toilet Transfers Comments: Pt require Min A to stand piovot to toilet & Min A to manage pants & depends down & up     ADL  Feeding: Setup  Grooming: Supervision(seated @ sink level)  UE Bathing: Stand by assistance;Setup; Increased time to complete  LE Bathing: Moderate assistance; Increased time to complete;Verbal cueing;Setup  UE Dressing: Setup;Minimal assistance  LE Dressing: Max A to don depends, shorts &B rposthesis- Pt was able to don B prosthesis with Min A to don R & L prosthesis seated EOB     Tone RUE  RUE Tone: Normotonic  Tone LUE  LUE Tone: Normotonic  Coordination  Movements Are Fluid And Coordinated: No     Bed mobility  Rolling to Left: Stand by assistance  Rolling to Right: Minimal assistance  Supine to Sit: Contact guard assistance  Scooting: Contact guard assistance  Comment: min vc's for safety  Transfers  Stand Pivot Transfers: Minimal assistance(rw)  Sit to stand: Minimal assistance  Stand to sit: Minimal assistance     Vision - Basic Assessment  Prior Vision: No visual deficits  Visual History: No significant visual history  Patient Visual Report: No visual complaint reported. Cognition  Overall Cognitive Status: WFL     Sensation  Overall Sensation Status: WFL      BUE AROM: BUE AROM in all planes WFL  Right & Left Hand AROM: B hand grasp & relelase WFL.  Pt able to oppose his thumb to each digit    BUE Strength: BUE strength 4/5 throughout in all planes  R Hand General: 4-/5  L Hand General: 4/5     Hand Dominance: Right    Left Hand Strength -   Handle Setting 2: 45# & norm for pt age & gender is 55#  Right Hand Strength -    Handle Setting 2: 36# & norm for pt age & gender is 66#    Fine Motor Skills  Left 9 Hole Peg Test Time: 34.7 seconds & norm for pt age & gender is 26.0 seconds  Right 9 Hole Peg Test Time: 28.1 seconds & norm for pt age & gender is 25.8 seconds     Plan   Times per week: OT twice a day for 2 weeks to address above problema reas  Times per day: Twice a day  Current Treatment Recommendations: Strengthening, Gait Training, Patient/Caregiver Education & Training, Home Management Training, Equipment Evaluation, Education, & procurement, Balance Training, Functional Mobility Training, Endurance Training, Positioning, Wheelchair Mobility Training, Safety Education & Training, Self-Care / ADL    Assessment   Performance deficits / Impairments: Decreased functional mobility ; Decreased endurance;Decreased ADL status; Decreased balance;Decreased strength;Decreased high-level IADLs;Decreased fine motor control  Prognosis: Good  Decision Making: Medium Complexity  OT Education: OT Role;Plan of Care;Equipment;Precautions; ADL Adaptive Strategies;Transfer Training;Energy Conservation;IADL Safety  Patient Education: Pt educated with regards to OT process. Pt participated in OT goal planning. Pt pleasant & cooperative throughut the OT session.   REQUIRES OT FOLLOW UP: Yes  Activity Tolerance: Patient Tolerated treatment well  Safety Devices in place: Yes  Type of devices: Call light within reach           Goals  Long term goals  Time Frame for Long term goals : 2 weeks toa ddress above problem areas  Long term goal 1: Pt demo Mod I to eat all meals  Long term goal 2: Pt demo Mod I grooming seated or standing  Long term goal 3: Pt demo SBA to bathe @ shower level with AE as needed  Long term goal 4: Pt demo I UE & Mod I LE dress with AE as needed  Long term goal 5: Pt demo Mod I commode trf with appropriate DME to ensure pt safety  Long term goals 6: Pt demo SBA walk in shower trf wtih appropriate DME to ensure pt safety  Long term goal 7: Pt demo G- endurance for a 20-30 minute functional activity  Long term goal 8: Pt demo imrpoved  & FMC/dexterity as evidenced by gains made in 9-hole & dynamometer (eval- R  36# & norm for pt age & gender is 66# & L hand 45# & norm for pt age & gender is 55#; 9-hole peg test- R hand 28.1 seconds & norm for pt age & gender is 25.8 seconds & L hand 34.7 seconds & norm for pt age & gender is 26.0 seconds)  Long term goal 9: Pt demo Mod I light snack prep & demo G safety  Patient Goals   Patient goals : \"I want to be able to walk & take care of myself. \"     Therapy Time   Individual Concurrent Group Co-treatment   Time In 615-OT eval  625-OT rx         Time Out 625-OT eval  715-OT rx         Minutes 75 Min OT total  10 Min OT eval  65 Min OT rx          Maggie Moran OTR/L [id number]

## 2020-07-30 NOTE — PROGRESS NOTES
mobility & transfers    Additional Notes:       Patient has made good  progress during treatment sessions toward set goals. Therapy emphasis to obtain goals:ADL retraining, transfers training, mobility, there ex, endurance/balance retraining, homemaking & pt/family education      [x] Continue with current OT Plan of care.   [] Prepare for Discharge     DISCHARGE RECOMMENDATIONS  Recommended DME:    Post Discharge Care:   []Home Independently  []Home with 24hr Care / Supervision []Home with Partial Supervision []Home with Home Health OT []Home with Out Pt OT []Other: ___   Comments:         Time in Time out Tx Time Breakdown  Variance:   First Session  eval+rx  [] Individual Tx-   [] Concurrent Tx -  [] Co-Tx -   [] Group Tx -   [] Time Missed -     Second Session 915 1000 [x] Individual Tx- 45[] Concurrent Tx -  [] Co-Tx -   [] Group Tx -   [] Time Missed -     Third Session    [] Individual Tx-   [] Concurrent Tx -  [] Co-Tx -   [] Group Tx -   [] Time Missed -         Total Tx Time:45 Min         Elise Romero OTR/L [id number]

## 2020-07-30 NOTE — PROGRESS NOTES
Physical Therapy  Weekly Team Note  Evaluating Therapist: Sudheer Malagon DPT [id number]    NAME: Davon Sensor  ROOM: 0973R  DIAGNOSIS: Closed R hip fracture  PRECAUTIONS: Falls, WBAT RLE, BLE BKA, BLE prosthesis   PROCEDURE(S): 7/25 R intramedullary trochanteric nailing    Social:  Pt lives with his wife and sister in law in a single story floor plan with a ramped entry. Pt did state that he also has an entrance other than the ramp that he normally uses which is 3 steps and 1 rail. Prior to admission pt ambulated in home and short distances in community with bilateral canes and bilateral prosthesis independently. Pt states he owns a wheelchair. Initial Evaluation  7/30/20 Comments Short Term Goals Long Term Goals    Bed Mobility  Rolling: Min A  Supine to sit: Min A  Sit to supine: Min A  Scooting: SBA to EOB Assist to manage RLE due to pain Supervision Mod I   Transfers Sit to stand: Mod A  Stand to sit: Mod A  Stand pivot: Min A with [employer]  SBA Supervision   Ambulation   10 feet with [employer] with Min A Antalgic gait pattern, limited by pain 50 feet with WW with SBA >100 feet with [employer] with Supervision   Wheel Chair Mobility 150 feet Supervision  300 feet Supervision 300 feet Mod I   Car Transfers NT  SBA Supervision   Stair negotiation: ascended and descended 1 step with 2 rails with Mod - backward descending Limited by pain 4 steps with 1 rail with SBA 4 steps with 1 rail with Supervision   Curb Step:   ascended and descended 2 inch step with [employer] and Mod A  4 inch step with [employer] and SBA 4 inch step with [employer] and Supervision   BLE ROM WFL      BLE Strength R hip grossly 3-/5 (limited exam due to pain)  L hip grossly 4+/5      Balance  Sitting: Supervision  Standing: Min A with [employer]      Date Family Teach Completed TBA      Is additional Family Teaching Needed?   Y or N Y      Hindering Progress Pain, weakness      PT recommended ELOS 2-3 weeks      Team's Discharge Plan 2 weeks      Therapist at [employer], PT, DPT [id number]          Date:  7/30/20  Supporting factors:  Hard working, family support  Barriers to discharge:  Pain, weakness  Additional comments:  Pt is mostly limited by pain and weakness at this time. Pt is determined to improve and is excited to working with PT. Pt is a good candidate for rehab program and will make good progress. Pt should use [address] and most likely will require a walker at discharge.   DME:  TBD closer to discharge  After Care:  [id number], DPT [id number]

## 2020-07-30 NOTE — PROGRESS NOTES
Physical Therapy  Initial Evaluation  Evaluating Therapist: Mildred Lay DPT [id number]    NAME: Yonatan Scherer  ROOM: 2941T  DIAGNOSIS: Closed R hip fracture  PRECAUTIONS: Falls, WBAT RLE, BLE BKA, BLE prosthesis   PROCEDURE(S): 7/25 R intramedullary trochanteric nailing    Social:  Pt lives with his wife and sister in law in a single story floor plan with a ramped entry. Pt did state that he also has an entrance other than the ramp that he normally uses which is 3 steps and 1 rail. Prior to admission pt ambulated in home and short distances in community with bilateral canes and bilateral prosthesis independently. Pt states he owns a wheelchair. Initial Evaluation  7/30/20          Short Term Goals Long Term Goals    Was pt agreeable to Eval/treatment? yes Initial Evaluation Initial Evaluation     Does pt have pain? R hip pain yessenia weight bearing       Bed Mobility  Rolling: Min A  Supine to sit: Min A  Sit to supine: Min A  Scooting: SBA to EOB   Supervision Mod I   Transfers Sit to stand:  Mod A  Stand to sit: Mod A  Stand pivot: Min A with [employer]   SBA Supervision   Ambulation   10 feet with [employer] with Min A   50 feet with [employer] with SBA >100 feet with [employer] with Supervision   Walking 10 feet on uneven surface NT   10 feet with [employer] with SBA 10 feet with [employer] with Supervision   Wheel Chair Mobility 150 feet Supervision   300 feet Supervision 300 feet Mod I   Car Transfers NT   SBA Supervision   Stair negotiation: ascended and descended 1 step with 2 rails with Mod - backward descending   4 steps with 1 rail with SBA 4 steps with 1 rail with Supervision   Curb Step:   ascended and descended 2 inch step with [employer] and Mod A   4 inch step with [employer] and SBA 4 inch step with [employer] and Supervision   Picking up object off the floor NT       BLE ROM WFL       BLE Strength R hip grossly 3-/5 (limited exam due to pain)  L hip grossly 4+/5       Balance  Sitting: Supervision  Standing: Min A with [employer]       Date Family Teach Completed TBA modalities to obtain goals. Patient and family education will also be administered as needed. Frequency of treatments will be 2x/day M-F and 1x/day Sat or Sun x  2-3 weeks.     Time in: 1045  Time out: [address] [place], [place] [id number]

## 2020-07-30 NOTE — PROGRESS NOTES
Bp 101/54 hr 80 notified Dr. Bhavesh Rasmussen. Ordered to hold Toprol Xl and Imdur for one hour and recheck. If bp still low call back for instructions. bp 110/60 gave patient his medications and doctor notified.

## 2020-07-30 NOTE — PROGRESS NOTES
[facility] Physical Medicine and Rehabilitation  Comprehensive Progress Note    Subjective:      Tyrell Payton is a 80 y.o. male admitted to inpatient rehabilitation for impairments and acitivities limitations in ADLs and mobility secondary to right intertrochanteric hip fracture. .      No acute events overnight. Intermittent low BP, asymptomatic. INR 1.6 today. H&H stable. Pain controlled. No cp, sob, n/v. The patient's medical records have been reviewed. Scheduled Meds:sennosides-docusate sodium, 1 tablet, BID  sodium chloride flush, 10 mL, 2 times per day  hydroxyurea, 500 mg, Daily  isosorbide mononitrate, 30 mg, Daily  metoprolol succinate, 50 mg, Daily  warfarin, 8 mg, Daily      Continuous Infusions:  PRN Meds:oxyCODONE, 5 mg, Q6H PRN  magnesium hydroxide, 30 mL, Daily PRN  sodium chloride flush, 10 mL, PRN  ondansetron, 4 mg, Q6H PRN  acetaminophen, 650 mg, Q4H PRN  polyethylene glycol, 17 g, Daily PRN         Objective:      Vitals:    07/29/20 1610 07/30/20 0040 07/30/20 0852 07/30/20 0951   BP:  108/60 (!) 101/54 110/60   Pulse:  72 80 80   Resp:  18 18    Temp:  98 °F (36.7 °C) 97.7 °F (36.5 °C)    TempSrc:  Temporal Temporal    SpO2: 95% 95% 98%    Weight:       Height:         General appearance: alert, appears stated age and cooperative  Head: Normocephalic, without obvious abnormality, atraumatic  Eyes: conjunctivae/corneas clear. PERRL, EOM's intact. Lungs: clear to auscultation bilaterally  Heart: regular rate and rhythm  Abdomen: soft, non-tender; bowel sounds normal; no masses,  no organomegaly  Musculoskeletal: Range of motion impaired RLE, post op precautions. Bilateral BKA. Skin: No rashes. R hip incision c/d/i  Neurologic: AAOx4. Speech clear, content appropriate. Follows commands. Motor 2-3/5 residual RLE, limited by pain. Strength otherwise 4-5/5 without focal deficits.        Laboratory:    Lab Results   Component Value Date    WBC 45.3 (H) 07/30/2020    HGB 8.7 (L) 07/30/2020    HCT 27.6 (L) 07/30/2020    .0 (H) 07/30/2020     07/30/2020     Lab Results   Component Value Date     07/30/2020    K 4.3 07/30/2020     07/30/2020    CO2 23 07/30/2020    BUN 19 07/30/2020    CREATININE 0.9 07/30/2020    GLUCOSE 79 07/30/2020    GLUCOSE 94 01/24/2012    CALCIUM 8.2 07/30/2020      Lab Results   Component Value Date    ALT 17 07/24/2020    AST 23 07/24/2020    ALKPHOS 184 (H) 07/24/2020    BILITOT 1.1 07/24/2020     Lab Results   Component Value Date    COLORU Yellow 02/14/2017    NITRU Negative 02/14/2017    GLUCOSEU Negative 02/14/2017    KETUA Negative 02/14/2017    UROBILINOGEN 1.0 02/14/2017    BILIRUBINUR Negative 02/14/2017     Lab Results   Component Value Date    LABA1C 5.1 09/26/2018       Functional Status:      Physical Therapy:  Bed mobility: Min A  Transfers: Mod A  Ambulation: 10 ft [employer] Min A     Occupational Therapy:  Feeding: Setup  Grooming: Supervision  UB dressing: Min A  LB dressing: Max A      Assessment/Plan:       80 y.o. male admitted to inpatient rehabilitation for impairments and acitivities limitations in ADLs and mobility secondary to right intertrochanteric hip fracture. -Right intertrochanteric hip fracture: s/p  right intramedullary trochanteric nail on 7/25/2020. WBAT RLE. Continue Acute Rehab evaluations.  -Acute post operative pain: oxycodone or tylenol PRN. Wean narcotics as tolerated.   -Acute blood loss anemia: received 2 units pRBC on 7/27. Monitor H&H, has been stable since transfusion. -HTN: continue Imdur, Toprol XL. Monitor. BP controlled. -Paroxysmal Afib: Rate controlled. Coumadin was held for surgery and resumed 7/25. Dose held on 7/27 due to low Hgb and receiving blood transfusion.  Monitor INR daily until stable.   -History of bilateral BKA: previously ambulatory with BLE prosthesis and canes.  -History of myeloproloferative disease   -DVT prophylaxis: on Coumadin as above    -Parameters for BP medications, adjust if further significant hypotension  -INR 1.6 today, dipped after 5mg followed by held dose on 7/26-7/27--likely will begin to increase again tomorrow. Daily INR until stable.        Electronically signed by Evin Jeffries MD on 7/30/2020 at 11:53 AM

## 2020-07-30 NOTE — PROGRESS NOTES
Physical Therapy  Daily Treatment Note  Evaluating Therapist: Jessica Kaur DPT [id number]    NAME: Jimmy Yusuf  ROOM: 6976Q  DIAGNOSIS: Closed R hip fracture  PRECAUTIONS: Falls, WBAT RLE, BLE BKA, BLE prosthesis   PROCEDURE(S): 7/25 R intramedullary trochanteric nailing    Social:  Pt lives with his wife and sister in law in a single story floor plan with a ramped entry. Pt did state that he also has an entrance other than the ramp that he normally uses which is 3 steps and 1 rail. Prior to admission pt ambulated in home and short distances in community with bilateral canes and bilateral prosthesis independently. Pt states he owns a wheelchair. Initial Evaluation  7/30/20      PM    Short Term Goals Long Term Goals    Was pt agreeable to Eval/treatment? yes Initial Evaluation yes     Does pt have pain? R hip pain yessenia weight bearing  R hip pain yessneia weight bearing     Bed Mobility  Rolling: Min A  Supine to sit: Min A  Sit to supine: Min A  Scooting: SBA to EOB  NT Supervision Mod I   Transfers Sit to stand: Mod A  Stand to sit: Mod A  Stand pivot: Min A with [employer]  Sit to stand:  Mod A  Stand to sit: Mod A  Stand pivot: Min A with [employer] SBA Supervision   Ambulation   10 feet with [employer] with Min A   12 feet with [employer] with Min A 50 feet with [employer] with SBA >100 feet with [employer] with Supervision   Walking 10 feet on uneven surface NT  NT 10 feet with [employer] with SBA 10 feet with [employer] with Supervision   Wheel Chair Mobility 150 feet Supervision   feet Supervision 300 feet Mod I   Car Transfers NT  NT SBA Supervision   Stair negotiation: ascended and descended 1 step with 2 rails with Mod - backward descending  NT 4 steps with 1 rail with SBA 4 steps with 1 rail with Supervision   Curb Step:   ascended and descended 2 inch step with [employer] and Mod A  NT 4 inch step with [employer] and SBA 4 inch step with [employer] and Supervision   Picking up object off the floor NT  NT     BLE ROM [facility]  WFL     BLE Strength R hip grossly 3-/5 (limited exam due to pain)  L hip grossly 4+/5       Balance  Sitting: Supervision  Standing: Min A with [employer]  Sitting: Supervision  Standing: Min A with [employer]     Date Family Teach Completed TBA  TBA     Is additional Family Teaching Needed? Y or N Y  Y     Hindering Progress Pain, weakness  Pain, weakness     PT recommended ELOS 2-3 weeks       Team's Discharge Plan        Therapist at Team Meeting          Therapeutic Exercise:   PM:   - Functional mobility as noted above in chart  - Sit<>stand x5 reps from wheelchair to [employer]  - Supine BLE hip abduction/adduction on slideboard 3x10  - Hook-lying band resisted hip abduction x20 with orange theraband    Additional Comments: Pt at this time is mostly limited by RLE pain and weakness. Pt ambulates with a slow, antalgic gait with a slightly forward flexed posture with the use of a [employer]. Pt should continue to use the [employer] for transfers and ambulation. Incorporated various exercises into PM session to improve BLE strength, ROM, and reduce pain. Pt is determined to improve and is pleased with his progress thus far, however pt is slightly concerned about his pain. Pt educated on requesting to receive pain medication prior to PT sessions in an effort to have less pain and PT be more tolerable. Patient/family education  Pt/family educated on gait with [employer], benefits of exercises and continued PT    Patient/family response to education:   Pt/family verbalized understanding Pt/family demonstrated skill Pt/family requires further education in this area   yes yes yes     PM  Time in: 1300  Time out: 1345    Pt is making good progress toward established Physical Therapy goals. Continue with physical therapy current plan of care.     Juwan Avelar DPT  [id number]

## 2020-07-31 LAB
HCT VFR BLD CALC: 27.1 % (ref 37–54)
HEMOGLOBIN: 8.4 G/DL (ref 12.5–16.5)
INR BLD: 2.7
MCH RBC QN AUTO: 36.1 PG (ref 26–35)
MCHC RBC AUTO-ENTMCNC: 31 % (ref 32–34.5)
MCV RBC AUTO: 116.3 FL (ref 80–99.9)
PDW BLD-RTO: 23.2 FL (ref 11.5–15)
PLATELET # BLD: 166 E9/L (ref 130–450)
PMV BLD AUTO: 12.9 FL (ref 7–12)
PROTHROMBIN TIME: 30.6 SEC (ref 9.3–12.4)
RBC # BLD: 2.33 E12/L (ref 3.8–5.8)
WBC # BLD: 45.6 E9/L (ref 4.5–11.5)

## 2020-07-31 PROCEDURE — 1280000000 HC REHAB R&B

## 2020-07-31 PROCEDURE — 99232 SBSQ HOSP IP/OBS MODERATE 35: CPT | Performed by: PHYSICAL MEDICINE & REHABILITATION

## 2020-07-31 PROCEDURE — 36415 COLL VENOUS BLD VENIPUNCTURE: CPT

## 2020-07-31 PROCEDURE — 6370000000 HC RX 637 (ALT 250 FOR IP): Performed by: PHYSICAL MEDICINE & REHABILITATION

## 2020-07-31 PROCEDURE — 85610 PROTHROMBIN TIME: CPT

## 2020-07-31 PROCEDURE — 6370000000 HC RX 637 (ALT 250 FOR IP): Performed by: FAMILY MEDICINE

## 2020-07-31 PROCEDURE — 97535 SELF CARE MNGMENT TRAINING: CPT

## 2020-07-31 PROCEDURE — 85027 COMPLETE CBC AUTOMATED: CPT

## 2020-07-31 PROCEDURE — 97110 THERAPEUTIC EXERCISES: CPT

## 2020-07-31 PROCEDURE — 97530 THERAPEUTIC ACTIVITIES: CPT

## 2020-07-31 RX ORDER — WARFARIN SODIUM 2.5 MG/1
7.5 TABLET ORAL EVERY OTHER DAY
Status: DISCONTINUED | OUTPATIENT
Start: 2020-08-02 | End: 2020-08-04

## 2020-07-31 RX ORDER — WARFARIN SODIUM 2.5 MG/1
2.5 TABLET ORAL
Status: DISCONTINUED | OUTPATIENT
Start: 2020-07-31 | End: 2020-07-31

## 2020-07-31 RX ORDER — SENNA PLUS 8.6 MG/1
2 TABLET ORAL NIGHTLY
Status: DISCONTINUED | OUTPATIENT
Start: 2020-07-31 | End: 2020-08-03

## 2020-07-31 RX ORDER — WARFARIN SODIUM 5 MG/1
5 TABLET ORAL EVERY OTHER DAY
Status: DISCONTINUED | OUTPATIENT
Start: 2020-08-01 | End: 2020-08-04

## 2020-07-31 RX ORDER — DOCUSATE SODIUM 100 MG/1
100 CAPSULE, LIQUID FILLED ORAL 2 TIMES DAILY
Status: DISCONTINUED | OUTPATIENT
Start: 2020-07-31 | End: 2020-08-11 | Stop reason: HOSPADM

## 2020-07-31 RX ADMIN — DOCUSATE SODIUM 50 MG AND SENNOSIDES 8.6 MG 1 TABLET: 8.6; 5 TABLET, FILM COATED ORAL at 08:10

## 2020-07-31 RX ADMIN — ISOSORBIDE MONONITRATE 30 MG: 30 TABLET ORAL at 08:10

## 2020-07-31 RX ADMIN — OXYCODONE 5 MG: 5 TABLET ORAL at 12:43

## 2020-07-31 RX ADMIN — HYDROXYUREA 500 MG: 500 CAPSULE ORAL at 08:10

## 2020-07-31 RX ADMIN — ACETAMINOPHEN 650 MG: 325 TABLET ORAL at 08:10

## 2020-07-31 RX ADMIN — OXYCODONE 5 MG: 5 TABLET ORAL at 22:57

## 2020-07-31 RX ADMIN — METOPROLOL SUCCINATE 50 MG: 50 TABLET, EXTENDED RELEASE ORAL at 08:10

## 2020-07-31 RX ADMIN — ACETAMINOPHEN 650 MG: 325 TABLET ORAL at 21:21

## 2020-07-31 ASSESSMENT — PAIN DESCRIPTION - LOCATION
LOCATION: HIP
LOCATION: HIP
LOCATION: LEG

## 2020-07-31 ASSESSMENT — PAIN DESCRIPTION - ORIENTATION
ORIENTATION: RIGHT

## 2020-07-31 ASSESSMENT — PAIN SCALES - GENERAL
PAINLEVEL_OUTOF10: 3
PAINLEVEL_OUTOF10: 0
PAINLEVEL_OUTOF10: 6
PAINLEVEL_OUTOF10: 1
PAINLEVEL_OUTOF10: 4
PAINLEVEL_OUTOF10: 0
PAINLEVEL_OUTOF10: 4
PAINLEVEL_OUTOF10: 10

## 2020-07-31 ASSESSMENT — PAIN DESCRIPTION - ONSET: ONSET: ON-GOING

## 2020-07-31 ASSESSMENT — PAIN DESCRIPTION - FREQUENCY: FREQUENCY: INTERMITTENT

## 2020-07-31 ASSESSMENT — PAIN DESCRIPTION - PAIN TYPE
TYPE: SURGICAL PAIN
TYPE: CHRONIC PAIN
TYPE: SURGICAL PAIN

## 2020-07-31 ASSESSMENT — PAIN DESCRIPTION - DESCRIPTORS
DESCRIPTORS: ACHING
DESCRIPTORS: ACHING;SORE

## 2020-07-31 ASSESSMENT — PAIN - FUNCTIONAL ASSESSMENT: PAIN_FUNCTIONAL_ASSESSMENT: PREVENTS OR INTERFERES SOME ACTIVE ACTIVITIES AND ADLS

## 2020-07-31 ASSESSMENT — PAIN DESCRIPTION - PROGRESSION: CLINICAL_PROGRESSION: NOT CHANGED

## 2020-07-31 NOTE — PROGRESS NOTES
Physical Therapy  Daily Treatment Note  Evaluating Therapist: Deisy Powell DPT [id number]    NAME: Luis Angel Cerda  ROOM: [id number]  DIAGNOSIS: Closed R hip fracture  PRECAUTIONS: Falls, WBAT RLE, BLE BKA, BLE prosthesis   PROCEDURE(S): 7/25 R intramedullary trochanteric nailing    Social:  Pt lives with his wife and sister in law in a single story floor plan with a ramped entry. Pt did state that he also has an entrance other than the ramp that he normally uses which is 3 steps and 1 rail. Prior to admission pt ambulated in home and short distances in community with bilateral canes and bilateral prosthesis independently. Pt states he owns a wheelchair. Initial Evaluation  7/30/20 AM     PM    Short Term Goals Long Term Goals    Was pt agreeable to Eval/treatment? yes yes yes     Does pt have pain? R hip pain yessenia weight bearing Mild R hip pain yessenia weight bearing R hip pain yessenia weight bearing     Bed Mobility  Rolling: Min A  Supine to sit: Min A  Sit to supine: Min A  Scooting: SBA to EOB NT NT Supervision Mod I   Transfers Sit to stand:  Mod A  Stand to sit: Mod A  Stand pivot: Min A with [employer] Sit to stand: Min A  Stand to sit: Min A  Stand pivot: CGA with [employer] Sit to stand: Min A  Stand to sit: Min A  Stand pivot: CGA with [employer] SBA Supervision   Ambulation   10 feet with [employer] with Min A 30 feet x1 rep and 15 feet x1 rep with [employer] with CGA 30 feet with [employer] with CGA 50 feet with [employer] with SBA >100 feet with [employer] with Supervision   Walking 10 feet on uneven surface NT NT NT 10 feet with [employer] with SBA 10 feet with [employer] with Supervision   Wheel Chair Mobility 150 feet Supervision 150 feet Supervision  feet Supervision 300 feet Mod I   Car Transfers NT NT Min A SBA Supervision   Stair negotiation: ascended and descended 1 step with 2 rails with Mod - backward descending NT NT 4 steps with 1 rail with SBA 4 steps with 1 rail with Supervision   Curb Step:   ascended and descended 2 inch step with [employer] and Mod A 2 inch step with [employer] Min A NT 4 inch step with [employer] and SBA 4 inch step with [employer] and Supervision   Picking up object off the floor NT NT NT     BLE ROM [facility] WFL      BLE Strength R hip grossly 3-/5 (limited exam due to pain)  L hip grossly 4+/5       Balance  Sitting: Supervision  Standing: Min A with [employer] Sitting: Supervision  Standing: CGA with [employer]      Date Family Teach Completed TBA TBA      Is additional Family Teaching Needed? Y or N Y Y      Hindering Progress Pain, weakness Pain, weakness      PT recommended ELOS 2-3 weeks       Team's Discharge Plan        Therapist at Team Meeting          Therapeutic Exercise:   AM:   - Functional mobility as noted above in chart  - Sit<>stand x5 reps from wheelchair  - 2 inch forward box step up down in parallel bars x5 reps  - 4 inch forward box step up down in parallel bars x3 reps  PM:   - Functional mobility as noted above in chart  - Sit<>stand x5 reps from wheelchair  - Transfer to/from recliner chair     Additional Comments: Pt continues to report mild R hip pain/discomfort with weight bearing activities. Pt has made progress overall in strength and function. Ambulation distance has increased slightly. Pt continues to ambulate with a reduced speed and slight antalgic gait while demonstrating a forward flexed posture. Incorporated step ups onto varying height boxes to improve pt's strength and progress toward stair negotiation. Performed a recliner transfer in PM session as pt stated this is what he sleeps on at night. Pt will continue to benefit from skilled PT services and future PT sessions should focus on building pt's strength and function.     Patient/family education  Pt/family educated on gait with [employer], benefits of exercises and continued PT    Patient/family response to education:   Pt/family verbalized understanding Pt/family demonstrated skill Pt/family requires further education in this area   yes yes yes     AM  Time in: 1045  Time out: 1130   PM  Time in: 1300  Time out: 5    Pt is making good progress toward established Physical Therapy goals. Continue with physical therapy current plan of care.     Laxmi Guevara DPT  [id number]

## 2020-07-31 NOTE — PATIENT CARE CONFERENCE
[address] [facility] [facility] ACUTE REHABILITATION  TEAM CONFERENCE NOTE/PATIENT PLAN OF CARE    Date: 2020  Admission date: 2020  Patient Name: Suresh Looney        MRN: [id number]    : 1929  (80 y.o.)  Gender: male   Rehab diagnosis/surgery with date: right intertrochanteric hip fracture  Dr. Lisbet Altamirano completed right intramedullary nailing on 20   Impairment Group Code:  8.11      MEDICAL/FUNCTIONAL HISTORY/STATUS:  History of Bilateral BKAs and fell 2 weeks prior to admission. Found to have right intertrochanteric hip fracture. He went for IM nail 20 and is WBAT. He also has history of Myeloproliferative disorder and is on hydroxyurea causing elevated WBCs. History of Afib and on coumadin. Blood pressures borderline low and parameters placed on anti-hypertensive medications. Consultations/Labs/X-rays: WBC elevated at 45.6 (related to hydroxyrea), chemistry from  normal, INR theraputic at 2.7    MEDICATION UPDATE:  Started on colace and senna for constipation      NURSING FIMS:    Bowel:   Current level: no BM since 20    Bladder:   Current level: continent    Toilet Hygiene:   Current level :  Moderate Assist   Short term Toilet hygiene goal: Minimum assistance  Long term toilet hygiene goal:  Supervision     Skin integrity: Hip incision with staples clean and well approximated, frail skin with skin tears on buttocks  Pain: right hip, rated 5-8/10, controlled with oxycodone 5mg    NUTRITION    Diet  general  Liquid consistency   thin    SOCIAL INFORMATION:  Lives with: spouse  Prior community services:  [address] [facility],[address]:  1 story home with ramp  Prior Level of function:  Independent with bilateral prostetics, bilateral canes in community, not driving  DME:  wheeled walker    FAMILY / PATIENT EDUCATION:  Education and safety are ongoing    PHYSICAL THERAPY    Bed mobility:   Current level: Minimum assistance help with right lower extremity  Short term bed mobility goal: Supervision   Long term bed mobility goal: Modified Independent     Chair/bed transfers:  Current level: Moderate Assist to Minimum assistance   Short term Chair/bed transfers goal: Stand by Assist   Long term Chair/bed transfers goal: Supervision       Ambulation:   Current level: 10 ft with wheeled walker Minimum assistance, antelgic gait  Short term ambulation goal: 50 feet with Stand by Assist   Long term ambulation goal: 100 ft with Supervision     Wheelchair Mobility:  Current level: 150 ft with Supervision   Short term wheelchair goal: 300 feet with Supervision   Long term wheelchair goal: 300 feet with Modified Independent       Car transfers:   Current level: To be assessed     Stairs:   Current level : 1 with 2 rails, Moderate Assist   Short term stairs goal: 4 with Stand by Assist   Long term stairs goal: 4 with Supervision     Lower Extremity Strength Issues:  LLE 4+/5, right 3-/5        OCCUPATIONAL THERAPY    Tub/shower:   Current level: To be assessed     Feeding:  Current level: Set up   Short term feeding goal: Modified Independent   Long term feeding goal: Modified Independent     Grooming:   Current level: Set up   Short term grooming goal: Modified Independent   Long term grooming goal: Modified Independent     Bathing:  Current level: Moderate Assist   Short term bathing goal: Minimum assistance   Long term bathing goal: Supervision     Homemaking:   Current level: To be assessed       Upper body dressing:  Current level: Minimum assistance   Short term upper body dressing goal: Supervision   Long term upper body dressing goal: Independent     Lower body dressing:  Current level: Max Assist   Short term lower body dressing goal: Minimum assistance   Long term lower body dressing goal: Modified Independent     Toilet transfer:   Current level:  Moderate Assist   Short term toilet transfer goal: Minimum assistance   Long term toilet transfer goal: Modified Independent Other comments: pain during evaluation which limited patient    Social interaction:  Hard of hearing    Safety awareness: fair    Patient/family's personal goals: go home as soon as he can  Factors supporting goal achievement:  Strong for his age, prior independence despite amputations  Factors hindering goal achievement:  pain      Discharge Plan   Estimated Length of Stay: 2weeks            Destination: home  Services at Discharge: To be assessed   Equipment at Discharge: To be assessed       INTERDISCIPLINARY TEAM/PHYSICIAN RECOMMENDATION AND/OR REVISIONS OF PLAN OF CARE:  Improve pain, gain strength, work toward goals      I approve the established interdisciplinary plan of care as documented within the medical record of Eliana Loaiza. Electronically signed by Yuridia Marin RN on 7/31/2020 at 9:06 AM  The following interdisciplinary team members were present:  GERALDINE Cuenca, BECCAW  TAYLOR Seaman, OTR

## 2020-07-31 NOTE — PLAN OF CARE
Problem: Skin Integrity:  Goal: Will show no infection signs and symptoms  Description: Will show no infection signs and symptoms  Outcome: Met This Shift     Problem: Falls - Risk of:  Goal: Will remain free from falls  Description: Will remain free from falls  Outcome: Met This Shift     Problem: Infection - Surgical Site:  Goal: Will show no infection signs and symptoms  Description: Will show no infection signs and symptoms  Outcome: Met This Shift

## 2020-07-31 NOTE — PROGRESS NOTES
Occupational Therapy  OCCUPATIONAL THERAPY DAILY NOTE    Date:2020  Patient Name: Cesar Garces  MRN: [id number]  : 1929  Room: [address] [place], [place] [address]     Referring Practitioner: Luciano Cruz MD  Diagnosis: R hip fx- s/p cephalo medullary nailing  Additional Pertinent Hx: B BKA- B prosthesis, CKD, HLD, hx prostate ca  Restrictions: Fall Risk, WBAT RLE, B prosthesis    Functional Assessment:   Date Status AE  Comments   Feeding 2020 s/u       Grooming 2020 Supervision  wc    Bathing 2020 Moderate Assist      UB Dressing 2020 Minimal Assist      LB Dressing 2020 Maximal Assist   MIN A (prothesis)   Pt seated EOB to ray B LE prosthetic legs    Homemaking 2020  TBA       Functional Transfers / Balance:   Date Status DME  Comments   Sit Balance 2020 Contact Guard Assist       Stand Balance 2020 Minimal Assist  rw    [] Tub  [] Shower   Transfer 3030 TBA      Commode   Transfer    Toileting 2020 Moderate Assist     Mod A rw    Functional   Mobility 2020 Minimal Assist  rw    Other:  Supine>sit       EOB>w/c    2020   MIN A       MIN A    Bed rail      w/w   Pt requiring assist to move to EOB     Pt requiring assist to maintain standing balance      Functional Exercises / Activity:  Pt seated completed B UE ex's focusing on UE strength/endurance to increase independence with transfer/mobility and ADL tasks;  5# weighted box 2 x 25 reps forward/backward;   Min resistant can do bar 2 x 15 reps 3 planes;   3# dumb bell 3 x 10 reps all planes; Pt engaged in pipe build activity wearing 2# wrist weights on B UE focusing on UE endurance to increase independence with ADL tasks;       Sensory / Neuromuscular Re-Education:      Cognitive Skills:   Status Comments   Problem   Solving fair  During ADL   Memory good - \"   Sequencing fair + \"   Safety fair  \"     Visual Perception:    Education:  Pt educated with regards to dressing strategies to promote pt functional performance. Pt also educated with regards to walker safety during mobility & transfers    [] Family teach completed on:    Pain Level: Pt c/o pain during mobility & transfers    Additional Notes:       Patient has made good  progress during treatment sessions toward set goals. Therapy emphasis to obtain goals:ADL retraining, transfers training, mobility, there ex, endurance/balance retraining, homemaking & pt/family education      [x] Continue with current OT Plan of care. [] Prepare for Discharge     DISCHARGE RECOMMENDATIONS  Recommended DME:    Post Discharge Care:   []Home Independently  []Home with 24hr Care / Supervision []Home with Partial Supervision []Home with Home Health OT []Home with Out Pt OT []Other: ___   Comments:         Time in Time out Tx Time Breakdown  Variance:   First Session   0845 1000 [x] Individual Tx-30 min   [x] Concurrent Tx - 45 min  [] Co-Tx -   [] Group Tx -   [] Time Missed -     Second Session 2973 1400 [] Individual Tx-   [x] Concurrent Tx - 15 min  [] Co-Tx -   [] Group Tx -   [] Time Missed -     Third Session    [] Individual Tx-   [] Concurrent Tx -  [] Co-Tx -   [] Group Tx -   [] Time Missed -         Total Tx Time:[address]  BOYER/L [id number]  I have read the above note and agree with the documentation.   Samanta Madrigal OTR/L [id number]

## 2020-07-31 NOTE — CARE COORDINATION
Per Team:  Plan 2 wk. SHERIDANOS. Updated the pt. And his sister in law, Tamie Blair. His spouse was at an appt. LTG: Supervision/Mod I.  Pt. Will require some hands on assistance post dc. PT recommends he start to use the [employer] instead of the bi lateral canes.     Khadra Lindsey  7/31/2020

## 2020-08-01 LAB
INR BLD: 3
PROTHROMBIN TIME: 34 SEC (ref 9.3–12.4)

## 2020-08-01 PROCEDURE — 97535 SELF CARE MNGMENT TRAINING: CPT

## 2020-08-01 PROCEDURE — 99233 SBSQ HOSP IP/OBS HIGH 50: CPT | Performed by: PHYSICAL MEDICINE & REHABILITATION

## 2020-08-01 PROCEDURE — 97530 THERAPEUTIC ACTIVITIES: CPT

## 2020-08-01 PROCEDURE — 36415 COLL VENOUS BLD VENIPUNCTURE: CPT

## 2020-08-01 PROCEDURE — 6370000000 HC RX 637 (ALT 250 FOR IP): Performed by: FAMILY MEDICINE

## 2020-08-01 PROCEDURE — 6370000000 HC RX 637 (ALT 250 FOR IP): Performed by: PHYSICAL MEDICINE & REHABILITATION

## 2020-08-01 PROCEDURE — 85610 PROTHROMBIN TIME: CPT

## 2020-08-01 PROCEDURE — 97110 THERAPEUTIC EXERCISES: CPT

## 2020-08-01 PROCEDURE — 1280000000 HC REHAB R&B

## 2020-08-01 RX ORDER — GABAPENTIN 100 MG/1
100 CAPSULE ORAL 3 TIMES DAILY
Status: DISCONTINUED | OUTPATIENT
Start: 2020-08-01 | End: 2020-08-11 | Stop reason: HOSPADM

## 2020-08-01 RX ADMIN — GABAPENTIN 100 MG: 100 CAPSULE ORAL at 14:24

## 2020-08-01 RX ADMIN — GABAPENTIN 100 MG: 100 CAPSULE ORAL at 09:05

## 2020-08-01 RX ADMIN — Medication 5 ML: at 21:04

## 2020-08-01 RX ADMIN — Medication 5 ML: at 14:24

## 2020-08-01 RX ADMIN — OXYCODONE 5 MG: 5 TABLET ORAL at 21:04

## 2020-08-01 RX ADMIN — METOPROLOL SUCCINATE 50 MG: 50 TABLET, EXTENDED RELEASE ORAL at 09:04

## 2020-08-01 RX ADMIN — GABAPENTIN 100 MG: 100 CAPSULE ORAL at 21:04

## 2020-08-01 RX ADMIN — DOCUSATE SODIUM 100 MG: 100 CAPSULE, LIQUID FILLED ORAL at 21:04

## 2020-08-01 RX ADMIN — HYDROXYUREA 500 MG: 500 CAPSULE ORAL at 09:04

## 2020-08-01 RX ADMIN — STANDARDIZED SENNA CONCENTRATE 17.2 MG: 8.6 TABLET ORAL at 21:04

## 2020-08-01 ASSESSMENT — PAIN SCALES - GENERAL
PAINLEVEL_OUTOF10: 0
PAINLEVEL_OUTOF10: 0
PAINLEVEL_OUTOF10: 4

## 2020-08-01 NOTE — PROGRESS NOTES
PM&R Weekend Progress Note  Patient: Marylene Cain  Age/sex: 80 y.o. male  Medical Record #: [id number]  Date: 8/1/2020    Covering for: Dr. Jun Rmoeo: Patient seen and examined in his room this morning. No issues overnight. Admits to phantom limb pain of bilateral lower extremities. Admits to pain in his mouth. Denies fevers, chills, abdominal pain, chest pain, shortness of breath. INR 3.0 today (up from 2.7 yesterday). ROS:    Constitutional: Denies fevers, chills    Respiratory: Denies SOB or cough     Cardiovascular: Denies CP, palpitations, edema      Gastrointestinal: Denies abdominal pain, N/V, constipation, or diarrhea    Genitourinary: Denies urinary symptoms    Neurologic: See HPI.     MSK: See HPI. 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 ([facility] 12/28/2015    Blood circulation, collateral     Cancer ([facility] 2004    prostate, seed implant    Chronic kidney disease     Critical lower limb ischemia 12/29/2015    Hyperlipidemia     Pneumonia        Past Surgical History:   Procedure Laterality Date    APPENDECTOMY      ECHOCARDIOGRAM COMPLETE 2D W DOPPLER W COLOR  1/25/2012         HIP FRACTURE SURGERY Right 7/25/2020    RIGHT HIP CEPHALO-MEDULLARY NAIL performed by Gauri Ortega MD at [address] [place] [address] Bilateral     Right    TOOTH EXTRACTION         Family History   Problem Relation Age of Onset    Stroke Father     Heart Disease Father     Cancer Sister        Objective:  Vitals:    07/30/20 0951 07/30/20 1715 07/31/20 0800 07/31/20 1630   BP: 110/60 (!) 115/55 (!) 109/57    Pulse: 80 76 79 79   Resp:  16 16    Temp:  98 °F (36.7 °C) 97.7 °F (36.5 °C)    TempSrc:  Temporal Temporal    SpO2:   95% 99%   Weight:       Height:         07/31 0701 - 08/01 0700  In: 360 [P.O.:360]  Out: 400 [Urine:400]    GEN: NAD, conversational   HEENT: + nonbleeding sore in mouth.   NC/AT, PERRLA, EOMI  RESP: CTAB, no R/R/W   CV: +S1 S2, RR   ABD: soft, NT, ND, normal BS   EXT: Bilateral stable chronic lower extremity amputation, normal pROM  NEURO: Alert, no new focal deficits     Labs reviewed  CBC:   Recent Labs     07/30/20  0600 07/31/20  0539   WBC 45.3* 45.6*   HGB 8.7* 8.4*    166     BMP:    Recent Labs     07/30/20  0600      K 4.3   *   CO2 23   BUN 19   CREATININE 0.9   GLUCOSE 79     Hepatic: No results for input(s): AST, ALT, ALB, BILITOT, ALKPHOS in the last 72 hours. BNP: No results for input(s): BNP in the last 72 hours. Lipids: No results for input(s): CHOL, HDL in the last 72 hours. Invalid input(s): LDLCALCU  INR:   Recent Labs     07/30/20  0600 07/31/20  0539 08/01/20  0547   INR 1.6 2.7 3.0       A/P  This is 80 y.o. male with right hip fracture. Rehab: Continue extensive rehabilitation. Continue physical therapy, occupational therapy, and speech-language pathology. Hold Coumadin today. Add Magic mouthwash for mouth sore. Add gabapentin 100 mg 3 times daily for phantom pain, titrate up as necessary. Continue current management. Hans Suazo, DO  Physical Medicine and Rehabilitation     Please note that the above documentation was prepared using voice recognition software. Every attempt was made to ensure accuracy but there may be spelling, grammatical, and contextual errors.

## 2020-08-01 NOTE — PROGRESS NOTES
Occupational Therapy  OCCUPATIONAL THERAPY DAILY NOTE    Date:2020  Patient Name: Veto Salgado  MRN: [id number]  : 1929  Room: [address] [place], [place] [address]     Referring Practitioner: Larissa Max MD  Diagnosis: R hip fx- s/p cephalo medullary nailing  Additional Pertinent Hx: B BKA- B prosthesis, CKD, HLD, hx prostate ca  Restrictions: Fall Risk, WBAT RLE, B prosthesis    Functional Assessment:   Date Status AE  Comments   Feeding 2020 s/u       Grooming 2020 S/set up wc Pt washed face, hand and completed oral care seated at sink    Bathing 2020 Moderate Assist      UB Dressing 2020 Minimal Assist      LB Dressing 2020 Maximal Assist   MIN A (prothesis)   Pt completed donning B LE prosthetic legs while seated on EOB   Homemaking 2020  TBA       Functional Transfers / Balance:   Date Status DME  Comments   Sit Balance 2020 Contact Guard Assist    sitting balance on EOB, up in w/c and on commode    Stand Balance 2020 Minimal Assist  rw  Grab bar Min assist with standing balance to adjust BLE prosthetics along with clothing mgmt for toilet needs. [] Tub  [] Shower   Transfer 3030 TBA      Commode   Transfer    Toileting 2020 Min/Mod Assist     Mod A rw  Grab bar  Pt completed w/c<>commode transfers using grab bar for safety including toilet hygiene & clothing mgmt    Functional   Mobility 2020 Minimal Assist  rw    Other:  Supine>sit       EOB>w/c    2020   CGA/MIN A       MIN A    Bed rail      w/w   Pt needed some assist during trunk scooting to EOB     Pt requiring assist to maintain standing balance during bed >wc transfer      Functional Exercises / Activity:  BUE AROM Ex's using 2# dumbbell for shld flexion/ext, punches, bicep curls and presses and arm circles to increase strength & endurance for ADL's, transfers and mobility. Pt tolerated 3 reps of 10ea while seated up in w/c.   Pt completed supine to EOB and bed  into w/c transfers needing Min Assist. Pt engaged in grooming tasks seated at sink to improve ADL's at S/set up level. Pt completed w/c<>toilet transfers using grab bar for safety and balance at Min/mod Assist.     Sensory / Neuromuscular Re-Education:      Cognitive Skills:   Status Comments   Problem   Solving fair  During functional tasks. Memory good - \"   Sequencing fair + \"   Safety fair  \"     Visual Perception:    Education:  Pt educated with safety during bed>w/c and w/c<>commode transfers to increase awareness. [] Family teach completed on:    Pain Level: no pain reported today    Additional Notes:       Patient has made good  progress during treatment sessions toward set goals. Therapy emphasis to obtain goals:ADL retraining, transfers training, mobility, there ex, endurance/balance retraining, homemaking & pt/family education      [x] Continue with current OT Plan of care.   [] Prepare for Discharge     DISCHARGE RECOMMENDATIONS  Recommended DME:    Post Discharge Care:   []Home Independently  []Home with 24hr Care / Supervision []Home with Partial Supervision []Home with Home Health OT []Home with Out Pt OT []Other: ___   Comments:         Time in Time out Tx Time Breakdown  Variance:   First Session  10:15am 11:00am [] Individual Tx- min   [x] Concurrent Tx - 45 min  [] Co-Tx -   [] Group Tx -   [] Time Missed -     Second Session   [] Individual Tx-   [] Concurrent Tx -  min  [] Co-Tx -   [] Group Tx -   [] Time Missed -     Third Session    [] Individual Tx-   [] Concurrent Tx -  [] Co-Tx -   [] Group Tx -   [] Time Missed -         Total Tx Time:45 Min       [employer]  BOYER/L [id number]

## 2020-08-01 NOTE — PLAN OF CARE
Problem: Skin Integrity:  Goal: Will show no infection signs and symptoms  Description: Will show no infection signs and symptoms  Outcome: Met This Shift  Goal: Absence of new skin breakdown  Description: Absence of new skin breakdown  Outcome: Met This Shift     Problem: Falls - Risk of:  Goal: Will remain free from falls  Description: Will remain free from falls  Outcome: Met This Shift  Goal: Absence of physical injury  Description: Absence of physical injury  Outcome: Met This Shift     Problem: Infection - Surgical Site:  Goal: Will show no infection signs and symptoms  Description: Will show no infection signs and symptoms  Outcome: Met This Shift     Problem: Mobility - Impaired:  Goal: Mobility will improve  Description: Mobility will improve  Outcome: Met This Shift     Problem: SAFETY  Goal: Free from accidental physical injury  Outcome: Met This Shift     Problem: DAILY CARE  Goal: Daily care needs are met  Outcome: Met This Shift     Problem: SKIN INTEGRITY  Goal: Skin integrity is maintained or improved  Outcome: Met This Shift     Problem: KNOWLEDGE DEFICIT  Goal: Patient/S.O. demonstrates understanding of disease process, treatment plan, medications, and discharge instructions.   Outcome: Met This Shift     Problem: Pain:  Goal: Pain level will decrease  Description: Pain level will decrease  Outcome: Met This Shift  Goal: Control of acute pain  Description: Control of acute pain  Outcome: Met This Shift  Goal: Control of chronic pain  Description: Control of chronic pain  Outcome: Met This Shift

## 2020-08-02 LAB
INR BLD: 1.8
PROTHROMBIN TIME: 20.1 SEC (ref 9.3–12.4)

## 2020-08-02 PROCEDURE — 99232 SBSQ HOSP IP/OBS MODERATE 35: CPT | Performed by: PHYSICAL MEDICINE & REHABILITATION

## 2020-08-02 PROCEDURE — 97530 THERAPEUTIC ACTIVITIES: CPT

## 2020-08-02 PROCEDURE — 85610 PROTHROMBIN TIME: CPT

## 2020-08-02 PROCEDURE — 6370000000 HC RX 637 (ALT 250 FOR IP): Performed by: PHYSICAL MEDICINE & REHABILITATION

## 2020-08-02 PROCEDURE — 97535 SELF CARE MNGMENT TRAINING: CPT

## 2020-08-02 PROCEDURE — 1280000000 HC REHAB R&B

## 2020-08-02 PROCEDURE — 36415 COLL VENOUS BLD VENIPUNCTURE: CPT

## 2020-08-02 PROCEDURE — 6370000000 HC RX 637 (ALT 250 FOR IP): Performed by: FAMILY MEDICINE

## 2020-08-02 RX ADMIN — DOCUSATE SODIUM 100 MG: 100 CAPSULE, LIQUID FILLED ORAL at 08:31

## 2020-08-02 RX ADMIN — GABAPENTIN 100 MG: 100 CAPSULE ORAL at 14:14

## 2020-08-02 RX ADMIN — GABAPENTIN 100 MG: 100 CAPSULE ORAL at 08:31

## 2020-08-02 RX ADMIN — DOCUSATE SODIUM 100 MG: 100 CAPSULE, LIQUID FILLED ORAL at 21:37

## 2020-08-02 RX ADMIN — STANDARDIZED SENNA CONCENTRATE 17.2 MG: 8.6 TABLET ORAL at 21:37

## 2020-08-02 RX ADMIN — GABAPENTIN 100 MG: 100 CAPSULE ORAL at 21:37

## 2020-08-02 RX ADMIN — HYDROXYUREA 500 MG: 500 CAPSULE ORAL at 08:31

## 2020-08-02 RX ADMIN — WARFARIN SODIUM 7.5 MG: 7.5 TABLET ORAL at 18:00

## 2020-08-02 RX ADMIN — METOPROLOL SUCCINATE 50 MG: 50 TABLET, EXTENDED RELEASE ORAL at 08:31

## 2020-08-02 ASSESSMENT — PAIN SCALES - GENERAL
PAINLEVEL_OUTOF10: 0
PAINLEVEL_OUTOF10: 0

## 2020-08-02 NOTE — PROGRESS NOTES
software. Every attempt was made to ensure accuracy but there may be spelling, grammatical, and contextual errors.

## 2020-08-02 NOTE — PROGRESS NOTES
Occupational Therapy  OCCUPATIONAL THERAPY DAILY NOTE    Date:2020  Patient Name: Eliana Loaiza  MRN: [id number]  : 1929  Room: [address] [place] [address]A     Referring Practitioner: Joanne Verduzco MD  Diagnosis: R hip fx- s/p cephalo medullary nailing  Additional Pertinent Hx: B BKA- B prosthesis, CKD, HLD, hx prostate ca  Restrictions: Fall Risk, WBAT RLE, B prosthesis    Functional Assessment:   Date Status AE  Comments   Feeding 2020 s/u       Grooming 2020 S/set up wc Washing face/hands and completing oral care. Bathing 2020 Moderate Assist   Pt deferred bathing this date stating he is \"fine\" and he \"just wants to rest, I'm tired\". UB Dressing 2020 S/Setup  To don pull over shirt bed level with HOB elevated. LB Dressing 2020 Maximal Assist   MIN A (prothesis)      Homemaking   TBA       Functional Transfers / Balance:   Date Status DME  Comments   Sit Balance 2020 CGA  Long sitting in bed during ADL tasks. Stand Balance 2020 Minimal Assist  rw  Grab bar    [] Tub  [] Shower   Transfer 3030 TBA      Commode   Transfer    Toileting 2020 Min/Mod Assist     Mod A rw  Grab bar     Functional   Mobility 2020 Minimal Assist  rw    Other:  Supine>sit       EOB>w/c    2020   CGA/MIN A       MIN A    Bed rail      w/w      Functional Exercises / Activity:       Sensory / Neuromuscular Re-Education:      Cognitive Skills:   Status Comments   Problem   Solving fair  During functional tasks. Memory good - \"   Sequencing fair + \"   Safety fair  \"     Visual Perception:    Education:  Pt educated on role of OT and benefits of participation. [] Family teach completed on:    Pain Level: No c/o pain this date. Additional Notes:  20:Pt deferred OT ADL this AM initially. Therapist returned ~45 minutes later and pt agreeable to completing UB dressing and grooming only bed level with encouragement d/t pt c/o fatigue.        Patient has made good  progress during treatment sessions toward set goals. Therapy emphasis to obtain goals:ADL retraining, transfers training, mobility, there ex, endurance/balance retraining, homemaking & pt/family education      [x] Continue with current OT Plan of care.   [] Prepare for Discharge     DISCHARGE RECOMMENDATIONS  Recommended DME:    Post Discharge Care:   []Home Independently  []Home with 24hr Care / Supervision []Home with Partial Supervision []Home with Home Health OT []Home with Out Pt OT []Other: ___   Comments:         Time in Time out Tx Time Breakdown  Variance:   First Session  [id number] [x] Individual Tx- 15 Mins   [] Concurrent Tx -   [] Co-Tx -   [] Group Tx -   [] Time Missed -     Second Session   [] Individual Tx-   [] Concurrent Tx -  [] Co-Tx -   [] Group Tx -   [] Time Missed -     Third Session    [] Individual Tx-   [] Concurrent Tx -  [] Co-Tx -   [] Group Tx -   [] Time Missed -         Total Tx Time: 15 Mins       Retia Isaruo  BOYER/L [id number]

## 2020-08-02 NOTE — PROGRESS NOTES
Physical Therapy  Treatment Note  Evaluating Therapist: Juwan Avelar DPT [id number]    NAME: Whit Guaman  ROOM: 4693G  DIAGNOSIS: Closed R hip fracture  PRECAUTIONS: Falls, WBAT RLE, BLE BKA, BLE prosthesis   PROCEDURE(S): 7/25 R intramedullary trochanteric nailing    Social:  Pt lives with his wife and sister in law in a single story floor plan with a ramped entry. Pt did state that he also has an entrance other than the ramp that he normally uses which is 3 steps and 1 rail. Prior to admission pt ambulated in home and short distances in community with bilateral canes and bilateral prosthesis independently. Pt states he owns a wheelchair. Initial Evaluation  7/30/20 AM     PM    Short Term Goals Long Term Goals    Was pt agreeable to Eval/treatment? yes Yes      Does pt have pain? R hip pain yessenia weight bearing Mild R hip pain      Bed Mobility  Rolling: Min A  Supine to sit: Min A  Sit to supine: Min A  Scooting: SBA to EOB Rolling: SBA  Supine to sit: SBA  Sit to supine: NT  Scooting: SBA  (hospital bed)  Supervision Mod I   Transfers Sit to stand:  Mod A  Stand to sit: Mod A  Stand pivot: Min A with [employer] Sit to stand: Min A  Stand to sit: Min A  Stand pivot: CGA with [employer]  SBA Supervision   Ambulation   10 feet with [employer] with Min A 35 feet with [employer] with Min A  50 feet with [employer] with SBA >100 feet with [employer] with Supervision   Walking 10 feet on uneven surface NT NT  10 feet with [employer] with SBA 10 feet with [employer] with Supervision   Wheel Chair Mobility 150 feet Supervision NT  300 feet Supervision 300 feet Mod I   Car Transfers NT Min A  SBA Supervision   Stair negotiation: ascended and descended 1 step with 2 rails with Mod - backward descending 1 step x2 reps with 2 rails with Min A (descended backward)  4 steps with 1 rail with SBA 4 steps with 1 rail with Supervision   Curb Step:   ascended and descended 2 inch step with [employer] and Mod A NT  4 inch step with [employer] and SBA 4 inch step with [employer] and Supervision   Picking up object off the floor NT NT      BLE ROM [facility] WFL      BLE Strength R hip grossly 3-/5 (limited exam due to pain)  L hip grossly 4+/5       Balance  Sitting: Supervision  Standing: Min A with [facility] Sitting: Supervision  Standing: CGA with [facility]      Date Family Teach Completed TBA TBA      Is additional Family Teaching Needed? Y or N Y Y      Hindering Progress Pain, weakness Pain, weakness      PT recommended ELOS 2-3 weeks       Team's Discharge Plan        Therapist at Team Meeting          Therapeutic Exercise:   AM:   Emphasis on function    Additional Comments: The pt was able to sit up at the EOB and manage his B prosthetics with no assistance. The pt was then able to stand but required Mod A to perform a stand pivot transfer with no [facility]. When repeating the transfer in the rehab gym with the [facility] only Min A was required. The pt ambulates with an antalgic gait pattern and had difficulty clearing the LLE at times, good sequencing and proper use of the [facility] noted throughout the session. Patient/family education  Pt/family educated on car transfer technique, sequencing with stair negotiation. Patient/family response to education:   Pt/family verbalized understanding Pt/family demonstrated skill Pt/family requires further education in this area   Yes Yes Reinforce as needed     AM  Time in: 0900  Time out: 0930    Pt is making good progress toward established Physical Therapy goals. Continue with physical therapy current plan of care. Yessy Ortiz.  [place], [place]  License Number: [id number]

## 2020-08-03 LAB
ANION GAP SERPL CALCULATED.3IONS-SCNC: 13 MMOL/L (ref 7–16)
ANISOCYTOSIS: ABNORMAL
BASOPHILS ABSOLUTE: 0 E9/L (ref 0–0.2)
BASOPHILS RELATIVE PERCENT: 0 % (ref 0–2)
BUN BLDV-MCNC: 24 MG/DL (ref 8–23)
CALCIUM SERPL-MCNC: 8.6 MG/DL (ref 8.6–10.2)
CHLORIDE BLD-SCNC: 102 MMOL/L (ref 98–107)
CO2: 25 MMOL/L (ref 22–29)
CREAT SERPL-MCNC: 0.9 MG/DL (ref 0.7–1.2)
EOSINOPHILS ABSOLUTE: 0.39 E9/L (ref 0.05–0.5)
EOSINOPHILS RELATIVE PERCENT: 1 % (ref 0–6)
GFR AFRICAN AMERICAN: >60
GFR NON-AFRICAN AMERICAN: >60 ML/MIN/1.73
GLUCOSE BLD-MCNC: 84 MG/DL (ref 74–99)
HCT VFR BLD CALC: 31.9 % (ref 37–54)
HEMOGLOBIN: 9.7 G/DL (ref 12.5–16.5)
INR BLD: 1.3
LYMPHOCYTES ABSOLUTE: 2.72 E9/L (ref 1.5–4)
LYMPHOCYTES RELATIVE PERCENT: 7 % (ref 20–42)
MCH RBC QN AUTO: 36.2 PG (ref 26–35)
MCHC RBC AUTO-ENTMCNC: 30.4 % (ref 32–34.5)
MCV RBC AUTO: 119 FL (ref 80–99.9)
MONOCYTES ABSOLUTE: 1.56 E9/L (ref 0.1–0.95)
MONOCYTES RELATIVE PERCENT: 4 % (ref 2–12)
NEUTROPHILS ABSOLUTE: 34.23 E9/L (ref 1.8–7.3)
NEUTROPHILS RELATIVE PERCENT: 88 % (ref 43–80)
OVALOCYTES: ABNORMAL
PDW BLD-RTO: 23 FL (ref 11.5–15)
PLATELET # BLD: 186 E9/L (ref 130–450)
PMV BLD AUTO: 12.7 FL (ref 7–12)
POIKILOCYTES: ABNORMAL
POLYCHROMASIA: ABNORMAL
POTASSIUM REFLEX MAGNESIUM: 3.6 MMOL/L (ref 3.5–5)
PROTHROMBIN TIME: 14.3 SEC (ref 9.3–12.4)
RBC # BLD: 2.68 E12/L (ref 3.8–5.8)
SODIUM BLD-SCNC: 140 MMOL/L (ref 132–146)
WBC # BLD: 38.9 E9/L (ref 4.5–11.5)

## 2020-08-03 PROCEDURE — 97110 THERAPEUTIC EXERCISES: CPT

## 2020-08-03 PROCEDURE — 80048 BASIC METABOLIC PNL TOTAL CA: CPT

## 2020-08-03 PROCEDURE — 6370000000 HC RX 637 (ALT 250 FOR IP): Performed by: PHYSICAL MEDICINE & REHABILITATION

## 2020-08-03 PROCEDURE — 97530 THERAPEUTIC ACTIVITIES: CPT

## 2020-08-03 PROCEDURE — 1280000000 HC REHAB R&B

## 2020-08-03 PROCEDURE — 97535 SELF CARE MNGMENT TRAINING: CPT

## 2020-08-03 PROCEDURE — 85025 COMPLETE CBC W/AUTO DIFF WBC: CPT

## 2020-08-03 PROCEDURE — 6370000000 HC RX 637 (ALT 250 FOR IP): Performed by: FAMILY MEDICINE

## 2020-08-03 PROCEDURE — 85610 PROTHROMBIN TIME: CPT

## 2020-08-03 PROCEDURE — 36415 COLL VENOUS BLD VENIPUNCTURE: CPT

## 2020-08-03 PROCEDURE — 99232 SBSQ HOSP IP/OBS MODERATE 35: CPT | Performed by: PHYSICAL MEDICINE & REHABILITATION

## 2020-08-03 RX ADMIN — METOPROLOL SUCCINATE 50 MG: 50 TABLET, EXTENDED RELEASE ORAL at 08:13

## 2020-08-03 RX ADMIN — GABAPENTIN 100 MG: 100 CAPSULE ORAL at 13:41

## 2020-08-03 RX ADMIN — DOCUSATE SODIUM 100 MG: 100 CAPSULE, LIQUID FILLED ORAL at 08:13

## 2020-08-03 RX ADMIN — HYDROXYUREA 500 MG: 500 CAPSULE ORAL at 08:13

## 2020-08-03 RX ADMIN — GABAPENTIN 100 MG: 100 CAPSULE ORAL at 20:49

## 2020-08-03 RX ADMIN — GABAPENTIN 100 MG: 100 CAPSULE ORAL at 08:12

## 2020-08-03 RX ADMIN — WARFARIN SODIUM 5 MG: 5 TABLET ORAL at 17:25

## 2020-08-03 RX ADMIN — OXYCODONE 5 MG: 5 TABLET ORAL at 12:22

## 2020-08-03 ASSESSMENT — PAIN SCALES - GENERAL
PAINLEVEL_OUTOF10: 0
PAINLEVEL_OUTOF10: 8
PAINLEVEL_OUTOF10: 0
PAINLEVEL_OUTOF10: 4
PAINLEVEL_OUTOF10: 0

## 2020-08-03 ASSESSMENT — PAIN DESCRIPTION - DESCRIPTORS: DESCRIPTORS: SORE

## 2020-08-03 ASSESSMENT — PAIN DESCRIPTION - LOCATION: LOCATION: LEG

## 2020-08-03 ASSESSMENT — PAIN DESCRIPTION - ORIENTATION: ORIENTATION: RIGHT

## 2020-08-03 ASSESSMENT — PAIN DESCRIPTION - PAIN TYPE: TYPE: ACUTE PAIN

## 2020-08-03 NOTE — PROGRESS NOTES
Occupational Therapy  OCCUPATIONAL THERAPY DAILY NOTE    Date:8/3/2020  Patient Name: Renaldo Mcmillan  MRN: [id number]  : 1929  Room: [address] [place], [place] [address]     Referring Practitioner: Alethea Araiza MD  Diagnosis: R hip fx- s/p cephalo medullary nailing  Additional Pertinent Hx: B BKA- B prosthesis, CKD, HLD, hx prostate ca  Restrictions: Fall Risk, WBAT RLE, B prosthesis    Functional Assessment:   Date Status AE  Comments   Feeding 8/3/2020 s/u       Grooming 8/3/2020 Sup  seated Washing face/hands and completing oral care. Bathing 2020 Moderate Assist   Pt deferred bathing this date stating he is \"fine\" and he \"just wants to rest, I'm tired\". UB Dressing 8/3/2020 Sup  To don pull over shirt bed level with HOB elevated. LB Dressing 8/3/2020 Maximal Assist   MIN A (prothesis)      Homemaking   TBA       Functional Transfers / Balance:   Date Status DME  Comments   Sit Balance 8/3/2020 SBA  Long sitting in bed during ADL tasks. Stand Balance 8/3/2020 CGA rw    [] Tub  [] Shower   Transfer 3030 TBA      Commode   Transfer    Toileting 8/3/2020 Min A     Mod A rw  Grab bar  Min vc's for pants management &safety   Functional   Mobility 8/3/2020 Minimal Assist  rw    Other:  Supine>sit       EOB>w/c    8/3/2020      8/1/20   SBA       MIN A    Bed rail      W/w   Increased time to sit EOB     Functional Exercises / Activity:   Pt presents supine in bed & does not have B prosthesis on. Pt demo SBA supine>sit with increased time & SBA to scoot to the EOB to don prosthesis. Pt demo Min A to don B prosthesis. Pt demo CGA sit<>stand. Pt ambulated short distance MIn A & min vc's for safety when turning to sit & for proper hand placment. Pt demo Min A commode trf with min vc's for safety & pants management. Pt enjoyed paying card game. Sensory / Neuromuscular Re-Education:      Cognitive Skills:   Status Comments   Problem   Solving fair  During functional tasks.     Memory good - \"   Sequencing fair + \" Safety fair  \"     Visual Perception:    Education:  Pt educated on safety with LB dressing & donning B prosthesis     [] Family teach completed on:    Pain Level: No c/o pain this date. Additional Notes:  8/2/20:Pt deferred OT ADL this AM initially. Therapist returned ~45 minutes later and pt agreeable to completing UB dressing and grooming only bed level with encouragement d/t pt c/o fatigue. Patient has made good  progress during treatment sessions toward set goals. Therapy emphasis to obtain goals:ADL retraining, transfers training, mobility, there ex, endurance/balance retraining, homemaking & pt/family education      [x] Continue with current OT Plan of care.   [] Prepare for Discharge     DISCHARGE RECOMMENDATIONS  Recommended DME:  Wc, rw, 3:1 & tub bench  Post Discharge Care:   []Home Independently  []Home with 24hr Care / Supervision []Home with Partial Supervision []Home with Home Health OT []Home with Out Pt OT []Other: ___   Comments:         Time in Time out Tx Time Breakdown  Variance:   First Session  915 1000 [] Individual Tx-  Mins   [x] Concurrent Tx - 45   [] Co-Tx -   [] Group Tx -   [] Time Missed -     Second Session 8820 [id number] [] Individual Tx-   [] Concurrent Tx -  [] Co-Tx -   [] Group Tx -   [x] Time Missed - 45         Pt c/o diarrhea & refuse to get OOB due to fear of diarrhea   Third Session    [] Individual Tx-   [] Concurrent Tx -  [] Co-Tx -   [] Group Tx -   [] Time Missed -         Total Tx Time: 45 Mins       Elise Romero  OTR/L [id number]

## 2020-08-03 NOTE — PROGRESS NOTES
08/03/20 1226   Attendance   Activity Games   Participation Active participation   Therapeutic Recreation   Community Reintegration Demonstrates ability to complete social goals   Social Skills Cooperates with others in group activity   Leisure Education Demonstrates knowledge of benefits of leisure involvement  (Cindy Arellano stated\"It takes my mind off problems and it was nice\")   Time Spent With Patient   Minutes 25

## 2020-08-03 NOTE — PROGRESS NOTES
Physical Therapy  Daily Treatment Note  Evaluating Therapist: Sudheer Malagon DPT [id number]    NAME: Davon Sensor  ROOM: 8664  DIAGNOSIS: Closed R hip fracture  PRECAUTIONS: Falls, WBAT RLE, BLE BKA, BLE prosthesis   PROCEDURE(S): 7/25 R intramedullary trochanteric nailing    Social:  Pt lives with his wife and sister in law in a single story floor plan with a ramped entry. Pt did state that he also has an entrance other than the ramp that he normally uses which is 3 steps and 1 rail. Prior to admission pt ambulated in home and short distances in community with bilateral canes and bilateral prosthesis independently. Pt states he owns a wheelchair. Initial Evaluation  7/30/20 AM     PM    Short Term Goals Long Term Goals    Was pt agreeable to Eval/treatment? yes yes yes     Does pt have pain? R hip pain yessenia weight bearing Mild R hip pain yessenia weight bearing R hip pain yessenia weight bearing     Bed Mobility  Rolling: Min A  Supine to sit: Min A  Sit to supine: Min A  Scooting: SBA to EOB NT NT Supervision Mod I   Transfers Sit to stand:  Mod A  Stand to sit: Mod A  Stand pivot: Min A with [employer] Sit to stand: CGA  Stand to sit: CGA  Stand pivot: SBA/CGA with [employer] Sit to stand: CGA  Stand to sit: CGA  Stand pivot: CGA with [employer] SBA Supervision   Ambulation   10 feet with [employer] with Min A 60 feet with [employer] SBA/CGA NT 50 feet with [employer] with SBA >100 feet with [employer] with Supervision   Walking 10 feet on uneven surface NT NT NT 10 feet with [employer] with SBA 10 feet with [employer] with Supervision   Wheel Chair Mobility 150 feet Supervision 150 feet Supervision  feet Supervision 300 feet Mod I   Car Transfers NT NT NT SBA Supervision   Stair negotiation: ascended and descended 1 step with 2 rails with Mod - backward descending 3 steps with 2 rails Min A - backward descending NT 4 steps with 1 rail with SBA 4 steps with 1 rail with Supervision   Curb Step:   ascended and descended 2 inch step with [employer] and Mod A 2 inch step with [facility] CGA NT 4 inch step with [facility] and SBA 4 inch step with [facility] and Supervision   Picking up object off the floor NT NT NT     BLE ROM [facility] WFL      BLE Strength R hip grossly 3-/5 (limited exam due to pain)  L hip grossly 4+/5       Balance  Sitting: Supervision  Standing: Min A with [facility] Sitting: Supervision  Standing: CGA with [facility]      Date Family Teach Completed TBA TBA      Is additional Family Teaching Needed? Y or N Y Y      Hindering Progress Pain, weakness Pain, weakness      PT recommended ELOS 2-3 weeks       Team's Discharge Plan        Therapist at Team Meeting          Therapeutic Exercise:   AM:   - Functional mobility as noted above in chart  - Sit<>stand x4 reps from wheelchair  - 1 step x2 reps with 2 rails Min A - backward descending  - Standing R hip abduction, hip extension 2x10 each  PM:   - Transfer to commode     Additional Comments: Pt continues to report R hip/knee pain with functional mobility activities specifically weight bearing. Pt is making steady progress in all aspects of functional mobility as his strength and overall function improves. Pt ambulates with a reduced speed and slight antalgic gait pattern. In PM session pt was assisted onto commode to perform a BM. Pt was on commode for the majority of the session. Once pt was done performing BM pt requested to return back to bed stating that he does not feel good and is afraid to have a bowel accident if he does PT. Pt was left with RN assisting pt into bed. Pt will continue to benefit from skilled therapy services to progress toward goals and improve safety with functional mobility.     Patient/family education  Pt/family educated on gait with [facility], benefits of exercises and continued PT, stair negotiation    Patient/family response to education:   Pt/family verbalized understanding Pt/family demonstrated skill Pt/family requires further education in this area   yes yes yes     AM  Time in: 1045  Time out: 1130  PM - missed time, 30 minutes  Time in: 1300  Time out: 1315    Pt is making good progress toward established Physical Therapy goals. Continue with physical therapy current plan of care.     Zofia Ortiz DPT  [id number]

## 2020-08-03 NOTE — PROGRESS NOTES
Motor 3-4-/5 residual RLE. Strength otherwise 4-5/5 without focal deficits. Laboratory:    Lab Results   Component Value Date    WBC 38.9 (H) 08/03/2020    HGB 9.7 (L) 08/03/2020    HCT 31.9 (L) 08/03/2020    .0 (H) 08/03/2020     08/03/2020     Lab Results   Component Value Date     08/03/2020    K 3.6 08/03/2020     08/03/2020    CO2 25 08/03/2020    BUN 24 08/03/2020    CREATININE 0.9 08/03/2020    GLUCOSE 84 08/03/2020    GLUCOSE 94 01/24/2012    CALCIUM 8.6 08/03/2020      Lab Results   Component Value Date    ALT 17 07/24/2020    AST 23 07/24/2020    ALKPHOS 184 (H) 07/24/2020    BILITOT 1.1 07/24/2020     Lab Results   Component Value Date    COLORU Yellow 02/14/2017    NITRU Negative 02/14/2017    GLUCOSEU Negative 02/14/2017    KETUA Negative 02/14/2017    UROBILINOGEN 1.0 02/14/2017    BILIRUBINUR Negative 02/14/2017     Lab Results   Component Value Date    LABA1C 5.1 09/26/2018       Functional Status:      Physical Therapy:  Bed mobility: Min A  Transfers: Min-Mod A  Ambulation: 12 ft [employer] Min A     Occupational Therapy:  Feeding: Setup  Grooming: Supervision  UB dressing: Min A  LB dressing: Max A      Assessment/Plan:       80 y.o. male admitted to inpatient rehabilitation for impairments and acitivities limitations in ADLs and mobility secondary to right intertrochanteric hip fracture. -Right intertrochanteric hip fracture: s/p  right intramedullary trochanteric nail on 7/25/2020. WBAT RLE. Continue Acute Rehab program.  -Acute post operative pain: oxycodone or tylenol PRN. Wean narcotics as tolerated.   -Acute blood loss anemia: received 2 units pRBC on 7/27. Monitor H&H, has been stable since transfusion. -HTN: continue Imdur, Toprol XL. Monitor. BP controlled. -Paroxysmal Afib: Rate controlled. Coumadin resumed post op--hold tonight's dose due to rapid increase in INR--plan to resume home dose of 5mg/7.5mg alternating, pending INR trend.  Monitor INR daily until stable.   -History of bilateral BKA: previously ambulatory with BLE prosthesis and canes.  -History of myeloproloferative disease   -Constipation: colace, senna, PRNs  -DVT prophylaxis: on Coumadin as above    Continue Coumadin 7.5mg/5mg alternating, had been held over weekend  Daily INR until stable   Add Recreation therapy    Electronically signed by Rashid Munson MD on 8/3/2020 at 2:52 PM

## 2020-08-04 LAB
INR BLD: 1.4
PROTHROMBIN TIME: 16.3 SEC (ref 9.3–12.4)

## 2020-08-04 PROCEDURE — 6370000000 HC RX 637 (ALT 250 FOR IP): Performed by: PHYSICAL MEDICINE & REHABILITATION

## 2020-08-04 PROCEDURE — 97530 THERAPEUTIC ACTIVITIES: CPT

## 2020-08-04 PROCEDURE — 97535 SELF CARE MNGMENT TRAINING: CPT

## 2020-08-04 PROCEDURE — 1280000000 HC REHAB R&B

## 2020-08-04 PROCEDURE — 97110 THERAPEUTIC EXERCISES: CPT

## 2020-08-04 PROCEDURE — 85610 PROTHROMBIN TIME: CPT

## 2020-08-04 PROCEDURE — 6370000000 HC RX 637 (ALT 250 FOR IP): Performed by: FAMILY MEDICINE

## 2020-08-04 PROCEDURE — 36415 COLL VENOUS BLD VENIPUNCTURE: CPT

## 2020-08-04 RX ORDER — WARFARIN SODIUM 5 MG/1
7.5 TABLET ORAL DAILY
Status: DISCONTINUED | OUTPATIENT
Start: 2020-08-04 | End: 2020-08-07

## 2020-08-04 RX ADMIN — METOPROLOL SUCCINATE 50 MG: 50 TABLET, EXTENDED RELEASE ORAL at 08:37

## 2020-08-04 RX ADMIN — WARFARIN SODIUM 7.5 MG: 5 TABLET ORAL at 18:38

## 2020-08-04 RX ADMIN — GABAPENTIN 100 MG: 100 CAPSULE ORAL at 08:20

## 2020-08-04 RX ADMIN — GABAPENTIN 100 MG: 100 CAPSULE ORAL at 22:43

## 2020-08-04 RX ADMIN — DOCUSATE SODIUM 100 MG: 100 CAPSULE, LIQUID FILLED ORAL at 22:43

## 2020-08-04 RX ADMIN — DOCUSATE SODIUM 100 MG: 100 CAPSULE, LIQUID FILLED ORAL at 08:20

## 2020-08-04 RX ADMIN — GABAPENTIN 100 MG: 100 CAPSULE ORAL at 14:37

## 2020-08-04 RX ADMIN — HYDROXYUREA 500 MG: 500 CAPSULE ORAL at 08:20

## 2020-08-04 ASSESSMENT — PAIN SCALES - GENERAL
PAINLEVEL_OUTOF10: 0

## 2020-08-04 NOTE — PLAN OF CARE
Problem: Falls - Risk of:  Goal: Will remain free from falls  Description: Will remain free from falls  Outcome: Met This Shift  Goal: Absence of physical injury  Description: Absence of physical injury  Outcome: Met This Shift     Problem: Pain:  Goal: Pain level will decrease  Description: Pain level will decrease  Outcome: Met This Shift  Goal: Control of acute pain  Description: Control of acute pain  Outcome: Met This Shift  Goal: Control of chronic pain  Description: Control of chronic pain  Outcome: Met This Shift     Problem: Skin Integrity:  Goal: Will show no infection signs and symptoms  Description: Will show no infection signs and symptoms  Outcome: Ongoing  Goal: Absence of new skin breakdown  Description: Absence of new skin breakdown  Outcome: Ongoing     Problem: Infection - Surgical Site:  Goal: Will show no infection signs and symptoms  Description: Will show no infection signs and symptoms  Outcome: Ongoing     Problem: Mobility - Impaired:  Goal: Mobility will improve  Description: Mobility will improve  Outcome: Ongoing     Problem: SAFETY  Goal: Free from accidental physical injury  Outcome: Ongoing     Problem: DAILY CARE  Goal: Daily care needs are met  Outcome: Ongoing     Problem: SKIN INTEGRITY  Goal: Skin integrity is maintained or improved  Outcome: Ongoing     Problem: KNOWLEDGE DEFICIT  Goal: Patient/S.O. demonstrates understanding of disease process, treatment plan, medications, and discharge instructions.   Outcome: Ongoing     Problem: DISCHARGE BARRIERS  Goal: Patient's continuum of care needs are met  Outcome: Ongoing

## 2020-08-04 NOTE — PROGRESS NOTES
Physical Therapy  Daily Treatment Note  Evaluating Therapist: Mildred Lay DPT [id number]    NAME: Yonatan Scherer  ROOM: 0906  DIAGNOSIS: Closed R hip fracture  PRECAUTIONS: Falls, WBAT RLE, BLE BKA, BLE prosthesis   PROCEDURE(S): 7/25 R intramedullary trochanteric nailing    Social:  Pt lives with his wife and sister in law in a single story floor plan with a ramped entry. Pt did state that he also has an entrance other than the ramp that he normally uses which is 3 steps and 1 rail. Prior to admission pt ambulated in home and short distances in community with bilateral canes and bilateral prosthesis independently. Pt states he owns a wheelchair. Initial Evaluation  7/30/20 AM     PM    Short Term Goals Long Term Goals    Was pt agreeable to Eval/treatment? yes yes yes     Does pt have pain? R hip pain yessenia weight bearing Mild R hip pain yessenia weight bearing R hip pain yessenia weight bearing     Bed Mobility  Rolling: Min A  Supine to sit: Min A  Sit to supine: Min A  Scooting: SBA to EOB NT Mod I all aspects Supervision Mod I   Transfers Sit to stand:  Mod A  Stand to sit: Mod A  Stand pivot: Min A with [facility] Sit to stand: SBA  Stand to sit: SBA  Stand pivot: SBA with [facility] Sit to stand: SBA  Stand to sit: SBA  Stand pivot: SBA with [facility] SBA Supervision   Ambulation   10 feet with [facility] with Min A 75 feet with [facility] SBA 75 feet with [facility] SBA 50 feet with [facility] with SBA >100 feet with [facility] with Supervision   Walking 10 feet on uneven surface NT NT 10 feet with [facility] SBA 10 feet with [facility] with SBA 10 feet with [facility] with Supervision   Wheel Chair Mobility 150 feet Supervision 150 feet Supervision  feet Supervision 300 feet Mod I   Car Transfers NT NT NT SBA Supervision   Stair negotiation: ascended and descended 1 step with 2 rails with Mod - backward descending 4 steps with 2 rails CGA NT 4 steps with 1 rail with SBA 4 steps with 1 rail with Supervision   Curb Step:   ascended and descended 2 inch step with [facility] and Mod A 2 inch step with [employer] CGA 4 inch step with [employer] CGA 4 inch step with [employer] and SBA 4 inch step with [employer] and Supervision   Picking up object off the floor NT NT NT     BLE ROM WFL WFL      BLE Strength R hip grossly 3-/5 (limited exam due to pain)  L hip grossly 4+/5       Balance  Sitting: Supervision  Standing: Min A with [employer] Sitting: Supervision  Standing: SBA with [employer]      Date Family Teach Completed TBA TBA      Is additional Family Teaching Needed? Y or N Y Y      Hindering Progress Pain, weakness Pain, weakness      PT recommended ELOS 2-3 weeks       Team's Discharge Plan        Therapist at Team Meeting          Therapeutic Exercise:   AM:   - Functional mobility as noted above in chart  - Sit<>stand x5 reps from wheelchair to [employer]  - 4 steps with 2 rails with CGA - backward descending  - Forward cane step overs in parallel bars, x6 reps  PM:   - Functional mobility as noted above in chart  - Sit<>stand x5 reps from wheelchair to [employer]  - 25 feet x2 reps with [employer] SBA  - 10 feet x2 reps with [employer] over top of blue mat with SBA    Additional Comments: Pt continues to have some mild R hip/knee pain with weight bearing activities. Pt overall is making good progress in functional mobility. Pt does not require as much verbal cueing for safety with functional mobility as previous sessions. Pt ambulates with a slightly forward flexed posture. Pt has absent heel strike on the RLE during ambulation, possibly due to pain but may have some hardware issue with R prosthetic. Incorporating various exercises/activities to improve pt's BLE strength and general function to assure a safe transition back home.     Patient/family education  Pt/family educated on gait with [facility], benefits of exercises and continued PT, stair negotiation, completing stand pivot before sitting down    Patient/family response to education:   Pt/family verbalized understanding Pt/family demonstrated skill Pt/family requires further education in this area   yes yes yes     AM  Time in: 1045  Time out: 1130  PM   Time in: 1300  Time out: [facility]    Pt is making good progress toward established Physical Therapy goals. Continue with physical therapy current plan of care.     VAN AnayaT  [id number]

## 2020-08-04 NOTE — PROGRESS NOTES
BUE AROM ex's using 3# dumbbell for shld punches, tricep and bicep curls along with arm circles tolerating 2-3 reps of 10 ea seated up in w/c. Pt demo CGA sit<>stand. Pt stood @ high low table while engaged in a functional activity for 3 minutes. Pt c/o RLE pain. Pt tolerated BUE strengthening exercises using a 1# wrist weights while engaged in a functional activity    Sensory / Neuromuscular Re-Education:      Cognitive Skills:   Status Comments   Problem   Solving fair  During functional tasks. Memory good - \"   Sequencing fair + \"   Safety fair  \"     Visual Perception:    Education:  Pt educated on safety with LB dressing & donning B prosthesis     [] Family teach completed on:    Pain Level: No c/o pain this date. Additional Notes:  8/2/20:Pt deferred OT ADL this AM initially. Therapist returned ~45 minutes later and pt agreeable to completing UB dressing and grooming only bed level with encouragement d/t pt c/o fatigue. Patient has made good  progress during treatment sessions toward set goals. Therapy emphasis to obtain goals:ADL retraining, transfers training, mobility, there ex, endurance/balance retraining, homemaking & pt/family education      [x] Continue with current OT Plan of care.   [] Prepare for Discharge     DISCHARGE RECOMMENDATIONS  Recommended DME:  Wc, rw, 3:1 & tub bench  Post Discharge Care:   []Home Independently  []Home with 24hr Care / Supervision []Home with Partial Supervision []Home with Home Health OT []Home with Out Pt OT []Other: ___   Comments:         Time in Time out Tx Time Breakdown  Variance:   First Session  915am 1000am [x] Individual Tx- 45 Mins   [] Concurrent Tx -    [] Co-Tx -   [] Group Tx -   [] Time Missed -     Second Session [id number] [x] Individual Tx- 45  [] Concurrent Tx -  [] Co-Tx -   [] Group Tx -   [] Time Missed -             Third Session    [] Individual Tx-   [] Concurrent Tx -  [] Co-Tx -   [] Group Tx -   [] Time Missed -         Total Tx Time: 90 Mins       Charles Marin  BOYER/L [address] OTR/L [id number]    I have read & agree with the above status.     Elise Romero OTR/L [id number]

## 2020-08-05 PROBLEM — E44.0 MODERATE PROTEIN-CALORIE MALNUTRITION (HCC): Status: ACTIVE | Noted: 2020-08-05

## 2020-08-05 LAB
INR BLD: 1.5
PROTHROMBIN TIME: 16.5 SEC (ref 9.3–12.4)

## 2020-08-05 PROCEDURE — 99232 SBSQ HOSP IP/OBS MODERATE 35: CPT | Performed by: PHYSICAL MEDICINE & REHABILITATION

## 2020-08-05 PROCEDURE — 6370000000 HC RX 637 (ALT 250 FOR IP): Performed by: PHYSICAL MEDICINE & REHABILITATION

## 2020-08-05 PROCEDURE — 85610 PROTHROMBIN TIME: CPT

## 2020-08-05 PROCEDURE — 97535 SELF CARE MNGMENT TRAINING: CPT | Performed by: OCCUPATIONAL THERAPY ASSISTANT

## 2020-08-05 PROCEDURE — 1280000000 HC REHAB R&B

## 2020-08-05 PROCEDURE — 97110 THERAPEUTIC EXERCISES: CPT

## 2020-08-05 PROCEDURE — 97530 THERAPEUTIC ACTIVITIES: CPT

## 2020-08-05 PROCEDURE — 97110 THERAPEUTIC EXERCISES: CPT | Performed by: OCCUPATIONAL THERAPY ASSISTANT

## 2020-08-05 PROCEDURE — 36415 COLL VENOUS BLD VENIPUNCTURE: CPT

## 2020-08-05 PROCEDURE — 6370000000 HC RX 637 (ALT 250 FOR IP): Performed by: FAMILY MEDICINE

## 2020-08-05 PROCEDURE — 97530 THERAPEUTIC ACTIVITIES: CPT | Performed by: OCCUPATIONAL THERAPY ASSISTANT

## 2020-08-05 RX ORDER — OXYCODONE HYDROCHLORIDE 5 MG/1
5 TABLET ORAL 2 TIMES DAILY PRN
Status: DISCONTINUED | OUTPATIENT
Start: 2020-08-05 | End: 2020-08-11 | Stop reason: HOSPADM

## 2020-08-05 RX ORDER — CHOLECALCIFEROL (VITAMIN D3) 125 MCG
5 CAPSULE ORAL NIGHTLY PRN
Status: DISCONTINUED | OUTPATIENT
Start: 2020-08-05 | End: 2020-08-11 | Stop reason: HOSPADM

## 2020-08-05 RX ADMIN — HYDROXYUREA 500 MG: 500 CAPSULE ORAL at 08:22

## 2020-08-05 RX ADMIN — GABAPENTIN 100 MG: 100 CAPSULE ORAL at 13:47

## 2020-08-05 RX ADMIN — GABAPENTIN 100 MG: 100 CAPSULE ORAL at 21:12

## 2020-08-05 RX ADMIN — DOCUSATE SODIUM 100 MG: 100 CAPSULE, LIQUID FILLED ORAL at 08:21

## 2020-08-05 RX ADMIN — METOPROLOL SUCCINATE 50 MG: 50 TABLET, EXTENDED RELEASE ORAL at 08:22

## 2020-08-05 RX ADMIN — DOCUSATE SODIUM 100 MG: 100 CAPSULE, LIQUID FILLED ORAL at 21:12

## 2020-08-05 RX ADMIN — WARFARIN SODIUM 7.5 MG: 5 TABLET ORAL at 17:22

## 2020-08-05 RX ADMIN — OXYCODONE 5 MG: 5 TABLET ORAL at 11:02

## 2020-08-05 RX ADMIN — GABAPENTIN 100 MG: 100 CAPSULE ORAL at 08:24

## 2020-08-05 RX ADMIN — Medication 5 MG: at 21:12

## 2020-08-05 ASSESSMENT — PAIN SCALES - GENERAL
PAINLEVEL_OUTOF10: 6
PAINLEVEL_OUTOF10: 0
PAINLEVEL_OUTOF10: 8

## 2020-08-05 ASSESSMENT — PAIN DESCRIPTION - PAIN TYPE: TYPE: ACUTE PAIN

## 2020-08-05 ASSESSMENT — PAIN DESCRIPTION - LOCATION: LOCATION: HIP

## 2020-08-05 ASSESSMENT — PAIN DESCRIPTION - ORIENTATION: ORIENTATION: RIGHT

## 2020-08-05 NOTE — PROGRESS NOTES
Physical Therapy  Daily Treatment Note  Evaluating Therapist: Zofia Ortiz DPT [id number]    NAME: Jade Harris  ROOM: [address]  DIAGNOSIS: Closed R hip fracture  PRECAUTIONS: Falls, WBAT RLE, BLE BKA, BLE prosthesis   PROCEDURE(S): 7/25 R intramedullary trochanteric nailing    Social:  Pt lives with his wife and sister in law in a single story floor plan with a ramped entry. Pt did state that he also has an entrance other than the ramp that he normally uses which is 3 steps and 1 rail. Prior to admission pt ambulated in home and short distances in community with bilateral canes and bilateral prosthesis independently. Pt states he owns a wheelchair. Initial Evaluation  7/30/20 AM     PM    Short Term Goals Long Term Goals    Was pt agreeable to Eval/treatment? yes yes NT     Does pt have pain? R hip pain yessenia weight bearing R hip/ knee pain and nursing notified/arrived to give medication       Bed Mobility  Rolling: Min A  Supine to sit: Min A  Sit to supine: Min A  Scooting: SBA to EOB NT  Supervision Mod I   Transfers Sit to stand:  Mod A  Stand to sit: Mod A  Stand pivot: Min A with [employer] Sit to stand: SBA  Stand to sit: SBA  Stand pivot: SBA with [employer]  SBA Supervision   Ambulation   10 feet with [employer] with Min A 75 feet with [employer] CG/SBA  50 feet with [employer] with SBA >100 feet with [employer] with Supervision   Walking 10 feet on uneven surface NT NT  10 feet with [employer] with SBA 10 feet with [employer] with Supervision   Wheel Chair Mobility 150 feet Supervision   300 feet Supervision 300 feet Mod I   Car Transfers NT Min A  SBA Supervision   Stair negotiation: ascended and descended 1 step with 2 rails with Mod - backward descending 4 steps with bilateral rails with min/cga   4 steps with 1 rail with SBA 4 steps with 1 rail with Supervision   Curb Step:   ascended and descended 2 inch step with [employer] and Mod A NT  4 inch step with [employer] and SBA 4 inch step with [employer] and Supervision   Picking up object off the floor NT NT      BLE ROM [facility] BLE Strength R hip grossly 3-/5 (limited exam due to pain)  L hip grossly 4+/5       Balance  Sitting: Supervision  Standing: Min A with [employer]       Date Family Teach Completed TBA TBA      Is additional Family Teaching Needed? Y or N Y Y      Hindering Progress Pain, weakness Pain, weakness      PT recommended ELOS 2-3 weeks       Team's Discharge Plan        Therapist at Team Meeting          Additional Comments: Pt complaining of increased R hip and knee pain. Pt overall is making good progress in functional mobility. Slight increased assistance with mobility due to pain of R LE. Pt ambulates with a slightly forward flexed posture. Patient/family education  Pt/family educated hand placement with transfers especially with car transfer     Patient/family response to education:   Pt/family verbalized understanding Pt/family demonstrated skill Pt/family requires further education in this area   x x x     AM  Time in: 1045  Time out: 1130    Pt is making good progress toward established Physical Therapy goals. Continue with physical therapy current plan of care.     Aimee Bangura [id number]

## 2020-08-05 NOTE — PROGRESS NOTES
Occupational Therapy  OCCUPATIONAL THERAPY DAILY NOTE    Date:2020  Patient Name: Santiago Borrego  MRN: [id number]  : 1929  Room: [address] [place], [place] [address]     Referring Practitioner: Haley Palmer MD  Diagnosis: R hip fx- s/p cephalo medullary nailing  Additional Pertinent Hx: B BKA- B prosthesis, CKD, HLD, hx prostate ca  Restrictions: Fall Risk, WBAT RLE, B prosthesis    Functional Assessment:   Date Status AE  Comments   Feeding 20 Mod I   Pt. Fed himself breakfast.    Grooming 20 S seated Pt. Groomed seated at sink. Bathing 20 CGA  Pt. Sponge bathed seated and standing at sink. UB Dressing 20 S  Pt. Donned pullover shirt. LB Dressing 20 CGA   Pt. Donned/doffed shorts requiring CGA for balance. Pt. Is required to ray shorts before donning prosthetics to decreasing bending at hip. Pt. Donned both prosthetics seated at edge ob bed. Homemaking 20 SBA W/c level       Functional Transfers / Balance:   Date Status DME  Comments   Sit Balance 2020 SBA     Stand Balance 2020 CGA rw During ADL's and transfers. [] Tub  [] Shower   Transfer 3030 TBA      Commode   Transfer    Toileting 2020 CGA      S rw  Grab bar  SPT   Functional   Mobility 8/3/2020 Minimal Assist  rw    Other:  Supine>sit       EOB>w/c    2020   SBA       CGA/MIN A    Bed rail      W/w      Functional Exercises / Activity:  Ergometer ex's to increase AROM and strength of BUE's. Int- min end- 6 mins x2. Yellow brian bar to increase forearm and  strength of BUE's. 20 reps x4 planes. Power hand gripper 35# to increase B hand strength. 35 reps each. Func standing ax to increase bal/end while performing FM leisure ax to increase [address]. Bal-F- end- 2-3 mins at a time. Sit to stand- CGA  Sensory / Neuromuscular Re-Education:      Cognitive Skills:   Status Comments   Problem   Solving fair  During functional tasks.     Memory good - \"   Sequencing fair + \"   Safety fair \"     Visual Perception:    Education:  Pt educated on safety with LB dressing and transfers. [] Family teach completed on:    Pain Level: No c/o pain this date. Additional Notes:  8/2/20:Pt deferred OT ADL this AM initially. Therapist returned ~45 minutes later and pt agreeable to completing UB dressing and grooming only bed level with encouragement d/t pt c/o fatigue. Patient has made good  progress during treatment sessions toward set goals. Therapy emphasis to obtain goals:ADL retraining, transfers training, mobility, there ex, endurance/balance retraining, homemaking & pt/family education      [x] Continue with current OT Plan of care. [] Prepare for Discharge     DISCHARGE RECOMMENDATIONS  Recommended DME:  Wc, rw, 3:1 & tub bench  Post Discharge Care:   []Home Independently  []Home with 24hr Care / Supervision []Home with Partial Supervision []Home with Home Health OT []Home with Out Pt OT []Other: ___   Comments:         Time in Time out Tx Time Breakdown  Variance:   First Session  0815 0945 [x] Individual Tx- 90 Mins   [] Concurrent Tx -    [] Co-Tx -   [] Group Tx -   [] Time Missed -     Second Session   [] Individual Tx-   [] Concurrent Tx -  [] Co-Tx -   [] Group Tx -   [] Time Missed -             Third Session    [] Individual Tx-   [] Concurrent Tx -  [] Co-Tx -   [] Group Tx -   [] Time Missed -         Total Tx Time: 90 Mins       Chito Weber Rising  BOYER/L [id number]    I have read & agree with the above status.     Minoo Madera OTR/L [id number]

## 2020-08-05 NOTE — PROGRESS NOTES
08/05/20 1249   Attendance   Activity Games   Participation Active participation   [place] Demonstrates ability to complete social goals   Social Skills Interacts independently in social activity   Leisure Education Demonstrates knowledge of benefits of leisure involvement  (Margaret Owens identified relaxing as a benefit.)   Time Spent With Patient   Minutes 40

## 2020-08-05 NOTE — PROGRESS NOTES
Physical Therapy  Daily Treatment Note  Evaluating Therapist: Jessica Turner DPT [id number]    NAME: Lynda Milner  ROOM: 1933B  DIAGNOSIS: Closed R hip fracture  PRECAUTIONS: Falls, WBAT RLE, BLE BKA, BLE prosthesis   PROCEDURE(S): 7/25 R intramedullary trochanteric nailing    Social:  Pt lives with his wife and sister in law in a single story floor plan with a ramped entry. Pt did state that he also has an entrance other than the ramp that he normally uses which is 3 steps and 1 rail. Prior to admission pt ambulated in home and short distances in community with bilateral canes and bilateral prosthesis independently. Pt states he owns a wheelchair. Initial Evaluation  7/30/20 AM     PM    Short Term Goals Long Term Goals    Was pt agreeable to Eval/treatment? yes yes yes     Does pt have pain? R hip pain yessenia weight bearing R hip/ knee pain and nursing notified/arrived to give medication  R stump pain     Bed Mobility  Rolling: Min A  Supine to sit: Min A  Sit to supine: Min A  Scooting: SBA to EOB NT NT Supervision Mod I   Transfers Sit to stand:  Mod A  Stand to sit: Mod A  Stand pivot: Min A with [employer] Sit to stand: SBA  Stand to sit: SBA  Stand pivot: SBA with [employer] Sit to stand: SBA  Stand to sit: SBA  Stand pivot: SBA with [employer] SBA Supervision   Ambulation   10 feet with [employer] with Min A 75 feet with [employer] CG/SBA 10 feet with [employer] SBA - limited by pain 50 feet with [employer] with SBA >100 feet with WW with Supervision   Walking 10 feet on uneven surface NT NT NT 10 feet with [employer] with SBA 10 feet with [employer] with Supervision   Wheel Chair Mobility 150 feet Supervision   feet Supervision 300 feet Mod I   Car Transfers NT Min A NT SBA Supervision   Stair negotiation: ascended and descended 1 step with 2 rails with Mod - backward descending 4 steps with bilateral rails with min/cga  NT 4 steps with 1 rail with SBA 4 steps with 1 rail with Supervision   Curb Step:   ascended and descended 2 inch step with [employer] and Mod A NT NT 4 inch step with [employer] and SBA 4 inch step with [employer] and Supervision   Picking up object off the floor NT NT NT     BLE ROM [facility] WFL      BLE Strength R hip grossly 3-/5 (limited exam due to pain)  L hip grossly 4+/5 R hip grossly 3-/5  L hip grossly 4+/5      Balance  Sitting: Supervision  Standing: Min A with [employer] Sitting: Supervision  Standing: SBA with [employer]      Date Family Teach Completed TBA TBA      Is additional Family Teaching Needed? Y or N Y Y      Hindering Progress Pain, weakness Pain, weakness      PT recommended ELOS 2-3 weeks       Team's Discharge Plan        Therapist at Team Meeting          Therapeutic exercises  PM:  - Functional mobility as noted above in chart  - Sit<>stand x5 reps from wheelchair to [employer]  - Standing hip abduction, extension x15/side each  - 20 feet ambulation in parallel bars with SBA - post adjustment of R prosthesis to assess fit  - 4 inch box forward step up downs x4 reps in parallel bars    Additional Comments:   AM: Pt complaining of increased R hip and knee pain. Pt overall is making good progress in functional mobility. Slight increased assistance with mobility due to pain of R LE. Pt ambulates with a slightly forward flexed posture. PM: Pt continued to report R stump pain especially with weight bearing. Pt's ambulation distance was limited by pain today. Prosthetist adjusted pt's fit on RLE. Pt did report a mild decrease in pain s/p readjustment. Will continue to work on improving pt's BLE strength and function to progress toward goals.     Patient/family education  Pt/family educated hand placement with transfers especially with car transfer, safety with functional mobility    Patient/family response to education:   Pt/family verbalized understanding Pt/family demonstrated skill Pt/family requires further education in this area   yes yes yes     AM  Time in: 1045  Time out: 1130  PM:   Time in: 1300  Time out: 1345    Pt is making good progress toward established Physical Therapy goals. Continue with physical therapy current plan of care.     Debbie Dove [id number] (AM)  Raul Rain DPT [id number] (PM)

## 2020-08-05 NOTE — PROGRESS NOTES
[facility]  Comprehensive Progress Note    Subjective:      Farhana Fields is a 80 y.o. male admitted to inpatient rehabilitation for impairments and acitivities limitations in ADLs and mobility secondary to right intertrochanteric hip fracture. .      No acute events overnight. Pain adequately controlled. No cp, sob, n/v. Tolerating therapy, progressing. INR remains subtherapeutic. The patient's medical records have been reviewed. Scheduled Meds:warfarin, 7.5 mg, Daily  gabapentin, 100 mg, TID  docusate sodium, 100 mg, BID  sodium chloride flush, 10 mL, 2 times per day  hydroxyurea, 500 mg, Daily  isosorbide mononitrate, 30 mg, Daily  metoprolol succinate, 50 mg, Daily      Continuous Infusions:  PRN Meds:magic (miracle) mouthwash, 5 mL, 4x Daily PRN  oxyCODONE, 5 mg, Q6H PRN  magnesium hydroxide, 30 mL, Daily PRN  sodium chloride flush, 10 mL, PRN  ondansetron, 4 mg, Q6H PRN  acetaminophen, 650 mg, Q4H PRN  polyethylene glycol, 17 g, Daily PRN         Objective:      Vitals:    08/04/20 0728 08/04/20 0830 08/04/20 1800 08/05/20 0822   BP: (!) 89/49 (!) 99/52 (!) 94/40 (!) 100/54   Pulse: 82 80 80 80   Resp: 18  18 18   Temp: 98.3 °F (36.8 °C)  98.8 °F (37.1 °C) 98.3 °F (36.8 °C)   TempSrc: Temporal  Temporal Temporal   SpO2: 98%   96%   Weight:       Height:         General appearance: alert, appears stated age and cooperative. Resting in bed. Head: Normocephalic, without obvious abnormality, atraumatic  Eyes: conjunctivae/corneas clear. PERRL, EOM's intact. Lungs: clear to auscultation bilaterally  Heart: regular rate and rhythm  Abdomen: soft, non-tender; bowel sounds normal; no masses,  no organomegaly  Musculoskeletal: Range of motion impaired RLE, post op precautions. Bilateral BKA. Skin: No rashes. R hip incision c/d/i  Neurologic: AAOx4. Speech clear, content appropriate. Follows commands. Motor 4-/5 residual RLE.  Strength otherwise 4-5/5 without focal

## 2020-08-05 NOTE — PROGRESS NOTES
Comprehensive Nutrition Assessment    Type and Reason for Visit:  Initial, RD Nutrition Re-Screen/LOS    Nutrition Recommendations/Plan: Continue current diet order and ONS. Will continue to monitor. Nutrition Assessment:  Pt admit s/p hip fx w/ repair. Pt at increased nutritional risk r/t blood cancer. ONS currently in place pt tolerating well. Will continue to monitor. Malnutrition Assessment:  Malnutrition Status:  Mild malnutrition    Context:  Acute Illness     Findings of the 6 clinical characteristics of malnutrition:  Energy Intake:  1 - 75% or less of estimated energy requirements for 7 or more days  Weight Loss:  No significant weight loss     Body Fat Loss:  1 - Mild body fat loss Orbital   Muscle Mass Loss:  1 - Mild muscle mass loss Temples (temporalis)  Fluid Accumulation:  No significant fluid accumulation     Strength:       Estimated Daily Nutrient Needs:  Energy (kcal):  4276-0567; Weight Used for Energy Requirements:  Current     Protein (g):  ; Weight Used for Protein Requirements:  Current(1.2-1.4 g/kg adj IBW)        Fluid (ml/day):  7724-2081;  Weight Used for Fluid Requirements:  Current(1 ml/kcal)      Nutrition Related Findings:  AMS, +I/Os, no edema noted, abd WNL, +bs      Wounds:  Open Wounds(buttocks)       Current Nutrition Therapies:    DIET GENERAL;  Dietary Nutrition Supplements: Standard High Calorie Oral Supplement    Anthropometric Measures:  · Height: 5' 9\" (175.3 cm)  · Current Body Weight: 149 lb (67.6 kg)(7/29 actual wt)   · Admission Body Weight: 149 lb (67.6 kg)(7/29 no method)    · Usual Body Weight: 135 lb (61.2 kg)(Per EMR, 3/8/2017 [facility])     · Ideal Body Weight: 160 lbs; % Ideal Body Weight 93.1 %   · BMI: 22  · Adjusted Body Weight: 166.6; Amputation   · Adjusted BMI: 24.6    · BMI Categories: Normal Weight (BMI 22.0 to 24.9) age over 72       Nutrition Diagnosis:   · In context of acute, non-illness related, Mild malnutrition related to increase demand for energy/nutrients as evidenced by mild loss of subcutaneous fat, mild muscle loss, intake 26-50%      Nutrition Interventions:   Food and/or Nutrient Delivery:  Continue Current Diet, Continue Oral Nutrition Supplement  Nutrition Education/Counseling:  Education initiated(Discussed importance of optimal intake with pt.)   Coordination of Nutrition Care:       Goals:  Intake >75% meals/ONS.        Nutrition Monitoring and Evaluation:   Behavioral-Environmental Outcomes:      Food/Nutrient Intake Outcomes:  Food and Nutrient Intake, Supplement Intake  Physical Signs/Symptoms Outcomes:  Biochemical Data, Nutrition Focused Physical Findings, Skin, Weight     Discharge Planning:    Continue current diet, Continue Oral Nutrition Supplement     Electronically signed by Paula Smith RD, LD on 8/5/20 at 2:26 PM EDT    Contact:

## 2020-08-06 LAB
ANION GAP SERPL CALCULATED.3IONS-SCNC: 14 MMOL/L (ref 7–16)
BUN BLDV-MCNC: 25 MG/DL (ref 8–23)
CALCIUM SERPL-MCNC: 8.6 MG/DL (ref 8.6–10.2)
CHLORIDE BLD-SCNC: 102 MMOL/L (ref 98–107)
CO2: 25 MMOL/L (ref 22–29)
CREAT SERPL-MCNC: 1 MG/DL (ref 0.7–1.2)
GFR AFRICAN AMERICAN: >60
GFR NON-AFRICAN AMERICAN: >60 ML/MIN/1.73
GLUCOSE BLD-MCNC: 65 MG/DL (ref 74–99)
HCT VFR BLD CALC: 28.7 % (ref 37–54)
HEMOGLOBIN: 8.8 G/DL (ref 12.5–16.5)
INR BLD: 1.4
MCH RBC QN AUTO: 36.7 PG (ref 26–35)
MCHC RBC AUTO-ENTMCNC: 30.7 % (ref 32–34.5)
MCV RBC AUTO: 119.6 FL (ref 80–99.9)
PDW BLD-RTO: 22.6 FL (ref 11.5–15)
PLATELET # BLD: 168 E9/L (ref 130–450)
PMV BLD AUTO: 12.9 FL (ref 7–12)
POTASSIUM REFLEX MAGNESIUM: 3.9 MMOL/L (ref 3.5–5)
PROTHROMBIN TIME: 15.9 SEC (ref 9.3–12.4)
RBC # BLD: 2.4 E12/L (ref 3.8–5.8)
SODIUM BLD-SCNC: 141 MMOL/L (ref 132–146)
WBC # BLD: 32.2 E9/L (ref 4.5–11.5)

## 2020-08-06 PROCEDURE — 97110 THERAPEUTIC EXERCISES: CPT

## 2020-08-06 PROCEDURE — 99232 SBSQ HOSP IP/OBS MODERATE 35: CPT | Performed by: PHYSICAL MEDICINE & REHABILITATION

## 2020-08-06 PROCEDURE — 85610 PROTHROMBIN TIME: CPT

## 2020-08-06 PROCEDURE — 6370000000 HC RX 637 (ALT 250 FOR IP): Performed by: FAMILY MEDICINE

## 2020-08-06 PROCEDURE — 1280000000 HC REHAB R&B

## 2020-08-06 PROCEDURE — 97530 THERAPEUTIC ACTIVITIES: CPT

## 2020-08-06 PROCEDURE — 97535 SELF CARE MNGMENT TRAINING: CPT

## 2020-08-06 PROCEDURE — 6370000000 HC RX 637 (ALT 250 FOR IP): Performed by: PHYSICAL MEDICINE & REHABILITATION

## 2020-08-06 PROCEDURE — 80048 BASIC METABOLIC PNL TOTAL CA: CPT

## 2020-08-06 PROCEDURE — 85027 COMPLETE CBC AUTOMATED: CPT

## 2020-08-06 PROCEDURE — 36415 COLL VENOUS BLD VENIPUNCTURE: CPT

## 2020-08-06 RX ADMIN — DOCUSATE SODIUM 100 MG: 100 CAPSULE, LIQUID FILLED ORAL at 21:58

## 2020-08-06 RX ADMIN — WARFARIN SODIUM 7.5 MG: 5 TABLET ORAL at 18:07

## 2020-08-06 RX ADMIN — HYDROXYUREA 500 MG: 500 CAPSULE ORAL at 08:40

## 2020-08-06 RX ADMIN — GABAPENTIN 100 MG: 100 CAPSULE ORAL at 08:40

## 2020-08-06 RX ADMIN — OXYCODONE 5 MG: 5 TABLET ORAL at 08:40

## 2020-08-06 RX ADMIN — GABAPENTIN 100 MG: 100 CAPSULE ORAL at 13:57

## 2020-08-06 RX ADMIN — METOPROLOL SUCCINATE 50 MG: 50 TABLET, EXTENDED RELEASE ORAL at 08:40

## 2020-08-06 RX ADMIN — GABAPENTIN 100 MG: 100 CAPSULE ORAL at 21:58

## 2020-08-06 RX ADMIN — DOCUSATE SODIUM 100 MG: 100 CAPSULE, LIQUID FILLED ORAL at 08:40

## 2020-08-06 RX ADMIN — Medication 5 MG: at 22:00

## 2020-08-06 ASSESSMENT — PAIN DESCRIPTION - DESCRIPTORS: DESCRIPTORS: BURNING

## 2020-08-06 ASSESSMENT — PAIN SCALES - GENERAL
PAINLEVEL_OUTOF10: 7
PAINLEVEL_OUTOF10: 0

## 2020-08-06 ASSESSMENT — PAIN DESCRIPTION - LOCATION: LOCATION: LEG

## 2020-08-06 ASSESSMENT — PAIN DESCRIPTION - ORIENTATION: ORIENTATION: RIGHT

## 2020-08-06 ASSESSMENT — PAIN DESCRIPTION - PAIN TYPE: TYPE: ACUTE PAIN

## 2020-08-06 NOTE — PLAN OF CARE
Problem: Falls - Risk of:  Goal: Will remain free from falls  Description: Will remain free from falls  Outcome: Met This Shift  Goal: Absence of physical injury  Description: Absence of physical injury  Outcome: Met This Shift     Problem: Mobility - Impaired:  Goal: Mobility will improve  Description: Mobility will improve  Outcome: Met This Shift     Problem: SAFETY  Goal: Free from accidental physical injury  Outcome: Met This Shift     Problem: Pain:  Goal: Pain level will decrease  Description: Pain level will decrease  Outcome: Met This Shift  Goal: Control of acute pain  Description: Control of acute pain  Outcome: Met This Shift  Goal: Control of chronic pain  Description: Control of chronic pain  Outcome: Met This Shift

## 2020-08-06 NOTE — PROGRESS NOTES
[facility]  Comprehensive Progress Note    Subjective:      Mary Nolan is a 80 y.o. male admitted to inpatient rehabilitation for impairments and acitivities limitations in ADLs and mobility secondary to right intertrochanteric hip fracture. .      No acute events overnight. Pain adequately controlled. No cp, sob, n/v. Tolerating therapy, progressing. INR remains subtherapeutic. Patient reports he slept better with melatonin. The patient's medical records have been reviewed. Scheduled Meds:warfarin, 7.5 mg, Daily  gabapentin, 100 mg, TID  docusate sodium, 100 mg, BID  hydroxyurea, 500 mg, Daily  isosorbide mononitrate, 30 mg, Daily  metoprolol succinate, 50 mg, Daily      Continuous Infusions:  PRN Meds:oxyCODONE, 5 mg, BID PRN  melatonin, 5 mg, Nightly PRN  magic (miracle) mouthwash, 5 mL, 4x Daily PRN  magnesium hydroxide, 30 mL, Daily PRN  ondansetron, 4 mg, Q6H PRN  acetaminophen, 650 mg, Q4H PRN  polyethylene glycol, 17 g, Daily PRN         Objective:      Vitals:    08/05/20 0822 08/05/20 1343 08/06/20 0015 08/06/20 0840   BP: (!) 100/54  106/64 92/61   Pulse: 80  77 76   Resp: 18  16 20   Temp: 98.3 °F (36.8 °C)  98.5 °F (36.9 °C) 98.5 °F (36.9 °C)   TempSrc: Temporal  Temporal Oral   SpO2: 96%  94% 97%   Weight:       Height:  5' 9\" (1.753 m)       General appearance: alert, appears stated age and cooperative. Resting in bed. Head: Normocephalic, without obvious abnormality, atraumatic  Eyes: conjunctivae/corneas clear. PERRL, EOM's intact. Lungs: clear to auscultation bilaterally  Heart: regular rate and rhythm  Abdomen: soft, non-tender; bowel sounds normal; no masses,  no organomegaly  Musculoskeletal: Range of motion impaired RLE, post op precautions. Bilateral BKA. Neurologic: AAOx4. Speech clear, content appropriate. Follows commands. Motor 4-/5 residual RLE. Strength otherwise 4-5/5 without focal deficits.      Exam stable    Laboratory:    Lab Results   Component Value Date    WBC 32.2 (H) 08/06/2020    HGB 8.8 (L) 08/06/2020    HCT 28.7 (L) 08/06/2020    .6 (H) 08/06/2020     08/06/2020     Lab Results   Component Value Date     08/06/2020    K 3.9 08/06/2020     08/06/2020    CO2 25 08/06/2020    BUN 25 08/06/2020    CREATININE 1.0 08/06/2020    GLUCOSE 65 08/06/2020    GLUCOSE 94 01/24/2012    CALCIUM 8.6 08/06/2020      Lab Results   Component Value Date    ALT 17 07/24/2020    AST 23 07/24/2020    ALKPHOS 184 (H) 07/24/2020    BILITOT 1.1 07/24/2020     Lab Results   Component Value Date    COLORU Yellow 02/14/2017    NITRU Negative 02/14/2017    GLUCOSEU Negative 02/14/2017    KETUA Negative 02/14/2017    UROBILINOGEN 1.0 02/14/2017    BILIRUBINUR Negative 02/14/2017     Lab Results   Component Value Date    LABA1C 5.1 09/26/2018       Functional Status:      Physical Therapy:  Bed mobility: Min A  Transfers: SBA  Ambulation: 75 ft [facility] [place] CGA/SBA     Occupational Therapy:  Feeding: Mod I  Grooming: Supervision  UB dressing: Supervision  LB dressing: CGA      Assessment/Plan:       80 y.o. male admitted to inpatient rehabilitation for impairments and acitivities limitations in ADLs and mobility secondary to right intertrochanteric hip fracture. -Right intertrochanteric hip fracture: s/p  right intramedullary trochanteric nail on 7/25/2020. WBAT RLE. Continue Acute Rehab program.  -Acute post operative pain: oxycodone or tylenol PRN. Wean narcotics as tolerated.   -Acute blood loss anemia: received 2 units pRBC on 7/27. Monitor H&H, has been stable. -HTN: continue Imdur, Toprol XL. Monitor. BP controlled. -Paroxysmal Afib: Rate controlled. Continue coumadin 7.5mg daily.  Daily INR until stable.   -History of bilateral BKA: previously ambulatory with BLE prosthesis and canes.  -History of myeloproloferative disease   -Constipation: colace, PRNs  -DVT prophylaxis: on Coumadin as above    Team conference tomorrow Electronically signed by Rashid Munson MD on 8/6/2020 at 1:16 PM

## 2020-08-06 NOTE — PROGRESS NOTES
Occupational Therapy  OCCUPATIONAL THERAPY DAILY NOTE    Date:2020  Patient Name: Yenny Paul  MRN: [id number]  : 1929  Room: [address] [place], [place] [address]     Referring Practitioner: Ventura Dooley MD  Diagnosis: R hip fx- s/p cephalo medullary nailing  Additional Pertinent Hx: B BKA- B prosthesis, CKD, HLD, hx prostate ca  Restrictions: Fall Risk, WBAT RLE, B prosthesis    Functional Assessment:   Date Status AE  Comments   Feeding 20 Mod I     Grooming 20 S seated AM:Pt brushed hair and completed oral care along with washing face/hands seated at sink. Bathing 20 CGA     UB Dressing 20 S     LB Dressing 20 SBA/CGA   AM: Pt donned both prosthetics seated on EOB at SBA however CGA using w. Walker to standing to ensure proper fit with legs for balance. PM: Doffed BLE prosthetics seated on EOB at SBA   Homemaking 20 SBA W/c level       Functional Transfers / Balance:   Date Status DME  Comments   Sit Balance 2020 SBA  Sitting on EOB and up in w/c both am/pm     Stand Balance 2020 SBA/CGA rw AM:During EOB>w/c transfers using w. Walker along with proper B prosthetic fit while standing. PM: Pt stood from w/c>bed using bed rail for balance      [] Tub  [] Shower   Transfer 2020 TBA      Commode   Transfer    Toileting 2020 CGA      S rw  Grab bar     Functional   Mobility 2020 CGA  rw    Other:  Supine<>sit       EOB<>w/c    2020   SBA       SBA/CGA    Bed rail      W/w Supine <>sit using bed rail for balance. Pt completed EOB <> w/c transfers using w. [employer]ora Dom for balance. Functional Exercises / Activity:  AM:Ergometer ex's with BUE's to increase strength/endurance for ADL's, transfers and mobility tasks tolerating 2 reps of 4 mins each. BUE ex's using jose box with 3# wt on table top for strength/endurance for functional tasks tolerating arm circles along with back & forth tolerating 3 reps of 15 ea.   Pt engaged in grooming skills seated at sink needing supervision. PM:  Pt completed velcro paddle ball activity while wearing 1# wts on BUE's and completed 3# dumbbell ex's to improve strength/endurance for ADLs, mobility and transfers. Pt tolerated 3 reps of 10 ea using dumbbells. Pt engaged in dice game activity to increase cognitive skills, coordination and problem solving while seated at table. Bilateral hand gripper ex's to increase  strength tolerating 3 reps of 15ea. Pt completed bed mobility and bed<>w/c transfers both in am/pm sessions to increase transfer training. Sensory / Neuromuscular Re-Education:      Cognitive Skills:   Status Comments   Problem   Solving fair + During transfers and arm ex's    Memory good - \"   Sequencing fair + \"   Safety fair + \"     Visual Perception:    Education:  Pt educated on safety during transfers and standing balance. [] Family teach completed on:    Pain Level:5/10 all over this morning and took medication. 0/10 pain in afternoon. Additional Notes:  8/2/20:Pt deferred OT ADL this AM initially. Therapist returned ~45 minutes later and pt agreeable to completing UB dressing and grooming only bed level with encouragement d/t pt c/o fatigue. Patient has made good  progress during treatment sessions toward set goals. Therapy emphasis to obtain goals:ADL retraining, transfers training, mobility, there ex, endurance/balance retraining, homemaking & pt/family education      [x] Continue with current OT Plan of care.   [] Prepare for Discharge     DISCHARGE RECOMMENDATIONS  Recommended DME:  Wc, rw, 3:1 & tub bench  Post Discharge Care:   []Home Independently  []Home with 24hr Care / Supervision []Home with Partial Supervision []Home with Home Health OT []Home with Out Pt OT []Other: ___   Comments:         Time in Time out Tx Time Breakdown  Variance:   First Session  9:15am 10;00am [x] Individual Tx- 45 Mins   [] Concurrent Tx -    [] Co-Tx -   [] Group Tx -   [] Time Missed - Second Session 13:45pm 14:30pm [x] Individual Tx- 45min  [] Concurrent Tx -  [] Co-Tx -   [] Group Tx -   [] Time Missed -             Third Session    [] Individual Tx-   [] Concurrent Tx -  [] Co-Tx -   [] Group Tx -   [] Time Missed -         Total Tx Time: 90 Mins       Rebeca Medrano  BOYER/L [id number]    I have read & agree with the above status.     Heatheria Conception OTR/L [id number]

## 2020-08-06 NOTE — PROGRESS NOTES
Physical Therapy  Weekly Team Note  Evaluating Therapist: Adam Aguilar DPT [id number]    NAME: Jennifer Barnett  ROOM: 3447O  DIAGNOSIS: Closed R hip fracture  PRECAUTIONS: Falls, WBAT RLE, BLE BKA, BLE prosthesis   PROCEDURE(S): 7/25 R intramedullary trochanteric nailing    Social:  Pt lives with his wife and sister in law in a single story floor plan with a ramped entry. Pt did state that he also has an entrance other than the ramp that he normally uses which is 3 steps and 1 rail. Prior to admission pt ambulated in home and short distances in community with bilateral canes and bilateral prosthesis independently. Pt states he owns a wheelchair. Initial Evaluation  7/30/20 8/6/20 Comments Short Term Goals Long Term Goals    Bed Mobility  Rolling: Min A  Supine to sit: Min A  Sit to supine: Min A  Scooting: SBA to EOB Mod I all aspects  Supervision Mod I   Transfers Sit to stand:  Mod A  Stand to sit: Mod A  Stand pivot: Min A with [employer] Sit to stand: Supervision  Stand to sit: Supervision  Stand pivot: Supervision with [employer] Good safety and recall of technique SBA Supervision   Ambulation   10 feet with WW with Min A 75 feet with [employer] Supervision Reduced gait speed 50 feet with [employer] with SBA >100 feet with [employer] with Supervision   Wheel Chair Mobility 150 feet Supervision 200 feet Supervision  300 feet Supervision 300 feet Mod I   Car Transfers NT SBA  SBA Supervision   Stair negotiation: ascended and descended 1 step with 2 rails with Mod - backward descending 4 steps with bilateral rails with CGA Limited by pain 4 steps with 1 rail with SBA 4 steps with 1 rail with Supervision   Curb Step:   ascended and descended 2 inch step with [employer] and Mod A 4 inch box x2 reps with [employer] SBA  4 inch step with [employer] and SBA 4 inch step with [employer] and Supervision   BLE ROM [facility] WFL      BLE Strength R hip grossly 3-/5 (limited exam due to pain)  L hip grossly 4+/5 R hip grossly 3-/5  L hip grossly 4+/5      Balance  Sitting: Supervision  Standing: Min A with [address] Sitting: Independent  Standing: SBA with [address]      Date Family Teach Completed TBA TBA      Is additional Family Teaching Needed? Y or N Y Y      Hindering Progress Pain, weakness Pain, weakness      PT recommended ELOS 2-3 weeks Early next week      Team's Discharge Plan 2 weeks Discharge Tuesday 8/11/20      Therapist at Team Meeting Martine Salvador PT, DPT [id number]   Martine Salvador PT, DPT [id number]          Date:  7/30/20  Supporting factors:  Hard working, family support  Barriers to discharge:  Pain, weakness  Additional comments:  Pt is mostly limited by pain and weakness at this time. Pt is determined to improve and is excited to working with PT. Pt is a good candidate for rehab program and will make good progress. Pt should use [address] and most likely will require a walker at discharge. DME:  TBD closer to discharge  After Care:  [address] [place] [address], DPT [id number]    Date:  8/6/20  Supporting factors:  Hard working, family support  Barriers to discharge:  Pain, weakness  Additional comments:  Pt is making good progress in PT, still limited by pain and weakness of the RLE. Pt has potential to progress to Supervision level before discharge. Pt will require supervision at home. Pt would benefit from family teach session prior to discharge.    DME:  [address]  After Care:  [address] [place] [address], DPT [id number]

## 2020-08-06 NOTE — PROGRESS NOTES
Physical Therapy  Daily Treatment Note  Evaluating Therapist: Sukhwinder Melendez DPT [id number]    NAME: Elverna Apley  ROOM: [id number]  DIAGNOSIS: Closed R hip fracture  PRECAUTIONS: Falls, WBAT RLE, BLE BKA, BLE prosthesis   PROCEDURE(S): 7/25 R intramedullary trochanteric nailing    Social:  Pt lives with his wife and sister in law in a single story floor plan with a ramped entry. Pt did state that he also has an entrance other than the ramp that he normally uses which is 3 steps and 1 rail. Prior to admission pt ambulated in home and short distances in community with bilateral canes and bilateral prosthesis independently. Pt states he owns a wheelchair. Initial Evaluation  7/30/20 AM     PM    Short Term Goals Long Term Goals    Was pt agreeable to Eval/treatment? yes yes yes     Does pt have pain? R hip pain yessenia weight bearing No c/o pain No c/o pain     Bed Mobility  Rolling: Min A  Supine to sit: Min A  Sit to supine: Min A  Scooting: SBA to EOB Mod I all aspects NT Supervision Mod I   Transfers Sit to stand:  Mod A  Stand to sit: Mod A  Stand pivot: Min A with [employer] Sit to stand: Supervision  Stand to sit: Supervision  Stand pivot: SBA with [employer] Sit to stand: Supervision  Stand to sit: Supervision  Stand pivot: Supervision with [employer] SBA Supervision   Ambulation   10 feet with [employer] with Min A 75 feet with [employer] SBA 75 feet with [employer] Supervision 50 feet with [employer] with SBA >100 feet with [employer] with Supervision   Walking 10 feet on uneven surface NT NT NT 10 feet with [employer] with SBA 10 feet with [employer] with Supervision   Wheel Chair Mobility 150 feet Supervision 200 feet Supervision  feet Supervision 300 feet Mod I   Car Transfers NT SBA NT SBA Supervision   Stair negotiation: ascended and descended 1 step with 2 rails with Mod - backward descending 4 steps with bilateral rails with CGA NT 4 steps with 1 rail with SBA 4 steps with 1 rail with Supervision   Curb Step:   ascended and descended 2 inch step with [employer] and Mod A 4 inch box x2 reps with [employer] SBA  NT 4 inch step with [employer] and SBA 4 inch step with [employer] and Supervision   Picking up object off the floor NT NT NT     BLE ROM [facility] WFL      BLE Strength R hip grossly 3-/5 (limited exam due to pain)  L hip grossly 4+/5 R hip grossly 3-/5  L hip grossly 4+/5      Balance  Sitting: Supervision  Standing: Min A with [employer] Sitting: Independent  Standing: SBA with [employer]      Date Family Teach Completed TBA TBA      Is additional Family Teaching Needed? Y or N Y Y      Hindering Progress Pain, weakness Pain, weakness      PT recommended ELOS 2-3 weeks       Team's Discharge Plan        Therapist at Team Meeting          Therapeutic exercises  AM:  - Functional mobility as noted above in chart  - Sit<>stand x5 reps from wheelchair to [employer]  - Standing band resisted hip abduction x15/side  - Standing band resisted side step x15/side  PM:  - Functional mobility as noted above in chart  - Bridge 3x10  - Hook-lying hip adduction iso squeeze into bolster - 3 sec hold, 3x10  - LAQ 3x10/side  - Seated band hamstring curl 3x10  - Sit to stand --> ambulate 10 feet --> stand pivot --> stand to sit: x2 reps with [employer] Supervision    Additional Comments: Pt reports less R hip/knee pain with all aspects of functional mobility. Pt is making good progress in functional mobility and demonstrates good safety awareness throughout. Pt is slow moving but based off the pt's age, past medical history, and recent injury this is to be expected. Pt has good potential to progress toward supervision in all aspects of functional mobility with continued PT services.     Patient/family education  Pt/family educated on safety with functional mobility    Patient/family response to education:   Pt/family verbalized understanding Pt/family demonstrated skill Pt/family requires further education in this area   yes yes yes     AM  Time in: 1000  Time out: 1045  PM:   Time in: 1300  Time out: 1345    Pt is making good progress toward established Physical Therapy goals. Continue with physical therapy current plan of care.     Alexandrea Anders DPT [id number]

## 2020-08-07 LAB
INR BLD: 1.5
PROTHROMBIN TIME: 17.4 SEC (ref 9.3–12.4)

## 2020-08-07 PROCEDURE — 6370000000 HC RX 637 (ALT 250 FOR IP): Performed by: FAMILY MEDICINE

## 2020-08-07 PROCEDURE — 1280000000 HC REHAB R&B

## 2020-08-07 PROCEDURE — 85610 PROTHROMBIN TIME: CPT

## 2020-08-07 PROCEDURE — 97110 THERAPEUTIC EXERCISES: CPT

## 2020-08-07 PROCEDURE — 97530 THERAPEUTIC ACTIVITIES: CPT

## 2020-08-07 PROCEDURE — 6370000000 HC RX 637 (ALT 250 FOR IP): Performed by: PHYSICAL MEDICINE & REHABILITATION

## 2020-08-07 PROCEDURE — 99232 SBSQ HOSP IP/OBS MODERATE 35: CPT | Performed by: PHYSICAL MEDICINE & REHABILITATION

## 2020-08-07 PROCEDURE — 97535 SELF CARE MNGMENT TRAINING: CPT

## 2020-08-07 PROCEDURE — 36415 COLL VENOUS BLD VENIPUNCTURE: CPT

## 2020-08-07 RX ORDER — WARFARIN SODIUM 10 MG/1
10 TABLET ORAL DAILY
Status: DISCONTINUED | OUTPATIENT
Start: 2020-08-07 | End: 2020-08-11 | Stop reason: HOSPADM

## 2020-08-07 RX ADMIN — METOPROLOL SUCCINATE 50 MG: 50 TABLET, EXTENDED RELEASE ORAL at 08:15

## 2020-08-07 RX ADMIN — HYDROXYUREA 500 MG: 500 CAPSULE ORAL at 08:15

## 2020-08-07 RX ADMIN — Medication 5 MG: at 21:26

## 2020-08-07 RX ADMIN — OXYCODONE 5 MG: 5 TABLET ORAL at 01:05

## 2020-08-07 RX ADMIN — DOCUSATE SODIUM 100 MG: 100 CAPSULE, LIQUID FILLED ORAL at 21:26

## 2020-08-07 RX ADMIN — WARFARIN SODIUM 10 MG: 5 TABLET ORAL at 17:46

## 2020-08-07 RX ADMIN — DOCUSATE SODIUM 100 MG: 100 CAPSULE, LIQUID FILLED ORAL at 08:15

## 2020-08-07 RX ADMIN — GABAPENTIN 100 MG: 100 CAPSULE ORAL at 08:15

## 2020-08-07 RX ADMIN — GABAPENTIN 100 MG: 100 CAPSULE ORAL at 21:26

## 2020-08-07 RX ADMIN — GABAPENTIN 100 MG: 100 CAPSULE ORAL at 14:44

## 2020-08-07 ASSESSMENT — PAIN DESCRIPTION - PROGRESSION: CLINICAL_PROGRESSION: NOT CHANGED

## 2020-08-07 ASSESSMENT — PAIN DESCRIPTION - FREQUENCY: FREQUENCY: INTERMITTENT

## 2020-08-07 ASSESSMENT — PAIN DESCRIPTION - ONSET: ONSET: GRADUAL

## 2020-08-07 ASSESSMENT — PAIN DESCRIPTION - PAIN TYPE: TYPE: CHRONIC PAIN

## 2020-08-07 ASSESSMENT — PAIN SCALES - GENERAL
PAINLEVEL_OUTOF10: 0
PAINLEVEL_OUTOF10: 0
PAINLEVEL_OUTOF10: 10

## 2020-08-07 ASSESSMENT — PAIN DESCRIPTION - LOCATION: LOCATION: HIP;LEG

## 2020-08-07 ASSESSMENT — PAIN DESCRIPTION - DESCRIPTORS: DESCRIPTORS: ACHING

## 2020-08-07 ASSESSMENT — PAIN DESCRIPTION - ORIENTATION: ORIENTATION: RIGHT;LOWER

## 2020-08-07 NOTE — PLAN OF CARE
Problem: Skin Integrity:  Goal: Will show no infection signs and symptoms  Description: Will show no infection signs and symptoms  8/7/2020 0932 by Brea Ulrich RN  Outcome: Met This Shift  8/7/2020 0134 by Johnson Iglesias RN  Outcome: Met This Shift  Goal: Absence of new skin breakdown  Description: Absence of new skin breakdown  8/7/2020 0932 by Brea Ulrich RN  Outcome: Met This Shift  8/7/2020 0134 by Johnson Iglesias RN  Outcome: Met This Shift     Problem: Falls - Risk of:  Goal: Will remain free from falls  Description: Will remain free from falls  8/7/2020 0932 by Brea Ulrich RN  Outcome: Met This Shift  8/7/2020 0134 by Johnson Iglesias RN  Outcome: Met This Shift  Goal: Absence of physical injury  Description: Absence of physical injury  8/7/2020 0932 by Brea Ulrich RN  Outcome: Met This Shift  8/7/2020 0134 by Johnson Iglesias RN  Outcome: Met This Shift     Problem: Infection - Surgical Site:  Goal: Will show no infection signs and symptoms  Description: Will show no infection signs and symptoms  8/7/2020 0932 by Brea Ulrich RN  Outcome: Met This Shift  8/7/2020 0134 by Johnson Iglesias RN  Outcome: Met This Shift     Problem: Mobility - Impaired:  Goal: Mobility will improve  Description: Mobility will improve  8/7/2020 0932 by Brea Ulrich RN  Outcome: Met This Shift  8/7/2020 0134 by Johnson Iglesias RN  Outcome: Met This Shift     Problem: SAFETY  Goal: Free from accidental physical injury  8/7/2020 0932 by Brea Ulrich RN  Outcome: Met This Shift  8/7/2020 0134 by Johnson Iglesias RN  Outcome: Met This Shift     Problem: DAILY CARE  Goal: Daily care needs are met  8/7/2020 0932 by Brea Ulrich RN  Outcome: Met This Shift  8/7/2020 0134 by Johnson Iglesias RN  Outcome: Met This Shift     Problem: SKIN INTEGRITY  Goal: Skin integrity is maintained or improved  8/7/2020 0932 by Brea Ulrich RN  Outcome: Ongoing  8/7/2020 0134 by Johnson Iglesias RN  Outcome: Met This Shift     Problem: KNOWLEDGE DEFICIT  Goal: Patient/S.O. demonstrates understanding of disease process, treatment plan, medications, and discharge instructions.   8/7/2020 0932 by Azam Bray RN  Outcome: Ongoing  8/7/2020 0134 by Andrei Felix RN  Outcome: Met This Shift     Problem: DISCHARGE BARRIERS  Goal: Patient's continuum of care needs are met  8/7/2020 0932 by Azam Bray RN  Outcome: Ongoing  8/7/2020 0134 by Andrei Felix RN  Outcome: Met This Shift     Problem: Pain:  Goal: Pain level will decrease  Description: Pain level will decrease  8/7/2020 0932 by Azam Bray RN  Outcome: Ongoing  8/7/2020 0134 by Andrei Felix RN  Outcome: Met This Shift  Goal: Control of acute pain  Description: Control of acute pain  8/7/2020 0932 by Azam Bray RN  Outcome: Ongoing  8/7/2020 0134 by Andrei Felix RN  Outcome: Met This Shift  Goal: Control of chronic pain  Description: Control of chronic pain  8/7/2020 0932 by Azam Bray RN  Outcome: Ongoing  8/7/2020 0134 by Andrei Felix RN  Outcome: Met This Shift

## 2020-08-07 NOTE — PLAN OF CARE
Problem: Skin Integrity:  Goal: Will show no infection signs and symptoms  Description: Will show no infection signs and symptoms  Outcome: Met This Shift  Goal: Absence of new skin breakdown  Description: Absence of new skin breakdown  Outcome: Met This Shift     Problem: Falls - Risk of:  Goal: Will remain free from falls  Description: Will remain free from falls  Outcome: Met This Shift  Goal: Absence of physical injury  Description: Absence of physical injury  Outcome: Met This Shift     Problem: Infection - Surgical Site:  Goal: Will show no infection signs and symptoms  Description: Will show no infection signs and symptoms  Outcome: Met This Shift     Problem: Mobility - Impaired:  Goal: Mobility will improve  Description: Mobility will improve  Outcome: Met This Shift     Problem: SAFETY  Goal: Free from accidental physical injury  Outcome: Met This Shift     Problem: DAILY CARE  Goal: Daily care needs are met  Outcome: Met This Shift     Problem: SKIN INTEGRITY  Goal: Skin integrity is maintained or improved  Outcome: Met This Shift     Problem: KNOWLEDGE DEFICIT  Goal: Patient/S.O. demonstrates understanding of disease process, treatment plan, medications, and discharge instructions.   Outcome: Met This Shift     Problem: DISCHARGE BARRIERS  Goal: Patient's continuum of care needs are met  Outcome: Met This Shift     Problem: Pain:  Goal: Pain level will decrease  Description: Pain level will decrease  Outcome: Met This Shift  Goal: Control of acute pain  Description: Control of acute pain  Outcome: Met This Shift  Goal: Control of chronic pain  Description: Control of chronic pain  Outcome: Met This Shift

## 2020-08-07 NOTE — PROGRESS NOTES
Message left for Dr. Epifanio Jones at his office regarding INR results and coumadin. Awaiting return call.

## 2020-08-07 NOTE — PROGRESS NOTES
Occupational Therapy  OCCUPATIONAL THERAPY DAILY NOTE    Date:2020  Patient Name: Jesse Reinoso  MRN: [id number]  : 1929  Room: [address] [place], [place] [address]     Referring Practitioner: Fransico Covarrubias MD  Diagnosis: R hip fx- s/p cephalo medullary nailing  Additional Pertinent Hx: B BKA- B prosthesis, CKD, HLD, hx prostate ca  Restrictions: Fall Risk, WBAT RLE, B prosthesis    Functional Assessment:   Date Status AE  Comments   Feeding 20 Mod I     Grooming 20 S seated AM:Pt brushed hair and teeth seated at sink along with washing face/hands. Bathing 20 CGA     UB Dressing 20 S     LB Dressing 20 S/SBA   AM: Pt donned both prosthetics seated on EOB at Mod I/Sup. Pt used  w. Walker to stand to ensure proper fit with legs for balance. Homemaking 20 SBA W/c level       Functional Transfers / Balance:   Date Status DME  Comments   Sit Balance 2020 Sup  Sitting on EOB and up in w/c   Stand Balance 2020 SBA   rw  Grab bar AM:During EOB>w/c transfers using w. Walker along with proper B prosthetic fit while standing along with w/c<>commode transfers at grab bar level        [] Tub  [] Shower   Transfer 2020 TBA      Commode   Transfer    Toileting 2020 SBA      S rw  Grab bar  Pt completed wc<>commode transfers using grab bar for balance. Functional   Mobility 2020 CGA  rw From OT gym to  in PM   Other:  Supine>sit       EOB>w/c    2020   Sup      SBA   Bed rail      W/w Supine > sit using bed rail for balance. Pt completed EOB > w/c transfers using w. [employer] [place] for balance. Functional Exercises / Activity:  AM:  Pt completed LB dressing to ray Bilateral prosthetics while seated on EOB. Pt completed EOB to w/c transfers at SBA using w. [employer] [place] for balance. Pt used grab bar during w/c<>commode transfers needing SBA due to low surface elevating off device.  Pt performed simple grooming tasks seated at sink washing face/hands and brushing teeth and hair at supervision level. BUE ex's using 3# dumbbells to increase overall strength/endurance for ADLs, transfers and mobility skills. Pt completed bicep curls, triceps, elbow  presses, shld flexion/extension and abduction/adduction and chest presses3 reps of 10 ea. Pt sitting in chair upon arrival to OT gym. Engaged in therex using 5# weighted ball, 3x15 reps (elbow/shoulder flex) to increase BUE strength for ease with functional transfers. Participated in armbike exercise, seated (due to pt declining standing), 2x5 mins, to increase endurance for ease with ADLs. Pt completed functional mobility back to  using ww. Sensory / Neuromuscular Re-Education:      Cognitive Skills:   Status Comments   Problem   Solving fair + During transfers and arm ex's    Memory good - \"   Sequencing fair + \"   Safety fair + \"     Visual Perception:    Education:  Pt educated on safety during transfers and standing balance. [] Family teach completed on:    Pain Level: No pain reported     Additional Notes:  8/2/20:Pt deferred OT ADL this AM initially. Therapist returned ~45 minutes later and pt agreeable to completing UB dressing and grooming only bed level with encouragement d/t pt c/o fatigue. Patient has made good  progress during treatment sessions toward set goals. Therapy emphasis to obtain goals:ADL retraining, transfers training, mobility, there ex, endurance/balance retraining, homemaking & pt/family education      [x] Continue with current OT Plan of care.   [] Prepare for Discharge     DISCHARGE RECOMMENDATIONS  Recommended DME:  Wc, rw, 3:1 & tub bench  Post Discharge Care:   []Home Independently  []Home with 24hr Care / Supervision []Home with Partial Supervision []Home with Home Health OT []Home with Out Pt OT []Other: ___   Comments:         Time in Time out Tx Time Breakdown  Variance:   First Session  9:15am 10;00am [x] Individual Tx- 45 Mins   [] Concurrent Tx -    [] Co-Tx -   [] Group Tx - [] Time Missed -     Second Session 1:45 2:30 [x] Individual Tx- 45 min  [] Concurrent Tx -  [] Co-Tx -   [] Group Tx -   [] Time Missed -             Third Session    [] Individual Tx-   [] Concurrent Tx -  [] Co-Tx -   [] Group Tx -   [] Time Missed -         Total Tx Time: 90 Mins       Ruthe Livers  BOYER/L [id number]    I have read & agree with the above status.     Alberto Alexandra OTR/L [address] [place], [place], OTR/L [id number]

## 2020-08-07 NOTE — PROGRESS NOTES
Physical Therapy  Daily Treatment Note  Evaluating Therapist: Alexandrea Anders DPT [id number]    NAME: Marsha Pierre  ROOM: [id number]  DIAGNOSIS: Closed R hip fracture  PRECAUTIONS: Falls, WBAT RLE, BLE BKA, BLE prosthesis   PROCEDURE(S): 7/25 R intramedullary trochanteric nailing    Social:  Pt lives with his wife and sister in law in a single story floor plan with a ramped entry. Pt did state that he also has an entrance other than the ramp that he normally uses which is 3 steps and 1 rail. Prior to admission pt ambulated in home and short distances in community with bilateral canes and bilateral prosthesis independently. Pt states he owns a wheelchair. Initial Evaluation  7/30/20 AM     PM    Short Term Goals Long Term Goals    Was pt agreeable to Eval/treatment? yes yes yes     Does pt have pain? R hip pain yessenia weight bearing No c/o pain No c/o pain     Bed Mobility  Rolling: Min A  Supine to sit: Min A  Sit to supine: Min A  Scooting: SBA to EOB Mod I all aspects NT Supervision Mod I   Transfers Sit to stand:  Mod A  Stand to sit: Mod A  Stand pivot: Min A with [employer] Sit to stand: Supervision  Stand to sit: Supervision  Stand pivot: Supervision with [employer] Sit to stand: Supervision  Stand to sit: Supervision  Stand pivot: Supervision with [employer] SBA Supervision   Ambulation   10 feet with [employer] with Min A 100 feet with [employer] Supervision 75 feet with [employer] Supervision 50 feet with [employer] with SBA >100 feet with [employer] with Supervision   Walking 10 feet on uneven surface NT NT 20 feet with [employer] Supervision 10 feet with [employer] with SBA 10 feet with [employer] with Supervision   Wheel Chair Mobility 150 feet Supervision 200 feet Supervision  feet Supervision 300 feet Mod I   Car Transfers NT SBA NT SBA Supervision   Stair negotiation: ascended and descended 1 step with 2 rails with Mod - backward descending 4 steps with bilateral rails with SBA/CGA NT 4 steps with 1 rail with SBA 4 steps with 1 rail with Supervision   Curb Step:   ascended and descended 2 inch step with [employer] and Mod A 7.5 inch step with [employer] Mod A NT 4 inch step with [employer] and SBA 4 inch step with [employer] and Supervision   Picking up object off the floor NT NT NT     BLE ROM [facility] WFL      BLE Strength R hip grossly 3-/5 (limited exam due to pain)  L hip grossly 4+/5 R hip grossly 3-/5  L hip grossly 4+/5      Balance  Sitting: Supervision  Standing: Min A with [employer] Sitting: Independent  Standing: SBA with [employer]      Date Family Teach Completed TBA TBA      Is additional Family Teaching Needed? Y or N Y Y      Hindering Progress Pain, weakness Pain, weakness      PT recommended ELOS 2-3 weeks       Team's Discharge Plan        Therapist at Team Meeting          Therapeutic exercises  AM:  - Functional mobility as noted above in chart  - Sit<>stand x5 reps from wheelchair to [employer]  - Standing knee flexion 2x10/side  PM:  - Functional mobility as noted above in chart  - 3 cane forward weaving - x2 reps with [employer], 75 feet total distance covered  - Sit<>stand x5 reps from wheelchair to [employer]  -  Forward to backward ambulation with [employer] - x2 reps, 10 yards forward/backward each rep  - Side stepping in parallel bars 2 sets down and back    Additional Comments: Pt is reporting less R stump pain with mobility after applying more layers to prosthesis. Pt utilizes a hip hike and lateral lean to perform hip flexion to get his foot onto each step. Pt is improving and progressing toward goals well. D/c date set for next Tuesday as well as family training that same day. Pt is somewhat eager about returning home but is pleased with his progress.     Patient/family education  Pt/family educated on safety with functional mobility    Patient/family response to education:   Pt/family verbalized understanding Pt/family demonstrated skill Pt/family requires further education in this area   yes yes yes     AM  Time in: 1045  Time out: 1130  PM:   Time in: 1300  Time out: 1345    Pt is making good progress toward established Physical Therapy goals. Continue with physical therapy current plan of care.     Ethan Schmitz, TAYLOR [id number]

## 2020-08-07 NOTE — PROGRESS NOTES
Patient resting quietly in bed easily aroused VSS calm and cooperative with staff able to make needs known. Continues on IV ATB no adverse effects, afebrile. Denies pain LCTA +BS will cont to monitor.

## 2020-08-07 NOTE — CARE COORDINATION
Team meeting this date:  Patient at standby contact levels with good progress this week. Min assist in Bathing and dressing with goals to contact assist.  Team feel ready to dc on Tuesday Aug 11. Reviewed plan with patient and on phone with sister in law Jaimie Neil who will speak to the wife. Wife to call if needed. Discharge per plan below    DC Plan:  Home with wife and sister in law  [facility] with [address],[facility] for PT OT RN MONSERRAT. Referral called and VM left for Jennifer. DEM  Has all needed DME     FT:  Tuesday am prior to discharge  The Plan for Transition of Care is related to the following treatment goals: Home with home health care     The Patient and/or patient representative patient and sister in law  was provided with a choice of provider and agrees   with the discharge plan. [x] Yes [] No    Freedom of choice list was provided with basic dialogue that supports the patient's individualized plan of care/goals, treatment preferences and shares the quality data associated with the providers.  [x] Yes [] No

## 2020-08-07 NOTE — PATIENT CARE CONFERENCE
[address] [place] [address]  ACUTE REHABILITATION  TEAM CONFERENCE NOTE/PATIENT PLAN OF CARE    Date: 2020  Admission date: 2020  Patient Name: Wilberto Short        MRN: [id number]    : 1929  (80 y.o.)  Gender: male   Rehab diagnosis/surgery with date:  right intertrochanteric hip fracture  Dr. Caitie Galaviz completed right intramedullary nailing on 20   Impairment Group Code:  8.11    MEDICAL/FUNCTIONAL HISTORY/STATUS:  Doing well, pain well controlled      Consultations/Labs/X-rays: INR 1.5 today  H/H remains stable 8.8/28.7    MEDICATION UPDATE:  On coumadin and working on stabilizing INR, still subtheraputic  Weaning narcotics  Added melatonin and patient slept better    NURSING FIMS:    Bowel:   Current level: incontinent    Bladder:   Current level: continent    Toilet Hygiene:   Current level : Moderate Assist   Short term Toilet hygiene goal: Minimum assistance   Long term toilet hygiene goal:  Supervision     Skin integrity: excoriated paola area  Pain: none    NUTRITION    Diet  General minced and moist  Liquid consistency   thin    SOCIAL INFORMATION:  Lives with: spouse  Prior community services:  [facility] health  Home Architecture:  1 story home with ramp  Prior Level of function:  Independent with bilateral prostetics, bilateral canes in community, not driving  DME:  wheeled walker    FAMILY / PATIENT EDUCATION:  Will plan on setting up family education with wife prior to discharge.      PHYSICAL THERAPY    Bed mobility:   Current level: Modified Independent   Short term bed mobility goal: met  Long term bed mobility goal: met    Chair/bed transfers:  Current level: Supervision with wheeled walker   Short term Chair/bed transfers goal: met  Long term Chair/bed transfers goal: met       Ambulation:   Current level: 75 ft with wheeled walker Supervision   Short term ambulation goal: met   Long term ambulation goal: 100 ft Supervision     Wheelchair Mobility:  Current level: 200 ft Supervision   Short term wheelchair goal: met   Long term wheelchair goal: 300 ft Modified Independent       Car transfers:   Current level: Stand by Assist   Short term car transfers goal: met   Long term car transfers goal:Supervision     Stairs:   Current level : 4 step 2 rail Contact Guard assistance   Short term stairs goal: 4 Stand by Assist   Long term stairs goal: 4 with Supervision     Lower Extremity Strength Issues:  Previous BLE BKAs, WBAT BLE        OCCUPATIONAL THERAPY:      Tub/shower:   Current level:  To be assessed       Feeding:  Current level: Modified Independent   Short term feeding goal: met  Long term feeding goal: met     Grooming:   Current level: Set up   Short term grooming goal: Modified Independent   Long term grooming goal: Modified Independent     Bathing:  Current level: Contact Guard assistance   Short term bathing goal: Stand by Assist   Long term bathing goal: Stand by Assist     Homemaking:   Current level: wc Stand by Assist   Short term homemaking goal: Modified Independent Independent   Long term homemaking goal: Modified Independent     Upper body dressing:  Current level: Set up   Short term upper body dressing goal: Independent   Long term upper body dressing goal: Independent     Lower body dressing:  Current level: Contact Guard assistance, patient can put his prosthesis on   Short term lower body dressing goal: Stand by Assist   Long term lower body dressing goal: Stand by Assist     Toilet transfer:   Current level: Contact Guard assistance   Short term toilet transfer goal: Stand by Assist   Long term toilet transfer goal: Stand by Assist       Patient/family's personal goals: go home  Factors supporting goal achievement:  Prior independence, strong for his age, family support  Factors hindering goal achievement:  pain          Discharge Plan   Estimated Length of Stay: 8/11            Destination: home  Services at Discharge: home PT, OT, nursing  Equipment at Discharge: has all equipment      INTERDISCIPLINARY TEAM/PHYSICIAN RECOMMENDATION AND/OR REVISIONS OF PLAN OF CARE:  Bring in wife for education, continue to improve strength and work toward goals      I approve the established interdisciplinary plan of care as documented within the medical record of Suresh Looney. Electronically signed by Lavinia López RN on 8/7/2020 at 9:08 AM  The following interdisciplinary team members were present:  GERALDINE Alvarado, CECIL Forrest, TAYLOR  [facility], OTR

## 2020-08-07 NOTE — PROGRESS NOTES
[facility] Physical Medicine and Rehabilitation  Comprehensive Progress Note    Subjective:      Mary Nolan is a 80 y.o. male admitted to inpatient rehabilitation for impairments and acitivities limitations in ADLs and mobility secondary to right intertrochanteric hip fracture. .      No acute events overnight. Denies pain. No cp, sob, n/v. Tolerating therapy, progressing. INR remains subtherapeutic. No complaints. The patient's medical records have been reviewed. Scheduled Meds:warfarin, 7.5 mg, Daily  gabapentin, 100 mg, TID  docusate sodium, 100 mg, BID  hydroxyurea, 500 mg, Daily  isosorbide mononitrate, 30 mg, Daily  metoprolol succinate, 50 mg, Daily      Continuous Infusions:  PRN Meds:oxyCODONE, 5 mg, BID PRN  melatonin, 5 mg, Nightly PRN  magic (miracle) mouthwash, 5 mL, 4x Daily PRN  magnesium hydroxide, 30 mL, Daily PRN  ondansetron, 4 mg, Q6H PRN  acetaminophen, 650 mg, Q4H PRN  polyethylene glycol, 17 g, Daily PRN         Objective:      Vitals:    08/06/20 0015 08/06/20 0840 08/06/20 2015 08/07/20 0734   BP: 106/64 92/61 (!) 94/52 (!) 103/54   Pulse: 77 76 77 79   Resp: 16 20 14 16   Temp: 98.5 °F (36.9 °C) 98.5 °F (36.9 °C) 97.5 °F (36.4 °C) 98 °F (36.7 °C)   TempSrc: Temporal Oral  Temporal   SpO2: 94% 97%  98%   Weight:       Height:         General appearance: alert, appears stated age and cooperative. Up in chair. Lungs: clear to auscultation bilaterally  Heart: regular rate and rhythm  Abdomen: soft, non-tender; bowel sounds normal; no masses,  no organomegaly  Musculoskeletal: Range of motion impaired RLE, post op precautions. Bilateral BKA. Neurologic: AAOx4. Motor 4/5 residual RLE. Strength otherwise 4-5/5 without focal deficits.        Laboratory:    Lab Results   Component Value Date    WBC 32.2 (H) 08/06/2020    HGB 8.8 (L) 08/06/2020    HCT 28.7 (L) 08/06/2020    .6 (H) 08/06/2020     08/06/2020     Lab Results   Component Value Date     08/06/2020 K 3.9 08/06/2020     08/06/2020    CO2 25 08/06/2020    BUN 25 08/06/2020    CREATININE 1.0 08/06/2020    GLUCOSE 65 08/06/2020    GLUCOSE 94 01/24/2012    CALCIUM 8.6 08/06/2020      Lab Results   Component Value Date    ALT 17 07/24/2020    AST 23 07/24/2020    ALKPHOS 184 (H) 07/24/2020    BILITOT 1.1 07/24/2020     Lab Results   Component Value Date    COLORU Yellow 02/14/2017    NITRU Negative 02/14/2017    GLUCOSEU Negative 02/14/2017    KETUA Negative 02/14/2017    UROBILINOGEN 1.0 02/14/2017    BILIRUBINUR Negative 02/14/2017     Lab Results   Component Value Date    LABA1C 5.1 09/26/2018       Functional Status:      Physical Therapy:  Bed mobility: Min A  Transfers: SBA  Ambulation: 75 ft [employer] CGA/SBA     Occupational Therapy:  Feeding: Mod I  Grooming: Supervision  UB dressing: Supervision  LB dressing: CGA      Assessment/Plan:       80 y.o. male admitted to inpatient rehabilitation for impairments and acitivities limitations in ADLs and mobility secondary to right intertrochanteric hip fracture. -Right intertrochanteric hip fracture: s/p  right intramedullary trochanteric nail on 7/25/2020. WBAT RLE. Continue Acute Rehab program.  -Acute post operative pain: oxycodone or tylenol PRN. Wean narcotics as tolerated.   -Acute blood loss anemia: received 2 units pRBC on 7/27. Monitor H&H, has been stable. -HTN: on Imdur, Toprol XL. Monitor. BP controlled. -Paroxysmal Afib: Rate controlled. Continue coumadin, increase dose to 10mg daily per Dr. Jesus Mahoney. Daily INR until stable.   -History of bilateral BKA: previously ambulatory with BLE prosthesis and canes.  -History of myeloproloferative disease   -Constipation: colace, PRNs  -DVT prophylaxis: on Coumadin as above    Team conference today.    Anticipate discharge next week     Electronically signed by Alexsander Starr MD on 8/7/2020 at 10:31 AM

## 2020-08-07 NOTE — PROGRESS NOTES
Spoke with Lavern Hawkins from Dr. Barger [facility] office and received an order for Coumadin 10 mg daily.

## 2020-08-08 LAB
INR BLD: 1.6
PROTHROMBIN TIME: 17.9 SEC (ref 9.3–12.4)

## 2020-08-08 PROCEDURE — 36415 COLL VENOUS BLD VENIPUNCTURE: CPT

## 2020-08-08 PROCEDURE — 85610 PROTHROMBIN TIME: CPT

## 2020-08-08 PROCEDURE — 97110 THERAPEUTIC EXERCISES: CPT | Performed by: OCCUPATIONAL THERAPY ASSISTANT

## 2020-08-08 PROCEDURE — 6370000000 HC RX 637 (ALT 250 FOR IP): Performed by: PHYSICAL MEDICINE & REHABILITATION

## 2020-08-08 PROCEDURE — 6370000000 HC RX 637 (ALT 250 FOR IP): Performed by: FAMILY MEDICINE

## 2020-08-08 PROCEDURE — 97530 THERAPEUTIC ACTIVITIES: CPT | Performed by: OCCUPATIONAL THERAPY ASSISTANT

## 2020-08-08 PROCEDURE — 1280000000 HC REHAB R&B

## 2020-08-08 RX ADMIN — HYDROXYUREA 500 MG: 500 CAPSULE ORAL at 08:09

## 2020-08-08 RX ADMIN — DOCUSATE SODIUM 100 MG: 100 CAPSULE, LIQUID FILLED ORAL at 08:09

## 2020-08-08 RX ADMIN — METOPROLOL SUCCINATE 50 MG: 50 TABLET, EXTENDED RELEASE ORAL at 08:10

## 2020-08-08 RX ADMIN — DOCUSATE SODIUM 100 MG: 100 CAPSULE, LIQUID FILLED ORAL at 20:46

## 2020-08-08 RX ADMIN — GABAPENTIN 100 MG: 100 CAPSULE ORAL at 14:09

## 2020-08-08 RX ADMIN — GABAPENTIN 100 MG: 100 CAPSULE ORAL at 08:09

## 2020-08-08 RX ADMIN — WARFARIN SODIUM 10 MG: 5 TABLET ORAL at 17:59

## 2020-08-08 RX ADMIN — Medication 5 MG: at 20:46

## 2020-08-08 RX ADMIN — GABAPENTIN 100 MG: 100 CAPSULE ORAL at 20:46

## 2020-08-08 NOTE — PROGRESS NOTES
Occupational Therapy  OCCUPATIONAL THERAPY DAILY NOTE    Date:2020  Patient Name: Lee Felix  MRN: [id number]  : 1929  Room: [address] [place], [place] [address]     Referring Practitioner: Juanita Field MD  Diagnosis: R hip fx- s/p cephalo medullary nailing  Additional Pertinent Hx: B BKA- B prosthesis, CKD, HLD, hx prostate ca  Restrictions: Fall Risk, WBAT RLE, B prosthesis    Functional Assessment:   Date Status AE  Comments   Feeding 20 Mod I     Grooming 20 S seated    Bathing 20 CGA     UB Dressing 20 S     LB Dressing 20 S/SBA   Pt. Sat at edge ob bed and donned both prosthetics. Homemaking 20 SBA W/c level       Functional Transfers / Balance:   Date Status DME  Comments   Sit Balance 2020 Sup     Stand Balance 2020 SBA   rw  Grab bar    [] Tub  [x] Shower   Transfer 20 Min A  Shower bench with rail  SPT Discussed possibly changing the positioning of his bench at home to increase safety. Commode   Transfer    Toileting 2020 SBA      S rw  Grab bar  Ambulating with w.w. Functional   Mobility 2020 CGA  rw    Other:  Supine>sit       EOB>w/c    2020   Sup      SBA   Bed rail      W/w         SPT     Functional Exercises / Activity:  Ergometer ex's to increase AROM and strength of BUE's. Int- mod end- 6 mins x2 . Yellow brian bar to increase forearm and  strength of BUE's. 20 reps x4 planes. Sensory / Neuromuscular Re-Education:      Cognitive Skills:   Status Comments   Problem   Solving fair + During transfers and arm ex's    Memory good - \"   Sequencing fair + \"   Safety fair + \"     Visual Perception:    Education:  Pt educated on safety with commode and shower transfers. [] Family teach completed on:    Pain Level: No pain reported     Additional Notes:  20:Pt deferred OT ADL this AM initially.  Therapist returned ~45 minutes later and pt agreeable to completing UB dressing and grooming only bed level with encouragement d/t pt c/o fatigue. Patient has made good  progress during treatment sessions toward set goals. Therapy emphasis to obtain goals:ADL retraining, transfers training, mobility, there ex, endurance/balance retraining, homemaking & pt/family education      [x] Continue with current OT Plan of care.   [] Prepare for Discharge     DISCHARGE RECOMMENDATIONS  Recommended DME:  Wc, rw, 3:1 & tub bench  Post Discharge Care:   []Home Independently  []Home with 24hr Care / Supervision []Home with Partial Supervision []Home with Home Health OT []Home with Out Pt OT []Other: ___   Comments:         Time in Time out Tx Time Breakdown  Variance:   First Session  0915 2097 [x] Individual Tx- 30 Mins   [] Concurrent Tx -    [] Co-Tx -   [] Group Tx -   [] Time Missed -     Second Session   [] Individual Tx-   [] Concurrent Tx -  [] Co-Tx -   [] Group Tx -   [] Time Missed -             Third Session    [] Individual Tx-   [] Concurrent Tx -  [] Co-Tx -   [] Group Tx -   [] Time Missed -         Total Tx Time: 30 Mins       Mayco Arnold  BOYER/L [id number]

## 2020-08-08 NOTE — PROGRESS NOTES
Progress Note - covering Dr. Rollins Fruit    Subjective/   80y.o. year old male on the rehab unit for right hip fracture. He denies any new complaints. No shortness of breath or chest pain. Slept well. His Coumadin dose has been adjusted. He is tolerating the rehab program.          Objective/   VITALS:  BP (!) 100/57   Pulse 83   Temp 98.2 °F (36.8 °C) (Temporal)   Resp 20   Ht 5' 9\" (1.753 m)   Wt 149 lb 2 oz (67.6 kg)   SpO2 92%   BMI 22.02 kg/m²   24HR INTAKE/OUTPUT:      Intake/Output Summary (Last 24 hours) at 8/8/2020 1248  Last data filed at 8/8/2020 0800  Gross per 24 hour   Intake 360 ml   Output 1450 ml   Net -1090 ml     Constitutional:  Alert, awake, no apparent distress  Cardiovascular:  S1, S2 without m/r/g   Respiratory:  CTA B without w/r/r   Abdomen: Positive bowel sounds  Ext: Bilateral BKA with prosthetics in place. Neuro: Awake, alert and oriented x3. Good sitting balance. Functional Level      Date   Status   AE    Comments     Feeding   8/5/20   Mod I             Grooming   8/7/20   S   seated         Bathing   8/5/20   CGA             UB Dressing   8/5/20   S             LB Dressing   8/8/20   S/SBA      Pt. Sat at edge ob bed and donned both prosthetics. Homemaking   8/4/20   SBA   W/c level               Functional Transfers / Balance:      Date Status DME  Comments   Sit Balance 8/8/2020 Sup       Stand Balance 8/8/2020 SBA    rw  Grab bar     []? Tub  [x]? Shower   Transfer 8/8/20 Min A  Shower bench with rail  SPT Discussed possibly changing the positioning of his bench at home to increase safety. Commode   Transfer     Toileting 8/8/2020 8/7/20 SBA        S rw  Grab bar  Ambulating with w.w. Functional   Mobility 8/7/2020 CGA  rw     Other:  Supine>sit         EOB>w/c     8/7/2020 8/8/20    Sup        SBA    Bed rail        W/w             SPT      Functional Exercises / Activity:  Ergometer ex's to increase AROM and strength of BUE's.  Int- Adonis Daughters end- 6 mins x2 . Yellow brian bar to increase forearm and  strength of BUE's. 20 reps x4 planes.         Sensory / Neuromuscular Re-Education:        Cognitive Skills:    Status Comments   Problem   Solving fair + During transfers and arm ex's    Memory good - \"   Sequencing fair + \"   Safety fair + \"          Scheduled Meds:   warfarin  10 mg Oral Daily    gabapentin  100 mg Oral TID    docusate sodium  100 mg Oral BID    hydroxyurea  500 mg Oral Daily    isosorbide mononitrate  30 mg Oral Daily    metoprolol succinate  50 mg Oral Daily     Continuous Infusions:  PRN Meds:oxyCODONE, melatonin, magic (miracle) mouthwash, magnesium hydroxide, ondansetron, acetaminophen, polyethylene glycol  I/O last 3 completed shifts: In: 780 [P.O.:780]  Out: 1450 [Urine:1450]  I/O this shift:  In: 360 [P.O.:360]  Out: 300 [Urine:300]    Labs reviewed  CBC:   Recent Labs     08/06/20  0530   WBC 32.2*   HGB 8.8*        BMP:    Recent Labs     08/06/20  0530      K 3.9      CO2 25   BUN 25*   CREATININE 1.0   GLUCOSE 65*     Hepatic: No results for input(s): AST, ALT, ALB, BILITOT, ALKPHOS in the last 72 hours. BNP: No results for input(s): BNP in the last 72 hours. Lipids: No results for input(s): CHOL, HDL in the last 72 hours.     Invalid input(s): LDLCALCU  INR:   Recent Labs     08/06/20  0530 08/07/20  0537 08/08/20  0705   INR 1.4 1.5 1.6       Assessment/  Patient Active Problem List:     Atrial fibrillation (HCC)     PVD (peripheral vascular disease)     Hyperlipemia     Anticoagulant long-term use     Claudication of left lower extremity (HCC)     Pneumonia of both lower lobes due to infectious organism (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)     Critical lower limb ischemia     Hx of right BKA (HCC)     Hyperlipidemia     Hypertension Lymphocytosis     Skin ulcer of left knee with fat layer exposed ([facility] [address]     Syncope and collapse     Wound infection     Blurry vision, bilateral     PAF (paroxysmal atrial fibrillation) ([facility])     Anticoagulated on Coumadin     Aortic stenosis, moderate     Sinus node dysfunction ([facility])     Peripheral arterial disease ([facility])     Sepsis due to pneumonia ([facility])     Chest pain     Precordial pain     Acute rhinitis     Closed nondisplaced intertrochanteric fracture of right femur ([facility])     Aortic valve disease     Closed right hip fracture, initial encounter ([facility])     Moderate protein-calorie malnutrition ([facility] [address]      Plan/  1. Continue rehab program with focus on improved mobility  2. Continue current medications  3.  Coumadin dose as per his PCP            Jose Nunez MD

## 2020-08-08 NOTE — PLAN OF CARE
Problem: Skin Integrity:  Goal: Will show no infection signs and symptoms  Description: Will show no infection signs and symptoms  Outcome: Met This Shift  Goal: Absence of new skin breakdown  Description: Absence of new skin breakdown  Outcome: Met This Shift     Problem: Falls - Risk of:  Goal: Will remain free from falls  Description: Will remain free from falls  Outcome: Met This Shift  Goal: Absence of physical injury  Description: Absence of physical injury  Outcome: Met This Shift     Problem: Infection - Surgical Site:  Goal: Will show no infection signs and symptoms  Description: Will show no infection signs and symptoms  Outcome: Met This Shift     Problem: Mobility - Impaired:  Goal: Mobility will improve  Description: Mobility will improve  Outcome: Met This Shift     Problem: SAFETY  Goal: Free from accidental physical injury  Outcome: Met This Shift     Problem: DAILY CARE  Goal: Daily care needs are met  Outcome: Met This Shift     Problem: SKIN INTEGRITY  Goal: Skin integrity is maintained or improved  Outcome: Met This Shift     Problem: Pain:  Goal: Pain level will decrease  Description: Pain level will decrease  Outcome: Met This Shift  Goal: Control of acute pain  Description: Control of acute pain  Outcome: Met This Shift  Goal: Control of chronic pain  Description: Control of chronic pain  Outcome: Met This Shift

## 2020-08-09 LAB
INR BLD: 1.8
PROTHROMBIN TIME: 20 SEC (ref 9.3–12.4)
STREP GRP A PCR: NEGATIVE

## 2020-08-09 PROCEDURE — 1280000000 HC REHAB R&B

## 2020-08-09 PROCEDURE — 6370000000 HC RX 637 (ALT 250 FOR IP): Performed by: FAMILY MEDICINE

## 2020-08-09 PROCEDURE — 36415 COLL VENOUS BLD VENIPUNCTURE: CPT

## 2020-08-09 PROCEDURE — 97530 THERAPEUTIC ACTIVITIES: CPT

## 2020-08-09 PROCEDURE — 6370000000 HC RX 637 (ALT 250 FOR IP): Performed by: PHYSICAL MEDICINE & REHABILITATION

## 2020-08-09 PROCEDURE — 87880 STREP A ASSAY W/OPTIC: CPT

## 2020-08-09 PROCEDURE — 85610 PROTHROMBIN TIME: CPT

## 2020-08-09 RX ADMIN — GABAPENTIN 100 MG: 100 CAPSULE ORAL at 08:35

## 2020-08-09 RX ADMIN — HYDROXYUREA 500 MG: 500 CAPSULE ORAL at 08:36

## 2020-08-09 RX ADMIN — BENZOCAINE AND MENTHOL 1 LOZENGE: 15; 3.6 LOZENGE ORAL at 17:00

## 2020-08-09 RX ADMIN — GABAPENTIN 100 MG: 100 CAPSULE ORAL at 20:30

## 2020-08-09 RX ADMIN — DOCUSATE SODIUM 100 MG: 100 CAPSULE, LIQUID FILLED ORAL at 08:36

## 2020-08-09 RX ADMIN — DOCUSATE SODIUM 100 MG: 100 CAPSULE, LIQUID FILLED ORAL at 20:30

## 2020-08-09 RX ADMIN — Medication 5 MG: at 20:30

## 2020-08-09 RX ADMIN — GABAPENTIN 100 MG: 100 CAPSULE ORAL at 13:43

## 2020-08-09 RX ADMIN — METOPROLOL SUCCINATE 50 MG: 50 TABLET, EXTENDED RELEASE ORAL at 08:36

## 2020-08-09 RX ADMIN — WARFARIN SODIUM 10 MG: 5 TABLET ORAL at 17:48

## 2020-08-09 ASSESSMENT — PAIN SCALES - GENERAL
PAINLEVEL_OUTOF10: 0
PAINLEVEL_OUTOF10: 0

## 2020-08-09 NOTE — PROGRESS NOTES
Physical Therapy  Daily Treatment Note  Evaluating Therapist: Raul Rain DPT [id number]    NAME: Myles Johnson  ROOM: 9820E  DIAGNOSIS: Closed R hip fracture  PRECAUTIONS: Falls, WBAT RLE, BLE BKA, BLE prosthesis   PROCEDURE(S): 7/25 R intramedullary trochanteric nailing    Social:  Pt lives with his wife and sister in law in a single story floor plan with a ramped entry. Pt did state that he also has an entrance other than the ramp that he normally uses which is 3 steps and 1 rail. Prior to admission pt ambulated in home and short distances in community with bilateral canes and bilateral prosthesis independently. Pt states he owns a wheelchair. Initial Evaluation  7/30/20 AM     Short Term Goals Long Term Goals    Was pt agreeable to Eval/treatment? yes yes     Does pt have pain? R hip pain yessenia weight bearing None      Bed Mobility  Rolling: Min A  Supine to sit: Min A  Sit to supine: Min A  Scooting: SBA to EOB Hospital bed   Supine to sit mod I Supervision Mod I   Transfers Sit to stand:  Mod A  Stand to sit: Mod A  Stand pivot: Min A with [employer] Sit to stand: Supervision  Stand to sit: Supervision  Stand pivot: Supervision with [employer] SBA Supervision   Ambulation   10 feet with [employer] with Min A 150 feet with [employer] Supervision 50 feet with [employer] with SBA >100 feet with [employer] with Supervision   Walking 10 feet on uneven surface NT NT 10 feet with [employer] with SBA 10 feet with [employer] with Supervision   Wheel Chair Mobility 150 feet Supervision  300 feet Supervision 300 feet Mod I   Car Transfers NT CG/SBA SBA Supervision   Stair negotiation: ascended and descended 1 step with 2 rails with Mod - backward descending 4 steps with bilateral rails with CGA ( occasional min A with descending )  4 steps with 1 rail with SBA 4 steps with 1 rail with Supervision   Curb Step:   ascended and descended 2 inch step with [employer] and Mod A NT 4 inch step with [employer] and SBA 4 inch step with [employer] and Supervision   Picking up object off the floor NT NT     BLE ROM [facility] WFL     BLE Strength R hip grossly 3-/5 (limited exam due to pain)  L hip grossly 4+/5 R hip grossly 3-/5  L hip grossly 4+/5     Balance  Sitting: Supervision  Standing: Min A with [employer] Sitting: Independent  Standing: SBA with [employer]     Date Family Teach Completed TBA TBA     Is additional Family Teaching Needed? Y or N Y Y     Hindering Progress Pain, weakness Pain, weakness     PT recommended ELOS 2-3 weeks      Team's Discharge Plan       Therapist at Team Meeting           Additional Comments:   Pt in bed upon arrival and agreed to participate in therapy. Pt sat on edge of bed with good balance while donning B LE prosthesis. Pt reported no pain in either LE. Pt ambulates on R toe and R LE with external rotation. Discussed with pt if R knee is painful with pt reported no pain. Pt also reported wearing extra socks with R LE  prothesis to properly fit per orthotist.  Verbal cues for technique/sequencing with stair negotiation. . Decreased weight bearing noted on R LE with stair negotiation. Pt reported bathroom needs at end of treatment. Assisted pt with stand pivot from wc to commode as noted above. Nursing staff notified that pt left in bathroom and will use call light for assistance. Patient/family education  Pt/family educated on safety with functional mobility    Patient/family response to education:   Pt/family verbalized understanding Pt/family demonstrated skill Pt/family requires further education in this area   x x x     AM  Time in: 0950  Time out: 1030      Pt is making good progress toward established Physical Therapy goals. Continue with physical therapy current plan of care.     Tu Median [id number]

## 2020-08-09 NOTE — PROGRESS NOTES
Pt c/o sore throat. No redness noted. Dr. Marky Mendez notified and new order for rapid strep and cepacol ordered.

## 2020-08-10 LAB
INR BLD: 2
PROTHROMBIN TIME: 22.6 SEC (ref 9.3–12.4)

## 2020-08-10 PROCEDURE — 36415 COLL VENOUS BLD VENIPUNCTURE: CPT

## 2020-08-10 PROCEDURE — 97110 THERAPEUTIC EXERCISES: CPT

## 2020-08-10 PROCEDURE — 97530 THERAPEUTIC ACTIVITIES: CPT

## 2020-08-10 PROCEDURE — 6370000000 HC RX 637 (ALT 250 FOR IP): Performed by: FAMILY MEDICINE

## 2020-08-10 PROCEDURE — 6370000000 HC RX 637 (ALT 250 FOR IP): Performed by: PHYSICAL MEDICINE & REHABILITATION

## 2020-08-10 PROCEDURE — 97535 SELF CARE MNGMENT TRAINING: CPT

## 2020-08-10 PROCEDURE — 85610 PROTHROMBIN TIME: CPT

## 2020-08-10 PROCEDURE — 1280000000 HC REHAB R&B

## 2020-08-10 PROCEDURE — 99232 SBSQ HOSP IP/OBS MODERATE 35: CPT | Performed by: PHYSICAL MEDICINE & REHABILITATION

## 2020-08-10 RX ORDER — HYDROCODONE BITARTRATE AND ACETAMINOPHEN 5; 325 MG/1; MG/1
1 TABLET ORAL EVERY 6 HOURS PRN
Qty: 20 TABLET | Refills: 0 | Status: SHIPPED | OUTPATIENT
Start: 2020-08-10 | End: 2020-08-15

## 2020-08-10 RX ORDER — WARFARIN SODIUM 10 MG/1
10 TABLET ORAL DAILY
Qty: 30 TABLET | Refills: 0 | Status: SHIPPED | OUTPATIENT
Start: 2020-08-10 | End: 2021-01-01

## 2020-08-10 RX ORDER — GABAPENTIN 100 MG/1
100 CAPSULE ORAL 3 TIMES DAILY
Qty: 90 CAPSULE | Refills: 0 | Status: SHIPPED | OUTPATIENT
Start: 2020-08-10 | End: 2021-01-01

## 2020-08-10 RX ADMIN — WARFARIN SODIUM 10 MG: 5 TABLET ORAL at 17:36

## 2020-08-10 RX ADMIN — HYDROXYUREA 500 MG: 500 CAPSULE ORAL at 07:29

## 2020-08-10 RX ADMIN — BENZOCAINE AND MENTHOL 1 LOZENGE: 15; 3.6 LOZENGE ORAL at 08:18

## 2020-08-10 RX ADMIN — METOPROLOL SUCCINATE 50 MG: 50 TABLET, EXTENDED RELEASE ORAL at 07:28

## 2020-08-10 RX ADMIN — Medication 5 MG: at 21:15

## 2020-08-10 RX ADMIN — GABAPENTIN 100 MG: 100 CAPSULE ORAL at 07:27

## 2020-08-10 RX ADMIN — GABAPENTIN 100 MG: 100 CAPSULE ORAL at 21:15

## 2020-08-10 RX ADMIN — GABAPENTIN 100 MG: 100 CAPSULE ORAL at 13:23

## 2020-08-10 RX ADMIN — DOCUSATE SODIUM 100 MG: 100 CAPSULE, LIQUID FILLED ORAL at 07:27

## 2020-08-10 ASSESSMENT — PAIN SCALES - GENERAL
PAINLEVEL_OUTOF10: 0

## 2020-08-10 NOTE — PROGRESS NOTES
Called dr Lavinia Breaux- states will notify of home going INR schedule in am after reviewing INR's.

## 2020-08-10 NOTE — PLAN OF CARE
Problem: Falls - Risk of:  Goal: Will remain free from falls  Description: Will remain free from falls  Outcome: Met This Shift  Goal: Absence of physical injury  Description: Absence of physical injury  Outcome: Met This Shift     Problem: SAFETY  Goal: Free from accidental physical injury  Outcome: Met This Shift     Problem: DAILY CARE  Goal: Daily care needs are met  Outcome: Met This Shift     Problem: Skin Integrity:  Goal: Will show no infection signs and symptoms  Description: Will show no infection signs and symptoms  Outcome: Ongoing  Goal: Absence of new skin breakdown  Description: Absence of new skin breakdown  Outcome: Ongoing     Problem: Infection - Surgical Site:  Goal: Will show no infection signs and symptoms  Description: Will show no infection signs and symptoms  Outcome: Ongoing     Problem: Mobility - Impaired:  Goal: Mobility will improve  Description: Mobility will improve  Outcome: Ongoing     Problem: SKIN INTEGRITY  Goal: Skin integrity is maintained or improved  Outcome: Ongoing     Problem: KNOWLEDGE DEFICIT  Goal: Patient/S.O. demonstrates understanding of disease process, treatment plan, medications, and discharge instructions.   Outcome: Ongoing     Problem: DISCHARGE BARRIERS  Goal: Patient's continuum of care needs are met  Outcome: Ongoing     Problem: Pain:  Goal: Pain level will decrease  Description: Pain level will decrease  Outcome: Ongoing  Goal: Control of acute pain  Description: Control of acute pain  Outcome: Ongoing  Goal: Control of chronic pain  Description: Control of chronic pain  Outcome: Ongoing no

## 2020-08-10 NOTE — PROGRESS NOTES
Physical Therapy  Daily Treatment Note  Evaluating Therapist: Jeffry Betts DPT [id number]    NAME: [facility]  ROOM: [address]  DIAGNOSIS: Closed R hip fracture  PRECAUTIONS: Falls, WBAT RLE, BLE BKA, BLE prosthesis   PROCEDURE(S): 7/25 R intramedullary trochanteric nailing    Social:  Pt lives with his wife and sister in law in a single story floor plan with a ramped entry. Pt did state that he also has an entrance other than the ramp that he normally uses which is 3 steps and 1 rail. Prior to admission pt ambulated in home and short distances in community with bilateral canes and bilateral prosthesis independently. Pt states he owns a wheelchair. Initial Evaluation  7/30/20 AM     PM    Short Term Goals Long Term Goals    Was pt agreeable to Eval/treatment? yes yes No - see comments     Does pt have pain? R hip pain yessenia weight bearing No c/o pain      Bed Mobility  Rolling: Min A  Supine to sit: Min A  Sit to supine: Min A  Scooting: SBA to EOB Mod I all aspects  Supervision Mod I   Transfers Sit to stand:  Mod A  Stand to sit: Mod A  Stand pivot: Min A with [employer] Sit to stand: Supervision  Stand to sit: Supervision  Stand pivot: Supervision with [employer]  SBA Supervision   Ambulation   10 feet with [employer] with Min A 150 feet with [employer] Supervision  50 feet with [employer] with SBA >100 feet with [employer] with Supervision   Walking 10 feet on uneven surface NT 20 feet with [employer] Supervision  10 feet with [employer] with SBA 10 feet with [employer] with Supervision   Wheel Chair Mobility 150 feet Supervision 200 feet Supervision  300 feet Supervision 300 feet Mod I   Car Transfers NT SBA  SBA Supervision   Stair negotiation: ascended and descended 1 step with 2 rails with Mod - backward descending 4 steps with bilateral rails with SBA  4 steps with 1 rail with SBA 4 steps with 1 rail with Supervision   Curb Step:   ascended and descended 2 inch step with [employer] and Mod A 4 inch step with [employer] SBA  4 inch step with [employer] and SBA 4 inch step with [employer] and Supervision Picking up object off the floor NT NT      BLE ROM [facility] WFL      BLE Strength R hip grossly 3-/5 (limited exam due to pain)  L hip grossly 4+/5 R hip grossly 3-/5  L hip grossly 4+/5      Balance  Sitting: Supervision  Standing: Min A with [employer] Sitting: Independent  Standing: SBA with [employer]      Date Family Teach Completed TBA TBA      Is additional Family Teaching Needed? Y or N Y Y      Hindering Progress Pain, weakness Pain, weakness      PT recommended ELOS 2-3 weeks       Team's Discharge Plan        Therapist at Team Meeting          Therapeutic exercises  AM:  - Functional mobility as noted above in chart  - Sit<>stand x5 reps from wheelchair to [employer]  - Side stepping in parallel bars, down and back x2 reps    Additional Comments: Pt continues to make steady progress in PT. Pt continuously educated on safety with functional mobility to reduce risk for falls. Pt was advised to continue to use WW versus B single point canes (pt's PLOF PTA) to maximize safety and stability. Pt is reporting a sore throat today which was pt's only c/o of pain. Pt is eager for discharge home and will require supervision upon discharge. Family training session scheduled for day of discharge. Pt declined PM PT session, stating that his throat pain continues to progress and pt was unable to eat lunch. Patient/family education  Pt/family educated on safety with functional mobility    Patient/family response to education:   Pt/family verbalized understanding Pt/family demonstrated skill Pt/family requires further education in this area   yes yes yes     AM  Time in: 1045  Time out: 1130    Pt is making good progress toward established Physical Therapy goals. Continue with physical therapy current plan of care.     Ama Rosenbaum DPT [id number]

## 2020-08-10 NOTE — PROGRESS NOTES
Occupational Therapy  OCCUPATIONAL THERAPY DAILY NOTE    Date:8/10/2020  Patient Name: Eliana Loaiza  MRN: [id number]  : 1929  Room: [address] [place] [address]-A     Referring Practitioner: Joanne Verduzco MD  Diagnosis: R hip fx- s/p cephalo medullary nailing  Additional Pertinent Hx: B BKA- B prosthesis, CKD, HLD, hx prostate ca  Restrictions: Fall Risk, WBAT RLE, B prosthesis    Functional Assessment:   Date Status AE  Comments   Feeding 8/10/20 Mod I     Grooming 8/10/20 Mod I  seated    Bathing 8/10/20 CGA     UB Dressing 8/10/20 s/u     LB Dressing 8/10/20 SBA   Pt. Sat at edge ob bed and donned both prosthetics. Homemaking 20 SBA W/c level       Functional Transfers / Balance:   Date Status DME  Comments   Sit Balance 8/10/2020 Sup     Stand Balance 8/10/2020 SBA   rw  Grab bar    [] Tub  [x] Shower   Transfer 20 Min A  Shower bench with rail  SPT Discussed possibly changing the positioning of his bench at home to increase safety. Commode   Transfer    Toileting 8/10/2020      8/7/20 SBA      S rw  Grab bar  Ambulating with w.w. Functional   Mobility 8/10/2020 SBA rw Short household distance using a rw   Other:  Supine>sit       EOB>w/c    8/10/2020      8/8/20   Mod I       SBA   Bed rail      W/w         SPT     Functional Exercises / Activity:  Pt demo Mod I to go supine>sit. Pt demo SBA LB dressing with increased time to don B prosthesis. Pt demo Sup sit<>stand. Pt demo SBA functional mobility using a rw short household distance. Pt tolerated BUE strengthening exercises using arm bike 8 minutes. Pt also tolerated shoulder ladder using 4# dowel trena 15 times. Pt tolerated FMC/GMC exercises with G- tolerance. Pt demo Sup wc propulsion on the unit. PT demo SBA commode trf using a rw.         Sensory / Neuromuscular Re-Education:      Cognitive Skills:   Status Comments   Problem   Solving fair + During transfers and arm ex's    Memory good - \"   Sequencing fair + \"   Safety fair + \"     Visual Perception:    Education:  Pt educated on safety with commode and shower transfers. [] Family teach completed on:    Pain Level: No pain reported     Additional Notes:  8/2/20:Pt deferred OT ADL this AM initially. Therapist returned ~45 minutes later and pt agreeable to completing UB dressing and grooming only bed level with encouragement d/t pt c/o fatigue. Patient has made good  progress during treatment sessions toward set goals. Therapy emphasis to obtain goals:ADL retraining, transfers training, mobility, there ex, endurance/balance retraining, homemaking & pt/family education      [x] Continue with current OT Plan of care. [] Prepare for Discharge     DISCHARGE RECOMMENDATIONS  Recommended DME:  Wc, rw, 3:1 & tub bench  Post Discharge Care:   []Home Independently  []Home with 24hr Care / Supervision []Home with Partial Supervision []Home with Home Health OT []Home with Out Pt OT []Other: ___   Comments:         Time in Time out Tx Time Breakdown  Variance:   First Session  915 1000 [x] Individual Tx- 45 Mins   [] Concurrent Tx -    [] Co-Tx -   [] Group Tx -   [] Time Missed -     Second Session [id number] [] Individual Tx-   [] Concurrent Tx -  [] Co-Tx -   [] Group Tx -   [x] Time Missed - 45       Pt refused pm OT session due to malaise.  RN ntofied     Third Session    [] Individual Tx-   [] Concurrent Tx -  [] Co-Tx -   [] Group Tx -   [] Time Missed -         Total Tx Time: 45 Mins       Elise Romero  OTR/L [id number]

## 2020-08-10 NOTE — PROGRESS NOTES
[facility] Physical Medicine and Rehabilitation  Comprehensive Progress Note    Subjective:      Abbi Morin is a 80 y.o. male admitted to inpatient rehabilitation for impairments and acitivities limitations in ADLs and mobility secondary to right intertrochanteric hip fracture. .      No acute events overnight. Denies pain. No cp, sob, n/v. Tolerating therapy, progressing. INR now therapeutic. Patient denies complaints. The patient's medical records have been reviewed. Scheduled Meds:warfarin, 10 mg, Daily  gabapentin, 100 mg, TID  docusate sodium, 100 mg, BID  hydroxyurea, 500 mg, Daily  isosorbide mononitrate, 30 mg, Daily  metoprolol succinate, 50 mg, Daily      Continuous Infusions:  PRN Meds:benzocaine-menthol, 1 lozenge, Q1H PRN  oxyCODONE, 5 mg, BID PRN  melatonin, 5 mg, Nightly PRN  magic (miracle) mouthwash, 5 mL, 4x Daily PRN  magnesium hydroxide, 30 mL, Daily PRN  ondansetron, 4 mg, Q6H PRN  acetaminophen, 650 mg, Q4H PRN  polyethylene glycol, 17 g, Daily PRN         Objective:      Vitals:    08/07/20 1630 08/08/20 0745 08/09/20 0810 08/10/20 0715   BP:  (!) 100/57 (!) 97/57 106/82   Pulse: 77 83 78 87   Resp:  20 18 18   Temp:  98.2 °F (36.8 °C) 98.4 °F (36.9 °C) 97.7 °F (36.5 °C)   TempSrc:  Temporal Temporal Temporal   SpO2: 95% 92% 95% 94%   Weight:       Height:         General appearance: alert, appears stated age and cooperative. Resting in bed  Lungs: clear to auscultation bilaterally  Heart: regular rate and rhythm  Abdomen: soft, non-tender; bowel sounds normal; no masses,  no organomegaly  Musculoskeletal: Range of motion impaired RLE, post op precautions. Bilateral BKA. Neurologic: AAOx4. Motor 4/5 residual RLE. Strength otherwise 4-5/5 without focal deficits.        Laboratory:    Lab Results   Component Value Date    WBC 32.2 (H) 08/06/2020    HGB 8.8 (L) 08/06/2020    HCT 28.7 (L) 08/06/2020    .6 (H) 08/06/2020     08/06/2020     Lab Results   Component Value Date     08/06/2020    K 3.9 08/06/2020     08/06/2020    CO2 25 08/06/2020    BUN 25 08/06/2020    CREATININE 1.0 08/06/2020    GLUCOSE 65 08/06/2020    GLUCOSE 94 01/24/2012    CALCIUM 8.6 08/06/2020      Lab Results   Component Value Date    ALT 17 07/24/2020    AST 23 07/24/2020    ALKPHOS 184 (H) 07/24/2020    BILITOT 1.1 07/24/2020     Lab Results   Component Value Date    COLORU Yellow 02/14/2017    NITRU Negative 02/14/2017    GLUCOSEU Negative 02/14/2017    KETUA Negative 02/14/2017    UROBILINOGEN 1.0 02/14/2017    BILIRUBINUR Negative 02/14/2017     Lab Results   Component Value Date    LABA1C 5.1 09/26/2018       Functional Status:      Physical Therapy:  Bed mobility: Mod I  Transfers: Supervision  Ambulation: 150 ft [facility] Supervision      Occupational Therapy:  Feeding: Mod I  Grooming: Mod I  UB dressing: Setup  LB dressing: SBA      Assessment/Plan:       80 y.o. male admitted to inpatient rehabilitation for impairments and acitivities limitations in ADLs and mobility secondary to right intertrochanteric hip fracture. -Right intertrochanteric hip fracture: s/p  right intramedullary trochanteric nail on 7/25/2020. WBAT RLE. Continue Acute Rehab program.  -Acute post operative pain: oxycodone or tylenol PRN. Wean narcotics as tolerated--taking minimally   -Acute blood loss anemia: received 2 units pRBC on 7/27. Monitor H&H, has been stable. -HTN: on Toprol XL. Imdur was held due to low BPs.    -Paroxysmal Afib: Rate controlled. Continue coumadin, now 10mg daily.  INR 2.0 today.   -History of bilateral BKA: previously ambulatory with BLE prosthesis and canes.  -History of myeloproloferative disease   -Constipation: colace, PRNs--resolved   -DVT prophylaxis: on Coumadin as above    Anticipate discharge tomorrow    Electronically signed by Miranda Cerrato MD on 8/10/2020 at 3:26 PM

## 2020-08-11 VITALS
BODY MASS INDEX: 22.09 KG/M2 | DIASTOLIC BLOOD PRESSURE: 48 MMHG | SYSTOLIC BLOOD PRESSURE: 94 MMHG | HEART RATE: 86 BPM | OXYGEN SATURATION: 98 % | TEMPERATURE: 98.3 F | HEIGHT: 69 IN | RESPIRATION RATE: 16 BRPM | WEIGHT: 149.13 LBS

## 2020-08-11 LAB
INR BLD: 2
PROTHROMBIN TIME: 22.6 SEC (ref 9.3–12.4)

## 2020-08-11 PROCEDURE — 99239 HOSP IP/OBS DSCHRG MGMT >30: CPT | Performed by: PHYSICAL MEDICINE & REHABILITATION

## 2020-08-11 PROCEDURE — 97110 THERAPEUTIC EXERCISES: CPT

## 2020-08-11 PROCEDURE — 97530 THERAPEUTIC ACTIVITIES: CPT

## 2020-08-11 PROCEDURE — 6370000000 HC RX 637 (ALT 250 FOR IP): Performed by: FAMILY MEDICINE

## 2020-08-11 PROCEDURE — 85610 PROTHROMBIN TIME: CPT

## 2020-08-11 PROCEDURE — 6370000000 HC RX 637 (ALT 250 FOR IP): Performed by: PHYSICAL MEDICINE & REHABILITATION

## 2020-08-11 PROCEDURE — 36415 COLL VENOUS BLD VENIPUNCTURE: CPT

## 2020-08-11 RX ADMIN — HYDROXYUREA 500 MG: 500 CAPSULE ORAL at 07:42

## 2020-08-11 RX ADMIN — GABAPENTIN 100 MG: 100 CAPSULE ORAL at 07:41

## 2020-08-11 RX ADMIN — BENZOCAINE AND MENTHOL 1 LOZENGE: 15; 3.6 LOZENGE ORAL at 06:27

## 2020-08-11 RX ADMIN — BENZOCAINE AND MENTHOL 1 LOZENGE: 15; 3.6 LOZENGE ORAL at 07:44

## 2020-08-11 RX ADMIN — METOPROLOL SUCCINATE 50 MG: 50 TABLET, EXTENDED RELEASE ORAL at 07:42

## 2020-08-11 ASSESSMENT — PAIN SCALES - GENERAL: PAINLEVEL_OUTOF10: 0

## 2020-08-11 NOTE — PLAN OF CARE
Problem: Skin Integrity:  Goal: Will show no infection signs and symptoms  Description: Will show no infection signs and symptoms  Outcome: Met This Shift  Goal: Absence of new skin breakdown  Description: Absence of new skin breakdown  Outcome: Met This Shift     Problem: Falls - Risk of:  Goal: Will remain free from falls  Description: Will remain free from falls  Outcome: Met This Shift  Goal: Absence of physical injury  Description: Absence of physical injury  Outcome: Met This Shift     Problem: Infection - Surgical Site:  Goal: Will show no infection signs and symptoms  Description: Will show no infection signs and symptoms  Outcome: Met This Shift     Problem: Mobility - Impaired:  Goal: Mobility will improve  Description: Mobility will improve  Outcome: Met This Shift     Problem: SAFETY  Goal: Free from accidental physical injury  Outcome: Met This Shift     Problem: DAILY CARE  Goal: Daily care needs are met  Outcome: Met This Shift     Problem: SKIN INTEGRITY  Goal: Skin integrity is maintained or improved  Outcome: Ongoing     Problem: KNOWLEDGE DEFICIT  Goal: Patient/S.O. demonstrates understanding of disease process, treatment plan, medications, and discharge instructions.   Outcome: Ongoing     Problem: DISCHARGE BARRIERS  Goal: Patient's continuum of care needs are met  Outcome: Ongoing     Problem: Pain:  Goal: Pain level will decrease  Description: Pain level will decrease  Outcome: Ongoing  Goal: Control of acute pain  Description: Control of acute pain  Outcome: Ongoing  Goal: Control of chronic pain  Description: Control of chronic pain  Outcome: Ongoing

## 2020-08-11 NOTE — PROGRESS NOTES
inr is 2.0 today; recommend that he be discharged on coumadin 10 mg qd with recheck of inr on 8-13-20 per nAabela Wayne

## 2020-08-11 NOTE — DISCHARGE SUMMARY
[facility] Physical Medicine and Rehabilitation  Discharge Summary    Date of Rehab Admission:7/29/2020    Date of Discharge: 8/11/2020    Primary Care Physician:Dulce Maria [address] [place] [address],      Attending Physician: Ashutosh Marie MD  Primary Service:  Acute Inpatient Rehabilitation     Primary Diagnosis:  Right intertrochanteric hip fracture  Secondary Diagnosis/es: s/p right intramedullary trochanteric nail, history of bilateral BKA, acute post operative pain, acute blood loss anemia, history of HTN, hypotension, paroxysmal atrial fibrillation, history of myeloproliferative disease, constipation    Consults:   None  Procedures:  None  Significant Radiologic Results:   None    Discharge Disposition:  Home  Condition on Discharge:  Stable. Functionally improved  ===================================================================  Brief History of Present Illness:     Maurice Culp a 80 y. o. male with past medical history significant for bilateral BKA who presented to [facility] on 7/24/2020 due to abnormal outpatient xrays. Patient was previously ambulatory with bilateral lower extremity prosthesis and bilateral canes. He had a fall about 2 weeks prior to admission and since that time had right hip pain with attempts to bear weight through the right leg. He was seen by Orthopedics in the office and xray revealed displaced right intertrochanteric hip fracture. He was admitted and underwent right intramedullary trochanteric nail on 7/25/2020. He is WBAT to the [facility]. Hospital course noted for acute blood loss anemia and he received 2 units pRBC on 7/27/2020. He participated in post operative therapy evaluations and was then admitted for the acute inpatient rehab program.         ===================================================================      Functional Status      Initial Evaluation  7/30/20 8/11/2020       Was pt agreeable to Eval/treatment? yes  yes   Does pt have pain?  R hip pain yessenia weight bearing No c/o pain Bed Mobility  Rolling: Min A  Supine to sit: Min A  Sit to supine: Min A  Scooting: SBA to EOB Mod I all aspects   Transfers Sit to stand: Mod A  Stand to sit: Mod A  Stand pivot: Min A with [employer] Sit to stand: Supervision  Stand to sit: Supervision  Stand pivot: Supervision with [employer]   Ambulation   10 feet with [employer] with Min A 150 feet with [employer] Supervision   Walking 10 feet on uneven surface NT 20 feet with [employer] Supervision   Wheel Chair Mobility 150 feet Supervision 200 feet Supervision   Car Transfers NT SBA   Stair negotiation: ascended and descended 1 step with 2 rails with Mod - backward descending 4 steps with bilateral rails with SBA   Curb Step:   ascended and descended 2 inch step with [employer] and Mod A 4 inch step with [employer] SBA         Functional Assessment:      Date   Status   AE    Comments     Feeding   8/10/20   Mod I             Grooming   8/10/20   Mod I    seated         Bathing   8/10/20   CGA             UB Dressing   8/10/20   s/u             LB Dressing   8/11/20   SBA      Pt. Sat at edge ob bed and donned both prosthetics. Homemaking   8/4/20   SBA   W/c level                Pertinent Discharge Physical Examination  General appearance: alert, NAD  Lungs: clear to auscultation bilaterally  Heart: regular rate and rhythm  Abdomen: soft, non-tender; bowel sounds normal; no masses,  no organomegaly  Musculoskeletal: Range of motion impaired RLE, post op precautions. Bilateral BKA, well healed. Neurologic: AAOx4. Motor 4/5 throughout without focal deficits. Hospital Course   Functional and mobility deficits secondary to right intertrochanteric hip fracture, s/p  right intramedullary trochanteric nail :  The patient completed an intensive inpatient rehabilitation program including PT, OT,  RT and achieved significant improvement in function as documented above.   Recommended continued therapies are documented below.      -Right intertrochanteric hip fracture: s/p right intramedullary trochanteric nail on 7/25/2020. WBAT RLE. Completed Acute Rehab program with functional progress as above.  -Acute post operative pain: discharge with [place] PRN. Wean narcotics as tolerated--taking minimal pain medications   -Acute blood loss anemia: received 2 units pRBC on 7/27. H&H has been stable. -HTN: on Toprol XL. Imdur was held due to low BPs.    -Paroxysmal Afib: Rate controlled. Continue coumadin, now 10mg daily. INR therapeutic. Home health to draw INR checks with results to Dr. Jordin Chávez.   -History of bilateral BKA: ambulatory with BLE prosthesis and canes.  -History of myeloproloferative disease   -Constipation: utilized colace, PRNs--now resolved      ==================================================================  Discharge Medications     Medication List      CHANGE how you take these medications    gabapentin 100 MG capsule  Commonly known as:  NEURONTIN  Take 1 capsule by mouth 3 times daily for 30 days. What changed:    · medication strength  · how much to take  · when to take this  · reasons to take this     HYDROcodone-acetaminophen 5-325 MG per tablet  Commonly known as:  Norco  Take 1 tablet by mouth every 6 hours as needed for Pain for up to 5 days. Intended supply: 5 days. Take lowest dose possible to manage pain  What changed:  additional instructions     nitroGLYCERIN 0.4 MG SL tablet  Commonly known as:  NITROSTAT  up to max of 3 total doses. If no relief after 1 dose, call 911. What changed:    · how much to take  · how to take this  · when to take this  · reasons to take this     warfarin 10 MG tablet  Commonly known as:  COUMADIN  Take as directed. If you are unsure how to take this medication, talk to your nurse or doctor. Original instructions:   Take 1 tablet by mouth daily  What changed:    · medication strength  · how much to take        CONTINUE taking these medications    atorvastatin 40 MG tablet  Commonly known as:  LIPITOR  Take 1 tablet by mouth nightly     Co Q-10 100 MG Caps

## 2020-08-11 NOTE — PROGRESS NOTES
DISCHARGE SUMMARY    Group interaction skills/socialization:  Fabi Iniguez displayed social interaction skills at the complete independence. Leisure participation/awareness:  Fabi Iniguez participated in 3 therapeutic recreation interventions identifying 4 benefits to leisure participation.     Other:     Outcomes: goals achieved      Electronically signed by Kelin Powell on 8/11/2020 at 12:50 PM

## 2020-08-11 NOTE — PROGRESS NOTES
Occupational Therapy  OCCUPATIONAL THERAPY DAILY NOTE/Discharge Summary     Date:2020  Patient Name: Marylene Cain  MRN: [id number]  : 1929  Room: [address] [place], [place] [address]     Referring Practitioner: Eleonora Lara MD  Diagnosis: R hip fx- s/p cephalo medullary nailing  Additional Pertinent Hx: B BKA- B prosthesis, CKD, HLD, hx prostate ca  Restrictions: Fall Risk, WBAT RLE, B prosthesis    Functional Assessment:   Date Status AE  Comments   Feeding 8/10/20 Mod I     Grooming 8/10/20 Mod I  seated    Bathing 8/10/20 CGA     UB Dressing 8/10/20 s/u     LB Dressing 20 SBA   Pt. Sat at edge of bed and donned both prosthetics. Homemaking 20 SBA W/c level       Functional Transfers / Balance:   Date Status DME  Comments   Sit Balance 8/10/2020 Sup     Stand Balance 8/10/2020 SBA   rw  Grab bar    [] Tub  [x] Shower   Transfer 20 CGA  Shower bench with rail  Step in method into walk in shower with vc's for safety and impulsivity   Commode   Transfer    Toileting 2020 SBA      S rw  Grab bar  Ambulating with w.w. Functional   Mobility 2020 SBA rw To/from bathroom and around OT apartment   Other:  Supine>sit       EOB>w/c    8/10/2020      8/8/20   Mod I       SBA   Bed rail      W/w         SPT     Functional Exercises / Activity:  Completed family teach this AM, see below for assessment. Pt completed w/c propulsion to/from OT gym to increase BUE strength for ease with functional transfers. Engaged in therex using green brian, 3x15 reps (supination/pronation) to increase BUE strength for ease with functional transfers. Engaged in therex using rickshaw, 3x15 reps, 60# to increase BUE strength for ease with functional transfers.       Sensory / Neuromuscular Re-Education:      Cognitive Skills:   Status Comments   Problem   Solving fair + During transfers and arm ex's    Memory good - \"   Sequencing fair + \"   Safety fair + \"     Visual Perception:    Education:  Pt educated on safety with term goal 5: Pt demo SBA commode trf with appropriate DME to ensure pt safety  Long term goals 6: Pt demo SBA walk in shower trf wtih appropriate DME to ensure pt safety  Long term goal 7: Pt demo G- endurance for a 20-30 minute functional activity  Long term goal 8: Pt demo imrpoved  & FMC/dexterity as evidenced by gains made in 9-hole & dynamometer (eval- R  36# & norm for pt age & gender is 66# & L hand 45# & norm for pt age & gender is 55#; 9-hole peg test- R hand 28.1 seconds & norm for pt age & gender is 25.8 seconds & L hand 34.7 seconds & norm for pt age & gender is 26.0 seconds)  Long term goal 9: Pt demo Mod I light snack prep & demo G safety    The Patient has received education on:  [x]Transfer Safety [x]Compensatory tech for ADLs []AE training [x]DME training [x]Energy Conservation [x]Home / Kitchen Safety  [x]Fall Prevention  []Other:    Family training was completed: yes    Recommended DME:  Wc, rw, 3:1 & tub bench  Post Discharge Care:   []Home Independently  []Home with 24hr Care / Supervision [x]Home with Partial Supervision []Home with Home Health OT []Home with Out Pt OT []Other: ___   Comments:         Time in Time out Tx Time Breakdown  Variance:   First Session  9:15 10:00 [x] Individual Tx- 45  [] Concurrent Tx -    [] Co-Tx -   [] Group Tx -   [] Time Missed -     Second Session   [] Individual Tx-   [] Concurrent Tx -  [] Co-Tx -   [] Group Tx -   [] Time Missed -             Third Session    [] Individual Tx-   [] Concurrent Tx -  [] Co-Tx -   [] Group Tx -   [] Time Missed -         Total Tx Time: [address], [address] [place], OTR/L [id number]

## 2020-08-11 NOTE — PROGRESS NOTES
CLINICAL PHARMACY NOTE: MEDS TO [address] [facility] Patient?: No  Total # of Prescriptions Filled: 3   The following medications were delivered to the patient:  · Gabapentin 100 mg  · Warfarin 10 mg  · norco 5-325 mg  Total # of Interventions Completed: 3  Time Spent (min): 30    Additional Documentation:

## 2020-08-11 NOTE — PLAN OF CARE
Problem: Skin Integrity:  Goal: Will show no infection signs and symptoms  Description: Will show no infection signs and symptoms  8/11/2020 0904 by Sonja Ramirez RN  Outcome: Completed  8/11/2020 0902 by Sonja Ramirez RN  Outcome: Met This Shift  Goal: Absence of new skin breakdown  Description: Absence of new skin breakdown  8/11/2020 0904 by Sonja Ramirez RN  Outcome: Completed  8/11/2020 0902 by Sonja Ramirez RN  Outcome: Met This Shift     Problem: Falls - Risk of:  Goal: Will remain free from falls  Description: Will remain free from falls  8/11/2020 0904 by Sonja Ramirez RN  Outcome: Completed  8/11/2020 0902 by Sonja Ramirez RN  Outcome: Met This Shift  Goal: Absence of physical injury  Description: Absence of physical injury  8/11/2020 0904 by Sonja Ramirez RN  Outcome: Completed  8/11/2020 0902 by Sonja Ramirez RN  Outcome: Met This Shift     Problem: Infection - Surgical Site:  Goal: Will show no infection signs and symptoms  Description: Will show no infection signs and symptoms  8/11/2020 0904 by Sonja Ramirez RN  Outcome: Completed  8/11/2020 0902 by Sonja Ramirez RN  Outcome: Met This Shift     Problem: Mobility - Impaired:  Goal: Mobility will improve  Description: Mobility will improve  8/11/2020 0904 by Sonja Ramirez RN  Outcome: Completed  8/11/2020 0902 by Sonja Ramirez RN  Outcome: Met This Shift     Problem: SAFETY  Goal: Free from accidental physical injury  8/11/2020 0904 by Sonja Ramirez RN  Outcome: Completed  8/11/2020 0902 by Sonja Ramirez RN  Outcome: Met This Shift     Problem: DAILY CARE  Goal: Daily care needs are met  8/11/2020 0904 by Sonja Ramirez RN  Outcome: Completed  8/11/2020 0902 by Sonja Ramirez RN  Outcome: Met This Shift     Problem: SKIN INTEGRITY  Goal: Skin integrity is maintained or improved  8/11/2020 0904 by Sonja Ramirez RN  Outcome: Completed  8/11/2020 0902 by Sonja Ramirez RN  Outcome: Ongoing     Problem: KNOWLEDGE DEFICIT  Goal: Patient/S.O. demonstrates understanding of disease process, treatment plan, medications, and discharge instructions.   8/11/2020 0904 by Brea Ulrich RN  Outcome: Completed  8/11/2020 0902 by Brea Ulrich RN  Outcome: Ongoing     Problem: DISCHARGE BARRIERS  Goal: Patient's continuum of care needs are met  8/11/2020 0904 by Brea Ulrich RN  Outcome: Completed  8/11/2020 0902 by Brea Ulrich RN  Outcome: Ongoing     Problem: Pain:  Goal: Pain level will decrease  Description: Pain level will decrease  8/11/2020 0904 by Brea Ulrich RN  Outcome: Completed  8/11/2020 0902 by Brea Ulrich RN  Outcome: Ongoing  Goal: Control of acute pain  Description: Control of acute pain  8/11/2020 0904 by Brea Ulrich RN  Outcome: Completed  8/11/2020 0902 by Brea Ulrich RN  Outcome: Ongoing  Goal: Control of chronic pain  Description: Control of chronic pain  8/11/2020 0904 by Brea Ulrich RN  Outcome: Completed  8/11/2020 0902 by Brea Ulrich RN  Outcome: Ongoing

## 2020-08-11 NOTE — PROGRESS NOTES
Discharge instructions reviewed with pt and wife with full understanding. Staples removed from R hip. Wound on buttocks assessed and is healed. Foam dressing remove.

## 2020-08-18 ENCOUNTER — HOSPITAL ENCOUNTER (OUTPATIENT)
Age: 85
Discharge: HOME | End: 2020-08-20
Payer: MEDICARE

## 2020-08-18 PROBLEM — Z87.81 S/P RIGHT HIP FRACTURE: Status: ACTIVE | Noted: 2020-08-18

## 2020-08-18 PROBLEM — J44.1 COPD EXACERBATION (HCC): Status: ACTIVE | Noted: 2020-08-18

## 2020-08-18 LAB
ALBUMIN SERPL-MCNC: 4.1 G/DL (ref 3.5–5.2)
ALP BLD-CCNC: 108 U/L (ref 40–129)
ALT SERPL-CCNC: 12 U/L (ref 0–40)
ANION GAP SERPL CALCULATED.3IONS-SCNC: 22 MMOL/L (ref 7–16)
ANISOCYTOSIS: ABNORMAL
AST SERPL-CCNC: 31 U/L (ref 0–39)
BASOPHILS ABSOLUTE: 0.36 E9/L (ref 0–0.2)
BASOPHILS RELATIVE PERCENT: 0.9 % (ref 0–2)
BILIRUB SERPL-MCNC: 0.4 MG/DL (ref 0–1.2)
BLASTS RELATIVE PERCENT: 0.9 % (ref 0–0)
BUN BLDV-MCNC: 21 MG/DL (ref 8–23)
BURR CELLS: ABNORMAL
CALCIUM SERPL-MCNC: 9.1 MG/DL (ref 8.6–10.2)
CHLORIDE BLD-SCNC: 101 MMOL/L (ref 98–107)
CO2: 19 MMOL/L (ref 22–29)
CREAT SERPL-MCNC: 1.1 MG/DL (ref 0.7–1.2)
EOSINOPHILS ABSOLUTE: 0.36 E9/L (ref 0.05–0.5)
EOSINOPHILS RELATIVE PERCENT: 0.9 % (ref 0–6)
GFR AFRICAN AMERICAN: >60
GFR NON-AFRICAN AMERICAN: >60 ML/MIN/1.73
GLUCOSE BLD-MCNC: 37 MG/DL (ref 74–99)
HCT VFR BLD CALC: 42.6 % (ref 37–54)
HEMOGLOBIN: 12.1 G/DL (ref 12.5–16.5)
INR BLD: 8.3
LYMPHOCYTES ABSOLUTE: 2.38 E9/L (ref 1.5–4)
LYMPHOCYTES RELATIVE PERCENT: 6.1 % (ref 20–42)
MCH RBC QN AUTO: 36.6 PG (ref 26–35)
MCHC RBC AUTO-ENTMCNC: 28.4 % (ref 32–34.5)
MCV RBC AUTO: 128.7 FL (ref 80–99.9)
MONOCYTES ABSOLUTE: 4.75 E9/L (ref 0.1–0.95)
MONOCYTES RELATIVE PERCENT: 12.2 % (ref 2–12)
NEUTROPHILS ABSOLUTE: 31.28 E9/L (ref 1.8–7.3)
NEUTROPHILS RELATIVE PERCENT: 79.1 % (ref 43–80)
OVALOCYTES: ABNORMAL
PDW BLD-RTO: 20.7 FL (ref 11.5–15)
PLATELET # BLD: 304 E9/L (ref 130–450)
PMV BLD AUTO: 12.8 FL (ref 7–12)
POIKILOCYTES: ABNORMAL
POLYCHROMASIA: ABNORMAL
POTASSIUM SERPL-SCNC: 4.5 MMOL/L (ref 3.5–5)
PROTHROMBIN TIME: 96.8 SEC (ref 9.3–12.4)
RBC # BLD: 3.31 E12/L (ref 3.8–5.8)
SODIUM BLD-SCNC: 142 MMOL/L (ref 132–146)
TOTAL PROTEIN: 6.8 G/DL (ref 6.4–8.3)
WBC # BLD: 39.6 E9/L (ref 4.5–11.5)

## 2020-08-18 PROCEDURE — 85025 COMPLETE CBC W/AUTO DIFF WBC: CPT

## 2020-08-18 PROCEDURE — 80053 COMPREHEN METABOLIC PANEL: CPT

## 2020-08-18 PROCEDURE — 85610 PROTHROMBIN TIME: CPT

## 2020-08-29 NOTE — DISCHARGE SUMMARY
[address]                  [address] [place],  [address]                               DISCHARGE SUMMARY    PATIENT NAME: Amarilis Rhoades                  :        1929  MED REC NO:   [id number]                            ROOM:       4360  ACCOUNT NO:   [de-identified]                           ADMIT DATE: 2020  PROVIDER:     Vernon De La O DO                  DISCHARGE DATE:  2020    FINAL DIAGNOSES:  1. Closed, nondisplaced intertrochanteric fracture of the right femur. 2.  Acute postop blood loss anemia. 3.  Moderate aortic stenosis. 4.  Paroxysmal atrial fibrillation. 5.  History of myeloproliferative disorder. CHIEF COMPLAINT, PRESENTING ILLNESS, PHYSICAL FINDINGS:  The patient is  an elderly male with a history of moderate aortic stenosis, paroxysmal  atrial fibrillation, history of myeloproliferative disorder, and  peripheral arterial disease with bilateral below-the-knee amputations  who presents to this hospital emergency room with pain in his right hip  region. The patient apparently had fallen exercising and imaging  studies performed on admission revealed a closed, nondisplaced  intertrochanteric fracture of the right femur. He was admitted and  orthopedic consultation was requested. PHYSICAL ASSESSMENT:  VITAL SIGNS:  Reveal his temperature and vital signs to be stable. ENT:  Negative. HEART:  Regular. LUNGS:  Clear. ABDOMEN:  Noted to be soft with positive bowel sounds. EXTREMITIES:  Reveal no evidence for edema and/or cyanosis. HOSPITAL COURSE:  He underwent repair of his intertrochanteric fracture  of the right femur, postoperatively encountered acute blood loss anemia. He was treated accordingly. He remained hemodynamically stable. He was  deemed an appropriate candidate for acute rehab and was discharged there  in satisfactory condition.     [address] [employer] STAY:  Orthopedic  Surgery. DISCHARGE CONDITION:  Level of function is fair. UNREPORTED TEST RESULTS AND INTENDED FOLLOWUP:  Nonapplicable. LEVEL OF ACTIVITY:  To be determined by acute rehab. DISCHARGE DIET:  Includes low-fat, low-cholesterol, 2-gm sodium diet. DISCHARGE MEDICATIONS:  See discharge med reconciliation form. FOLLOWUP:  Will be upon his discharge from acute rehab at [address] [place] [address].         Mariia Slaughter DO    D: 08/28/2020 18:57:10       T: 08/28/2020 19:06:47     [email or web address]  Job#: [id number]     Doc#: [id number]    CC:

## 2020-09-15 ENCOUNTER — HOSPITAL ENCOUNTER (OUTPATIENT)
Age: 85
Discharge: HOME | End: 2020-09-17
Payer: MEDICARE

## 2020-09-15 LAB
ALBUMIN SERPL-MCNC: 4.1 G/DL (ref 3.5–5.2)
ALP BLD-CCNC: 87 U/L (ref 40–129)
ALT SERPL-CCNC: 9 U/L (ref 0–40)
ANION GAP SERPL CALCULATED.3IONS-SCNC: 12 MMOL/L (ref 7–16)
AST SERPL-CCNC: 19 U/L (ref 0–39)
BILIRUB SERPL-MCNC: 0.7 MG/DL (ref 0–1.2)
BUN BLDV-MCNC: 20 MG/DL (ref 8–23)
CALCIUM SERPL-MCNC: 9.1 MG/DL (ref 8.6–10.2)
CHLORIDE BLD-SCNC: 106 MMOL/L (ref 98–107)
CHOLESTEROL, TOTAL: 89 MG/DL (ref 0–199)
CO2: 25 MMOL/L (ref 22–29)
CREAT SERPL-MCNC: 1 MG/DL (ref 0.7–1.2)
GFR AFRICAN AMERICAN: >60
GFR NON-AFRICAN AMERICAN: >60 ML/MIN/1.73
GLUCOSE BLD-MCNC: 89 MG/DL (ref 74–99)
HDLC SERPL-MCNC: 28 MG/DL
INR BLD: 4.2
LDL CHOLESTEROL CALCULATED: 40 MG/DL (ref 0–99)
POTASSIUM SERPL-SCNC: 4.2 MMOL/L (ref 3.5–5)
PROTHROMBIN TIME: 49.3 SEC (ref 9.3–12.4)
SODIUM BLD-SCNC: 143 MMOL/L (ref 132–146)
TOTAL PROTEIN: 6.6 G/DL (ref 6.4–8.3)
TRIGL SERPL-MCNC: 107 MG/DL (ref 0–149)
VLDLC SERPL CALC-MCNC: 21 MG/DL

## 2020-09-15 PROCEDURE — 85610 PROTHROMBIN TIME: CPT

## 2020-09-15 PROCEDURE — 80053 COMPREHEN METABOLIC PANEL: CPT

## 2020-09-15 PROCEDURE — 80061 LIPID PANEL: CPT

## 2020-09-15 PROCEDURE — 85025 COMPLETE CBC W/AUTO DIFF WBC: CPT

## 2020-09-16 LAB
ANISOCYTOSIS: ABNORMAL
ATYPICAL LYMPHOCYTE RELATIVE PERCENT: 0.9 % (ref 0–4)
BASOPHILS ABSOLUTE: 0 E9/L (ref 0–0.2)
BASOPHILS RELATIVE PERCENT: 0.4 % (ref 0–2)
BURR CELLS: ABNORMAL
EOSINOPHILS ABSOLUTE: 0 E9/L (ref 0.05–0.5)
EOSINOPHILS RELATIVE PERCENT: 1 % (ref 0–6)
HCT VFR BLD CALC: 43.2 % (ref 37–54)
HEMOGLOBIN: 12.8 G/DL (ref 12.5–16.5)
HYPOCHROMIA: ABNORMAL
LYMPHOCYTES ABSOLUTE: 1.91 E9/L (ref 1.5–4)
LYMPHOCYTES RELATIVE PERCENT: 5.3 % (ref 20–42)
MCH RBC QN AUTO: 36.9 PG (ref 26–35)
MCHC RBC AUTO-ENTMCNC: 29.6 % (ref 32–34.5)
MCV RBC AUTO: 124.5 FL (ref 80–99.9)
MONOCYTES ABSOLUTE: 3.5 E9/L (ref 0.1–0.95)
MONOCYTES RELATIVE PERCENT: 11.4 % (ref 2–12)
NEUTROPHILS ABSOLUTE: 26.39 E9/L (ref 1.8–7.3)
NEUTROPHILS RELATIVE PERCENT: 82.5 % (ref 43–80)
NUCLEATED RED BLOOD CELLS: 0.9 /100 WBC
OVALOCYTES: ABNORMAL
PDW BLD-RTO: 19.9 FL (ref 11.5–15)
PLATELET # BLD: 148 E9/L (ref 130–450)
PMV BLD AUTO: 13.5 FL (ref 7–12)
POIKILOCYTES: ABNORMAL
RBC # BLD: 3.47 E12/L (ref 3.8–5.8)
WBC # BLD: 31.8 E9/L (ref 4.5–11.5)

## 2020-11-03 PROBLEM — I73.9 PERIPHERAL ARTERIAL DISEASE (HCC): Status: RESOLVED | Noted: 2020-11-03 | Resolved: 2020-11-03

## 2020-11-24 ENCOUNTER — OFFICE VISIT (OUTPATIENT)
Dept: CARDIOLOGY CLINIC | Age: 85
End: 2020-11-24
Payer: MEDICARE

## 2020-11-24 VITALS
RESPIRATION RATE: 16 BRPM | HEART RATE: 81 BPM | BODY MASS INDEX: 21.24 KG/M2 | SYSTOLIC BLOOD PRESSURE: 102 MMHG | WEIGHT: 143.4 LBS | HEIGHT: 69 IN | DIASTOLIC BLOOD PRESSURE: 60 MMHG

## 2020-11-24 PROCEDURE — 93000 ELECTROCARDIOGRAM COMPLETE: CPT | Performed by: INTERNAL MEDICINE

## 2020-11-24 PROCEDURE — 4040F PNEUMOC VAC/ADMIN/RCVD: CPT | Performed by: INTERNAL MEDICINE

## 2020-11-24 PROCEDURE — 1036F TOBACCO NON-USER: CPT | Performed by: INTERNAL MEDICINE

## 2020-11-24 PROCEDURE — G8420 CALC BMI NORM PARAMETERS: HCPCS | Performed by: INTERNAL MEDICINE

## 2020-11-24 PROCEDURE — G8427 DOCREV CUR MEDS BY ELIG CLIN: HCPCS | Performed by: INTERNAL MEDICINE

## 2020-11-24 PROCEDURE — 1123F ACP DISCUSS/DSCN MKR DOCD: CPT | Performed by: INTERNAL MEDICINE

## 2020-11-24 PROCEDURE — 99213 OFFICE O/P EST LOW 20 MIN: CPT | Performed by: INTERNAL MEDICINE

## 2020-11-24 PROCEDURE — G8482 FLU IMMUNIZE ORDER/ADMIN: HCPCS | Performed by: INTERNAL MEDICINE

## 2020-11-24 RX ORDER — ISOSORBIDE MONONITRATE 30 MG/1
TABLET, EXTENDED RELEASE ORAL
COMMUNITY
Start: 2020-11-07 | End: 2021-01-01 | Stop reason: SDUPTHER

## 2020-11-24 NOTE — PROGRESS NOTES
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 ([facility])    Critical lower limb ischemia    Hx of right BKA ([facility])    Hyperlipidemia    Hypertension    Lymphocytosis    Skin ulcer of left knee with fat layer exposed ([facility]    Syncope and collapse    Wound infection    Blurry vision, bilateral    PAF (paroxysmal atrial fibrillation) ([facility])    Anticoagulated on Coumadin    Aortic stenosis, moderate    Sinus node dysfunction ([facility]    Sepsis due to pneumonia ([facility])    Chest pain    Precordial pain    Acute rhinitis    Closed nondisplaced intertrochanteric fracture of right femur ([facility])    Aortic valve disease    Closed right hip fracture, initial encounter ([facility])    Moderate protein-calorie malnutrition ([facility])    S/P right hip fracture    COPD exacerbation ([facility])       Current Outpatient Medications   Medication Sig Dispense Refill    isosorbide mononitrate (IMDUR) 30 MG extended release tablet       mirtazapine (REMERON) 7.5 MG tablet Take 1 tablet by mouth nightly 30 tablet 3    warfarin (COUMADIN) 10 MG tablet Take 1 tablet by mouth daily (Patient taking differently: Take 5 mg by mouth daily ) 30 tablet 0    gabapentin (NEURONTIN) 100 MG capsule Take 1 capsule by mouth 3 times daily for 30 days.  (Patient taking differently: Take 100 mg by mouth 2 times daily. ) 90 capsule 0    atorvastatin (LIPITOR) 40 MG tablet Take 1 tablet by mouth nightly 90 tablet 1    Cholecalciferol (VITAMIN D3) 2000 units CAPS Take by mouth daily      Coenzyme Q10 (CO Q-10) 100 MG CAPS Take by mouth daily      metoprolol succinate (TOPROL XL) 50 MG extended release tablet Take 1 tablet by mouth daily 30 tablet 3    Melatonin 5 MG CAPS Take 1 capsule by mouth nightly as needed      pantoprazole (PROTONIX) 40 MG tablet Take 40 mg by mouth every morning (before breakfast)      nitroGLYCERIN (NITROSTAT) 0.4 MG SL tablet up to max of 3 total doses. If no relief after 1 dose, call 911. (Patient taking differently: Place 0.4 mg under the tongue every 5 minutes as needed for Chest pain up to max of 3 total doses. If no relief after 1 dose, call 911.) 25 tablet 3    hydroxyurea (HYDREA) 500 MG chemo capsule Take 500 mg by mouth every morning       sennosides-docusate sodium (SENOKOT-S) 8.6-50 MG tablet Take 1 tablet by mouth 2 times daily (Patient not taking: Reported on 11/24/2020) 30 tablet 0     No current facility-administered medications for this visit. CC:    Patient is seen in follow up for:  1. Aortic stenosis, moderate    2. Anticoagulant long-term use    3. PAF (paroxysmal atrial fibrillation) ([facility]    4. Warfarin anticoagulation    5. Pure hypercholesterolemia    6. Essential hypertension        HPI:  Patient is doing well since his recent hospitalization for chest pain. He denies any further chest pain or anginal-like symptoms. He has no unusual shortness of breath lightheadedness or dizziness. He is tolerating his current medications well without side effects. History per patient and his wife.     ROS:   General: No unusual weight gain, no change in exercise tolerance  Skin: No rash or itching  EENT: No vision changes or nosebleeds  Cardiovascular: No orthopnea or paroxysmal nocturnal dyspnea  Respiratory: No cough or hemoptysis  Gastrointestinal: No hematemesis or recent changes in bowel habits  Genitourinary: No hematuria, urgency or frequency  Musculoskeletal: No muscular weakness or joint swelling   Neurologic / Psychiatric: No incoordination or convulsions  Allergic / Immunologic/ Lymphatic / Endocrine: No anemia or bleeding tendency    Social History Socioeconomic History    Marital status:      Spouse name: Not on file    Number of children: Not on file    Years of education: Not on file    Highest education level: Not on file   Occupational History    Not on file   Social Needs    Financial resource strain: Not on file    Food insecurity     Worry: Not on file     Inability: Not on file    Transportation needs     Medical: Not on file     Non-medical: Not on file   Tobacco Use    Smoking status: Former Smoker     Packs/day: 1.00     Years: 30.00     Pack years: 30.00     Types: Cigars, Cigarettes     Start date:      Last attempt to quit: 1979     Years since quittin.2    Smokeless tobacco: Never Used   Substance and Sexual Activity    Alcohol use:  Yes     Alcohol/week: 5.0 standard drinks     Types: 5 Glasses of wine per week    Drug use: Yes     Types: Marijuana    Sexual activity: Never   Lifestyle    Physical activity     Days per week: Not on file     Minutes per session: Not on file    Stress: Not on file   Relationships    Social connections     Talks on phone: Not on file     Gets together: Not on file     Attends Nondenominational service: Not on file     Active member of club or organization: Not on file     Attends meetings of clubs or organizations: Not on file     Relationship status: Not on file    Intimate partner violence     Fear of current or ex partner: Not on file     Emotionally abused: Not on file     Physically abused: Not on file     Forced sexual activity: Not on file   Other Topics Concern    Not on file   Social History Narrative    Not on file       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

## 2021-01-01 ENCOUNTER — APPOINTMENT (OUTPATIENT)
Dept: GENERAL RADIOLOGY | Age: 86
DRG: 207 | End: 2021-01-01
Payer: MEDICARE

## 2021-01-01 ENCOUNTER — APPOINTMENT (OUTPATIENT)
Dept: CT IMAGING | Age: 86
DRG: 207 | End: 2021-01-01
Payer: MEDICARE

## 2021-01-01 ENCOUNTER — HOSPITAL ENCOUNTER (INPATIENT)
Age: 86
LOS: 11 days | DRG: 207 | End: 2021-12-22
Attending: EMERGENCY MEDICINE | Admitting: INTERNAL MEDICINE
Payer: MEDICARE

## 2021-01-01 ENCOUNTER — HOSPITAL ENCOUNTER (OUTPATIENT)
Age: 86
Discharge: HOME | End: 2021-08-27

## 2021-01-01 ENCOUNTER — HOSPITAL ENCOUNTER (OUTPATIENT)
Dept: INFUSION THERAPY | Age: 86
Setting detail: INFUSION SERIES
Discharge: HOME | End: 2021-12-10

## 2021-01-01 VITALS
BODY MASS INDEX: 21.88 KG/M2 | WEIGHT: 147.71 LBS | DIASTOLIC BLOOD PRESSURE: 43 MMHG | SYSTOLIC BLOOD PRESSURE: 66 MMHG | HEIGHT: 69 IN | TEMPERATURE: 99.7 F

## 2021-01-01 VITALS
OXYGEN SATURATION: 93 % | RESPIRATION RATE: 18 BRPM | DIASTOLIC BLOOD PRESSURE: 62 MMHG | SYSTOLIC BLOOD PRESSURE: 120 MMHG | TEMPERATURE: 98.3 F | HEART RATE: 90 BPM

## 2021-01-01 DIAGNOSIS — J06.9 ACUTE RESPIRATORY DISEASE DUE TO COVID-19 VIRUS: Primary | ICD-10-CM

## 2021-01-01 DIAGNOSIS — N17.9 AKI (ACUTE KIDNEY INJURY) (HCC): ICD-10-CM

## 2021-01-01 DIAGNOSIS — U07.1 ACUTE RESPIRATORY DISEASE DUE TO COVID-19 VIRUS: Primary | ICD-10-CM

## 2021-01-01 LAB
AADO2: 205 MMHG
AADO2: 239.8 MMHG
AADO2: 256.4 MMHG
AADO2: 308.3 MMHG
AADO2: 455.1 MMHG
AADO2: 463.2 MMHG
AADO2: 599.1 MMHG
ABO/RH: NORMAL
ALBUMIN SERPL-MCNC: 2.1 G/DL (ref 3.5–5.2)
ALBUMIN SERPL-MCNC: 2.4 G/DL (ref 3.5–5.2)
ALBUMIN SERPL-MCNC: 3.4 G/DL (ref 3.5–5.2)
ALBUMIN SERPL-MCNC: 3.6 G/DL (ref 3.5–5.2)
ALP BLD-CCNC: 130 U/L (ref 40–129)
ALP BLD-CCNC: 195 U/L (ref 40–129)
ALP BLD-CCNC: 59 U/L (ref 40–129)
ALP BLD-CCNC: 82 U/L (ref 40–129)
ALP BLD-CCNC: 89 U/L (ref 40–129)
ALT SERPL-CCNC: 15 U/L (ref 0–40)
ALT SERPL-CCNC: <5 U/L (ref 0–40)
ANION GAP SERPL CALCULATED.3IONS-SCNC: 14 MMOL/L (ref 7–16)
ANION GAP SERPL CALCULATED.3IONS-SCNC: 14 MMOL/L (ref 7–16)
ANION GAP SERPL CALCULATED.3IONS-SCNC: 16 MMOL/L (ref 7–16)
ANION GAP SERPL CALCULATED.3IONS-SCNC: 17 MMOL/L (ref 7–16)
ANION GAP SERPL CALCULATED.3IONS-SCNC: 20 MMOL/L (ref 7–16)
ANION GAP SERPL CALCULATED.3IONS-SCNC: 20 MMOL/L (ref 7–16)
ANION GAP SERPL CALCULATED.3IONS-SCNC: 21 MMOL/L (ref 7–16)
ANION GAP SERPL CALCULATED.3IONS-SCNC: 21 MMOL/L (ref 7–16)
ANION GAP SERPL CALCULATED.3IONS-SCNC: 22 MMOL/L (ref 7–16)
ANION GAP SERPL CALCULATED.3IONS-SCNC: 23 MMOL/L (ref 7–16)
ANISOCYTOSIS: ABNORMAL
ANTIBODY SCREEN: NORMAL
AST SERPL-CCNC: 137 U/L (ref 0–39)
AST SERPL-CCNC: 157 U/L (ref 0–39)
AST SERPL-CCNC: 159 U/L (ref 0–39)
AST SERPL-CCNC: 171 U/L (ref 0–39)
AST SERPL-CCNC: 26 U/L (ref 0–39)
ATYPICAL LYMPHOCYTE RELATIVE PERCENT: 3 % (ref 0–4)
B.E.: -1.5 MMOL/L (ref -3–3)
B.E.: -1.9 MMOL/L (ref -3–3)
B.E.: -13.2 MMOL/L (ref -3–3)
B.E.: -4.5 MMOL/L (ref -3–3)
B.E.: -6.2 MMOL/L (ref -3–3)
B.E.: -9.1 MMOL/L (ref -3–3)
B.E.: -9.2 MMOL/L (ref -3–3)
BASOPHILIC STIPPLING: ABNORMAL
BASOPHILS ABSOLUTE: 0 E9/L (ref 0–0.2)
BASOPHILS ABSOLUTE: 0.38 E9/L (ref 0–0.2)
BASOPHILS RELATIVE PERCENT: 0 % (ref 0–2)
BASOPHILS RELATIVE PERCENT: 0.4 % (ref 0–2)
BILIRUB SERPL-MCNC: 0.2 MG/DL (ref 0–1.2)
BILIRUB SERPL-MCNC: 0.3 MG/DL (ref 0–1.2)
BILIRUB SERPL-MCNC: 0.3 MG/DL (ref 0–1.2)
BILIRUB SERPL-MCNC: 1.1 MG/DL (ref 0–1.2)
BILIRUB SERPL-MCNC: <0.2 MG/DL (ref 0–1.2)
BILIRUBIN DIRECT: 0.6 MG/DL (ref 0–0.3)
BILIRUBIN, INDIRECT: 0.5 MG/DL (ref 0–1)
BLOOD BANK DISPENSE STATUS: NORMAL
BLOOD BANK PRODUCT CODE: NORMAL
BLOOD CULTURE, ROUTINE: NORMAL
BPU ID: NORMAL
BUN BLDV-MCNC: 111 MG/DL (ref 6–23)
BUN BLDV-MCNC: 114 MG/DL (ref 6–23)
BUN BLDV-MCNC: 118 MG/DL (ref 6–23)
BUN BLDV-MCNC: 119 MG/DL (ref 6–23)
BUN BLDV-MCNC: 31 MG/DL (ref 6–23)
BUN BLDV-MCNC: 41 MG/DL (ref 6–23)
BUN BLDV-MCNC: 43 MG/DL (ref 6–23)
BUN BLDV-MCNC: 45 MG/DL (ref 6–23)
BUN BLDV-MCNC: 52 MG/DL (ref 6–23)
BUN BLDV-MCNC: 77 MG/DL (ref 6–23)
BURR CELLS: ABNORMAL
C-REACTIVE PROTEIN: 11.7 MG/DL (ref 0–0.4)
C-REACTIVE PROTEIN: 2.4 MG/DL (ref 0–0.4)
C-REACTIVE PROTEIN: 3.5 MG/DL (ref 0–0.4)
C-REACTIVE PROTEIN: 6.3 MG/DL (ref 0–0.4)
C-REACTIVE PROTEIN: 6.8 MG/DL (ref 0–0.4)
C-REACTIVE PROTEIN: 8.1 MG/DL (ref 0–0.4)
CALCIUM SERPL-MCNC: 5.7 MG/DL (ref 8.6–10.2)
CALCIUM SERPL-MCNC: 5.7 MG/DL (ref 8.6–10.2)
CALCIUM SERPL-MCNC: 5.9 MG/DL (ref 8.6–10.2)
CALCIUM SERPL-MCNC: 6.2 MG/DL (ref 8.6–10.2)
CALCIUM SERPL-MCNC: 8 MG/DL (ref 8.6–10.2)
CALCIUM SERPL-MCNC: 8.2 MG/DL (ref 8.6–10.2)
CALCIUM SERPL-MCNC: 8.4 MG/DL (ref 8.6–10.2)
CALCIUM SERPL-MCNC: 8.6 MG/DL (ref 8.6–10.2)
CALCIUM SERPL-MCNC: 9.2 MG/DL (ref 8.6–10.2)
CALCIUM SERPL-MCNC: 9.4 MG/DL (ref 8.6–10.2)
CALCULI COMPOSITION: NORMAL
CHLORIDE BLD-SCNC: 102 MMOL/L (ref 98–107)
CHLORIDE BLD-SCNC: 103 MMOL/L (ref 98–107)
CHLORIDE BLD-SCNC: 105 MMOL/L (ref 98–107)
CHLORIDE BLD-SCNC: 108 MMOL/L (ref 98–107)
CHLORIDE BLD-SCNC: 110 MMOL/L (ref 98–107)
CHLORIDE BLD-SCNC: 114 MMOL/L (ref 98–107)
CO2: 12 MMOL/L (ref 22–29)
CO2: 16 MMOL/L (ref 22–29)
CO2: 17 MMOL/L (ref 22–29)
CO2: 18 MMOL/L (ref 22–29)
CO2: 18 MMOL/L (ref 22–29)
CO2: 19 MMOL/L (ref 22–29)
CO2: 20 MMOL/L (ref 22–29)
CO2: 21 MMOL/L (ref 22–29)
CO2: 22 MMOL/L (ref 22–29)
CO2: 24 MMOL/L (ref 22–29)
COHB: 0.1 % (ref 0–1.5)
COHB: 0.1 % (ref 0–1.5)
COHB: 0.2 % (ref 0–1.5)
COHB: 0.3 % (ref 0–1.5)
COHB: 0.3 % (ref 0–1.5)
COHB: 0.4 % (ref 0–1.5)
COHB: 0.5 % (ref 0–1.5)
COMMENT: ABNORMAL
CREAT SERPL-MCNC: 1.3 MG/DL (ref 0.7–1.2)
CREAT SERPL-MCNC: 1.3 MG/DL (ref 0.7–1.2)
CREAT SERPL-MCNC: 1.4 MG/DL (ref 0.7–1.2)
CREAT SERPL-MCNC: 1.5 MG/DL (ref 0.7–1.2)
CREAT SERPL-MCNC: 1.6 MG/DL (ref 0.7–1.2)
CREAT SERPL-MCNC: 3.1 MG/DL (ref 0.7–1.2)
CREAT SERPL-MCNC: 4.1 MG/DL (ref 0.7–1.2)
CREAT SERPL-MCNC: 4.7 MG/DL (ref 0.7–1.2)
CREAT SERPL-MCNC: 5 MG/DL (ref 0.7–1.2)
CREAT SERPL-MCNC: 5 MG/DL (ref 0.7–1.2)
CREATININE URINE: 64 MG/DL (ref 40–278)
CRITICAL: ABNORMAL
CULTURE, BLOOD 2: NORMAL
CULTURE, RESPIRATORY: ABNORMAL
D DIMER: 2861 NG/ML DDU
D DIMER: 3242 NG/ML DDU
D DIMER: 4770 NG/ML DDU
D DIMER: 549 NG/ML DDU
D DIMER: 625 NG/ML DDU
DATE ANALYZED: ABNORMAL
DATE OF COLLECTION: ABNORMAL
DESCRIPTION BLOOD BANK: NORMAL
EKG ATRIAL RATE: 111 BPM
EKG P AXIS: 84 DEGREES
EKG P-R INTERVAL: 178 MS
EKG Q-T INTERVAL: 316 MS
EKG QRS DURATION: 80 MS
EKG QTC CALCULATION (BAZETT): 429 MS
EKG R AXIS: 28 DEGREES
EKG T AXIS: -49 DEGREES
EKG VENTRICULAR RATE: 111 BPM
EOSINOPHILS ABSOLUTE: 0 E9/L (ref 0.05–0.5)
EOSINOPHILS RELATIVE PERCENT: 0 % (ref 0–6)
FERRITIN: 323 NG/ML
FIO2: 100 %
FIO2: 100 %
FIO2: 45 %
FIO2: 50 %
FIO2: 50 %
FIO2: 60 %
FIO2: 90 %
GFR AFRICAN AMERICAN: 13
GFR AFRICAN AMERICAN: 13
GFR AFRICAN AMERICAN: 14
GFR AFRICAN AMERICAN: 17
GFR AFRICAN AMERICAN: 23
GFR AFRICAN AMERICAN: 49
GFR AFRICAN AMERICAN: 53
GFR AFRICAN AMERICAN: 57
GFR AFRICAN AMERICAN: >60
GFR AFRICAN AMERICAN: >60
GFR NON-AFRICAN AMERICAN: 11 ML/MIN/1.73
GFR NON-AFRICAN AMERICAN: 11 ML/MIN/1.73
GFR NON-AFRICAN AMERICAN: 12 ML/MIN/1.73
GFR NON-AFRICAN AMERICAN: 14 ML/MIN/1.73
GFR NON-AFRICAN AMERICAN: 19 ML/MIN/1.73
GFR NON-AFRICAN AMERICAN: 41 ML/MIN/1.73
GFR NON-AFRICAN AMERICAN: 44 ML/MIN/1.73
GFR NON-AFRICAN AMERICAN: 47 ML/MIN/1.73
GFR NON-AFRICAN AMERICAN: 52 ML/MIN/1.73
GFR NON-AFRICAN AMERICAN: 52 ML/MIN/1.73
GLUCOSE BLD-MCNC: 118 MG/DL (ref 74–99)
GLUCOSE BLD-MCNC: 123 MG/DL (ref 74–99)
GLUCOSE BLD-MCNC: 135 MG/DL (ref 74–99)
GLUCOSE BLD-MCNC: 141 MG/DL (ref 74–99)
GLUCOSE BLD-MCNC: 145 MG/DL (ref 74–99)
GLUCOSE BLD-MCNC: 151 MG/DL (ref 74–99)
GLUCOSE BLD-MCNC: 154 MG/DL (ref 74–99)
GLUCOSE BLD-MCNC: 178 MG/DL (ref 74–99)
GLUCOSE BLD-MCNC: 248 MG/DL (ref 74–99)
GLUCOSE BLD-MCNC: 268 MG/DL (ref 74–99)
HAV IGM SER IA-ACNC: NORMAL
HAV IGM SER IA-ACNC: NORMAL
HBV SURFACE AB TITR SER: NORMAL {TITER}
HCO3: 16.3 MMOL/L (ref 22–26)
HCO3: 16.3 MMOL/L (ref 22–26)
HCO3: 17.4 MMOL/L (ref 22–26)
HCO3: 18.3 MMOL/L (ref 22–26)
HCO3: 19.1 MMOL/L (ref 22–26)
HCO3: 21.3 MMOL/L (ref 22–26)
HCO3: 21.5 MMOL/L (ref 22–26)
HCT VFR BLD CALC: 20.9 % (ref 37–54)
HCT VFR BLD CALC: 23 % (ref 37–54)
HCT VFR BLD CALC: 23.1 % (ref 37–54)
HCT VFR BLD CALC: 23.4 % (ref 37–54)
HCT VFR BLD CALC: 23.8 % (ref 37–54)
HCT VFR BLD CALC: 27.1 % (ref 37–54)
HCT VFR BLD CALC: 28.3 % (ref 37–54)
HCT VFR BLD CALC: 28.9 % (ref 37–54)
HCT VFR BLD CALC: 30.2 % (ref 37–54)
HCT VFR BLD CALC: 31.1 % (ref 37–54)
HCT VFR BLD CALC: 32.8 % (ref 37–54)
HEMOGLOBIN: 10.3 G/DL (ref 12.5–16.5)
HEMOGLOBIN: 6.5 G/DL (ref 12.5–16.5)
HEMOGLOBIN: 7.2 G/DL (ref 12.5–16.5)
HEMOGLOBIN: 7.2 G/DL (ref 12.5–16.5)
HEMOGLOBIN: 7.4 G/DL (ref 12.5–16.5)
HEMOGLOBIN: 7.5 G/DL (ref 12.5–16.5)
HEMOGLOBIN: 8.3 G/DL (ref 12.5–16.5)
HEMOGLOBIN: 9 G/DL (ref 12.5–16.5)
HEMOGLOBIN: 9.3 G/DL (ref 12.5–16.5)
HEMOGLOBIN: 9.3 G/DL (ref 12.5–16.5)
HEMOGLOBIN: 9.7 G/DL (ref 12.5–16.5)
HEPATITIS B CORE IGM ANTIBODY: NORMAL
HEPATITIS B CORE IGM ANTIBODY: NORMAL
HEPATITIS B SURFACE ANTIGEN INTERPRETATION: NORMAL
HEPATITIS B SURFACE ANTIGEN INTERPRETATION: NORMAL
HEPATITIS C ANTIBODY INTERPRETATION: NORMAL
HEPATITIS C ANTIBODY INTERPRETATION: NORMAL
HHB: 0.7 % (ref 0–5)
HHB: 10.8 % (ref 0–5)
HHB: 20.3 % (ref 0–5)
HHB: 4.7 % (ref 0–5)
HHB: 5.9 % (ref 0–5)
HHB: 6.8 % (ref 0–5)
HHB: 9.8 % (ref 0–5)
HYPOCHROMIA: ABNORMAL
HYPOCHROMIA: ABNORMAL
IMMATURE GRANULOCYTES #: 4.77 E9/L
IMMATURE GRANULOCYTES %: 4.5 % (ref 0–5)
INR BLD: 1.5
INR BLD: 1.5
INR BLD: 1.6
INR BLD: 1.7
INR BLD: 2
INR BLD: 2.1
INR BLD: 2.2
INR BLD: 2.3
INR BLD: 3.8
INR BLD: 4.5
INR BLD: 4.7
INR BLD: 5.3
L. PNEUMOPHILA SEROGP 1 UR AG: NORMAL
LAB: ABNORMAL
LACTATE DEHYDROGENASE: 307 U/L (ref 135–225)
LACTATE DEHYDROGENASE: 722 U/L (ref 135–225)
LACTIC ACID: 0.9 MMOL/L (ref 0.5–2.2)
LACTIC ACID: 1.9 MMOL/L (ref 0.5–2.2)
LACTIC ACID: 2 MMOL/L (ref 0.5–2.2)
LACTIC ACID: 2 MMOL/L (ref 0.5–2.2)
LACTIC ACID: 2.1 MMOL/L (ref 0.5–2.2)
LACTIC ACID: 2.4 MMOL/L (ref 0.5–2.2)
LACTIC ACID: 2.7 MMOL/L (ref 0.5–2.2)
LACTIC ACID: 3 MMOL/L (ref 0.5–2.2)
LACTIC ACID: 3.1 MMOL/L (ref 0.5–2.2)
LACTIC ACID: 3.2 MMOL/L (ref 0.5–2.2)
LACTIC ACID: 3.2 MMOL/L (ref 0.5–2.2)
LACTIC ACID: 3.6 MMOL/L (ref 0.5–2.2)
LACTIC ACID: 3.9 MMOL/L (ref 0.5–2.2)
LYMPHOCYTES ABSOLUTE: 0 E9/L (ref 1.5–4)
LYMPHOCYTES ABSOLUTE: 0.76 E9/L (ref 1.5–4)
LYMPHOCYTES ABSOLUTE: 0.79 E9/L (ref 1.5–4)
LYMPHOCYTES ABSOLUTE: 0.91 E9/L (ref 1.5–4)
LYMPHOCYTES ABSOLUTE: 1.24 E9/L (ref 1.5–4)
LYMPHOCYTES ABSOLUTE: 1.43 E9/L (ref 1.5–4)
LYMPHOCYTES ABSOLUTE: 1.92 E9/L (ref 1.5–4)
LYMPHOCYTES ABSOLUTE: 2.18 E9/L (ref 1.5–4)
LYMPHOCYTES ABSOLUTE: 3.3 E9/L (ref 1.5–4)
LYMPHOCYTES RELATIVE PERCENT: 0 % (ref 20–42)
LYMPHOCYTES RELATIVE PERCENT: 0 % (ref 20–42)
LYMPHOCYTES RELATIVE PERCENT: 0.8 % (ref 20–42)
LYMPHOCYTES RELATIVE PERCENT: 1 % (ref 20–42)
LYMPHOCYTES RELATIVE PERCENT: 1.7 % (ref 20–42)
LYMPHOCYTES RELATIVE PERCENT: 1.7 % (ref 20–42)
LYMPHOCYTES RELATIVE PERCENT: 2.6 % (ref 20–42)
LYMPHOCYTES RELATIVE PERCENT: 3 % (ref 20–42)
LYMPHOCYTES RELATIVE PERCENT: 4.4 % (ref 20–42)
Lab: ABNORMAL
MAGNESIUM: 1.3 MG/DL (ref 1.6–2.6)
MAGNESIUM: 1.4 MG/DL (ref 1.6–2.6)
MAGNESIUM: 1.5 MG/DL (ref 1.6–2.6)
MAGNESIUM: 1.9 MG/DL (ref 1.6–2.6)
MASS: 36 MG
MCH RBC QN AUTO: 37.3 PG (ref 26–35)
MCH RBC QN AUTO: 39.6 PG (ref 26–35)
MCH RBC QN AUTO: 40.1 PG (ref 26–35)
MCH RBC QN AUTO: 40.4 PG (ref 26–35)
MCH RBC QN AUTO: 40.6 PG (ref 26–35)
MCH RBC QN AUTO: 40.7 PG (ref 26–35)
MCH RBC QN AUTO: 40.9 PG (ref 26–35)
MCH RBC QN AUTO: 40.9 PG (ref 26–35)
MCH RBC QN AUTO: 41.5 PG (ref 26–35)
MCHC RBC AUTO-ENTMCNC: 29.9 % (ref 32–34.5)
MCHC RBC AUTO-ENTMCNC: 31.1 % (ref 32–34.5)
MCHC RBC AUTO-ENTMCNC: 31.3 % (ref 32–34.5)
MCHC RBC AUTO-ENTMCNC: 31.4 % (ref 32–34.5)
MCHC RBC AUTO-ENTMCNC: 32.1 % (ref 32–34.5)
MCHC RBC AUTO-ENTMCNC: 32.5 % (ref 32–34.5)
MCHC RBC AUTO-ENTMCNC: 32.9 % (ref 32–34.5)
MCV RBC AUTO: 119.2 FL (ref 80–99.9)
MCV RBC AUTO: 123.5 FL (ref 80–99.9)
MCV RBC AUTO: 126.3 FL (ref 80–99.9)
MCV RBC AUTO: 127.3 FL (ref 80–99.9)
MCV RBC AUTO: 127.4 FL (ref 80–99.9)
MCV RBC AUTO: 128.6 FL (ref 80–99.9)
MCV RBC AUTO: 130.8 FL (ref 80–99.9)
MCV RBC AUTO: 131.4 FL (ref 80–99.9)
MCV RBC AUTO: 135.8 FL (ref 80–99.9)
METAMYELOCYTES RELATIVE PERCENT: 0.9 % (ref 0–1)
METAMYELOCYTES RELATIVE PERCENT: 0.9 % (ref 0–1)
METER GLUCOSE: 159 MG/DL (ref 74–99)
METER GLUCOSE: 170 MG/DL (ref 74–99)
METER GLUCOSE: 172 MG/DL (ref 74–99)
METER GLUCOSE: 228 MG/DL (ref 74–99)
METHB: 0.1 % (ref 0–1.5)
METHB: 0.2 % (ref 0–1.5)
METHB: 0.2 % (ref 0–1.5)
METHB: 0.4 % (ref 0–1.5)
METHB: 0.5 % (ref 0–1.5)
METHB: 0.5 % (ref 0–1.5)
METHB: 0.6 % (ref 0–1.5)
MODE: ABNORMAL
MODE: AC
MONOCYTES ABSOLUTE: 1.21 E9/L (ref 0.1–0.95)
MONOCYTES ABSOLUTE: 10.18 E9/L (ref 0.1–0.95)
MONOCYTES ABSOLUTE: 11.09 E9/L (ref 0.1–0.95)
MONOCYTES ABSOLUTE: 18.7 E9/L (ref 0.1–0.95)
MONOCYTES ABSOLUTE: 3.42 E9/L (ref 0.1–0.95)
MONOCYTES ABSOLUTE: 6.42 E9/L (ref 0.1–0.95)
MONOCYTES ABSOLUTE: 9.59 E9/L (ref 0.1–0.95)
MONOCYTES ABSOLUTE: 9.89 E9/L (ref 0.1–0.95)
MONOCYTES ABSOLUTE: 9.91 E9/L (ref 0.1–0.95)
MONOCYTES RELATIVE PERCENT: 14 % (ref 2–12)
MONOCYTES RELATIVE PERCENT: 17 % (ref 2–12)
MONOCYTES RELATIVE PERCENT: 17.5 % (ref 2–12)
MONOCYTES RELATIVE PERCENT: 4.4 % (ref 2–12)
MONOCYTES RELATIVE PERCENT: 8 % (ref 2–12)
MONOCYTES RELATIVE PERCENT: 8.8 % (ref 2–12)
MONOCYTES RELATIVE PERCENT: 9.4 % (ref 2–12)
MONOCYTES RELATIVE PERCENT: 9.4 % (ref 2–12)
MONOCYTES RELATIVE PERCENT: 9.6 % (ref 2–12)
MRSA CULTURE ONLY: NORMAL
MYELOCYTE PERCENT: 0.9 % (ref 0–0)
NEUTROPHILS ABSOLUTE: 112.11 E9/L (ref 1.8–7.3)
NEUTROPHILS ABSOLUTE: 112.75 E9/L (ref 1.8–7.3)
NEUTROPHILS ABSOLUTE: 14.82 E9/L (ref 1.8–7.3)
NEUTROPHILS ABSOLUTE: 28.09 E9/L (ref 1.8–7.3)
NEUTROPHILS ABSOLUTE: 60.34 E9/L (ref 1.8–7.3)
NEUTROPHILS ABSOLUTE: 64.17 E9/L (ref 1.8–7.3)
NEUTROPHILS ABSOLUTE: 85.35 E9/L (ref 1.8–7.3)
NEUTROPHILS ABSOLUTE: 88 E9/L (ref 1.8–7.3)
NEUTROPHILS ABSOLUTE: 89.33 E9/L (ref 1.8–7.3)
NEUTROPHILS RELATIVE PERCENT: 78.1 % (ref 43–80)
NEUTROPHILS RELATIVE PERCENT: 80 % (ref 43–80)
NEUTROPHILS RELATIVE PERCENT: 83 % (ref 43–80)
NEUTROPHILS RELATIVE PERCENT: 84.9 % (ref 43–80)
NEUTROPHILS RELATIVE PERCENT: 87.7 % (ref 43–80)
NEUTROPHILS RELATIVE PERCENT: 87.8 % (ref 43–80)
NEUTROPHILS RELATIVE PERCENT: 90.6 % (ref 43–80)
NEUTROPHILS RELATIVE PERCENT: 91 % (ref 43–80)
NEUTROPHILS RELATIVE PERCENT: 93 % (ref 43–80)
NUCLEATED RED BLOOD CELLS: 0 /100 WBC
NUCLEATED RED BLOOD CELLS: 0.9 /100 WBC
O2 CONTENT: 11.2 ML/DL
O2 CONTENT: 11.3 ML/DL
O2 CONTENT: 12.8 ML/DL
O2 CONTENT: 12.8 ML/DL
O2 CONTENT: 14.4 ML/DL
O2 CONTENT: 8 ML/DL
O2 CONTENT: 9.5 ML/DL
O2 SATURATION: 79.5 % (ref 92–98.5)
O2 SATURATION: 89.2 % (ref 92–98.5)
O2 SATURATION: 90.1 % (ref 92–98.5)
O2 SATURATION: 93.2 % (ref 92–98.5)
O2 SATURATION: 94.1 % (ref 92–98.5)
O2 SATURATION: 95.3 % (ref 92–98.5)
O2 SATURATION: 99.3 % (ref 92–98.5)
O2HB: 78.7 % (ref 94–97)
O2HB: 88.8 % (ref 94–97)
O2HB: 89.2 % (ref 94–97)
O2HB: 92.8 % (ref 94–97)
O2HB: 93.5 % (ref 94–97)
O2HB: 94.8 % (ref 94–97)
O2HB: 98.8 % (ref 94–97)
OPERATOR ID: 166
OPERATOR ID: 166
OPERATOR ID: 1687
OPERATOR ID: 2485
OPERATOR ID: 2485
OPERATOR ID: 516
OPERATOR ID: 7296
ORGANISM: ABNORMAL
OVALOCYTES: ABNORMAL
PATIENT TEMP: 37 C
PCO2: 28.4 MMHG (ref 35–45)
PCO2: 29.3 MMHG (ref 35–45)
PCO2: 32.1 MMHG (ref 35–45)
PCO2: 32.3 MMHG (ref 35–45)
PCO2: 33.1 MMHG (ref 35–45)
PCO2: 33.1 MMHG (ref 35–45)
PCO2: 67.1 MMHG (ref 35–45)
PDW BLD-RTO: 14.3 FL (ref 11.5–15)
PDW BLD-RTO: 14.3 FL (ref 11.5–15)
PDW BLD-RTO: 14.6 FL (ref 11.5–15)
PDW BLD-RTO: 14.7 FL (ref 11.5–15)
PDW BLD-RTO: 14.7 FL (ref 11.5–15)
PDW BLD-RTO: 14.9 FL (ref 11.5–15)
PDW BLD-RTO: 14.9 FL (ref 11.5–15)
PDW BLD-RTO: 15.4 FL (ref 11.5–15)
PDW BLD-RTO: 27.5 FL (ref 11.5–15)
PEEP/CPAP: 5 CMH2O
PFO2: 0.57 MMHG/%
PFO2: 1.01 MMHG/%
PFO2: 1.06 MMHG/%
PFO2: 1.21 MMHG/%
PFO2: 1.34 MMHG/%
PFO2: 1.61 MMHG/%
PFO2: 1.93 MMHG/%
PH BLOOD GAS: 7.03 (ref 7.35–7.45)
PH BLOOD GAS: 7.31 (ref 7.35–7.45)
PH BLOOD GAS: 7.31 (ref 7.35–7.45)
PH BLOOD GAS: 7.37 (ref 7.35–7.45)
PH BLOOD GAS: 7.43 (ref 7.35–7.45)
PH BLOOD GAS: 7.44 (ref 7.35–7.45)
PH BLOOD GAS: 7.49 (ref 7.35–7.45)
PHOSPHORUS: 4.7 MG/DL (ref 2.5–4.5)
PHOSPHORUS: 5 MG/DL (ref 2.5–4.5)
PHOSPHORUS: 5.1 MG/DL (ref 2.5–4.5)
PHOSPHORUS: 5.6 MG/DL (ref 2.5–4.5)
PLATELET # BLD: 111 E9/L (ref 130–450)
PLATELET # BLD: 32 E9/L (ref 130–450)
PLATELET # BLD: 46 E9/L (ref 130–450)
PLATELET # BLD: 47 E9/L (ref 130–450)
PLATELET # BLD: 49 E9/L (ref 130–450)
PLATELET # BLD: 50 E9/L (ref 130–450)
PLATELET # BLD: 50 E9/L (ref 130–450)
PLATELET # BLD: 82 E9/L (ref 130–450)
PLATELET # BLD: 98 E9/L (ref 130–450)
PLATELET CONFIRMATION: NORMAL
PMV BLD AUTO: 14 FL (ref 7–12)
PMV BLD AUTO: ABNORMAL FL (ref 7–12)
PO2: 192.7 MMHG (ref 75–100)
PO2: 50.4 MMHG (ref 75–100)
PO2: 56.6 MMHG (ref 75–100)
PO2: 67 MMHG (ref 75–100)
PO2: 72.3 MMHG (ref 75–100)
PO2: 72.3 MMHG (ref 75–100)
PO2: 95.3 MMHG (ref 75–100)
POIKILOCYTES: ABNORMAL
POLYCHROMASIA: ABNORMAL
POTASSIUM SERPL-SCNC: 3.5 MMOL/L (ref 3.5–5)
POTASSIUM SERPL-SCNC: 3.6 MMOL/L (ref 3.5–5)
POTASSIUM SERPL-SCNC: 3.6 MMOL/L (ref 3.5–5)
POTASSIUM SERPL-SCNC: 3.7 MMOL/L (ref 3.5–5)
POTASSIUM SERPL-SCNC: 3.7 MMOL/L (ref 3.5–5)
POTASSIUM SERPL-SCNC: 3.8 MMOL/L (ref 3.5–5)
POTASSIUM SERPL-SCNC: 3.9 MMOL/L (ref 3.5–5)
POTASSIUM SERPL-SCNC: 4 MMOL/L (ref 3.5–5)
POTASSIUM SERPL-SCNC: 4 MMOL/L (ref 3.5–5)
POTASSIUM SERPL-SCNC: 4.7 MMOL/L (ref 3.5–5)
PRO-BNP: ABNORMAL PG/ML (ref 0–450)
PROCALCITONIN: 0.19 NG/ML (ref 0–0.08)
PROCALCITONIN: 0.32 NG/ML (ref 0–0.08)
PROCALCITONIN: 0.34 NG/ML (ref 0–0.08)
PROCALCITONIN: 0.34 NG/ML (ref 0–0.08)
PROCALCITONIN: 0.37 NG/ML (ref 0–0.08)
PROCALCITONIN: 1.55 NG/ML (ref 0–0.08)
PROCALCITONIN: 19.42 NG/ML (ref 0–0.08)
PROCALCITONIN: 8.16 NG/ML (ref 0–0.08)
PROTHROMBIN TIME: 16.6 SEC (ref 9.3–12.4)
PROTHROMBIN TIME: 16.6 SEC (ref 9.3–12.4)
PROTHROMBIN TIME: 17.7 SEC (ref 9.3–12.4)
PROTHROMBIN TIME: 19.2 SEC (ref 9.3–12.4)
PROTHROMBIN TIME: 21.4 SEC (ref 9.3–12.4)
PROTHROMBIN TIME: 23.5 SEC (ref 9.3–12.4)
PROTHROMBIN TIME: 23.7 SEC (ref 9.3–12.4)
PROTHROMBIN TIME: 25.3 SEC (ref 9.3–12.4)
PROTHROMBIN TIME: 42.3 SEC (ref 9.3–12.4)
PROTHROMBIN TIME: 48.6 SEC (ref 9.3–12.4)
PROTHROMBIN TIME: 50.5 SEC (ref 9.3–12.4)
PROTHROMBIN TIME: 58.8 SEC (ref 9.3–12.4)
RBC # BLD: 1.59 E12/L (ref 3.8–5.8)
RBC # BLD: 1.87 E12/L (ref 3.8–5.8)
RBC # BLD: 1.87 E12/L (ref 3.8–5.8)
RBC # BLD: 1.93 E12/L (ref 3.8–5.8)
RBC # BLD: 2.21 E12/L (ref 3.8–5.8)
RBC # BLD: 2.24 E12/L (ref 3.8–5.8)
RBC # BLD: 2.29 E12/L (ref 3.8–5.8)
RBC # BLD: 2.37 E12/L (ref 3.8–5.8)
RBC # BLD: 2.55 E12/L (ref 3.8–5.8)
REASON FOR REJECTION: NORMAL
REJECTED TEST: NORMAL
RI(T): 1058 %
RI(T): 240 %
RI(T): 284 %
RI(T): 358 %
RI(T): 426 %
RI(T): 478 %
RI(T): 509 %
RR MECHANICAL: 16 B/MIN
RR MECHANICAL: 26 B/MIN
RR MECHANICAL: 26 B/MIN
SCHISTOCYTES: ABNORMAL
SEDIMENTATION RATE, ERYTHROCYTE: 14 MM/HR (ref 0–15)
SEDIMENTATION RATE, ERYTHROCYTE: 33 MM/HR (ref 0–15)
SMEAR, RESPIRATORY: ABNORMAL
SMEAR, RESPIRATORY: ABNORMAL
SMUDGE CELLS: ABNORMAL
SODIUM BLD-SCNC: 135 MMOL/L (ref 132–146)
SODIUM BLD-SCNC: 139 MMOL/L (ref 132–146)
SODIUM BLD-SCNC: 139 MMOL/L (ref 132–146)
SODIUM BLD-SCNC: 140 MMOL/L (ref 132–146)
SODIUM BLD-SCNC: 143 MMOL/L (ref 132–146)
SODIUM BLD-SCNC: 146 MMOL/L (ref 132–146)
SODIUM BLD-SCNC: 149 MMOL/L (ref 132–146)
SODIUM BLD-SCNC: 154 MMOL/L (ref 132–146)
SODIUM URINE: 50 MMOL/L
SOURCE, BLOOD GAS: ABNORMAL
STONE DESCRIPTION: NORMAL
STONE NUMBER: NORMAL
STONE SIZE: NORMAL MM
STREP PNEUMONIAE ANTIGEN, URINE: NORMAL
TEAR DROP CELLS: ABNORMAL
THB: 11.5 G/DL (ref 11.5–16.5)
THB: 7.2 G/DL (ref 11.5–16.5)
THB: 7.5 G/DL (ref 11.5–16.5)
THB: 8.5 G/DL (ref 11.5–16.5)
THB: 8.5 G/DL (ref 11.5–16.5)
THB: 8.9 G/DL (ref 11.5–16.5)
THB: 9.5 G/DL (ref 11.5–16.5)
TIME ANALYZED: 1103
TIME ANALYZED: 1502
TIME ANALYZED: 1959
TIME ANALYZED: 537
TIME ANALYZED: 551
TIME ANALYZED: 604
TIME ANALYZED: 737
TOTAL PROTEIN: 3.9 G/DL (ref 6.4–8.3)
TOTAL PROTEIN: 4.4 G/DL (ref 6.4–8.3)
TOTAL PROTEIN: 4.5 G/DL (ref 6.4–8.3)
TOTAL PROTEIN: 4.6 G/DL (ref 6.4–8.3)
TOTAL PROTEIN: 6.2 G/DL (ref 6.4–8.3)
TROPONIN, HIGH SENSITIVITY: 35 NG/L (ref 0–11)
TROPONIN, HIGH SENSITIVITY: 41 NG/L (ref 0–11)
UREA NITROGEN, UR: 733 MG/DL (ref 800–1666)
URIC ACID, SERUM: 10.1 MG/DL (ref 3.4–7)
VT MECHANICAL: 450 ML
VT MECHANICAL: 500 ML
WBC # BLD: 105.2 E9/L (ref 4.5–11.5)
WBC # BLD: 110 E9/L (ref 4.5–11.5)
WBC # BLD: 123.2 E9/L (ref 4.5–11.5)
WBC # BLD: 123.9 E9/L (ref 4.5–11.5)
WBC # BLD: 19 E9/L (ref 4.5–11.5)
WBC # BLD: 30.2 E9/L (ref 4.5–11.5)
WBC # BLD: 71.3 E9/L (ref 4.5–11.5)
WBC # BLD: 72.7 E9/L (ref 4.5–11.5)
WBC # BLD: 95.9 E9/L (ref 4.5–11.5)

## 2021-01-01 PROCEDURE — 36592 COLLECT BLOOD FROM PICC: CPT

## 2021-01-01 PROCEDURE — C1751 CATH, INF, PER/CENT/MIDLINE: HCPCS

## 2021-01-01 PROCEDURE — 84540 ASSAY OF URINE/UREA-N: CPT

## 2021-01-01 PROCEDURE — 36415 COLL VENOUS BLD VENIPUNCTURE: CPT

## 2021-01-01 PROCEDURE — 71045 X-RAY EXAM CHEST 1 VIEW: CPT

## 2021-01-01 PROCEDURE — 84145 PROCALCITONIN (PCT): CPT

## 2021-01-01 PROCEDURE — 6360000002 HC RX W HCPCS: Performed by: INTERNAL MEDICINE

## 2021-01-01 PROCEDURE — 6370000000 HC RX 637 (ALT 250 FOR IP): Performed by: INTERNAL MEDICINE

## 2021-01-01 PROCEDURE — 6360000002 HC RX W HCPCS

## 2021-01-01 PROCEDURE — 84300 ASSAY OF URINE SODIUM: CPT

## 2021-01-01 PROCEDURE — 6360000002 HC RX W HCPCS: Performed by: SPECIALIST

## 2021-01-01 PROCEDURE — 80048 BASIC METABOLIC PNL TOTAL CA: CPT

## 2021-01-01 PROCEDURE — 2580000003 HC RX 258: Performed by: SPECIALIST

## 2021-01-01 PROCEDURE — 85610 PROTHROMBIN TIME: CPT

## 2021-01-01 PROCEDURE — 6360000002 HC RX W HCPCS: Performed by: STUDENT IN AN ORGANIZED HEALTH CARE EDUCATION/TRAINING PROGRAM

## 2021-01-01 PROCEDURE — 85378 FIBRIN DEGRADE SEMIQUANT: CPT

## 2021-01-01 PROCEDURE — 2580000003 HC RX 258: Performed by: STUDENT IN AN ORGANIZED HEALTH CARE EDUCATION/TRAINING PROGRAM

## 2021-01-01 PROCEDURE — 82570 ASSAY OF URINE CREATININE: CPT

## 2021-01-01 PROCEDURE — 6360000002 HC RX W HCPCS: Performed by: NURSE PRACTITIONER

## 2021-01-01 PROCEDURE — 2580000003 HC RX 258: Performed by: INTERNAL MEDICINE

## 2021-01-01 PROCEDURE — 2060000000 HC ICU INTERMEDIATE R&B

## 2021-01-01 PROCEDURE — 84484 ASSAY OF TROPONIN QUANT: CPT

## 2021-01-01 PROCEDURE — 2580000003 HC RX 258: Performed by: NURSE PRACTITIONER

## 2021-01-01 PROCEDURE — 74018 RADEX ABDOMEN 1 VIEW: CPT

## 2021-01-01 PROCEDURE — 99285 EMERGENCY DEPT VISIT HI MDM: CPT

## 2021-01-01 PROCEDURE — 85025 COMPLETE CBC W/AUTO DIFF WBC: CPT

## 2021-01-01 PROCEDURE — P9040 RBC LEUKOREDUCED IRRADIATED: HCPCS

## 2021-01-01 PROCEDURE — 87040 BLOOD CULTURE FOR BACTERIA: CPT

## 2021-01-01 PROCEDURE — 83735 ASSAY OF MAGNESIUM: CPT

## 2021-01-01 PROCEDURE — 92526 ORAL FUNCTION THERAPY: CPT | Performed by: SPEECH-LANGUAGE PATHOLOGIST

## 2021-01-01 PROCEDURE — 2000000000 HC ICU R&B

## 2021-01-01 PROCEDURE — 2580000003 HC RX 258: Performed by: HOSPITALIST

## 2021-01-01 PROCEDURE — 6360000002 HC RX W HCPCS: Performed by: EMERGENCY MEDICINE

## 2021-01-01 PROCEDURE — 80074 ACUTE HEPATITIS PANEL: CPT

## 2021-01-01 PROCEDURE — 92611 MOTION FLUOROSCOPY/SWALLOW: CPT | Performed by: SPEECH-LANGUAGE PATHOLOGIST

## 2021-01-01 PROCEDURE — 94660 CPAP INITIATION&MGMT: CPT

## 2021-01-01 PROCEDURE — 94002 VENT MGMT INPAT INIT DAY: CPT

## 2021-01-01 PROCEDURE — 90935 HEMODIALYSIS ONE EVALUATION: CPT

## 2021-01-01 PROCEDURE — 2580000003 HC RX 258: Performed by: EMERGENCY MEDICINE

## 2021-01-01 PROCEDURE — 94761 N-INVAS EAR/PLS OXIMETRY MLT: CPT

## 2021-01-01 PROCEDURE — 94003 VENT MGMT INPAT SUBQ DAY: CPT

## 2021-01-01 PROCEDURE — 2500000003 HC RX 250 WO HCPCS: Performed by: INTERNAL MEDICINE

## 2021-01-01 PROCEDURE — 86140 C-REACTIVE PROTEIN: CPT

## 2021-01-01 PROCEDURE — 87206 SMEAR FLUORESCENT/ACID STAI: CPT

## 2021-01-01 PROCEDURE — P9059 PLASMA, FRZ BETWEEN 8-24HOUR: HCPCS

## 2021-01-01 PROCEDURE — 99222 1ST HOSP IP/OBS MODERATE 55: CPT | Performed by: STUDENT IN AN ORGANIZED HEALTH CARE EDUCATION/TRAINING PROGRAM

## 2021-01-01 PROCEDURE — 82805 BLOOD GASES W/O2 SATURATION: CPT

## 2021-01-01 PROCEDURE — 2500000003 HC RX 250 WO HCPCS: Performed by: STUDENT IN AN ORGANIZED HEALTH CARE EDUCATION/TRAINING PROGRAM

## 2021-01-01 PROCEDURE — 31500 INSERT EMERGENCY AIRWAY: CPT

## 2021-01-01 PROCEDURE — P9016 RBC LEUKOCYTES REDUCED: HCPCS

## 2021-01-01 PROCEDURE — 82962 GLUCOSE BLOOD TEST: CPT

## 2021-01-01 PROCEDURE — 02HV33Z INSERTION OF INFUSION DEVICE INTO SUPERIOR VENA CAVA, PERCUTANEOUS APPROACH: ICD-10-PCS | Performed by: INTERNAL MEDICINE

## 2021-01-01 PROCEDURE — XW0DXM6 INTRODUCTION OF BARICITINIB INTO MOUTH AND PHARYNX, EXTERNAL APPROACH, NEW TECHNOLOGY GROUP 6: ICD-10-PCS | Performed by: INTERNAL MEDICINE

## 2021-01-01 PROCEDURE — 6360000002 HC RX W HCPCS: Performed by: HOSPITALIST

## 2021-01-01 PROCEDURE — 6360000004 HC RX CONTRAST MEDICATION: Performed by: RADIOLOGY

## 2021-01-01 PROCEDURE — 89220 SPUTUM SPECIMEN COLLECTION: CPT

## 2021-01-01 PROCEDURE — 2700000000 HC OXYGEN THERAPY PER DAY

## 2021-01-01 PROCEDURE — 36600 WITHDRAWAL OF ARTERIAL BLOOD: CPT

## 2021-01-01 PROCEDURE — 36556 INSERT NON-TUNNEL CV CATH: CPT | Performed by: SURGERY

## 2021-01-01 PROCEDURE — 87449 NOS EACH ORGANISM AG IA: CPT

## 2021-01-01 PROCEDURE — 6370000000 HC RX 637 (ALT 250 FOR IP): Performed by: STUDENT IN AN ORGANIZED HEALTH CARE EDUCATION/TRAINING PROGRAM

## 2021-01-01 PROCEDURE — 51798 US URINE CAPACITY MEASURE: CPT

## 2021-01-01 PROCEDURE — 99291 CRITICAL CARE FIRST HOUR: CPT | Performed by: HOSPITALIST

## 2021-01-01 PROCEDURE — 86923 COMPATIBILITY TEST ELECTRIC: CPT

## 2021-01-01 PROCEDURE — 86900 BLOOD TYPING SEROLOGIC ABO: CPT

## 2021-01-01 PROCEDURE — 83605 ASSAY OF LACTIC ACID: CPT

## 2021-01-01 PROCEDURE — 76937 US GUIDE VASCULAR ACCESS: CPT

## 2021-01-01 PROCEDURE — 87070 CULTURE OTHR SPECIMN AEROBIC: CPT

## 2021-01-01 PROCEDURE — 2500000003 HC RX 250 WO HCPCS: Performed by: HOSPITALIST

## 2021-01-01 PROCEDURE — 82040 ASSAY OF SERUM ALBUMIN: CPT

## 2021-01-01 PROCEDURE — 2500000003 HC RX 250 WO HCPCS: Performed by: RADIOLOGY

## 2021-01-01 PROCEDURE — 5A1D70Z PERFORMANCE OF URINARY FILTRATION, INTERMITTENT, LESS THAN 6 HOURS PER DAY: ICD-10-PCS | Performed by: INTERNAL MEDICINE

## 2021-01-01 PROCEDURE — 5A1955Z RESPIRATORY VENTILATION, GREATER THAN 96 CONSECUTIVE HOURS: ICD-10-PCS | Performed by: INTERNAL MEDICINE

## 2021-01-01 PROCEDURE — 80076 HEPATIC FUNCTION PANEL: CPT

## 2021-01-01 PROCEDURE — 87186 SC STD MICRODIL/AGAR DIL: CPT

## 2021-01-01 PROCEDURE — 80053 COMPREHEN METABOLIC PANEL: CPT

## 2021-01-01 PROCEDURE — 88300 SURGICAL PATH GROSS: CPT

## 2021-01-01 PROCEDURE — 93005 ELECTROCARDIOGRAM TRACING: CPT | Performed by: EMERGENCY MEDICINE

## 2021-01-01 PROCEDURE — 93010 ELECTROCARDIOGRAM REPORT: CPT | Performed by: INTERNAL MEDICINE

## 2021-01-01 PROCEDURE — 83615 LACTATE (LD) (LDH) ENZYME: CPT

## 2021-01-01 PROCEDURE — 06HY33Z INSERTION OF INFUSION DEVICE INTO LOWER VEIN, PERCUTANEOUS APPROACH: ICD-10-PCS | Performed by: STUDENT IN AN ORGANIZED HEALTH CARE EDUCATION/TRAINING PROGRAM

## 2021-01-01 PROCEDURE — 86706 HEP B SURFACE ANTIBODY: CPT

## 2021-01-01 PROCEDURE — 0DH67UZ INSERTION OF FEEDING DEVICE INTO STOMACH, VIA NATURAL OR ARTIFICIAL OPENING: ICD-10-PCS | Performed by: INTERNAL MEDICINE

## 2021-01-01 PROCEDURE — 06HY33Z INSERTION OF INFUSION DEVICE INTO LOWER VEIN, PERCUTANEOUS APPROACH: ICD-10-PCS | Performed by: SURGERY

## 2021-01-01 PROCEDURE — C9113 INJ PANTOPRAZOLE SODIUM, VIA: HCPCS | Performed by: INTERNAL MEDICINE

## 2021-01-01 PROCEDURE — 82365 CALCULUS SPECTROSCOPY: CPT

## 2021-01-01 PROCEDURE — 83880 ASSAY OF NATRIURETIC PEPTIDE: CPT

## 2021-01-01 PROCEDURE — 36569 INSJ PICC 5 YR+ W/O IMAGING: CPT

## 2021-01-01 PROCEDURE — 71275 CT ANGIOGRAPHY CHEST: CPT

## 2021-01-01 PROCEDURE — 85651 RBC SED RATE NONAUTOMATED: CPT

## 2021-01-01 PROCEDURE — 85018 HEMOGLOBIN: CPT

## 2021-01-01 PROCEDURE — 87077 CULTURE AEROBIC IDENTIFY: CPT

## 2021-01-01 PROCEDURE — 3E0G76Z INTRODUCTION OF NUTRITIONAL SUBSTANCE INTO UPPER GI, VIA NATURAL OR ARTIFICIAL OPENING: ICD-10-PCS | Performed by: INTERNAL MEDICINE

## 2021-01-01 PROCEDURE — 86901 BLOOD TYPING SEROLOGIC RH(D): CPT

## 2021-01-01 PROCEDURE — 94640 AIRWAY INHALATION TREATMENT: CPT

## 2021-01-01 PROCEDURE — 36430 TRANSFUSION BLD/BLD COMPNT: CPT

## 2021-01-01 PROCEDURE — 86850 RBC ANTIBODY SCREEN: CPT

## 2021-01-01 PROCEDURE — 87081 CULTURE SCREEN ONLY: CPT

## 2021-01-01 PROCEDURE — 85014 HEMATOCRIT: CPT

## 2021-01-01 PROCEDURE — 84100 ASSAY OF PHOSPHORUS: CPT

## 2021-01-01 PROCEDURE — 0BH17EZ INSERTION OF ENDOTRACHEAL AIRWAY INTO TRACHEA, VIA NATURAL OR ARTIFICIAL OPENING: ICD-10-PCS | Performed by: INTERNAL MEDICINE

## 2021-01-01 PROCEDURE — 51702 INSERT TEMP BLADDER CATH: CPT

## 2021-01-01 PROCEDURE — 82728 ASSAY OF FERRITIN: CPT

## 2021-01-01 PROCEDURE — 84550 ASSAY OF BLOOD/URIC ACID: CPT

## 2021-01-01 PROCEDURE — 74230 X-RAY XM SWLNG FUNCJ C+: CPT

## 2021-01-01 RX ORDER — SODIUM CHLORIDE 9 MG/ML
INJECTION, SOLUTION INTRAVENOUS PRN
Status: DISCONTINUED | OUTPATIENT
Start: 2021-01-01 | End: 2021-01-01 | Stop reason: SDUPTHER

## 2021-01-01 RX ORDER — SODIUM CHLORIDE 9 MG/ML
INJECTION, SOLUTION INTRAVENOUS CONTINUOUS
Status: DISCONTINUED | OUTPATIENT
Start: 2021-01-01 | End: 2021-01-01

## 2021-01-01 RX ORDER — METOPROLOL TARTRATE 5 MG/5ML
INJECTION INTRAVENOUS
Status: DISPENSED
Start: 2021-01-01 | End: 2021-01-01

## 2021-01-01 RX ORDER — DOXYCYCLINE HYCLATE 100 MG/1
100 CAPSULE ORAL EVERY 12 HOURS SCHEDULED
Status: DISCONTINUED | OUTPATIENT
Start: 2021-01-01 | End: 2021-01-01

## 2021-01-01 RX ORDER — METHYLPREDNISOLONE SODIUM SUCCINATE 125 MG/2ML
125 INJECTION, POWDER, LYOPHILIZED, FOR SOLUTION INTRAMUSCULAR; INTRAVENOUS
Status: ACTIVE | OUTPATIENT
Start: 2021-01-01 | End: 2021-01-01

## 2021-01-01 RX ORDER — ASCORBIC ACID 500 MG
500 TABLET ORAL DAILY
Status: DISCONTINUED | OUTPATIENT
Start: 2021-01-01 | End: 2021-01-01

## 2021-01-01 RX ORDER — SODIUM CHLORIDE 9 MG/ML
100 INJECTION, SOLUTION INTRAVENOUS CONTINUOUS PRN
Status: CANCELLED | OUTPATIENT
Start: 2021-01-01

## 2021-01-01 RX ORDER — SODIUM CHLORIDE 0.9 % (FLUSH) 0.9 %
10 SYRINGE (ML) INJECTION PRN
Status: DISCONTINUED | OUTPATIENT
Start: 2021-01-01 | End: 2021-01-01 | Stop reason: SDUPTHER

## 2021-01-01 RX ORDER — 0.9 % SODIUM CHLORIDE 0.9 %
500 INTRAVENOUS SOLUTION INTRAVENOUS ONCE
Status: COMPLETED | OUTPATIENT
Start: 2021-01-01 | End: 2021-01-01

## 2021-01-01 RX ORDER — METHYLPREDNISOLONE SODIUM SUCCINATE 125 MG/2ML
125 INJECTION, POWDER, LYOPHILIZED, FOR SOLUTION INTRAMUSCULAR; INTRAVENOUS
Status: CANCELLED | OUTPATIENT
Start: 2021-01-01 | End: 2021-01-01

## 2021-01-01 RX ORDER — SODIUM CHLORIDE 9 MG/ML
100 INJECTION, SOLUTION INTRAVENOUS CONTINUOUS PRN
Status: DISCONTINUED | OUTPATIENT
Start: 2021-01-01 | End: 2021-01-01

## 2021-01-01 RX ORDER — DEXAMETHASONE SODIUM PHOSPHATE 10 MG/ML
6 INJECTION INTRAMUSCULAR; INTRAVENOUS EVERY 12 HOURS
Status: DISCONTINUED | OUTPATIENT
Start: 2021-01-01 | End: 2021-01-01

## 2021-01-01 RX ORDER — WARFARIN SODIUM 5 MG/1
5 TABLET ORAL DAILY
Status: DISCONTINUED | OUTPATIENT
Start: 2021-01-01 | End: 2021-01-01

## 2021-01-01 RX ORDER — SODIUM CHLORIDE 9 MG/ML
INJECTION, SOLUTION INTRAVENOUS EVERY 24 HOURS
Status: DISCONTINUED | OUTPATIENT
Start: 2021-01-01 | End: 2021-12-23 | Stop reason: HOSPADM

## 2021-01-01 RX ORDER — ISOSORBIDE MONONITRATE 30 MG/1
30 TABLET, EXTENDED RELEASE ORAL DAILY
Status: DISCONTINUED | OUTPATIENT
Start: 2021-01-01 | End: 2021-12-23 | Stop reason: HOSPADM

## 2021-01-01 RX ORDER — GUAIFENESIN/DEXTROMETHORPHAN 100-10MG/5
5 SYRUP ORAL EVERY 4 HOURS PRN
Status: DISCONTINUED | OUTPATIENT
Start: 2021-01-01 | End: 2021-12-23 | Stop reason: HOSPADM

## 2021-01-01 RX ORDER — ARFORMOTEROL TARTRATE 15 UG/2ML
15 SOLUTION RESPIRATORY (INHALATION) 2 TIMES DAILY
Status: DISCONTINUED | OUTPATIENT
Start: 2021-01-01 | End: 2021-12-23 | Stop reason: HOSPADM

## 2021-01-01 RX ORDER — SODIUM CHLORIDE 9 MG/ML
25 INJECTION, SOLUTION INTRAVENOUS PRN
Status: CANCELLED | OUTPATIENT
Start: 2021-01-01

## 2021-01-01 RX ORDER — 0.9 % SODIUM CHLORIDE 0.9 %
1000 INTRAVENOUS SOLUTION INTRAVENOUS ONCE
Status: COMPLETED | OUTPATIENT
Start: 2021-01-01 | End: 2021-01-01

## 2021-01-01 RX ORDER — DIPHENHYDRAMINE HYDROCHLORIDE 50 MG/ML
50 INJECTION INTRAMUSCULAR; INTRAVENOUS
Status: CANCELLED | OUTPATIENT
Start: 2021-01-01 | End: 2021-01-01

## 2021-01-01 RX ORDER — PANTOPRAZOLE SODIUM 40 MG/10ML
40 INJECTION, POWDER, LYOPHILIZED, FOR SOLUTION INTRAVENOUS DAILY
Status: DISCONTINUED | OUTPATIENT
Start: 2021-01-01 | End: 2021-12-23 | Stop reason: HOSPADM

## 2021-01-01 RX ORDER — LIDOCAINE HYDROCHLORIDE 10 MG/ML
5 INJECTION, SOLUTION EPIDURAL; INFILTRATION; INTRACAUDAL; PERINEURAL ONCE
Status: DISCONTINUED | OUTPATIENT
Start: 2021-01-01 | End: 2021-12-23 | Stop reason: HOSPADM

## 2021-01-01 RX ORDER — WARFARIN SODIUM 2.5 MG/1
2.5 TABLET ORAL DAILY
Status: DISCONTINUED | OUTPATIENT
Start: 2021-01-01 | End: 2021-01-01

## 2021-01-01 RX ORDER — METOPROLOL SUCCINATE 50 MG/1
50 TABLET, EXTENDED RELEASE ORAL DAILY
Status: DISCONTINUED | OUTPATIENT
Start: 2021-01-01 | End: 2021-12-23 | Stop reason: HOSPADM

## 2021-01-01 RX ORDER — SODIUM CHLORIDE 0.9 % (FLUSH) 0.9 %
5-40 SYRINGE (ML) INJECTION PRN
Status: DISCONTINUED | OUTPATIENT
Start: 2021-01-01 | End: 2021-12-23 | Stop reason: HOSPADM

## 2021-01-01 RX ORDER — BUDESONIDE AND FORMOTEROL FUMARATE DIHYDRATE 160; 4.5 UG/1; UG/1
2 AEROSOL RESPIRATORY (INHALATION) 2 TIMES DAILY
Status: DISCONTINUED | OUTPATIENT
Start: 2021-01-01 | End: 2021-01-01

## 2021-01-01 RX ORDER — MIDODRINE HYDROCHLORIDE 5 MG/1
5 TABLET ORAL
Status: DISCONTINUED | OUTPATIENT
Start: 2021-01-01 | End: 2021-12-23 | Stop reason: HOSPADM

## 2021-01-01 RX ORDER — METOPROLOL TARTRATE 5 MG/5ML
2.5 INJECTION INTRAVENOUS ONCE
Status: COMPLETED | OUTPATIENT
Start: 2021-01-01 | End: 2021-01-01

## 2021-01-01 RX ORDER — FENTANYL CITRATE 50 UG/ML
25 INJECTION, SOLUTION INTRAMUSCULAR; INTRAVENOUS
Status: DISCONTINUED | OUTPATIENT
Start: 2021-01-01 | End: 2021-12-23 | Stop reason: HOSPADM

## 2021-01-01 RX ORDER — SODIUM CHLORIDE 0.9 % (FLUSH) 0.9 %
5-40 SYRINGE (ML) INJECTION EVERY 12 HOURS SCHEDULED
Status: DISCONTINUED | OUTPATIENT
Start: 2021-01-01 | End: 2021-12-23 | Stop reason: HOSPADM

## 2021-01-01 RX ORDER — LORAZEPAM 2 MG/ML
0.5 INJECTION INTRAMUSCULAR ONCE
Status: COMPLETED | OUTPATIENT
Start: 2021-01-01 | End: 2021-01-01

## 2021-01-01 RX ORDER — DEXAMETHASONE SODIUM PHOSPHATE 10 MG/ML
6 INJECTION, SOLUTION INTRAMUSCULAR; INTRAVENOUS EVERY 12 HOURS
Status: DISCONTINUED | OUTPATIENT
Start: 2021-01-01 | End: 2021-01-01

## 2021-01-01 RX ORDER — FUROSEMIDE 10 MG/ML
40 INJECTION INTRAMUSCULAR; INTRAVENOUS ONCE
Status: COMPLETED | OUTPATIENT
Start: 2021-01-01 | End: 2021-01-01

## 2021-01-01 RX ORDER — HYDROXYUREA 500 MG/1
500 CAPSULE ORAL 2 TIMES DAILY
Status: DISCONTINUED | OUTPATIENT
Start: 2021-01-01 | End: 2021-12-23 | Stop reason: HOSPADM

## 2021-01-01 RX ORDER — ANTICOAGULANT SODIUM CITRATE SOLUTION 4 G/100ML
100 SOLUTION INTRAVENOUS PRN
Status: DISCONTINUED | OUTPATIENT
Start: 2021-01-01 | End: 2021-12-23 | Stop reason: HOSPADM

## 2021-01-01 RX ORDER — ATORVASTATIN CALCIUM 40 MG/1
40 TABLET, FILM COATED ORAL NIGHTLY
Status: DISCONTINUED | OUTPATIENT
Start: 2021-01-01 | End: 2021-12-23 | Stop reason: HOSPADM

## 2021-01-01 RX ORDER — EPINEPHRINE 1 MG/ML
0.3 INJECTION, SOLUTION, CONCENTRATE INTRAVENOUS PRN
Status: CANCELLED | OUTPATIENT
Start: 2021-01-01

## 2021-01-01 RX ORDER — DEXAMETHASONE SODIUM PHOSPHATE 10 MG/ML
10 INJECTION INTRAMUSCULAR; INTRAVENOUS ONCE
Status: COMPLETED | OUTPATIENT
Start: 2021-01-01 | End: 2021-01-01

## 2021-01-01 RX ORDER — FLUCONAZOLE 2 MG/ML
200 INJECTION, SOLUTION INTRAVENOUS EVERY 24 HOURS
Status: DISCONTINUED | OUTPATIENT
Start: 2021-01-01 | End: 2021-01-01

## 2021-01-01 RX ORDER — EPINEPHRINE 1 MG/ML
0.3 INJECTION, SOLUTION, CONCENTRATE INTRAVENOUS PRN
Status: DISCONTINUED | OUTPATIENT
Start: 2021-01-01 | End: 2021-01-01

## 2021-01-01 RX ORDER — SODIUM CHLORIDE 9 MG/ML
INJECTION, SOLUTION INTRAVENOUS CONTINUOUS PRN
Status: CANCELLED | OUTPATIENT
Start: 2021-01-01 | End: 2021-01-01

## 2021-01-01 RX ORDER — DIPHENHYDRAMINE HYDROCHLORIDE 50 MG/ML
50 INJECTION INTRAMUSCULAR; INTRAVENOUS
Status: ACTIVE | OUTPATIENT
Start: 2021-01-01 | End: 2021-01-01

## 2021-01-01 RX ORDER — SODIUM CHLORIDE 0.9 % (FLUSH) 0.9 %
5-40 SYRINGE (ML) INJECTION PRN
Status: CANCELLED | OUTPATIENT
Start: 2021-01-01

## 2021-01-01 RX ORDER — ATROPINE SULFATE 0.1 MG/ML
INJECTION INTRAVENOUS
Status: DISCONTINUED
Start: 2021-01-01 | End: 2021-12-23 | Stop reason: HOSPADM

## 2021-01-01 RX ORDER — ALBUTEROL SULFATE 90 UG/1
2 AEROSOL, METERED RESPIRATORY (INHALATION) EVERY 4 HOURS PRN
Status: DISCONTINUED | OUTPATIENT
Start: 2021-01-01 | End: 2021-01-01

## 2021-01-01 RX ORDER — ZINC SULFATE 50(220)MG
50 CAPSULE ORAL DAILY
Status: DISCONTINUED | OUTPATIENT
Start: 2021-01-01 | End: 2021-12-23 | Stop reason: HOSPADM

## 2021-01-01 RX ORDER — SODIUM CHLORIDE 9 MG/ML
INJECTION, SOLUTION INTRAVENOUS PRN
Status: DISCONTINUED | OUTPATIENT
Start: 2021-01-01 | End: 2021-12-23 | Stop reason: HOSPADM

## 2021-01-01 RX ORDER — BENZONATATE 100 MG/1
200 CAPSULE ORAL 3 TIMES DAILY PRN
Status: DISCONTINUED | OUTPATIENT
Start: 2021-01-01 | End: 2021-12-23 | Stop reason: HOSPADM

## 2021-01-01 RX ORDER — HEPARIN SODIUM (PORCINE) LOCK FLUSH IV SOLN 100 UNIT/ML 100 UNIT/ML
3 SOLUTION INTRAVENOUS EVERY 12 HOURS SCHEDULED
Status: DISCONTINUED | OUTPATIENT
Start: 2021-01-01 | End: 2021-12-23 | Stop reason: HOSPADM

## 2021-01-01 RX ORDER — DEXAMETHASONE SODIUM PHOSPHATE 10 MG/ML
10 INJECTION, SOLUTION INTRAMUSCULAR; INTRAVENOUS EVERY 12 HOURS
Status: DISCONTINUED | OUTPATIENT
Start: 2021-01-01 | End: 2021-01-01

## 2021-01-01 RX ORDER — BUDESONIDE 0.5 MG/2ML
0.5 INHALANT ORAL 2 TIMES DAILY
Status: DISCONTINUED | OUTPATIENT
Start: 2021-01-01 | End: 2021-12-23 | Stop reason: HOSPADM

## 2021-01-01 RX ORDER — EPINEPHRINE 0.1 MG/ML
SYRINGE (ML) INJECTION
Status: COMPLETED
Start: 2021-01-01 | End: 2021-01-01

## 2021-01-01 RX ORDER — GUAIFENESIN 400 MG/1
400 TABLET ORAL 3 TIMES DAILY
Status: DISCONTINUED | OUTPATIENT
Start: 2021-01-01 | End: 2021-12-23 | Stop reason: HOSPADM

## 2021-01-01 RX ORDER — SODIUM CHLORIDE 0.9 % (FLUSH) 0.9 %
5-40 SYRINGE (ML) INJECTION PRN
Status: DISCONTINUED | OUTPATIENT
Start: 2021-01-01 | End: 2021-01-01 | Stop reason: HOSPADM

## 2021-01-01 RX ORDER — MIRTAZAPINE 15 MG/1
7.5 TABLET, FILM COATED ORAL NIGHTLY
Status: DISCONTINUED | OUTPATIENT
Start: 2021-01-01 | End: 2021-12-23 | Stop reason: HOSPADM

## 2021-01-01 RX ORDER — SODIUM CHLORIDE 9 MG/ML
25 INJECTION, SOLUTION INTRAVENOUS PRN
Status: DISCONTINUED | OUTPATIENT
Start: 2021-01-01 | End: 2021-01-01 | Stop reason: SDUPTHER

## 2021-01-01 RX ORDER — SODIUM CHLORIDE 9 MG/ML
25 INJECTION, SOLUTION INTRAVENOUS PRN
Status: DISCONTINUED | OUTPATIENT
Start: 2021-01-01 | End: 2021-12-23 | Stop reason: HOSPADM

## 2021-01-01 RX ORDER — MAGNESIUM SULFATE IN WATER 40 MG/ML
2000 INJECTION, SOLUTION INTRAVENOUS ONCE
Status: COMPLETED | OUTPATIENT
Start: 2021-01-01 | End: 2021-01-01

## 2021-01-01 RX ORDER — SODIUM CHLORIDE 9 MG/ML
INJECTION, SOLUTION INTRAVENOUS CONTINUOUS PRN
Status: DISCONTINUED | OUTPATIENT
Start: 2021-01-01 | End: 2021-01-01 | Stop reason: HOSPADM

## 2021-01-01 RX ORDER — ASCORBIC ACID 500 MG
1000 TABLET ORAL DAILY
Status: DISCONTINUED | OUTPATIENT
Start: 2021-01-01 | End: 2021-12-23 | Stop reason: HOSPADM

## 2021-01-01 RX ORDER — SODIUM CHLORIDE 9 MG/ML
10 INJECTION INTRAVENOUS DAILY
Status: DISCONTINUED | OUTPATIENT
Start: 2021-01-01 | End: 2021-01-01

## 2021-01-01 RX ORDER — HEPARIN SODIUM (PORCINE) LOCK FLUSH IV SOLN 100 UNIT/ML 100 UNIT/ML
3 SOLUTION INTRAVENOUS PRN
Status: DISCONTINUED | OUTPATIENT
Start: 2021-01-01 | End: 2021-12-23 | Stop reason: HOSPADM

## 2021-01-01 RX ORDER — LEVOFLOXACIN 5 MG/ML
750 INJECTION, SOLUTION INTRAVENOUS ONCE
Status: COMPLETED | OUTPATIENT
Start: 2021-01-01 | End: 2021-01-01

## 2021-01-01 RX ORDER — CHLORHEXIDINE GLUCONATE 0.12 MG/ML
15 RINSE ORAL 2 TIMES DAILY
Status: DISCONTINUED | OUTPATIENT
Start: 2021-01-01 | End: 2021-12-23 | Stop reason: HOSPADM

## 2021-01-01 RX ORDER — 0.9 % SODIUM CHLORIDE 0.9 %
500 INTRAVENOUS SOLUTION INTRAVENOUS ONCE
Status: DISCONTINUED | OUTPATIENT
Start: 2021-01-01 | End: 2021-01-01

## 2021-01-01 RX ORDER — ALLOPURINOL 100 MG/1
100 TABLET ORAL DAILY
Status: DISCONTINUED | OUTPATIENT
Start: 2021-01-01 | End: 2021-12-23 | Stop reason: HOSPADM

## 2021-01-01 RX ORDER — ACETAMINOPHEN 325 MG/1
650 TABLET ORAL EVERY 4 HOURS PRN
Status: DISCONTINUED | OUTPATIENT
Start: 2021-01-01 | End: 2021-12-23 | Stop reason: HOSPADM

## 2021-01-01 RX ORDER — PANTOPRAZOLE SODIUM 40 MG/10ML
40 INJECTION, POWDER, LYOPHILIZED, FOR SOLUTION INTRAVENOUS DAILY
Status: DISCONTINUED | OUTPATIENT
Start: 2021-01-01 | End: 2021-01-01

## 2021-01-01 RX ORDER — VITAMIN B COMPLEX
2000 TABLET ORAL DAILY
Status: DISCONTINUED | OUTPATIENT
Start: 2021-01-01 | End: 2021-12-23 | Stop reason: HOSPADM

## 2021-01-01 RX ADMIN — CEFEPIME HYDROCHLORIDE 1000 MG: 1 INJECTION, POWDER, FOR SOLUTION INTRAMUSCULAR; INTRAVENOUS at 02:57

## 2021-01-01 RX ADMIN — SODIUM CHLORIDE 500 ML: 9 INJECTION, SOLUTION INTRAVENOUS at 01:00

## 2021-01-01 RX ADMIN — HYDROXYUREA 500 MG: 500 CAPSULE ORAL at 08:14

## 2021-01-01 RX ADMIN — GUAIFENESIN 400 MG: 400 TABLET ORAL at 20:24

## 2021-01-01 RX ADMIN — ATORVASTATIN CALCIUM 40 MG: 40 TABLET, FILM COATED ORAL at 20:26

## 2021-01-01 RX ADMIN — Medication: at 04:55

## 2021-01-01 RX ADMIN — FENTANYL CITRATE 25 MCG: 50 INJECTION INTRAMUSCULAR; INTRAVENOUS at 16:40

## 2021-01-01 RX ADMIN — GUAIFENESIN 400 MG: 400 TABLET ORAL at 10:17

## 2021-01-01 RX ADMIN — ISOSORBIDE MONONITRATE 30 MG: 30 TABLET, EXTENDED RELEASE ORAL at 08:41

## 2021-01-01 RX ADMIN — Medication 1000 MG: at 08:18

## 2021-01-01 RX ADMIN — ISOSORBIDE MONONITRATE 30 MG: 30 TABLET, EXTENDED RELEASE ORAL at 07:55

## 2021-01-01 RX ADMIN — HYDROCORTISONE SODIUM SUCCINATE 100 MG: 100 INJECTION, POWDER, FOR SOLUTION INTRAMUSCULAR; INTRAVENOUS at 10:30

## 2021-01-01 RX ADMIN — HYDROXYUREA 500 MG: 500 CAPSULE ORAL at 09:11

## 2021-01-01 RX ADMIN — BUDESONIDE AND FORMOTEROL FUMARATE DIHYDRATE 2 PUFF: 160; 4.5 AEROSOL RESPIRATORY (INHALATION) at 08:41

## 2021-01-01 RX ADMIN — ISOSORBIDE MONONITRATE 30 MG: 30 TABLET, EXTENDED RELEASE ORAL at 09:12

## 2021-01-01 RX ADMIN — PANTOPRAZOLE SODIUM 40 MG: 40 INJECTION, POWDER, FOR SOLUTION INTRAVENOUS at 08:15

## 2021-01-01 RX ADMIN — GUAIFENESIN 400 MG: 400 TABLET ORAL at 08:42

## 2021-01-01 RX ADMIN — HYDROCORTISONE SODIUM SUCCINATE 50 MG: 100 INJECTION, POWDER, FOR SOLUTION INTRAMUSCULAR; INTRAVENOUS at 08:57

## 2021-01-01 RX ADMIN — BARICITINIB 1 MG: 2 TABLET, FILM COATED ORAL at 07:56

## 2021-01-01 RX ADMIN — AMIODARONE HYDROCHLORIDE 0.5 MG/MIN: 50 INJECTION, SOLUTION INTRAVENOUS at 22:43

## 2021-01-01 RX ADMIN — ATORVASTATIN CALCIUM 40 MG: 40 TABLET, FILM COATED ORAL at 21:05

## 2021-01-01 RX ADMIN — Medication 2000 MG: at 13:03

## 2021-01-01 RX ADMIN — MIDODRINE HYDROCHLORIDE 5 MG: 5 TABLET ORAL at 08:30

## 2021-01-01 RX ADMIN — DEXAMETHASONE SODIUM PHOSPHATE 6 MG: 10 INJECTION INTRAMUSCULAR; INTRAVENOUS at 00:31

## 2021-01-01 RX ADMIN — MIDODRINE HYDROCHLORIDE 5 MG: 5 TABLET ORAL at 11:17

## 2021-01-01 RX ADMIN — HYDROXYUREA 500 MG: 500 CAPSULE ORAL at 20:24

## 2021-01-01 RX ADMIN — HYDROCORTISONE SODIUM SUCCINATE 50 MG: 100 INJECTION, POWDER, FOR SOLUTION INTRAMUSCULAR; INTRAVENOUS at 02:27

## 2021-01-01 RX ADMIN — AMIODARONE HYDROCHLORIDE 0.5 MG/MIN: 50 INJECTION, SOLUTION INTRAVENOUS at 12:41

## 2021-01-01 RX ADMIN — DEXAMETHASONE SODIUM PHOSPHATE 10 MG: 10 INJECTION INTRAMUSCULAR; INTRAVENOUS at 03:25

## 2021-01-01 RX ADMIN — GUAIFENESIN 400 MG: 400 TABLET ORAL at 20:26

## 2021-01-01 RX ADMIN — ZINC SULFATE 220 MG (50 MG) CAPSULE 50 MG: CAPSULE at 08:41

## 2021-01-01 RX ADMIN — ALLOPURINOL 100 MG: 100 TABLET ORAL at 09:10

## 2021-01-01 RX ADMIN — GUAIFENESIN 400 MG: 400 TABLET ORAL at 15:19

## 2021-01-01 RX ADMIN — Medication 1000 MG: at 08:14

## 2021-01-01 RX ADMIN — ZINC SULFATE 220 MG (50 MG) CAPSULE 50 MG: CAPSULE at 08:18

## 2021-01-01 RX ADMIN — ZINC SULFATE 220 MG (50 MG) CAPSULE 50 MG: CAPSULE at 08:14

## 2021-01-01 RX ADMIN — Medication 10 ML: at 21:32

## 2021-01-01 RX ADMIN — GUAIFENESIN 400 MG: 400 TABLET ORAL at 08:41

## 2021-01-01 RX ADMIN — SODIUM CHLORIDE: 9 INJECTION, SOLUTION INTRAVENOUS at 05:25

## 2021-01-01 RX ADMIN — SODIUM CHLORIDE: 9 INJECTION, SOLUTION INTRAVENOUS at 04:41

## 2021-01-01 RX ADMIN — PANTOPRAZOLE SODIUM 40 MG: 40 INJECTION, POWDER, FOR SOLUTION INTRAVENOUS at 09:08

## 2021-01-01 RX ADMIN — BENZONATATE 200 MG: 100 CAPSULE ORAL at 10:16

## 2021-01-01 RX ADMIN — BARIUM SULFATE 120 ML: 400 SUSPENSION ORAL at 11:21

## 2021-01-01 RX ADMIN — Medication 2000 UNITS: at 08:42

## 2021-01-01 RX ADMIN — Medication 10 ML: at 08:17

## 2021-01-01 RX ADMIN — GUAIFENESIN 400 MG: 400 TABLET ORAL at 14:26

## 2021-01-01 RX ADMIN — ZINC SULFATE 220 MG (50 MG) CAPSULE 50 MG: CAPSULE at 08:42

## 2021-01-01 RX ADMIN — BUDESONIDE AND FORMOTEROL FUMARATE DIHYDRATE 2 PUFF: 160; 4.5 AEROSOL RESPIRATORY (INHALATION) at 20:26

## 2021-01-01 RX ADMIN — DEXAMETHASONE SODIUM PHOSPHATE 6 MG: 10 INJECTION INTRAMUSCULAR; INTRAVENOUS at 12:02

## 2021-01-01 RX ADMIN — METOPROLOL TARTRATE 2.5 MG: 1 INJECTION, SOLUTION INTRAVENOUS at 22:49

## 2021-01-01 RX ADMIN — PANTOPRAZOLE SODIUM 40 MG: 40 INJECTION, POWDER, FOR SOLUTION INTRAVENOUS at 13:18

## 2021-01-01 RX ADMIN — BUDESONIDE AND FORMOTEROL FUMARATE DIHYDRATE 2 PUFF: 160; 4.5 AEROSOL RESPIRATORY (INHALATION) at 10:18

## 2021-01-01 RX ADMIN — GUAIFENESIN 400 MG: 400 TABLET ORAL at 20:35

## 2021-01-01 RX ADMIN — BARICITINIB 2 MG: 2 TABLET, FILM COATED ORAL at 08:41

## 2021-01-01 RX ADMIN — Medication 1000 MG: at 09:18

## 2021-01-01 RX ADMIN — BARICITINIB 2 MG: 2 TABLET, FILM COATED ORAL at 10:17

## 2021-01-01 RX ADMIN — ALLOPURINOL 100 MG: 100 TABLET ORAL at 07:55

## 2021-01-01 RX ADMIN — ATORVASTATIN CALCIUM 40 MG: 40 TABLET, FILM COATED ORAL at 21:28

## 2021-01-01 RX ADMIN — CHLORHEXIDINE GLUCONATE 0.12% ORAL RINSE 15 ML: 1.2 LIQUID ORAL at 09:08

## 2021-01-01 RX ADMIN — Medication 2000 UNITS: at 07:54

## 2021-01-01 RX ADMIN — MIRTAZAPINE 7.5 MG: 15 TABLET, FILM COATED ORAL at 21:32

## 2021-01-01 RX ADMIN — GUAIFENESIN 400 MG: 400 TABLET ORAL at 16:33

## 2021-01-01 RX ADMIN — SODIUM CHLORIDE, PRESERVATIVE FREE 300 UNITS: 5 INJECTION INTRAVENOUS at 09:08

## 2021-01-01 RX ADMIN — Medication 10 ML: at 23:03

## 2021-01-01 RX ADMIN — SODIUM BICARBONATE: 84 INJECTION, SOLUTION INTRAVENOUS at 17:20

## 2021-01-01 RX ADMIN — BUDESONIDE AND FORMOTEROL FUMARATE DIHYDRATE 2 PUFF: 160; 4.5 AEROSOL RESPIRATORY (INHALATION) at 09:26

## 2021-01-01 RX ADMIN — HYDROCORTISONE SODIUM SUCCINATE 50 MG: 100 INJECTION, POWDER, FOR SOLUTION INTRAMUSCULAR; INTRAVENOUS at 18:42

## 2021-01-01 RX ADMIN — FENTANYL CITRATE 25 MCG: 50 INJECTION INTRAMUSCULAR; INTRAVENOUS at 09:05

## 2021-01-01 RX ADMIN — MIRTAZAPINE 7.5 MG: 15 TABLET, FILM COATED ORAL at 20:26

## 2021-01-01 RX ADMIN — Medication 1000 MG: at 08:16

## 2021-01-01 RX ADMIN — BUDESONIDE 500 MCG: 0.5 SUSPENSION RESPIRATORY (INHALATION) at 09:32

## 2021-01-01 RX ADMIN — ARFORMOTEROL TARTRATE 15 MCG: 15 SOLUTION RESPIRATORY (INHALATION) at 20:16

## 2021-01-01 RX ADMIN — MIDODRINE HYDROCHLORIDE 5 MG: 5 TABLET ORAL at 17:30

## 2021-01-01 RX ADMIN — GUAIFENESIN 400 MG: 400 TABLET ORAL at 21:05

## 2021-01-01 RX ADMIN — CEFEPIME HYDROCHLORIDE 1000 MG: 1 INJECTION, POWDER, FOR SOLUTION INTRAMUSCULAR; INTRAVENOUS at 14:43

## 2021-01-01 RX ADMIN — SODIUM CHLORIDE, PRESERVATIVE FREE 10 ML: 5 INJECTION INTRAVENOUS at 18:49

## 2021-01-01 RX ADMIN — ALLOPURINOL 100 MG: 100 TABLET ORAL at 08:14

## 2021-01-01 RX ADMIN — ZINC SULFATE 220 MG (50 MG) CAPSULE 50 MG: CAPSULE at 10:17

## 2021-01-01 RX ADMIN — LORAZEPAM 0.5 MG: 2 INJECTION INTRAMUSCULAR; INTRAVENOUS at 20:40

## 2021-01-01 RX ADMIN — AMIODARONE HYDROCHLORIDE 0.5 MG/MIN: 50 INJECTION, SOLUTION INTRAVENOUS at 09:37

## 2021-01-01 RX ADMIN — ATORVASTATIN CALCIUM 40 MG: 40 TABLET, FILM COATED ORAL at 20:35

## 2021-01-01 RX ADMIN — Medication 2000 MG: at 20:26

## 2021-01-01 RX ADMIN — BARIUM SULFATE 10 ML: 400 PASTE ORAL at 11:21

## 2021-01-01 RX ADMIN — DEXAMETHASONE SODIUM PHOSPHATE 10 MG: 10 INJECTION, SOLUTION INTRAMUSCULAR; INTRAVENOUS at 23:22

## 2021-01-01 RX ADMIN — SODIUM CHLORIDE: 9 INJECTION, SOLUTION INTRAVENOUS at 18:51

## 2021-01-01 RX ADMIN — HYDROXYUREA 500 MG: 500 CAPSULE ORAL at 21:36

## 2021-01-01 RX ADMIN — METOPROLOL SUCCINATE 50 MG: 50 TABLET, FILM COATED, EXTENDED RELEASE ORAL at 08:42

## 2021-01-01 RX ADMIN — Medication 10 ML: at 20:26

## 2021-01-01 RX ADMIN — WARFARIN SODIUM 2.5 MG: 2.5 TABLET ORAL at 17:30

## 2021-01-01 RX ADMIN — FLUCONAZOLE 200 MG: 200 INJECTION, SOLUTION INTRAVENOUS at 11:20

## 2021-01-01 RX ADMIN — MIDODRINE HYDROCHLORIDE 5 MG: 5 TABLET ORAL at 18:42

## 2021-01-01 RX ADMIN — GUAIFENESIN 400 MG: 400 TABLET ORAL at 08:00

## 2021-01-01 RX ADMIN — CEFEPIME HYDROCHLORIDE 1000 MG: 1 INJECTION, POWDER, FOR SOLUTION INTRAMUSCULAR; INTRAVENOUS at 15:48

## 2021-01-01 RX ADMIN — Medication 2000 MG: at 23:21

## 2021-01-01 RX ADMIN — ATORVASTATIN CALCIUM 40 MG: 40 TABLET, FILM COATED ORAL at 21:19

## 2021-01-01 RX ADMIN — MEROPENEM 1000 MG: 1 INJECTION, POWDER, FOR SOLUTION INTRAVENOUS at 16:25

## 2021-01-01 RX ADMIN — DEXAMETHASONE SODIUM PHOSPHATE 6 MG: 10 INJECTION INTRAMUSCULAR; INTRAVENOUS at 02:44

## 2021-01-01 RX ADMIN — BARICITINIB 2 MG: 2 TABLET, FILM COATED ORAL at 09:19

## 2021-01-01 RX ADMIN — Medication 2000 MG: at 05:34

## 2021-01-01 RX ADMIN — BUDESONIDE AND FORMOTEROL FUMARATE DIHYDRATE 2 PUFF: 160; 4.5 AEROSOL RESPIRATORY (INHALATION) at 21:07

## 2021-01-01 RX ADMIN — SODIUM CHLORIDE, PRESERVATIVE FREE 300 UNITS: 5 INJECTION INTRAVENOUS at 23:01

## 2021-01-01 RX ADMIN — FUROSEMIDE 40 MG: 10 INJECTION, SOLUTION INTRAVENOUS at 17:24

## 2021-01-01 RX ADMIN — METOPROLOL TARTRATE 2.5 MG: 1 INJECTION, SOLUTION INTRAVENOUS at 20:54

## 2021-01-01 RX ADMIN — SODIUM CHLORIDE: 9 INJECTION, SOLUTION INTRAVENOUS at 16:18

## 2021-01-01 RX ADMIN — MIDODRINE HYDROCHLORIDE 5 MG: 5 TABLET ORAL at 13:00

## 2021-01-01 RX ADMIN — MEROPENEM 1000 MG: 1 INJECTION, POWDER, FOR SOLUTION INTRAVENOUS at 14:16

## 2021-01-01 RX ADMIN — BUDESONIDE AND FORMOTEROL FUMARATE DIHYDRATE 2 PUFF: 160; 4.5 AEROSOL RESPIRATORY (INHALATION) at 09:59

## 2021-01-01 RX ADMIN — Medication 2000 MG: at 13:54

## 2021-01-01 RX ADMIN — GUAIFENESIN 400 MG: 400 TABLET ORAL at 13:20

## 2021-01-01 RX ADMIN — NOREPINEPHRINE BITARTRATE 5 MCG/MIN: 1 INJECTION, SOLUTION, CONCENTRATE INTRAVENOUS at 08:52

## 2021-01-01 RX ADMIN — DOXYCYCLINE HYCLATE 100 MG: 100 CAPSULE ORAL at 08:41

## 2021-01-01 RX ADMIN — DEXAMETHASONE SODIUM PHOSPHATE 6 MG: 10 INJECTION INTRAMUSCULAR; INTRAVENOUS at 02:58

## 2021-01-01 RX ADMIN — Medication 1000 MG: at 09:11

## 2021-01-01 RX ADMIN — FUROSEMIDE 40 MG: 10 INJECTION, SOLUTION INTRAVENOUS at 16:32

## 2021-01-01 RX ADMIN — ISOSORBIDE MONONITRATE 30 MG: 30 TABLET, EXTENDED RELEASE ORAL at 08:44

## 2021-01-01 RX ADMIN — HYDROCORTISONE SODIUM SUCCINATE 50 MG: 100 INJECTION, POWDER, FOR SOLUTION INTRAMUSCULAR; INTRAVENOUS at 01:50

## 2021-01-01 RX ADMIN — VASOPRESSIN 0.06 UNITS/MIN: 20 INJECTION INTRAVENOUS at 17:15

## 2021-01-01 RX ADMIN — SODIUM CHLORIDE: 9 INJECTION, SOLUTION INTRAVENOUS at 17:28

## 2021-01-01 RX ADMIN — ATORVASTATIN CALCIUM 40 MG: 40 TABLET, FILM COATED ORAL at 21:27

## 2021-01-01 RX ADMIN — MIRTAZAPINE 7.5 MG: 15 TABLET, FILM COATED ORAL at 21:45

## 2021-01-01 RX ADMIN — BENZONATATE 200 MG: 100 CAPSULE ORAL at 08:42

## 2021-01-01 RX ADMIN — CHLORHEXIDINE GLUCONATE 0.12% ORAL RINSE 15 ML: 1.2 LIQUID ORAL at 08:18

## 2021-01-01 RX ADMIN — LORAZEPAM 0.5 MG: 2 INJECTION INTRAMUSCULAR; INTRAVENOUS at 22:42

## 2021-01-01 RX ADMIN — SODIUM BICARBONATE 100 MEQ: 84 INJECTION, SOLUTION INTRAVENOUS at 12:00

## 2021-01-01 RX ADMIN — Medication 2000 UNITS: at 10:16

## 2021-01-01 RX ADMIN — GUAIFENESIN 400 MG: 400 TABLET ORAL at 08:18

## 2021-01-01 RX ADMIN — METOPROLOL SUCCINATE 50 MG: 50 TABLET, FILM COATED, EXTENDED RELEASE ORAL at 09:12

## 2021-01-01 RX ADMIN — Medication 10 ML: at 08:00

## 2021-01-01 RX ADMIN — Medication 2000 MG: at 04:59

## 2021-01-01 RX ADMIN — HYDROXYUREA 500 MG: 500 CAPSULE ORAL at 08:00

## 2021-01-01 RX ADMIN — METOPROLOL SUCCINATE 50 MG: 50 TABLET, FILM COATED, EXTENDED RELEASE ORAL at 10:16

## 2021-01-01 RX ADMIN — Medication 10 ML: at 08:15

## 2021-01-01 RX ADMIN — ISOSORBIDE MONONITRATE 30 MG: 30 TABLET, EXTENDED RELEASE ORAL at 10:17

## 2021-01-01 RX ADMIN — GUAIFENESIN 400 MG: 400 TABLET ORAL at 13:18

## 2021-01-01 RX ADMIN — ARFORMOTEROL TARTRATE 15 MCG: 15 SOLUTION RESPIRATORY (INHALATION) at 19:45

## 2021-01-01 RX ADMIN — ACETAMINOPHEN 650 MG: 325 TABLET ORAL at 20:26

## 2021-01-01 RX ADMIN — Medication 2000 UNITS: at 08:16

## 2021-01-01 RX ADMIN — BUDESONIDE AND FORMOTEROL FUMARATE DIHYDRATE 2 PUFF: 160; 4.5 AEROSOL RESPIRATORY (INHALATION) at 21:36

## 2021-01-01 RX ADMIN — BUDESONIDE AND FORMOTEROL FUMARATE DIHYDRATE 2 PUFF: 160; 4.5 AEROSOL RESPIRATORY (INHALATION) at 08:17

## 2021-01-01 RX ADMIN — GUAIFENESIN 400 MG: 400 TABLET ORAL at 15:28

## 2021-01-01 RX ADMIN — GUAIFENESIN 400 MG: 400 TABLET ORAL at 08:15

## 2021-01-01 RX ADMIN — SODIUM CHLORIDE, PRESERVATIVE FREE 300 UNITS: 5 INJECTION INTRAVENOUS at 09:38

## 2021-01-01 RX ADMIN — ATORVASTATIN CALCIUM 40 MG: 40 TABLET, FILM COATED ORAL at 20:24

## 2021-01-01 RX ADMIN — SODIUM CHLORIDE, PRESERVATIVE FREE 300 UNITS: 5 INJECTION INTRAVENOUS at 21:58

## 2021-01-01 RX ADMIN — SODIUM CHLORIDE: 9 INJECTION, SOLUTION INTRAVENOUS at 15:51

## 2021-01-01 RX ADMIN — SODIUM CHLORIDE, PRESERVATIVE FREE 300 UNITS: 5 INJECTION INTRAVENOUS at 08:00

## 2021-01-01 RX ADMIN — Medication 1000 MG: at 10:17

## 2021-01-01 RX ADMIN — GUAIFENESIN 400 MG: 400 TABLET ORAL at 08:14

## 2021-01-01 RX ADMIN — ZINC SULFATE 220 MG (50 MG) CAPSULE 50 MG: CAPSULE at 08:16

## 2021-01-01 RX ADMIN — DOXYCYCLINE HYCLATE 100 MG: 100 CAPSULE ORAL at 21:28

## 2021-01-01 RX ADMIN — Medication 2000 UNITS: at 09:18

## 2021-01-01 RX ADMIN — WARFARIN SODIUM 2.5 MG: 2.5 TABLET ORAL at 18:30

## 2021-01-01 RX ADMIN — GUAIFENESIN 400 MG: 400 TABLET ORAL at 09:19

## 2021-01-01 RX ADMIN — BARICITINIB 2 MG: 2 TABLET, FILM COATED ORAL at 08:42

## 2021-01-01 RX ADMIN — MIDODRINE HYDROCHLORIDE 5 MG: 5 TABLET ORAL at 08:17

## 2021-01-01 RX ADMIN — MIRTAZAPINE 7.5 MG: 15 TABLET, FILM COATED ORAL at 21:05

## 2021-01-01 RX ADMIN — GUAIFENESIN 400 MG: 400 TABLET ORAL at 21:27

## 2021-01-01 RX ADMIN — ZINC SULFATE 220 MG (50 MG) CAPSULE 50 MG: CAPSULE at 09:18

## 2021-01-01 RX ADMIN — PANTOPRAZOLE SODIUM 40 MG: 40 INJECTION, POWDER, FOR SOLUTION INTRAVENOUS at 08:18

## 2021-01-01 RX ADMIN — DEXAMETHASONE SODIUM PHOSPHATE 6 MG: 10 INJECTION INTRAMUSCULAR; INTRAVENOUS at 00:54

## 2021-01-01 RX ADMIN — ZINC SULFATE 220 MG (50 MG) CAPSULE 50 MG: CAPSULE at 09:08

## 2021-01-01 RX ADMIN — HYDROCORTISONE SODIUM SUCCINATE 50 MG: 100 INJECTION, POWDER, FOR SOLUTION INTRAMUSCULAR; INTRAVENOUS at 09:09

## 2021-01-01 RX ADMIN — METOPROLOL SUCCINATE 50 MG: 50 TABLET, FILM COATED, EXTENDED RELEASE ORAL at 11:48

## 2021-01-01 RX ADMIN — MIDODRINE HYDROCHLORIDE 5 MG: 5 TABLET ORAL at 12:41

## 2021-01-01 RX ADMIN — SODIUM CHLORIDE 3000 MG: 900 INJECTION INTRAVENOUS at 12:43

## 2021-01-01 RX ADMIN — DEXAMETHASONE SODIUM PHOSPHATE 6 MG: 10 INJECTION INTRAMUSCULAR; INTRAVENOUS at 12:44

## 2021-01-01 RX ADMIN — ZINC SULFATE 220 MG (50 MG) CAPSULE 50 MG: CAPSULE at 08:02

## 2021-01-01 RX ADMIN — MIRTAZAPINE 7.5 MG: 15 TABLET, FILM COATED ORAL at 20:35

## 2021-01-01 RX ADMIN — HYDROCORTISONE SODIUM SUCCINATE 50 MG: 100 INJECTION, POWDER, FOR SOLUTION INTRAMUSCULAR; INTRAVENOUS at 10:30

## 2021-01-01 RX ADMIN — METOPROLOL SUCCINATE 50 MG: 50 TABLET, FILM COATED, EXTENDED RELEASE ORAL at 08:41

## 2021-01-01 RX ADMIN — MEROPENEM 1000 MG: 1 INJECTION, POWDER, FOR SOLUTION INTRAVENOUS at 14:30

## 2021-01-01 RX ADMIN — VANCOMYCIN HYDROCHLORIDE 1250 MG: 10 INJECTION, POWDER, LYOPHILIZED, FOR SOLUTION INTRAVENOUS at 03:26

## 2021-01-01 RX ADMIN — DOXYCYCLINE HYCLATE 100 MG: 100 CAPSULE ORAL at 21:43

## 2021-01-01 RX ADMIN — Medication 1000 MG: at 07:55

## 2021-01-01 RX ADMIN — GUAIFENESIN 400 MG: 400 TABLET ORAL at 21:32

## 2021-01-01 RX ADMIN — Medication 10 ML: at 08:53

## 2021-01-01 RX ADMIN — ALLOPURINOL 100 MG: 100 TABLET ORAL at 17:24

## 2021-01-01 RX ADMIN — NOREPINEPHRINE BITARTRATE 4 MCG/MIN: 1 INJECTION, SOLUTION, CONCENTRATE INTRAVENOUS at 06:16

## 2021-01-01 RX ADMIN — Medication 25 MCG/HR: at 19:16

## 2021-01-01 RX ADMIN — DEXAMETHASONE SODIUM PHOSPHATE 6 MG: 10 INJECTION INTRAMUSCULAR; INTRAVENOUS at 15:47

## 2021-01-01 RX ADMIN — OXYCODONE HYDROCHLORIDE AND ACETAMINOPHEN 500 MG: 500 TABLET ORAL at 08:45

## 2021-01-01 RX ADMIN — DOXYCYCLINE HYCLATE 100 MG: 100 CAPSULE ORAL at 08:16

## 2021-01-01 RX ADMIN — CEFEPIME HYDROCHLORIDE 2000 MG: 2 INJECTION, POWDER, FOR SOLUTION INTRAVENOUS at 02:24

## 2021-01-01 RX ADMIN — BARICITINIB 2 MG: 2 TABLET, FILM COATED ORAL at 09:12

## 2021-01-01 RX ADMIN — ISOSORBIDE MONONITRATE 30 MG: 30 TABLET, EXTENDED RELEASE ORAL at 08:16

## 2021-01-01 RX ADMIN — SODIUM CHLORIDE, PRESERVATIVE FREE 10 ML: 5 INJECTION INTRAVENOUS at 21:52

## 2021-01-01 RX ADMIN — ATORVASTATIN CALCIUM 40 MG: 40 TABLET, FILM COATED ORAL at 21:32

## 2021-01-01 RX ADMIN — Medication: at 20:01

## 2021-01-01 RX ADMIN — CEFEPIME HYDROCHLORIDE 1000 MG: 1 INJECTION, POWDER, FOR SOLUTION INTRAMUSCULAR; INTRAVENOUS at 02:20

## 2021-01-01 RX ADMIN — Medication 10 ML: at 20:39

## 2021-01-01 RX ADMIN — Medication 10 ML: at 09:08

## 2021-01-01 RX ADMIN — SODIUM CHLORIDE, PRESERVATIVE FREE 10 ML: 5 INJECTION INTRAVENOUS at 08:16

## 2021-01-01 RX ADMIN — GUAIFENESIN 400 MG: 400 TABLET ORAL at 09:10

## 2021-01-01 RX ADMIN — BUDESONIDE 500 MCG: 0.5 SUSPENSION RESPIRATORY (INHALATION) at 20:16

## 2021-01-01 RX ADMIN — Medication 2000 UNITS: at 09:10

## 2021-01-01 RX ADMIN — CEFEPIME HYDROCHLORIDE 1000 MG: 1 INJECTION, POWDER, FOR SOLUTION INTRAMUSCULAR; INTRAVENOUS at 15:28

## 2021-01-01 RX ADMIN — HYDROXYUREA 500 MG: 500 CAPSULE ORAL at 08:18

## 2021-01-01 RX ADMIN — Medication 1000 MG: at 08:42

## 2021-01-01 RX ADMIN — MIRTAZAPINE 7.5 MG: 15 TABLET, FILM COATED ORAL at 21:27

## 2021-01-01 RX ADMIN — ARFORMOTEROL TARTRATE 15 MCG: 15 SOLUTION RESPIRATORY (INHALATION) at 10:08

## 2021-01-01 RX ADMIN — GUAIFENESIN 400 MG: 400 TABLET ORAL at 21:28

## 2021-01-01 RX ADMIN — Medication 2000 MG: at 20:25

## 2021-01-01 RX ADMIN — GUAIFENESIN 400 MG: 400 TABLET ORAL at 01:02

## 2021-01-01 RX ADMIN — FENTANYL CITRATE 25 MCG: 50 INJECTION INTRAMUSCULAR; INTRAVENOUS at 04:15

## 2021-01-01 RX ADMIN — MEROPENEM 1000 MG: 1 INJECTION, POWDER, FOR SOLUTION INTRAVENOUS at 13:30

## 2021-01-01 RX ADMIN — SODIUM CHLORIDE, PRESERVATIVE FREE 10 ML: 5 INJECTION INTRAVENOUS at 18:52

## 2021-01-01 RX ADMIN — HYDROCORTISONE SODIUM SUCCINATE 50 MG: 100 INJECTION, POWDER, FOR SOLUTION INTRAMUSCULAR; INTRAVENOUS at 02:05

## 2021-01-01 RX ADMIN — METOPROLOL SUCCINATE 50 MG: 50 TABLET, FILM COATED, EXTENDED RELEASE ORAL at 08:43

## 2021-01-01 RX ADMIN — SODIUM CHLORIDE, PRESERVATIVE FREE 300 UNITS: 5 INJECTION INTRAVENOUS at 20:30

## 2021-01-01 RX ADMIN — GUAIFENESIN 400 MG: 400 TABLET ORAL at 15:47

## 2021-01-01 RX ADMIN — FLUCONAZOLE 200 MG: 200 INJECTION, SOLUTION INTRAVENOUS at 11:36

## 2021-01-01 RX ADMIN — GUAIFENESIN 400 MG: 400 TABLET ORAL at 13:02

## 2021-01-01 RX ADMIN — BUDESONIDE AND FORMOTEROL FUMARATE DIHYDRATE 2 PUFF: 160; 4.5 AEROSOL RESPIRATORY (INHALATION) at 08:42

## 2021-01-01 RX ADMIN — BARICITINIB 2 MG: 2 TABLET, FILM COATED ORAL at 18:09

## 2021-01-01 RX ADMIN — AMPICILLIN SODIUM AND SULBACTAM SODIUM 3000 MG: 2; 1 INJECTION, POWDER, FOR SOLUTION INTRAMUSCULAR; INTRAVENOUS at 13:30

## 2021-01-01 RX ADMIN — HYDROCORTISONE SODIUM SUCCINATE 50 MG: 100 INJECTION, POWDER, FOR SOLUTION INTRAMUSCULAR; INTRAVENOUS at 17:30

## 2021-01-01 RX ADMIN — Medication 2000 UNITS: at 08:43

## 2021-01-01 RX ADMIN — BUDESONIDE 500 MCG: 0.5 SUSPENSION RESPIRATORY (INHALATION) at 10:08

## 2021-01-01 RX ADMIN — ALLOPURINOL 100 MG: 100 TABLET ORAL at 09:11

## 2021-01-01 RX ADMIN — ARFORMOTEROL TARTRATE 15 MCG: 15 SOLUTION RESPIRATORY (INHALATION) at 09:32

## 2021-01-01 RX ADMIN — SODIUM CHLORIDE: 9 INJECTION, SOLUTION INTRAVENOUS at 01:50

## 2021-01-01 RX ADMIN — HYDROCORTISONE SODIUM SUCCINATE 100 MG: 100 INJECTION, POWDER, FOR SOLUTION INTRAMUSCULAR; INTRAVENOUS at 01:28

## 2021-01-01 RX ADMIN — NOREPINEPHRINE BITARTRATE 50 MCG/MIN: 1 INJECTION, SOLUTION, CONCENTRATE INTRAVENOUS at 14:43

## 2021-01-01 RX ADMIN — CHLORHEXIDINE GLUCONATE 0.12% ORAL RINSE 15 ML: 1.2 LIQUID ORAL at 21:32

## 2021-01-01 RX ADMIN — FUROSEMIDE 40 MG: 10 INJECTION, SOLUTION INTRAVENOUS at 05:39

## 2021-01-01 RX ADMIN — METOPROLOL SUCCINATE 50 MG: 50 TABLET, FILM COATED, EXTENDED RELEASE ORAL at 08:16

## 2021-01-01 RX ADMIN — HYDROXYUREA 500 MG: 500 CAPSULE ORAL at 09:12

## 2021-01-01 RX ADMIN — HYDROXYUREA 500 MG: 500 CAPSULE ORAL at 20:26

## 2021-01-01 RX ADMIN — SODIUM CHLORIDE, PRESERVATIVE FREE 300 UNITS: 5 INJECTION INTRAVENOUS at 08:30

## 2021-01-01 RX ADMIN — SODIUM CHLORIDE: 9 INJECTION, SOLUTION INTRAVENOUS at 08:49

## 2021-01-01 RX ADMIN — Medication 2000 UNITS: at 08:17

## 2021-01-01 RX ADMIN — SODIUM CHLORIDE, PRESERVATIVE FREE 10 ML: 5 INJECTION INTRAVENOUS at 21:27

## 2021-01-01 RX ADMIN — Medication 2000 MG: at 14:42

## 2021-01-01 RX ADMIN — GUAIFENESIN 400 MG: 400 TABLET ORAL at 09:11

## 2021-01-01 RX ADMIN — BUDESONIDE 500 MCG: 0.5 SUSPENSION RESPIRATORY (INHALATION) at 09:42

## 2021-01-01 RX ADMIN — Medication 2000 UNITS: at 08:14

## 2021-01-01 RX ADMIN — MIRTAZAPINE 7.5 MG: 15 TABLET, FILM COATED ORAL at 21:19

## 2021-01-01 RX ADMIN — ISOSORBIDE MONONITRATE 30 MG: 30 TABLET, EXTENDED RELEASE ORAL at 08:42

## 2021-01-01 RX ADMIN — DEXAMETHASONE SODIUM PHOSPHATE 6 MG: 10 INJECTION INTRAMUSCULAR; INTRAVENOUS at 12:16

## 2021-01-01 RX ADMIN — ZINC SULFATE 220 MG (50 MG) CAPSULE 50 MG: CAPSULE at 08:44

## 2021-01-01 RX ADMIN — SODIUM CHLORIDE: 9 INJECTION, SOLUTION INTRAVENOUS at 11:38

## 2021-01-01 RX ADMIN — Medication: at 12:22

## 2021-01-01 RX ADMIN — Medication 50 MCG/HR: at 17:19

## 2021-01-01 RX ADMIN — DEXAMETHASONE SODIUM PHOSPHATE 6 MG: 10 INJECTION INTRAMUSCULAR; INTRAVENOUS at 16:31

## 2021-01-01 RX ADMIN — WARFARIN SODIUM 2.5 MG: 2.5 TABLET ORAL at 18:00

## 2021-01-01 RX ADMIN — BENZONATATE 200 MG: 100 CAPSULE ORAL at 16:33

## 2021-01-01 RX ADMIN — SODIUM CHLORIDE, PRESERVATIVE FREE 10 ML: 5 INJECTION INTRAVENOUS at 13:18

## 2021-01-01 RX ADMIN — GUAIFENESIN 400 MG: 400 TABLET ORAL at 14:43

## 2021-01-01 RX ADMIN — ISOSORBIDE MONONITRATE 30 MG: 30 TABLET, EXTENDED RELEASE ORAL at 09:19

## 2021-01-01 RX ADMIN — HYDROCORTISONE SODIUM SUCCINATE 100 MG: 100 INJECTION, POWDER, FOR SOLUTION INTRAMUSCULAR; INTRAVENOUS at 10:50

## 2021-01-01 RX ADMIN — Medication 2000 MG: at 05:43

## 2021-01-01 RX ADMIN — MIRTAZAPINE 7.5 MG: 15 TABLET, FILM COATED ORAL at 20:24

## 2021-01-01 RX ADMIN — MIDODRINE HYDROCHLORIDE 5 MG: 5 TABLET ORAL at 10:00

## 2021-01-01 RX ADMIN — Medication 10 ML: at 09:39

## 2021-01-01 RX ADMIN — EPINEPHRINE 1 MG: 0.1 INJECTION INTRACARDIAC; INTRAVENOUS at 17:20

## 2021-01-01 RX ADMIN — DEXAMETHASONE 6 MG: 4 TABLET ORAL at 21:36

## 2021-01-01 RX ADMIN — SODIUM CHLORIDE 1000 ML: 9 INJECTION, SOLUTION INTRAVENOUS at 22:39

## 2021-01-01 RX ADMIN — SODIUM CHLORIDE: 9 INJECTION, SOLUTION INTRAVENOUS at 18:49

## 2021-01-01 RX ADMIN — ATORVASTATIN CALCIUM 40 MG: 40 TABLET, FILM COATED ORAL at 20:23

## 2021-01-01 RX ADMIN — SODIUM CHLORIDE 1000 ML: 9 INJECTION, SOLUTION INTRAVENOUS at 06:18

## 2021-01-01 RX ADMIN — BUDESONIDE 500 MCG: 0.5 SUSPENSION RESPIRATORY (INHALATION) at 19:45

## 2021-01-01 RX ADMIN — WARFARIN SODIUM 5 MG: 5 TABLET ORAL at 18:08

## 2021-01-01 RX ADMIN — BARICITINIB 2 MG: 2 TABLET, FILM COATED ORAL at 08:16

## 2021-01-01 RX ADMIN — ZINC SULFATE 220 MG (50 MG) CAPSULE 50 MG: CAPSULE at 09:12

## 2021-01-01 RX ADMIN — MAGNESIUM SULFATE HEPTAHYDRATE 2000 MG: 2 INJECTION, SOLUTION INTRAVENOUS at 08:46

## 2021-01-01 RX ADMIN — SODIUM CHLORIDE: 9 INJECTION, SOLUTION INTRAVENOUS at 04:45

## 2021-01-01 RX ADMIN — Medication 5 ML: at 13:11

## 2021-01-01 RX ADMIN — HYDROCORTISONE SODIUM SUCCINATE 50 MG: 100 INJECTION, POWDER, FOR SOLUTION INTRAMUSCULAR; INTRAVENOUS at 18:52

## 2021-01-01 RX ADMIN — CEFEPIME HYDROCHLORIDE 1000 MG: 1 INJECTION, POWDER, FOR SOLUTION INTRAMUSCULAR; INTRAVENOUS at 02:43

## 2021-01-01 RX ADMIN — Medication 75 MCG/HR: at 14:00

## 2021-01-01 RX ADMIN — SODIUM CHLORIDE 500 ML: 9 INJECTION, SOLUTION INTRAVENOUS at 01:45

## 2021-01-01 RX ADMIN — ARFORMOTEROL TARTRATE 15 MCG: 15 SOLUTION RESPIRATORY (INHALATION) at 09:42

## 2021-01-01 RX ADMIN — SODIUM CHLORIDE 3000 MG: 900 INJECTION INTRAVENOUS at 00:54

## 2021-01-01 RX ADMIN — LEVOFLOXACIN 750 MG: 5 INJECTION, SOLUTION INTRAVENOUS at 13:45

## 2021-01-01 RX ADMIN — HYDROCORTISONE SODIUM SUCCINATE 100 MG: 100 INJECTION, POWDER, FOR SOLUTION INTRAMUSCULAR; INTRAVENOUS at 18:30

## 2021-01-01 RX ADMIN — DEXAMETHASONE 6 MG: 4 TABLET ORAL at 16:22

## 2021-01-01 RX ADMIN — Medication 1000 MG: at 08:41

## 2021-01-01 RX ADMIN — BENZONATATE 200 MG: 100 CAPSULE ORAL at 08:16

## 2021-01-01 RX ADMIN — SODIUM CHLORIDE 500 ML: 9 INJECTION, SOLUTION INTRAVENOUS at 03:56

## 2021-01-01 RX ADMIN — Medication 1000 MG: at 09:12

## 2021-01-01 RX ADMIN — PHYTONADIONE 10 MG: 10 INJECTION, EMULSION INTRAMUSCULAR; INTRAVENOUS; SUBCUTANEOUS at 18:45

## 2021-01-01 RX ADMIN — IOPAMIDOL 70 ML: 755 INJECTION, SOLUTION INTRAVENOUS at 00:54

## 2021-01-01 RX ADMIN — ATORVASTATIN CALCIUM 40 MG: 40 TABLET, FILM COATED ORAL at 21:37

## 2021-01-01 RX ADMIN — BARIUM SULFATE 70 G: 0.81 POWDER, FOR SUSPENSION ORAL at 11:21

## 2021-01-01 ASSESSMENT — PULMONARY FUNCTION TESTS
PIF_VALUE: 21
PIF_VALUE: 18
PIF_VALUE: 22
PIF_VALUE: 22
PIF_VALUE: 18
PIF_VALUE: 20
PIF_VALUE: 21
PIF_VALUE: 22
PIF_VALUE: 20
PIF_VALUE: 24
PIF_VALUE: 33
PIF_VALUE: 32
PIF_VALUE: 18
PIF_VALUE: 26
PIF_VALUE: 18
PIF_VALUE: 21
PIF_VALUE: 21
PIF_VALUE: 22
PIF_VALUE: 23
PIF_VALUE: 22
PIF_VALUE: 24
PIF_VALUE: 33
PIF_VALUE: 23
PIF_VALUE: 28
PIF_VALUE: 21
PIF_VALUE: 24
PIF_VALUE: 20
PIF_VALUE: 31
PIF_VALUE: 18
PIF_VALUE: 22
PIF_VALUE: 21
PIF_VALUE: 22
PIF_VALUE: 18
PIF_VALUE: 24
PIF_VALUE: 22
PIF_VALUE: 18
PIF_VALUE: 32
PIF_VALUE: 22
PIF_VALUE: 27
PIF_VALUE: 21
PIF_VALUE: 19
PIF_VALUE: 21
PIF_VALUE: 21
PIF_VALUE: 32
PIF_VALUE: 22
PIF_VALUE: 21
PIF_VALUE: 20
PIF_VALUE: 24
PIF_VALUE: 22
PIF_VALUE: 32
PIF_VALUE: 22
PIF_VALUE: 34
PIF_VALUE: 32
PIF_VALUE: 23
PIF_VALUE: 35
PIF_VALUE: 20
PIF_VALUE: 20
PIF_VALUE: 22
PIF_VALUE: 21
PIF_VALUE: 21
PIF_VALUE: 18
PIF_VALUE: 20
PIF_VALUE: 20
PIF_VALUE: 18
PIF_VALUE: 21
PIF_VALUE: 23
PIF_VALUE: 21
PIF_VALUE: 20
PIF_VALUE: 20
PIF_VALUE: 24
PIF_VALUE: 21
PIF_VALUE: 32
PIF_VALUE: 24
PIF_VALUE: 22
PIF_VALUE: 22
PIF_VALUE: 25
PIF_VALUE: 21
PIF_VALUE: 21
PIF_VALUE: 25
PIF_VALUE: 19
PIF_VALUE: 21
PIF_VALUE: 30
PIF_VALUE: 21
PIF_VALUE: 21
PIF_VALUE: 24
PIF_VALUE: 19
PIF_VALUE: 21
PIF_VALUE: 22
PIF_VALUE: 23
PIF_VALUE: 22
PIF_VALUE: 26
PIF_VALUE: 21
PIF_VALUE: 34
PIF_VALUE: 18
PIF_VALUE: 18
PIF_VALUE: 21
PIF_VALUE: 20
PIF_VALUE: 25
PIF_VALUE: 23
PIF_VALUE: 20
PIF_VALUE: 22
PIF_VALUE: 19
PIF_VALUE: 20
PIF_VALUE: 22
PIF_VALUE: 33
PIF_VALUE: 23
PIF_VALUE: 22
PIF_VALUE: 23
PIF_VALUE: 23
PIF_VALUE: 18
PIF_VALUE: 21
PIF_VALUE: 29
PIF_VALUE: 21
PIF_VALUE: 31
PIF_VALUE: 20
PIF_VALUE: 22
PIF_VALUE: 21
PIF_VALUE: 22
PIF_VALUE: 31
PIF_VALUE: 20
PIF_VALUE: 18
PIF_VALUE: 23
PIF_VALUE: 21
PIF_VALUE: 26
PIF_VALUE: 23
PIF_VALUE: 32
PIF_VALUE: 23
PIF_VALUE: 18
PIF_VALUE: 20
PIF_VALUE: 23
PIF_VALUE: 20
PIF_VALUE: 23
PIF_VALUE: 18
PIF_VALUE: 20
PIF_VALUE: 21
PIF_VALUE: 26
PIF_VALUE: 21
PIF_VALUE: 22
PIF_VALUE: 30
PIF_VALUE: 24
PIF_VALUE: 20
PIF_VALUE: 28
PIF_VALUE: 21
PIF_VALUE: 27
PIF_VALUE: 24
PIF_VALUE: 24
PIF_VALUE: 23
PIF_VALUE: 22
PIF_VALUE: 25
PIF_VALUE: 23
PIF_VALUE: 20
PIF_VALUE: 24
PIF_VALUE: 21
PIF_VALUE: 18
PIF_VALUE: 28
PIF_VALUE: 22
PIF_VALUE: 22
PIF_VALUE: 28
PIF_VALUE: 22
PIF_VALUE: 22
PIF_VALUE: 21
PIF_VALUE: 21
PIF_VALUE: 18
PIF_VALUE: 20
PIF_VALUE: 31
PIF_VALUE: 21
PIF_VALUE: 22
PIF_VALUE: 23
PIF_VALUE: 25
PIF_VALUE: 23
PIF_VALUE: 21
PIF_VALUE: 18
PIF_VALUE: 24
PIF_VALUE: 18
PIF_VALUE: 19
PIF_VALUE: 19
PIF_VALUE: 22
PIF_VALUE: 20
PIF_VALUE: 21
PIF_VALUE: 20
PIF_VALUE: 25
PIF_VALUE: 20
PIF_VALUE: 18
PIF_VALUE: 18
PIF_VALUE: 24
PIF_VALUE: 21
PIF_VALUE: 24

## 2021-01-01 ASSESSMENT — ENCOUNTER SYMPTOMS
SINUS PRESSURE: 0
HEMOPTYSIS: 0
COUGH: 1
EYES NEGATIVE: 1
COUGH: 1
VOMITING: 0
EYE PAIN: 0
SHORTNESS OF BREATH: 1
VOMITING: 0
ABDOMINAL PAIN: 0
NAUSEA: 0
DIARRHEA: 0
SORE THROAT: 0
WHEEZING: 0
EYE DISCHARGE: 0
CHEST TIGHTNESS: 1
ABDOMINAL PAIN: 0
BACK PAIN: 0
COLOR CHANGE: 0
EYE REDNESS: 0
SPUTUM PRODUCTION: 1
NAUSEA: 0

## 2021-01-01 ASSESSMENT — PAIN SCALES - GENERAL
PAINLEVEL_OUTOF10: 0
PAINLEVEL_OUTOF10: 5
PAINLEVEL_OUTOF10: 6
PAINLEVEL_OUTOF10: 0
PAINLEVEL_OUTOF10: 5
PAINLEVEL_OUTOF10: 0

## 2021-05-25 PROBLEM — E44.0 MODERATE PROTEIN-CALORIE MALNUTRITION (HCC): Status: RESOLVED | Noted: 2020-08-05 | Resolved: 2021-01-01

## 2021-05-25 PROBLEM — R19.7 DIARRHEA: Status: ACTIVE | Noted: 2021-01-01

## 2021-05-25 PROBLEM — J44.1 COPD EXACERBATION (HCC): Status: RESOLVED | Noted: 2020-08-18 | Resolved: 2021-01-01

## 2021-05-25 PROBLEM — R63.4 WEIGHT LOSS: Status: ACTIVE | Noted: 2021-01-01

## 2021-08-09 PROBLEM — S41.111A LACERATIONS OF MULTIPLE SITES OF RIGHT ARM: Status: ACTIVE | Noted: 2021-01-01

## 2021-12-11 PROBLEM — U07.1 ACUTE RESPIRATORY DISEASE DUE TO COVID-19 VIRUS: Status: ACTIVE | Noted: 2021-01-01

## 2021-12-11 PROBLEM — J06.9 ACUTE RESPIRATORY DISEASE DUE TO COVID-19 VIRUS: Status: ACTIVE | Noted: 2021-01-01

## 2021-12-11 NOTE — ED PROVIDER NOTES
72-year-old male known positive for COVID-19 who has been given vaccinations for COVID-19 presents the emergency department with shortness of breath increasing over the last few days from home. He was diagnosed with COVID-19 3 days ago. Patient has worsening cough and per family was low oxygen level. Patient denies fevers chills nausea vomiting diarrhea abdominal pain or urinary symptoms. He does state some yellow sputum. He states no other complaints. Patient on Coumadin    The history is provided by the patient. Shortness of Breath  Severity:  Moderate  Onset quality:  Gradual  Duration:  3 days  Timing:  Intermittent  Progression:  Waxing and waning  Chronicity:  New  Relieved by:  Nothing  Worsened by:  Nothing  Ineffective treatments:  None tried  Associated symptoms: cough and sputum production    Associated symptoms: no abdominal pain, no chest pain, no ear pain, no fever, no headaches, no hemoptysis, no neck pain, no PND, no rash, no sore throat, no vomiting and no wheezing         Review of Systems   Constitutional: Negative for chills and fever. HENT: Negative for ear pain, sinus pressure and sore throat. Eyes: Negative for pain, discharge and redness. Respiratory: Positive for cough, sputum production and shortness of breath. Negative for hemoptysis and wheezing. Cardiovascular: Negative for chest pain and PND. Gastrointestinal: Negative for abdominal pain, diarrhea, nausea and vomiting. Genitourinary: Negative for dysuria and frequency. Musculoskeletal: Negative for arthralgias, back pain and neck pain. Skin: Negative for rash and wound. Neurological: Negative for weakness and headaches. Hematological: Negative for adenopathy. All other systems reviewed and are negative. Physical Exam  Constitutional:       Appearance: He is well-developed. HENT:      Head: Normocephalic and atraumatic. Cardiovascular:      Rate and Rhythm: Regular rhythm. Tachycardia present. Pulmonary:      Effort: Pulmonary effort is normal.      Breath sounds: Examination of the right-lower field reveals rales. Examination of the left-lower field reveals rales. Rales present. Abdominal:      Palpations: Abdomen is soft. Musculoskeletal:         General: Normal range of motion. Cervical back: Normal range of motion and neck supple. 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 Left Lower Extremity: Left leg is amputated below knee. Skin:     General: Skin is warm. Capillary Refill: Capillary refill takes less than 2 seconds. Neurological:      General: No focal deficit present. Mental Status: He is alert and oriented to person, place, and time. Procedures   EKG: This EKG is signed and interpreted by the EP. Time: 22:47  Rate: 111  Rhythm: Sinus  Interpretation: sinus tachycardia and nonspecific ST and T wave abnormality  Comparison: changes compared to previous EKG    MDM  Number of Diagnoses or Management Options  Acute respiratory disease due to COVID-19 virus  MARCIAL (acute kidney injury) ([facility] [place] [facility]  Diagnosis management comments: Patient seen and examined. Labs and imaging were ordered. Patient found to be positive for COVID-19 he is also hypoxic here in the emergency department. Review work-up reveals an acute kidney injury as well as concerns for a possible left lower lobe infiltrate. Given patient's leukocytosis I did draw blood cultures and start antibiotics on top of patient be started on Decadron for COVID-19.  Case was discussed with Dr. Annemarie Chapman who will admit the patient for further evaluation.            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 Past Surgical History:  has a past surgical history that includes ECHO Complete 2D W Doppler W Color (1/25/2012); Tooth Extraction; Appendectomy; Leg amputation below knee (Bilateral); and Hip fracture surgery (Right, 7/25/2020). Social History:  reports that he quit smoking about 42 years ago. His smoking use included cigars and cigarettes. He started smoking about 72 years ago. He has a 30.00 pack-year smoking history. He has never used smokeless tobacco. He reports current alcohol use of about 5.0 standard drinks of alcohol per week. He reports current drug use. Drug: Marijuana Ken Jock). Family History: family history includes Cancer in his sister; Heart Disease in his father; Stroke in his father. The patients home medications have been reviewed. Allergies: Patient has no known allergies.     -------------------------------------------------- RESULTS -------------------------------------------------    LABS:  Results for orders placed or performed during the hospital encounter of 12/10/21   CBC Auto Differential   Result Value Ref Range    WBC 19.0 (H) 4.5 - 11.5 E9/L    RBC 2.37 (L) 3.80 - 5.80 E12/L    Hemoglobin 9.7 (L) 12.5 - 16.5 g/dL    Hematocrit 30.2 (L) 37.0 - 54.0 %    .4 (H) 80.0 - 99.9 fL    MCH 40.9 (H) 26.0 - 35.0 pg    MCHC 32.1 32.0 - 34.5 %    RDW 15.4 (H) 11.5 - 15.0 fL    Platelets 32 (L) 983 - 450 E9/L    MPV NOT CALC 7.0 - 12.0 fL    Neutrophils % 78.1 43.0 - 80.0 %    Lymphocytes % 4.4 (L) 20.0 - 42.0 %    Monocytes % 17.5 (H) 2.0 - 12.0 %    Eosinophils % 0.0 0.0 - 6.0 %    Basophils % 0.0 0.0 - 2.0 %    Neutrophils Absolute 14.82 (H) 1.80 - 7.30 E9/L    Lymphocytes Absolute 0.76 (L) 1.50 - 4.00 E9/L    Monocytes Absolute 3.42 (H) 0.10 - 0.95 E9/L    Eosinophils Absolute 0.00 (L) 0.05 - 0.50 E9/L    Basophils Absolute 0.00 0.00 - 0.20 E9/L    nRBC 0.0 /100 WBC    Poikilocytes 2+     Schistocytes 1+     Ovalocytes 2+ Basic Metabolic Panel   Result Value Ref Range    Sodium 139 132 - 146 mmol/L    Potassium 3.5 3.5 - 5.0 mmol/L    Chloride 105 98 - 107 mmol/L    CO2 20 (L) 22 - 29 mmol/L    Anion Gap 14 7 - 16 mmol/L    Glucose 123 (H) 74 - 99 mg/dL    BUN 31 (H) 6 - 23 mg/dL    CREATININE 1.5 (H) 0.7 - 1.2 mg/dL    GFR Non-African American 44 >=60 mL/min/1.73    GFR African American 53     Calcium 8.4 (L) 8.6 - 10.2 mg/dL   Hepatic Function Panel   Result Value Ref Range    Total Protein 6.2 (L) 6.4 - 8.3 g/dL    Albumin 3.6 3.5 - 5.2 g/dL    Alkaline Phosphatase 89 40 - 129 U/L    ALT 15 0 - 40 U/L    AST 26 0 - 39 U/L    Total Bilirubin 1.1 0.0 - 1.2 mg/dL    Bilirubin, Direct 0.6 (H) 0.0 - 0.3 mg/dL    Bilirubin, Indirect 0.5 0.0 - 1.0 mg/dL   Troponin   Result Value Ref Range    Troponin, High Sensitivity 41 (H) 0 - 11 ng/L   Brain Natriuretic Peptide   Result Value Ref Range    Pro-BNP 12,557 (H) 0 - 450 pg/mL   D-Dimer, Quantitative   Result Value Ref Range    D-Dimer, Quant 625 ng/mL DDU   Lactic Acid, Plasma   Result Value Ref Range    Lactic Acid 0.9 0.5 - 2.2 mmol/L   Magnesium   Result Value Ref Range    Magnesium 1.3 (L) 1.6 - 2.6 mg/dL   Sedimentation Rate   Result Value Ref Range    Sed Rate 14 0 - 15 mm/Hr   LACTATE DEHYDROGENASE   Result Value Ref Range     (H) 135 - 225 U/L   Protime-INR   Result Value Ref Range    Protime 23.5 (H) 9.3 - 12.4 sec    INR 2.2    Platelet Confirmation   Result Value Ref Range    Platelet Confirmation CONFIRMED    EKG 12 Lead   Result Value Ref Range    Ventricular Rate 111 BPM    Atrial Rate 111 BPM    P-R Interval 178 ms    QRS Duration 80 ms    Q-T Interval 316 ms    QTc Calculation (Bazett) 429 ms    P Axis 84 degrees    R Axis 28 degrees    T Axis -49 degrees       RADIOLOGY:  No results found.      ------------------------- NURSING NOTES AND VITALS REVIEWED ---------------------------  Date / Time Roomed:  12/10/2021  9:15 PM  ED Bed Assignment:  08/08    The nursing notes within the ED encounter and vital signs as below have been reviewed. Patient Vitals for the past 24 hrs:   BP Temp Temp src Pulse Resp SpO2 Weight   12/10/21 2216 101/60     94 %    12/10/21 2131      (!) 87 %    12/10/21 2123  98.4 °F (36.9 °C) Oral 121 20 92 % 145 lb (65.8 kg)       Oxygen Saturation Interpretation: Improved after treatment    ------------------------------------------ PROGRESS NOTES   Counseling:  I have spoken with the patient and discussed todays results, in addition to providing specific details for the plan of care and counseling regarding the diagnosis and prognosis. Their questions are answered at this time and they are agreeable with the plan of admission.    --------------------------------- ADDITIONAL PROVIDER NOTES ---------------------------------  This patient's ED course included: a personal history and physicial examination, re-evaluation prior to disposition, multiple bedside re-evaluations, IV medications, cardiac monitoring and continuous pulse oximetry    This patient has remained hemodynamically stable during their ED course. Diagnosis:  1. Acute respiratory disease due to COVID-19 virus    2. MARCIAL (acute kidney injury) ([facility]        Disposition:  Patient's disposition: Admit to telemetry  Patient's condition is fair.          Seth Vargas DO  12/11/21 0231

## 2021-12-11 NOTE — PROGRESS NOTES
Spoke with the patient regarding code status; discussed that if he were to die, he does not want any measures taken, he does not want intubation, CPR,defibrillation, or medications.

## 2021-12-11 NOTE — ED NOTES
RN faxed SBAR to floor. Called floor to confirm receipt. Spoke with Sole Pineda who did not receive fax. Copy of SBAR sent in tube sytem.       Christina Montenegro RN  12/11/21 8042

## 2021-12-11 NOTE — CONSULTS
Nelson Barnett M.D. Yanira Swain M.D. Nidia Serrano M.D. Olu Dawkins D.O. Patient:  Jessica Mas 80 y.o. male MRN: [id number]     Date of Service: 12/11/2021      PULMONARY CONSULTATION    Pulmonary Consult seen in coverage for [facility]. Reason for Consultation: COVID, resp failure  Referring Physician: Hugo Rodriguez MD    Chief Complaint   Patient presents with    Shortness of Breath     covid + on tuesday, increased sob last several days, from home         Code Status: Prior        SUBJECTIVE:    HPI:  This is a 49-year-old vaccinated male with history of limb ischemia with bilateral BKA, chronic anticoagulation on Coumadin, CKD, HLD, prostate CA, tobacco use quit 1979 that presents from home with worsening shortness of breath and cough. Started feeling unwell about 1 week ago with predominantly productive cough with yellow sputum production. Called primary and got a Z-Pedro. No improvement in his productive cough. Had chest congestion and shortness of breath that have been persistent. At the urging of family is directed to come to emergency department. Per family he had low oxygen levels at home. Patient tested positive for Covid -19 on 12/8 as outpatient. He did have desaturation at 87% and has been on 2 L with adequate saturation in the 90s. CT pulmonary angiogram was performed showing bilateral pleural effusions, basilar consolidation, and few peripheral opacities. Had Covid vaccine January February 2021. No booster.     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 ([address] 12/28/2015    Blood circulation, collateral     Cancer ([address] 2004    prostate, seed implant    Chronic kidney disease     Critical lower limb ischemia ([address] 12/29/2015    Hyperlipidemia     Pneumonia      Past Surgical History:   Procedure Laterality Date    APPENDECTOMY      ECHOCARDIOGRAM COMPLETE 2D [address] [place] COLOR  2012         HIP FRACTURE SURGERY Right 2020    RIGHT HIP CEPHALO-MEDULLARY NAIL performed by Shy Mao MD at [facility] [address] [place] [address] Bilateral     Right    TOOTH EXTRACTION       Family History   Problem Relation Age of Onset    Stroke Father     Heart Disease Father     Cancer Sister          Social History:   Social History     Socioeconomic History    Marital status:      Spouse name: Not on file    Number of children: Not on file    Years of education: Not on file    Highest education level: Not on file   Occupational History    Not on file   Tobacco Use    Smoking status: Former Smoker     Packs/day: 1.00     Years: 30.00     Pack years: 30.00     Types: Cigars, Cigarettes     Start date:      Quit date: 1979     Years since quittin.3    Smokeless tobacco: Never Used   Vaping Use    Vaping Use: Never used   Substance and Sexual Activity    Alcohol use: Yes     Alcohol/week: 5.0 standard drinks     Types: 5 Glasses of wine per week    Drug use: Yes     Types: Marijuana (Weed)     Comment: uses to help with appetite    Sexual activity: Never   Other Topics Concern    Not on file   Social History Narrative    Not on file     Social Determinants of Health     Financial Resource Strain: Low Risk     Difficulty of Paying Living Expenses: Not hard at all   Food Insecurity: No Food Insecurity    Worried About [facility] in the Last Year: Never true    [facility] in the Last Year: Never true   Transportation Needs:     Lack of Transportation (Medical): Not on file    Lack of Transportation (Non-Medical):  Not on file   Physical Activity:     Days of Exercise per Week: Not on file    Minutes of Exercise per Session: Not on file   Stress:     Feeling of Stress : Not on file   Social Connections:     Frequency of Communication with Friends and Family: Not on file    Frequency of Social Gatherings with max of 3 total doses. If no relief after 1 dose, call 911. (Patient taking differently: Place 0.4 mg under the tongue every 5 minutes as needed for Chest pain up to max of 3 total doses. If no relief after 1 dose, call 911.)  tamsulosin (FLOMAX) 0.4 MG capsule, Take 1 capsule by mouth daily  gabapentin (NEURONTIN) 100 MG capsule, Take 1 capsule by mouth 3 times daily for 30 days. (Patient taking differently: Take 100 mg by mouth 2 times daily. )  sennosides-docusate sodium (SENOKOT-S) 8.6-50 MG tablet, Take 1 tablet by mouth 2 times daily  atorvastatin (LIPITOR) 40 MG tablet, Take 1 tablet by mouth nightly  hydroxyurea (HYDREA) 500 MG chemo capsule, Take 500 mg by mouth every morning     CURRENT MEDS :  Scheduled Meds:   atorvastatin  40 mg Oral Nightly    isosorbide mononitrate  30 mg Oral Daily    metoprolol succinate  50 mg Oral Daily    mirtazapine  7.5 mg Oral Nightly    warfarin  5 mg Oral Daily    cefepime  1,000 mg IntraVENous Q12H    dexamethasone  6 mg IntraVENous Q12H    zinc sulfate  50 mg Oral Daily    ascorbic acid  500 mg Oral Daily    Vitamin D  2,000 Units Oral Daily       Continuous Infusions:   sodium chloride      sodium chloride         No Known Allergies    REVIEW OF SYSTEMS:  REVIEW OF SYMPTOMS:  Review of Systems   Constitutional: Positive for fatigue. Negative for chills and fever. HENT: Negative. Eyes: Negative. Respiratory: Positive for cough and chest tightness. Cardiovascular: Negative for chest pain and leg swelling. Gastrointestinal: Negative for abdominal pain, nausea and vomiting. Endocrine: Negative for cold intolerance and heat intolerance. Genitourinary: Negative for difficulty urinating and dysuria. Musculoskeletal: Negative. Skin: Negative for color change and rash. Allergic/Immunologic: Negative for environmental allergies and immunocompromised state. Neurological: Negative for dizziness and weakness.    Psychiatric/Behavioral: Negative for agitation and confusion. OBJECTIVE:   BP (!) 96/50   Pulse 77   Temp 98.1 °F (36.7 °C) (Oral)   Resp 16   Wt 145 lb (65.8 kg)   SpO2 96%   BMI 21.41 kg/m²   SpO2 Readings from Last 1 Encounters:   12/11/21 96%        I/O:  No intake or output data in the 24 hours ending 12/11/21 1344  Vent Information  SpO2: 96 %                PHYSICAL EXAM:  Physical Exam  HENT:      Head: Normocephalic and atraumatic. Eyes:      Conjunctiva/sclera: Conjunctivae normal.   Neck:      Trachea: No tracheal deviation. Cardiovascular:      Rate and Rhythm: Normal rate and regular rhythm. Heart sounds: Normal heart sounds. Pulmonary:      Effort: Pulmonary effort is normal.      Breath sounds: Rhonchi present. Abdominal:      General: Bowel sounds are normal.      Palpations: Abdomen is soft. Tenderness: There is no abdominal tenderness. Musculoskeletal:         General: No swelling. Cervical back: Neck supple. Comments: +b/l BKA with prostethesis   Lymphadenopathy:      Cervical: No cervical adenopathy. Skin:     General: Skin is warm and dry. Findings: No rash. Neurological:      General: No focal deficit present. Mental Status: He is alert and oriented to person, place, and time. Psychiatric:         Behavior: Behavior normal.         Pulmonary Function Testing personally reviewed and interpreted. PERTINENT LAB RESULTS: Labs reviewed. DIAGNOSTICS: Pertinent imaging reviewed.     CT pulmonary angiogram:    No evidence of pulmonary embolism.       Small to moderate right pleural effusion and small left pleural effusion.       In addition to associated element of dependent/compressive atelectasis within   the bilateral lower lobes, consolidation may be somewhat out of proportion to   perceived volume loss in areas of the lower left base, concerning for   superimposed pneumonia, with dependent secretions seen within bilateral   mainstem bronchi extending into the left lower lobe bronchi.       Although some areas may simply represent simple atelectasis, there also   several scattered low density infiltrates present within the bilateral upper   lobes which could represent multifocal bronchopneumonia.       A few tiny peripheral opacities within predominantly the lateral right upper   lobe have somewhat nodular morphology, however these are nonspecific         ASSESSMENT:     1. Acute hypoxic respiratory failure with bilateral pleural effusion, basilar consolidation and peripheral opacities  2. COVID-19 pneumonia in vaccinated patient. Tested +12/8.  3.  Superimposed bacterial pneumonia, bibasilar consolidation with multifocal infiltrates  4. Bilateral pleural effusions R>L with associated compressive atelectasis  5. Chronic OAC on coumadin  6. Hx critical limb ischemia s/p bilateral BKA  7. Hx of tobacco use 30 py's, quit 1979        PLAN:      Continue supplemental oxygen to maintain SPO2 greater than 92%   Pulmonary recruitment maneuvers as able   Check respiratory cultures, strep, Legionella urine antigen, pro calcitonin   Continue antibiotics has been started on cefepime and Vanco.  Can likely DC escalate off Vanco once MRSA swab negative   Decadron 6 mg BID   Start baricitinib. If clinically worsening or worsening inflammatory markers then consider Tocilizumab   Continue vitamins   Albuterol prn, symbicort. Antitussives   Diuresis if able. Effusions small at this time and would hold on thora.  On anticoag with coumadin    Pulmonary consult seen in coverage for [facility]. They will resume coverage on Mon. 12/13. Thank you for allowing me to participate in the care of Zahida Worley. Please feel free to call with questions.        Electronically signed by Elza Devi DO on 12/11/2021 at 1:44 PM

## 2021-12-11 NOTE — H&P
Jorje Pizarro is a 80 y.o. male  known positive for COVID-19 who has been given vaccinations for COVID-19 presents the emergency department with shortness of breath increasing over the last few days from home. He was diagnosed with COVID-19 3 days ago. Patient has worsening cough and per family was low oxygen level. Patient denies fevers chills nausea vomiting diarrhea abdominal pain or urinary symptoms. He does state some yellow sputum. He states no other complaints. 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 ([facility] 12/28/2015    Blood circulation, collateral     Cancer ([facility] 2004    prostate, seed implant    Chronic kidney disease     Critical lower limb ischemia ([facility] 12/29/2015    Hyperlipidemia     Pneumonia        Past Surgical History:   Procedure Laterality Date    APPENDECTOMY      ECHOCARDIOGRAM COMPLETE 2D W DOPPLER W COLOR  1/25/2012         HIP FRACTURE SURGERY Right 7/25/2020    RIGHT HIP CEPHALO-MEDULLARY NAIL performed by Luis Lo MD at [address] [place] [address] Bilateral     Right    TOOTH EXTRACTION         Family History   Problem Relation Age of Onset    Stroke Father     Heart Disease Father     Cancer Sister        Prior to Admission medications    Medication Sig Start Date End Date Taking?  Authorizing Provider   azithromycin (ZITHROMAX) 250 MG tablet Take 1 tablet by mouth See Admin Instructions for 5 days 500mg on day 1 followed by 250mg on days 2 - 5 12/7/21 12/12/21 Yes Dulce Maria Villegas DO   isosorbide mononitrate (IMDUR) 30 MG extended release tablet Take 1 tablet by mouth daily 7/27/21 12/10/21 Yes Dulce Maria Villegas DO   warfarin (COUMADIN) 5 MG tablet  3/9/21  Yes Historical Provider, MD   warfarin (COUMADIN) 7.5 MG tablet  3/16/21  Yes Historical Provider, MD mirtazapine (REMERON) 7.5 MG tablet Take 1 tablet by mouth nightly 20  Yes Dulce Maria Villegas DO   Cholecalciferol (VITAMIN D3) 2000 units CAPS Take by mouth daily   Yes Historical Provider, MD   Coenzyme Q10 (CO Q-10) 100 MG CAPS Take by mouth daily   Yes Historical Provider, MD   metoprolol succinate (TOPROL XL) 50 MG extended release tablet Take 1 tablet by mouth daily 18  Yes Dulce Maria Villegas DO   Melatonin 5 MG CAPS Take 1 capsule by mouth nightly as needed   Yes Historical Provider, MD   pantoprazole (PROTONIX) 40 MG tablet Take 40 mg by mouth every morning (before breakfast)   Yes Historical Provider, MD   nitroGLYCERIN (NITROSTAT) 0.4 MG SL tablet up to max of 3 total doses. If no relief after 1 dose, call 911. Patient taking differently: Place 0.4 mg under the tongue every 5 minutes as needed for Chest pain up to max of 3 total doses. If no relief after 1 dose, call 911. 18  Yes Myles Cifuentes, DO   tamsulosin (FLOMAX) 0.4 MG capsule Take 1 capsule by mouth daily 10/6/21   Dulce Maria Villegas DO   gabapentin (NEURONTIN) 100 MG capsule Take 1 capsule by mouth 3 times daily for 30 days. Patient taking differently: Take 100 mg by mouth 2 times daily. 8/10/20 8/9/21  Dax Stanley MD   sennosides-docusate sodium (SENOKOT-S) 8.6-50 MG tablet Take 1 tablet by mouth 2 times daily 20   Dulce Maria Villegas DO   atorvastatin (LIPITOR) 40 MG tablet Take 1 tablet by mouth nightly 20   Dulce Maria Villegas DO   hydroxyurea (HYDREA) 500 MG chemo capsule Take 500 mg by mouth every morning     Historical Provider, MD        Allergies: Patient has no known allergies. Social History     Tobacco Use    Smoking status: Former Smoker     Packs/day: 1.00     Years: 30.00     Pack years: 30.00     Types: Cigars, Cigarettes     Start date:      Quit date: 1979     Years since quittin.3    Smokeless tobacco: Never Used   Substance Use Topics    Alcohol use:  Yes     Alcohol/week: 5.0 standard drinks     Types: 5 Glasses of wine per week        Review of Systems:  Respiratory: SOB, yellowish cough   Cardiovascular: negative for chest pain and dyspnea  Gastrointestinal: negative for abdominal pain, diarrhea, nausea and vomiting  Genitourinary:negative for dysuria and hematuria  Derm: negative for rash and skin lesion(s)  Neurological: negative for seizures and tremors  Endocrine: negative for diabetic symptoms including polydipsia and polyuria    Physical Exam:  Vitals:    12/11/21 0738   BP: (!) 104/59   Pulse: 95   Resp:    Temp:    SpO2: 95%      Skin:  Warm and dry. No rash or bruises  HEENT:  PERRLA, EOMI  Neck:  No JVD, No thyromegaly, No carotid bruit  Cardiac:  RRR, No gallop or murmur  Lungs:  Rales   Abdomen: Normal bowel sounds, no HSM, non-tender  Extremities:  No clubbing,bilateral BKA  Neurological:  Moves all extremities.     Labs:    CBC with Differential:    Lab Results   Component Value Date    WBC 19.0 12/10/2021    RBC 2.37 12/10/2021    HGB 9.7 12/10/2021    HCT 30.2 12/10/2021    PLT 32 12/10/2021    .4 12/10/2021    MCH 40.9 12/10/2021    MCHC 32.1 12/10/2021    RDW 15.4 12/10/2021    NRBC 0.0 12/10/2021    BANDSPCT 2 10/12/2016    BLASTSPCT 0.9 08/18/2020    METASPCT 0.9 07/29/2020    LYMPHOPCT 4.4 12/10/2021    MONOPCT 17.5 12/10/2021    MYELOPCT 0.9 01/14/2020    BASOPCT 0.0 12/10/2021    MONOSABS 3.42 12/10/2021    LYMPHSABS 0.76 12/10/2021    EOSABS 0.00 12/10/2021    BASOSABS 0.00 12/10/2021     CMP:    Lab Results   Component Value Date     12/10/2021    K 3.5 12/10/2021    K 3.9 08/06/2020     12/10/2021    CO2 20 12/10/2021    BUN 31 12/10/2021    CREATININE 1.5 12/10/2021    GFRAA 53 12/10/2021    LABGLOM 44 12/10/2021    GLUCOSE 123 12/10/2021    GLUCOSE 94 01/24/2012    PROT 6.2 12/10/2021    LABALBU 3.6 12/10/2021    LABALBU 4.1 01/24/2012    CALCIUM 8.4 12/10/2021    BILITOT 1.1 12/10/2021    ALKPHOS 89 12/10/2021    AST 26 12/10/2021    ALT 15 12/10/2021      Imaging:CTA chest:  No evidence of pulmonary embolism.       Small to moderate right pleural effusion and small left pleural effusion.       In addition to associated element of dependent/compressive atelectasis within   the bilateral lower lobes, consolidation may be somewhat out of proportion to   perceived volume loss in areas of the lower left base, concerning for   superimposed pneumonia, with dependent secretions seen within bilateral   mainstem bronchi extending into the left lower lobe bronchi.       Although some areas may simply represent simple atelectasis, there also   several scattered low density infiltrates present within the bilateral upper   lobes which could represent multifocal bronchopneumonia.       A few tiny peripheral opacities within predominantly the lateral right upper   lobe have somewhat nodular morphology, however these are nonspecific,   particularly in current context, and require no surveillance, in the absence   of risk factors for bronchogenic carcinoma, such as smoking.               Assessment and Plan:    Patient Active Problem List   Diagnosis    Pneumonia LLL    Cocid 19 infection     Hypoxia due to above     Pleural effusion     Leukocytosis     Paroxysmal atrial fibrillation (HCC)    Warfarin anticoagulation    CKI    Hypertension    S/P bilateral BKA

## 2021-12-11 NOTE — PROGRESS NOTES
-Consulted to dose baricitinib.  -eGFR 30-60  -Inflammatory markers are elevated.   -Patient is on supplemental oxygen, steroids, and anticoagulation.  -Will initiate baricitinib 2 mg PO once daily for 14 days or until discharge

## 2021-12-11 NOTE — PROGRESS NOTES
Dr. Rebecca Holden perfect serve messaged regarding lasix order and current blood pressure. OK for lasix, educated patient on possible dizziness/lightheadedness advised to stay in bed and use urinal to record output.

## 2021-12-12 NOTE — PROGRESS NOTES
Admit Date: 12/10/2021    Subjective:     Feels better breathing easier   Pulmonary consult appreciated     Objective:     Patient Vitals for the past 8 hrs:   BP Pulse Resp SpO2   12/12/21 0545 (!) 102/58      12/12/21 0255 (!) 83/51 84 18 92 %     I/O last 3 completed shifts:  In: -   Out: 875 [Urine:875]  No intake/output data recorded. HEENT: Normal  NECK: Thyroid normal. No carotid bruit. No lymphphadenopathy. CVS: RRR  RS: basal rales   ABD: Soft. Non tender. No mass. Normal BS. EXT: No edema. Non tender.  Bilateral BKA  NEURO: no focal deficit       Scheduled Meds:   atorvastatin  40 mg Oral Nightly    isosorbide mononitrate  30 mg Oral Daily    metoprolol succinate  50 mg Oral Daily    mirtazapine  7.5 mg Oral Nightly    warfarin  5 mg Oral Daily    cefepime  1,000 mg IntraVENous Q12H    dexamethasone  6 mg IntraVENous Q12H    zinc sulfate  50 mg Oral Daily    Vitamin D  2,000 Units Oral Daily    guaiFENesin  400 mg Oral TID    ascorbic acid  1,000 mg Oral Daily    budesonide-formoterol  2 puff Inhalation BID    baricitinib  2 mg Oral Daily     Continuous Infusions:   sodium chloride      sodium chloride         CBC with Differential:    Lab Results   Component Value Date    WBC 30.2 12/12/2021    RBC 2.21 12/12/2021    HGB 9.0 12/12/2021    HCT 28.9 12/12/2021    PLT 49 12/12/2021    .8 12/12/2021    MCH 40.7 12/12/2021    MCHC 31.1 12/12/2021    RDW 14.9 12/12/2021    NRBC 0.0 12/12/2021    BANDSPCT 2 10/12/2016    BLASTSPCT 0.9 08/18/2020    METASPCT 0.9 07/29/2020    LYMPHOPCT 2.6 12/12/2021    MONOPCT 4.4 12/12/2021    MYELOPCT 0.9 01/14/2020    BASOPCT 0.0 12/12/2021    MONOSABS 1.21 12/12/2021    LYMPHSABS 0.91 12/12/2021    EOSABS 0.00 12/12/2021    BASOSABS 0.00 12/12/2021     CMP:    Lab Results   Component Value Date     12/12/2021    K 3.6 12/12/2021    K 3.9 08/06/2020     12/12/2021    CO2 18 12/12/2021    BUN 43 12/12/2021    CREATININE 1.4 12/12/2021 GFRAA 57 12/12/2021    LABGLOM 47 12/12/2021    PROT 6.2 12/10/2021    LABALBU 3.6 12/10/2021    LABALBU 4.1 01/24/2012    CALCIUM 8.0 12/12/2021    BILITOT 1.1 12/10/2021    ALKPHOS 89 12/10/2021    AST 26 12/10/2021    ALT 15 12/10/2021     PT/INR:    Lab Results   Component Value Date    PROTIME 23.5 12/10/2021    PROTIME 28.4 02/18/2020    INR 2.2 12/10/2021       Assessment:     Active Problems:  Acute hypoxic respiratory failure  Pneumonia  Covid 19 infection   Pleural effusion   Leukocytosis worsening due to steroid       Plan:   Continue same management

## 2021-12-13 NOTE — PROGRESS NOTES
Physician Progress Note      Rosaline Vázquez  CSN #:                  [id number]  :                       1929  ADMIT DATE:       12/10/2021 9:15 PM  [address] [place] DATE:  RESPONDING  PROVIDER #:        Hanny Harley MD          QUERY TEXT:    Pt admitted with COVID-19 and noted to have sepsis criteria. If possible,   please document in progress notes and discharge summary if you are evaluating   and/or treating: The medical record reflects the following:  Risk Factors: COVID-19 pneumonia  Clinical Indicators: WBC 19.0, Procal 0.37, CRP 11.7, , BP 83/51  Treatment: IV fluids, PO Baricitinib, PO Vitamin C, IV Maxipime, IV Decadron,   PO Vitamin D, PO Zinc    Thank you,  Kate MONTAÑO, RN, CDIS  Clinical Documentation Improvement  [email or web address]  [phone]  Options provided:  -- Sepsis present on admission due to COVID-19 infection  -- Sepsis not present on admission due to COVID-19 infection  -- Sepsis present on admission due to COVID-19 pneumonia  -- Sepsis not present on admission due to COVID-19 pneumonia  -- Covid-19 infection without sepsis  -- Covid-19 pneumonia without sepsis  -- Other - I will add my own diagnosis  -- Disagree - Not applicable / Not valid  -- Disagree - Clinically unable to determine / Unknown  -- Refer to Clinical Documentation Reviewer    PROVIDER RESPONSE TEXT:    This patient has sepsis which was not present on admission due to COVID-19   infection.     Query created by: Juan Kaiser on 2021 7:53 AM      Electronically signed by:  Hanny Harley MD 2021 8:56 AM

## 2021-12-13 NOTE — CARE COORDINATION
COVID + 12/8. Phone conversation w/ wife Jordy Mitchell [phone]. Explained role of  and plan of care. Lives in a 1 story house- ramp to entrance. Hx bilateral BKA. Has WW,cane, WC, shower chair, nebulizer. Hx [facility]. Hx [place] [facility]. PCP is Dr. Juany Zheng and pharmacy is [address] [place] [address]. Currently on room air. Continues on iv abx. Per pt and wife, plan is to return home on discharge- [facility]- wife will provide needed transportation.   Will follow Skip Watson, RN case manager

## 2021-12-13 NOTE — ACP (ADVANCE CARE PLANNING)
Advance Care Planning     Advance Care Planning Activator (Inpatient)  Conversation Note      Date of ACP Conversation: 12/13/2021     Conversation Conducted with: Colt Wall    ACP Activator: Clement Phillips RN      Health Care Decision Maker:     Current Designated Health Care Decision Maker:     Primary Decision Maker: Antonio Ser - [phone]    Secondary Decision Maker: Addis Esparza Child - [phone]    Secondary Decision Maker: Max Kwon - Child - [phone]  Click here to complete Healthcare Decision Makers including section of the Healthcare Decision Maker Relationship (ie \"Primary\")    Care Preferences    Ventilation: \"If you were in your present state of health and suddenly became very ill and were unable to breathe on your own, what would your preference be about the use of a ventilator (breathing machine) if it were available to you? \"      Would the patient desire the use of ventilator (breathing machine)?: yes    \"If your health worsens and it becomes clear that your chance of recovery is unlikely, what would your preference be about the use of a ventilator (breathing machine) if it were available to you? \"     Would the patient desire the use of ventilator (breathing machine)?: No      Resuscitation  \"CPR works best to restart the heart when there is a sudden event, like a heart attack, in someone who is otherwise healthy. Unfortunately, CPR does not typically restart the heart for people who have serious health conditions or who are very sick. \"    \"In the event your heart stopped as a result of an underlying serious health condition, would you want attempts to be made to restart your heart (answer \"yes\" for attempt to resuscitate) or would you prefer a natural death (answer \"no\" for do not attempt to resuscitate)? \" yes       [] Yes   [x] No   Educated Patient / Luanne Courtney regarding differences between Advance Directives and portable DNR orders.     Length of ACP Conversation in minutes:  10 minutes  Conversation Outcomes:  [x] ACP discussion completed  [] Existing advance directive reviewed with patient; no changes to patient's previously recorded wishes  [] New Advance Directive completed  [] Portable Do Not Rescitate prepared for Provider review and signature  [] POLST/POST/MOLST/MOST prepared for Provider review and signature      Follow-up plan:    [] Schedule follow-up conversation to continue planning  [x] Referred individual to Provider for additional questions/concerns   [] Advised patient/agent/surrogate to review completed ACP document and update if needed with changes in condition, patient preferences or care setting    [] This note routed to one or more involved healthcare providers

## 2021-12-13 NOTE — PROGRESS NOTES
Pulmonary Progress Note  12/13/2021 12:09 PM  Subjective:   Admit Date: 12/10/2021  PCP: Bob Villegas DO  Interval History:   Tested + for COVID 12/8 with desaturation at home to 87% with pox 92% on 2L. CT pulmonary angiogram was performed showing bilateral pleural effusions, basilar consolidation, and few peripheral opacities. Resting in bed on room air with pox 92%  Feels better, no dyspnea - but dry cough      Diet: ADULT DIET;  Regular  ADULT ORAL NUTRITION SUPPLEMENT; Breakfast, Lunch, Dinner; Standard High Calorie/High Protein Oral Supplement      Data:   Scheduled Meds:    atorvastatin  40 mg Oral Nightly    isosorbide mononitrate  30 mg Oral Daily    metoprolol succinate  50 mg Oral Daily    mirtazapine  7.5 mg Oral Nightly    cefepime  1,000 mg IntraVENous Q12H    dexamethasone  6 mg IntraVENous Q12H    zinc sulfate  50 mg Oral Daily    Vitamin D  2,000 Units Oral Daily    guaiFENesin  400 mg Oral TID    ascorbic acid  1,000 mg Oral Daily    budesonide-formoterol  2 puff Inhalation BID    baricitinib  2 mg Oral Daily       Continuous Infusions:    sodium chloride      sodium chloride         PRN Meds: sodium chloride, sodium chloride, EPINEPHrine, acetaminophen, guaiFENesin-dextromethorphan, benzonatate, albuterol sulfate HFA, sodium chloride flush    I/O last 3 completed shifts:  In: -   Out: 400 [Urine:400]    I/O this shift:  In: -   Out: 300 [Urine:300]      Intake/Output Summary (Last 24 hours) at 12/13/2021 1209  Last data filed at 12/13/2021 0726  Gross per 24 hour   Intake    Output 300 ml   Net -300 ml       Patient Vitals for the past 96 hrs (Last 3 readings):   Weight   12/10/21 2123 145 lb (65.8 kg)         Recent Labs     12/10/21  2221 12/12/21  0530   WBC 19.0* 30.2*   HGB 9.7* 9.0*   HCT 30.2* 28.9*   .4* 130.8*   PLT 32* 49*     Recent Labs     12/10/21  2221 12/12/21  0530    139   K 3.5 3.6    105   CO2 20* 18*   BUN 31* 43*   CREATININE 1.5* 1.4* Recent Labs     12/10/21  2221   PROT 6.2*   ALKPHOS 89   ALT 15   AST 26   BILITOT 1.1     Recent Labs     12/10/21  2221 12/12/21  1340 12/13/21  0333   INR 2.2 4.7 5.3*     No results for input(s): CKTOTAL, CKMB, TROPONINI in the last 72 hours. No results for input(s): BNP in the last 72 hours. Troponin: No results for input(s): TROPONINI in the last 72 hours. CPK:  Lab Results   Component Value Date    CKTOTAL 35 09/11/2018      BNP: No results for input(s): BNP in the last 72 hours. ABGs: No results found for: PHART, PO2ART, DIJ9VZN  INR:   Recent Labs     12/10/21  2221 12/12/21  1340 12/13/21 0333   INR 2.2 4.7 5.3*         -----------------------------------------------------------------  RAD:   CXR12/12/2021:  The lungs are without acute focal process. Stable small left pleural  effusion. No pneumothorax. The cardiomediastinal silhouette is without  acute process. The osseous structures are without acute process. Impression  Stable appearance of the chest compared to 12/10/2021      Micro:  Recent Labs     12/11/21  0210   BC 24 Hours no growth     12/12 Resp Culture:   Group 5: >25 PMN's/LPF and <10 Epithelial cells/LPF   Moderate Polymorphonuclear leukocytes   Moderate yeast   Few Gram negative rods   Rare Gram positive rods   Rare Gram positive cocci in clusters       No results for input(s): ORG in the last 72 hours. Recent Labs     12/11/21 0210   BLOODCULT2 24 Hours no growth     No results for input(s): STREPPNEUMAG in the last 72 hours. No results for input(s): LEGUR in the last 72 hours. No results for input(s): ORG in the last 72 hours. No results for input(s): ASO in the last 72 hours. No results for input(s): CULTRESP in the last 72 hours. No results for input(s): LABURIN in the last 72 hours.   Vent Information  SpO2: 92 %    Additional Respiratory  Assessments  Pulse: 78  Resp: 20  SpO2: 92 %    Objective:   Vitals:   Vitals:    12/13/21 0830   BP: 98/63   Pulse: 78   Resp: 20   Temp: 97.1 °F (36.2 °C)   SpO2: 92%      TEMP:Current: Temp: 97.1 °F (36.2 °C)  Max: Temp  Av.4 °F (36.3 °C)  Min: 97.1 °F (36.2 °C)  Max: 97.7 °F (36.5 °C)    BP Range: Systolic (09YGL), LJV:655 , Min:96 , RFX:588     Diastolic (79NKY), HZA:98, Min:57, Max:74      General appearance: alert, appears stated age and cooperative. In no acute distress  Skin: No rashes or lesions  HEENT: mucous membranes are moist  Neck: No JVD  Lungs: symmetrical expansion, diminished with few bibasilar crackles >L,  no use of accessory muscles. Heart: S1S2,  +murmurs,  Abdomen: soft, non tender,   Extremities: no peripheral edema; R arm with significant ecchymosis from fingers up to bicep area.  BKA with prosthesis  Neurologic: Alert, oriented times 3,  Affect: pleasant      Assessment:   Patient Active Problem List:     Atrial fibrillation ([facility] [address]     Hyperlipemia     Anticoagulant long-term use     Claudication of left lower extremity (HCC)     Pneumonia of both lower lobes due to infectious organism     Paroxysmal atrial fibrillation ([facility])     Warfarin anticoagulation     Warfarin overdosage     Critical lower limb ischemia ([facility])     Hyperlipidemia     Hypertension     Lymphocytosis     Skin ulcer of left knee with fat layer exposed ([address]     Syncope and collapse     Wound infection     Blurry vision, bilateral     Anticoagulated on Coumadin     Aortic stenosis, moderate     Sinus node dysfunction ([facility])     Sepsis due to pneumonia ([facility])     Chest pain     Precordial pain     Acute rhinitis     Closed nondisplaced intertrochanteric fracture of right femur ([address]     Aortic valve disease     Closed right hip fracture, initial encounter ([facility])     S/P right hip fracture     Diarrhea     Weight loss     Lacerations of multiple sites of right arm     Acute respiratory disease due to COVID-19 virus    Plan:   · Check respiratory culture - as above, strep, Legionella urine antigen - negative  · Pro calcitonin 0.34 - on Cefepime · Change Decadron to oral, 6 mg QD  · Baricitinib 2mg gfr 47, absol lymph 0.91 - 12/12  · Follow inflammatory markers - improving CRP 3.5, , D dimer 625, Ferritin 323, Sed rate 14  · Effusions small - no indication for thora at this time. Diuresis - rec renal consult for diuresis. BNP 12,557 - not on diuretics. · Continue vitamins  · Albuterol prn, symbicort. Antitussives  · Supra therapeutic INR 5.3 - hold coumadin    LAURA Teran - CNP     Seen and evaluated, agree with above A/P  Clinically better. Sputum with high WBC and Poly MO, to r/o aspiration.   MBS to follow  Hope to be d/c soon

## 2021-12-13 NOTE — PROGRESS NOTES
12/10/2021    LABALBU 4.1 01/24/2012    CALCIUM 8.0 12/12/2021    BILITOT 1.1 12/10/2021    ALKPHOS 89 12/10/2021    AST 26 12/10/2021    ALT 15 12/10/2021     PT/INR:    Lab Results   Component Value Date    PROTIME 58.8 12/13/2021    PROTIME 28.4 02/18/2020    INR 5.3 12/13/2021     INR 5.3     Assessment:     Active Problems:   Acute hypoxic respiratory failure  Pneumonia  Covid 19 infection   Pleural effusion   Leukocytosis worsening due to steroid       Plan:   Continue support ,hold coumadin

## 2021-12-14 NOTE — PROGRESS NOTES
Outpatient speech therapy office needs to be called in the morning to set up an appointment.  Phone number is [phone]

## 2021-12-14 NOTE — PROGRESS NOTES
SPEECH/LANGUAGE PATHOLOGY  VIDEOFLUOROSCOPIC STUDY OF SWALLOWING (MBS)   and PLAN OF CARE    PATIENT NAME:  Marycarmen Araujo  (male)     MRN:  [id number]    :  [id number]  (80 y.o.)  STATUS:  Inpatient: Room 0415/0415-A    TODAY'S DATE:  2021  REFERRING PROVIDER:  21 1530   SLP video swallow Start: 21 1530, End: 21 1530, ONE TIME, Standing Count: 1 Occurrences, R    Sekou Boothe MD  REASON FOR REFERRAL: assess for aspiration   EVALUATING THERAPIST: Marti Argueta, EDER      RESULTS:      DYSPHAGIA DIAGNOSIS:  moderate oropharyngeal phase dysphagia including silent aspiration of thin liquid     DIET RECOMMENDATIONS:  Soft and bite size consistency solids (IDDSI level 6) with  nectar consistency (mildly thick - IDDSI level 2) liquids-NO STRAWS    Administer medication crushed, as able, with pudding/applesauce    Recommend Bear [place]ib[place] adequate oral care, ok for thin water and ice chips in between meals. Needs to wait at least 1/2 hour following meals before consuming thin water.      FEEDING RECOMMENDATIONS:    Assistance level:  No assistance needed     Compensatory strategies recommended: Multiple swallow, Effortful swallow, Small bites/sips, Alternate solids and liquids and No straw     Discussed recommendations with nursing and/or faxed report to referring provider: Yes     Laryngeal Penetration and Aspiration:  Penetration WITH aspiration was observed in today's study with thin liquid    SPEECH THERAPY  PLAN OF CARE   The dysphagia POC is established based on physician order and dysphagia diagnosis    Skilled SLP intervention for dysphagia management on acute care 3-5 x per week until goals met, pt plateaus in function and/or discharged from hospital      Conditions Requiring Skilled Therapeutic Intervention for dysphagia:    Reduced pharyngeal clearing of the bolus  Reduced laryngeal closure resulting in penetration  Reduced laryngeal closure resulting in aspiration SPECIFIC DYSPHAGIA INTERVENTIONS TO INCLUDE:     Compensatory strategy training   Therapeutic exercises    Specific instructions for next treatment:  initiate instruction of therapeutic exercises  and initiate instruction of compensatory strategies  Treatment Goals:    Short Term Goals:  Pt will implement identified compensatory swallowing strategies on 90% of opportunities or greater to improve airway protection and swallow function. Pt will complete BOTR strength/ ROM exercises to reduce pharyngeal residuals and improve epiglottic inversion minimal verbal prompts  Pt will complete laryngeal strength/ ROM therapeutic exercises to improve airway protection for the least restrictive PO diet minimal verbal prompts  Pt will complete Effortful Swallow therapeutically to target increased oral and base of tongue pressure, increased pharyngeal constrictor contractions, and increased UES relaxation duration to reduce pharyngeal residue with minimal verbal prompts     Long Term Goals:   Pt will maintain adequate nutrition/hydration via PO intake of the least restrictive oral diet with implementation of safe swallow/ compensatory strategies and decrease signs/symptoms of aspiration to less than 1 x/day. Pt will improve oropharyngeal swallow function to ensure airway protection during PO intake to maintain adequate nutrition/hydration and decrease signs/symptoms of aspiration to less than 1 x/day. Patient/family Goal:    To return to prior level of function.     Plan of care discussed with Patient   The Patient understand(s) the diagnosis, prognosis and plan of care     Rehabilitation Potential/Prognosis: fair                      ADMITTING DIAGNOSIS: MARCIAL (acute kidney injury) ([facility] [N17.9]  Acute respiratory disease due to COVID-19 virus [U07.1, J06.9]     VISIT DIAGNOSIS:   Visit Diagnoses       Codes    [facility] ([facility]) ([facility]     N17.9              PATIENT REPORT/COMPLAINT: pt reports good recently getting stuck in throat    PRIOR LEVEL OF SWALLOW FUNCTION:    Past History of Dysphagia?:  none reported    Home diet: Regular consistency solids (IDDSI level 7) with  thin liquids (IDDSI level 0)  Diet during hospital admission: Regular consistency solids (IDDSI level 7) with thin liquids (IDDSI level 0)    PROCEDURE:  Consistencies Administered During the Evaluation   Liquids: thin liquid and nectar thick liquid   Solids:  pureed foods and solid foods      Method of Intake:   cup, straw, spoon  Self fed, Fed by clinician      Position:   Seated, upright, Lateral plane    INSTRUMENTAL ASSESSMENT:    ORAL PREP/ ORAL PHASE:    Decreased mastication due to:  poor/missing dentition       PHARYNGEAL PHASE:     ONSET TIME       Onset time of the pharyngeal swallow was adequate       PHARYNGEAL RESIDUALS        Vallecula/Pharyngeal Wall           Reduced tongue base retraction resulting in residuals in vallecula and/or posterior pharyngeal wall for all consistencies administered which mostly cleared with spontaneous double swallow and cued multiple swallow     Pt demonstrated difficulty clearing puree and solid from vallecula. Liquid wash of thin liquid resulted in aspiration. Improved toleration of liquid was with NTL observed. Pyriform Sinuses      Residuals in the pyriform sinuses were noted due to pharyngeal weakness for all consistencies administered  which  mostly cleared with spontaneous double swallow and cued multiple swallow      LARYNGEAL PENETRATION   Laryngeal penetration occurred prior to aspiration. Further details under aspiration section. ASPIRATION  Aspiration occurred DURING the swallow for thin liquid due to  inadequate laryngeal closure, presence of vall .  Aspiration was moderate and occurred inconsistently when vallecular space residue present -an absent cough/throat clear was noted    PENETRATION-ASPIRATION SCALE (PAS):  THIN 8 = Material enters the airway, passes below the vocal folds, and no effort is made to eject   MILDLY THICK 1 = Material does not enter the airway  MODERATELY THICK item not administered  PUREE 1 = Material does not enter the airway  HARD SOLID 1 = Material does not enter the airway       COMPENSATORY STRATEGIES    Compensatory strategies that were beneficial included Double swallow, Multiple swallow, Small bites/sips, Alternate solids and liquids and No straw      STRUCTURAL/FUNCTIONAL ANOMALIES   No structural/functional anomalies were noted    CERVICAL ESOPHAGEAL STAGE :     The cervical esophagus appeared adequate          ___________    Cognition:   Within functional limits for this exam    Oral Peripheral Examination   Adequate lingual/labial strength     Current Respiratory Status   room air     Parameters of Speech Production  Respiration:  Adequate for speech production  Quality:   Within functional limits  Intensity: Within functional limits    Pain: No pain reported. EDUCATION:   The Speech Language Pathologist (SLP) completed education regarding results of evaluation and that intervention is warranted at this time. Learner: Patient  Education: Reviewed results and recommendations of this evaluation  Evaluation of Education:  Remi understanding    This plan may be re-evaluated and revised as warranted. Evaluation Time includes thorough review of current medical information, gathering information on past medical history/social history and prior level of function, completion of standardized testing/informal observation of tasks, assessment of data and education on plan of care and goals. INTERVENTION    Pt/family educated on above results and plan of care. Pt/family trained on compensatory strategies for safe swallow and signs and symptoms of aspiration (throat clearing, coughing/choking, wet vocal quality. , etc.) and encouraged to notify staff if observed. Handouts provided as warranted. Pt/family encouraged to engage in question/answer session. All questions answered and pt/family verbalized understanding of above. [x]The admitting diagnosis and active problem list, have been reviewed prior to initiation of this evaluation. CPT Code: 22451  dysphagia study, 74016 dysphagia therapy    ACTIVE PROBLEM LIST:   Patient Active Problem List   Diagnosis    Atrial fibrillation ([facility]    Hyperlipemia    Anticoagulant long-term use    Claudication of left lower extremity ([address]    Pneumonia of both lower lobes due to infectious organism    Paroxysmal atrial fibrillation (HCC)    Warfarin anticoagulation    Warfarin overdosage    Critical lower limb ischemia (HCC)    Hyperlipidemia    Hypertension    Lymphocytosis    Skin ulcer of left knee with fat layer exposed ([address]    Syncope and collapse    Wound infection    Blurry vision, bilateral    Anticoagulated on Coumadin    Aortic stenosis, moderate    Sinus node dysfunction (HCC)    Sepsis due to pneumonia ([address]    Chest pain    Precordial pain    Acute rhinitis    Closed nondisplaced intertrochanteric fracture of right femur ([address]    Aortic valve disease    [facility]    S/P right hip fracture    Diarrhea    Weight loss    Lacerations of multiple sites of right arm    Acute respiratory disease due to COVID-19 virus       Monique DIAZ CCC/SLP [id number]  Speech-Language Pathologist

## 2021-12-14 NOTE — PROGRESS NOTES
Admit Date: 12/10/2021    Subjective:     Feels better breathing easier off O2     Objective:     Patient Vitals for the past 8 hrs:   BP Temp Temp src Pulse Resp SpO2 Weight   12/14/21 0624       109 lb 12.8 oz (49.8 kg)   12/14/21 0225 110/63 97.4 °F (36.3 °C) Axillary 76 19 90 %      I/O last 3 completed shifts:  In: -   Out: 300 [Urine:300]  No intake/output data recorded. HEENT: Normal  NECK: Thyroid normal. No carotid bruit. No lymphphadenopathy. CVS: IRR  RS: basal rales   ABD: Soft. Non tender. No mass. Normal BS. EXT: No edema. Non tender.  Bilateral BKA   NEURO: no focal deficit       Scheduled Meds:   dexamethasone  6 mg Oral Daily    atorvastatin  40 mg Oral Nightly    isosorbide mononitrate  30 mg Oral Daily    metoprolol succinate  50 mg Oral Daily    mirtazapine  7.5 mg Oral Nightly    cefepime  1,000 mg IntraVENous Q12H    zinc sulfate  50 mg Oral Daily    Vitamin D  2,000 Units Oral Daily    guaiFENesin  400 mg Oral TID    ascorbic acid  1,000 mg Oral Daily    budesonide-formoterol  2 puff Inhalation BID    baricitinib  2 mg Oral Daily     Continuous Infusions:   sodium chloride      sodium chloride         CBC with Differential:    Lab Results   Component Value Date    WBC 30.2 12/12/2021    RBC 2.21 12/12/2021    HGB 9.0 12/12/2021    HCT 28.9 12/12/2021    PLT 49 12/12/2021    .8 12/12/2021    MCH 40.7 12/12/2021    MCHC 31.1 12/12/2021    RDW 14.9 12/12/2021    NRBC 0.0 12/12/2021    BANDSPCT 2 10/12/2016    BLASTSPCT 0.9 08/18/2020    METASPCT 0.9 07/29/2020    LYMPHOPCT 2.6 12/12/2021    MONOPCT 4.4 12/12/2021    MYELOPCT 0.9 01/14/2020    BASOPCT 0.0 12/12/2021    MONOSABS 1.21 12/12/2021    LYMPHSABS 0.91 12/12/2021    EOSABS 0.00 12/12/2021    BASOSABS 0.00 12/12/2021     CMP:    Lab Results   Component Value Date     12/12/2021    K 3.6 12/12/2021    K 3.9 08/06/2020     12/12/2021    CO2 18 12/12/2021    BUN 43 12/12/2021    CREATININE 1.4 12/12/2021    GFRAA 57 12/12/2021    LABGLOM 47 12/12/2021    PROT 6.2 12/10/2021    LABALBU 3.6 12/10/2021    LABALBU 4.1 01/24/2012    CALCIUM 8.0 12/12/2021    BILITOT 1.1 12/10/2021    ALKPHOS 89 12/10/2021    AST 26 12/10/2021    ALT 15 12/10/2021     PT/INR:    Lab Results   Component Value Date    PROTIME 42.3 12/14/2021    PROTIME 28.4 02/18/2020    INR 3.8 12/14/2021       Assessment:     Active Problems:   Acute hypoxic respiratory failure  Pneumonia  Covid 19 infection   Pleural effusion   Leukocytosis worsening due to steroid       Plan:   Improvement,continue same home soon

## 2021-12-14 NOTE — CARE COORDINATION
COVID + 12/8. Continues on iv abx. Plan remains to return home on discharge- declining [facility]- wife will provide needed transportation.   Will follow  Skip Watson RN case manager

## 2021-12-14 NOTE — PROGRESS NOTES
Pulmonary Progress Note  12/14/2021 12:17 PM  Subjective:   Admit Date: 12/10/2021  PCP: Sarah Villegas DO  Interval History:   Tested + for COVID 12/8 with desaturation at home to 87% with pox 92% on 2L. CT pulmonary angiogram was performed showing bilateral pleural effusions, basilar consolidation, and few peripheral opacities. Back from swallow study this am  On Room air  Feels ok - breathing chokes him at times but not SOB  Coughing and throat hurts      Diet: ADULT DIET; Regular  ADULT ORAL NUTRITION SUPPLEMENT; Breakfast, Lunch, Dinner; Standard High Calorie/High Protein Oral Supplement      Data:   Scheduled Meds:    dexamethasone  6 mg Oral Daily    atorvastatin  40 mg Oral Nightly    isosorbide mononitrate  30 mg Oral Daily    metoprolol succinate  50 mg Oral Daily    mirtazapine  7.5 mg Oral Nightly    cefepime  1,000 mg IntraVENous Q12H    zinc sulfate  50 mg Oral Daily    Vitamin D  2,000 Units Oral Daily    guaiFENesin  400 mg Oral TID    ascorbic acid  1,000 mg Oral Daily    budesonide-formoterol  2 puff Inhalation BID    baricitinib  2 mg Oral Daily       Continuous Infusions:    sodium chloride      sodium chloride         PRN Meds: sodium chloride, sodium chloride, EPINEPHrine, acetaminophen, guaiFENesin-dextromethorphan, benzonatate, albuterol sulfate HFA, sodium chloride flush    I/O last 3 completed shifts:  In: -   Out: 300 [Urine:300]    No intake/output data recorded. No intake or output data in the 24 hours ending 12/14/21 1217    Patient Vitals for the past 96 hrs (Last 3 readings):   Weight   12/14/21 0624 109 lb 12.8 oz (49.8 kg)   12/10/21 2123 145 lb (65.8 kg)         Recent Labs     12/12/21  0530   WBC 30.2*   HGB 9.0*   HCT 28.9*   .8*   PLT 49*     Recent Labs     12/12/21  0530      K 3.6      CO2 18*   BUN 43*   CREATININE 1.4*     No results for input(s): PROT, ALB, ALKPHOS, ALT, AST, BILITOT, AMYLASE, LIPASE in the last 72 hours.   Recent Labs     12/12/21  1340 12/13/21  0333 12/14/21  0348   INR 4.7 5.3* 3.8     No results for input(s): CKTOTAL, CKMB, TROPONINI in the last 72 hours. No results for input(s): BNP in the last 72 hours. Troponin: No results for input(s): TROPONINI in the last 72 hours. CPK:  Lab Results   Component Value Date    CKTOTAL 35 09/11/2018      BNP: No results for input(s): BNP in the last 72 hours. ABGs: No results found for: PHART, PO2ART, BFV5JES  INR:   Recent Labs     12/12/21  1340 12/13/21  0333 12/14/21  0348   INR 4.7 5.3* 3.8         -----------------------------------------------------------------  RAD:   CXR12/12/2021:  The lungs are without acute focal process. Stable small left pleural  effusion. No pneumothorax. The cardiomediastinal silhouette is without  acute process. The osseous structures are without acute process. Impression  Stable appearance of the chest compared to 12/10/2021      Micro:  No results for input(s): BC in the last 72 hours. 12/12 Resp Culture:   Group 5: >25 PMN's/LPF and <10 Epithelial cells/LPF   Moderate Polymorphonuclear leukocytes   Moderate yeast   Few Gram negative rods   Rare Gram positive rods   Rare Gram positive cocci in clusters       Recent Labs     12/12/21  0550   ORG Staphylococcus aureus*     No results for input(s): BLOODCULT2 in the last 72 hours. No results for input(s): STREPPNEUMAG in the last 72 hours. No results for input(s): LEGUR in the last 72 hours. Recent Labs     12/12/21  0550   ORG Staphylococcus aureus*     No results for input(s): ASO in the last 72 hours. Recent Labs     12/12/21  0550   CULTRESP Oral Pharyngeal Magda present  Oral Pharyngeal Magda present  Growth present, evaluating for:  Gram negative rods  *  Moderate growth  Sensitivity to follow       No results for input(s): Bryson Sands in the last 72 hours.   Vent Information  SpO2: 91 %    Additional Respiratory  Assessments  Pulse: 70  Resp: 20  SpO2: 91 %    Objective:   Vitals: Vitals:    21 0915   BP: (!) 124/59   Pulse: 70   Resp: 20   Temp: 98.9 °F (37.2 °C)   SpO2: 91%      TEMP:Current: Temp: 98.9 °F (37.2 °C)  Max: Temp  Av.8 °F (36.6 °C)  Min: 97 °F (36.1 °C)  Max: 98.9 °F (37.2 °C)    BP Range: Systolic (18HBR), RF , Min:105 , XDS:601     Diastolic (29QSS), OFV:23, Min:51, Max:63      General appearance: alert, appears stated age and cooperative. In no acute distress  Skin: No rashes or lesions  HEENT: mucous membranes are moist  Neck: No JVD  Lungs: symmetrical expansion, diminished,  no use of accessory muscles. Heart: S1S2,  +murmurs,  Abdomen: soft, non tender,   Extremities: no peripheral edema; R arm with significant ecchymosis from fingers up to bicep area.  BKA with prosthesis  Neurologic: Alert, oriented times 3,  Affect: pleasant      Assessment:   Patient Active Problem List:     Atrial fibrillation ([address]     Hyperlipemia     Anticoagulant long-term use     Claudication of left lower extremity (HCC)     Pneumonia of both lower lobes due to infectious organism     Paroxysmal atrial fibrillation (HCC)     Warfarin anticoagulation     Warfarin overdosage     Critical lower limb ischemia (HCC)     Hyperlipidemia     Hypertension     Lymphocytosis     Skin ulcer of left knee with fat layer exposed ([address]     Syncope and collapse     Wound infection     Blurry vision, bilateral     Anticoagulated on Coumadin     Aortic stenosis, moderate     Sinus node dysfunction ([facility])     Sepsis due to pneumonia ([facility])     Chest pain     Precordial pain     Acute rhinitis     Closed nondisplaced intertrochanteric fracture of right femur ([address]     Aortic valve disease     Closed right hip fracture, initial encounter ([facility])     S/P right hip fracture     Diarrhea     Weight loss     Lacerations of multiple sites of right arm     Acute respiratory disease due to COVID-19 virus    Plan:   · Respiratory culture - as above, strep, Legionella urine antigen - negative  · MBS WITH penetration and aspiration - await speech therapy recommendations. ?PEG  · Pro calcitonin 0.34 - repeat 0.19- on Cefepime   · Decadron 6 mg QD  · Baricitinib 2mg gfr 47, absol lymph 0.91 - 12/12 - check bmp and cbc with diff  · Follow inflammatory markers - improving CRP 3.5->2.4, , D dimer 625, Ferritin 323, Sed rate 14  · Effusions small - no indication for thora at this time. Diuresis - rec renal consult for diuresis. BNP 12,557 - not on diuretics. · Continue vitamins  · Albuterol prn, symbicort. Antitussives  · Supra therapeutic INR better at 3.8    LAURA Shannon - CNP     Seen and evaluated, and agree with above assessment and plan  Aspiration pneumonia with thin liquids  Modified diet as per speech therapy, may benefit of outpatient treatment.   Antibiotics may switch to p.o. doxycycline for 1 week  Dexamethasone to complete 10-days  Okay to DC home from pulmonary perspective follow-up in 4 to 6 weeks  Will need isolation at home until December 18, 2020  Discussed with patient and patient's son via telephone

## 2021-12-15 NOTE — CARE COORDINATION
COVID + 12/8. Requiring O2 6l high flow NC- no DME preference- [facility]- will need O2 testing/ order on discharge if unable to wean off. On po abx. OPT speech order noted- no HHC preference- referral made to [facility] order for speech therapy on chart- notify when pt is discharging. Plan remains to return home on discharge w/ wife on discharge . Will follow  Pk Azevedo RN case manager    The Plan for Transition of Care is related to the following treatment goals: speech therapy, respiratory conditioning    The Patient and/or patient representative Filemon Sprague was provided with a choice of provider and agrees   with the discharge plan. [x] Yes [] No    Freedom of choice list was provided with basic dialogue that supports the patient's individualized plan of care/goals, treatment preferences and shares the quality data associated with the providers.  [x] Yes [] No

## 2021-12-15 NOTE — PROGRESS NOTES
Pt stated that upon returning from restroom that he was very SOB. Upon assessing vitals O2 was at 70% and patient had Resp of 40. Pt placed on 10L of HiFlo O2 and  was notified. Pt also stated that this had happened to him multiple times during the day, but had not told staff until now. [FreeTextEntry1] : HTN - MAHIN  and I had an extensive discussion regarding his blood pressure management. Patient will continue taking current medications in addition to maintaining a low Na diet, with periodic b/p checks at home.\par HLD MAHIN and I discussed his lipid panel and individualized target LDL goal. At this point, will do diet and exercise with anticipation of re-evaluating labs in 3-6 months\par AF cont meds

## 2021-12-15 NOTE — PROGRESS NOTES
Department of Internal Medicine  Nephrology Attending Progress Note        SUBJECTIVE:  Mr Van solano is a 80 gentleman with history of shortness of breath. Thought to be related to colon cancer possible aspiration. His admitting chest x-ray suggestive of bilateral bronchograms with small pleural effusions. His serum creatinine was noted to be 1.5 at time of admission had been 1.0 in August 2020. Patient was noted to have worsening respiratory requirements this morning with requiring 10 L of oxygen did respond to some IV Lasix now on 4 L. Patient himself not admitting to any significant change in how he feels. Dora Lark test was abnormal    PMH  Bilateral below the knee amputation  History of renal lithiasis uric acid stone  Hypertension  Hyperlipidemia  History of tobacco use, still smoking some cigars  History of chronic A. fib on anticoagulation  history of JAK2 mutation with essential thrombocytosis  Vit D deficiency        Physical Exam:    Vitals:    12/15/21 1515   BP:    Pulse:    Resp:    Temp:    SpO2: 95%       I/O last 24 hours:  Intake/Output inaccurate    Weight: Stated weight 140 at his [de-identified] office bed scale not working    General Appearance:  awake, alert, oriented, in mild respiratory distress on 4 L  Skin: Multiple brain  Neck:  neck- supple, no mass, non-tender and no bruits  Lungs: Decreased breath sounds in both base  Heart: Irregular rhythm     Abdominal: Abdomen soft, non-tender. BS normal. No masses,  No organomegaly  Extremities: Extremities warm to touch, pink, with no edema.   Bilateral BKA  Peripheral Pulses: Decreased    DATA:    CBC with Differential:    Lab Results   Component Value Date    WBC 72.7 12/15/2021    RBC 2.24 12/15/2021    HGB 9.3 12/15/2021    HCT 28.3 12/15/2021    PLT 82 12/15/2021    .3 12/15/2021    MCH 41.5 12/15/2021    MCHC 32.9 12/15/2021    RDW 14.7 12/15/2021    NRBC 0.0 12/12/2021    BANDSPCT 2 10/12/2016    BLASTSPCT 0.9 08/18/2020 METASPCT 0.9 07/29/2020    LYMPHOPCT 0.0 12/15/2021    MONOPCT 14.0 12/15/2021    MYELOPCT 0.9 01/14/2020    BASOPCT 0.0 12/15/2021    MONOSABS 10.18 12/15/2021    LYMPHSABS 2.18 12/15/2021    EOSABS 0.00 12/15/2021    BASOSABS 0.00 12/15/2021     CMP:    Lab Results   Component Value Date     12/12/2021    K 3.6 12/12/2021    K 3.9 08/06/2020     12/12/2021    CO2 18 12/12/2021    BUN 43 12/12/2021    CREATININE 1.4 12/12/2021    GFRAA 57 12/12/2021    LABGLOM 47 12/12/2021    GLUCOSE 135 12/12/2021    GLUCOSE 94 01/24/2012    PROT 6.2 12/10/2021    LABALBU 3.6 12/10/2021    LABALBU 4.1 01/24/2012    CALCIUM 8.0 12/12/2021    BILITOT 1.1 12/10/2021    ALKPHOS 89 12/10/2021    AST 26 12/10/2021    ALT 15 12/10/2021     Magnesium:    Lab Results   Component Value Date    MG 1.5 12/12/2021        dexamethasone  6 mg IntraVENous Q12H    ampicillin-sulbactam  3,000 mg IntraVENous Q12H    doxycycline hyclate  100 mg Oral 2 times per day    atorvastatin  40 mg Oral Nightly    isosorbide mononitrate  30 mg Oral Daily    metoprolol succinate  50 mg Oral Daily    mirtazapine  7.5 mg Oral Nightly    zinc sulfate  50 mg Oral Daily    Vitamin D  2,000 Units Oral Daily    guaiFENesin  400 mg Oral TID    ascorbic acid  1,000 mg Oral Daily    budesonide-formoterol  2 puff Inhalation BID    baricitinib  2 mg Oral Daily      sodium chloride      sodium chloride       sodium chloride, sodium chloride, EPINEPHrine, acetaminophen, guaiFENesin-dextromethorphan, benzonatate, sodium chloride flush    IMPRESSION/RECOMMENDATIONS:      CKD4 suspect serum creatinine under reflecting actual GFR as he has very little muscle mass  Aspiration pneumonia with some superimposed volume overload have spoken to staff about getting bed scale to work will need weights to help guide diuretic management.    History of uric acid stones in view of a history of essential thrombocytosis and need for hydroxyurea will check uric acid may need allopurinol started if uric acid above 10  Discussed with Dr. Gaby Schmidt  History of BPH check bladder scan for PVR in view of history of nocturia and frequency      Reggie Jacobson MD  12/15/2021 4:43 PM

## 2021-12-15 NOTE — PROGRESS NOTES
Pulmonary Progress Note  12/15/2021 11:03 AM  Subjective:   Admit Date: 12/10/2021  PCP: Juan Villegas DO  Interval History:   Tested + for COVID 12/8 with desaturation at home to 87% with pox 92% on 2L. CT pulmonary angiogram was performed showing bilateral pleural effusions, basilar consolidation, and few peripheral opacities. This am pt up to use BR and pox down to 70%. Placed on 10l HFNC with pox now 97%. Decrease O2 to 6L with pox 95%. Feels dyspneic today, coughing a lot      Diet: ADULT ORAL NUTRITION SUPPLEMENT; Breakfast, Lunch, Dinner; Standard High Calorie/High Protein Oral Supplement  ADULT DIET; Dysphagia - Soft and Bite Sized; Mildly Thick (Nectar)      Data:   Scheduled Meds:    doxycycline hyclate  100 mg Oral 2 times per day    dexamethasone  6 mg Oral Daily    atorvastatin  40 mg Oral Nightly    isosorbide mononitrate  30 mg Oral Daily    metoprolol succinate  50 mg Oral Daily    mirtazapine  7.5 mg Oral Nightly    zinc sulfate  50 mg Oral Daily    Vitamin D  2,000 Units Oral Daily    guaiFENesin  400 mg Oral TID    ascorbic acid  1,000 mg Oral Daily    budesonide-formoterol  2 puff Inhalation BID    baricitinib  2 mg Oral Daily       Continuous Infusions:    sodium chloride      sodium chloride         PRN Meds: sodium chloride, sodium chloride, EPINEPHrine, acetaminophen, guaiFENesin-dextromethorphan, benzonatate, albuterol sulfate HFA, sodium chloride flush    No intake/output data recorded. I/O this shift:  In: -   Out: 150 [Urine:150]      Intake/Output Summary (Last 24 hours) at 12/15/2021 1103  Last data filed at 12/15/2021 1022  Gross per 24 hour   Intake    Output 150 ml   Net -150 ml       Patient Vitals for the past 96 hrs (Last 3 readings):   Weight   12/14/21 0624 109 lb 12.8 oz (49.8 kg)         No results for input(s): WBC, HGB, HCT, MCV, PLT in the last 72 hours. No results for input(s): NA, K, CL, CO2, BUN, CREATININE, PHOS in the last 72 hours.     Invalid input(s): CA  No results for input(s): PROT, ALB, ALKPHOS, ALT, AST, BILITOT, AMYLASE, LIPASE in the last 72 hours. Recent Labs     12/13/21  0333 12/14/21  0348 12/15/21  0420   INR 5.3* 3.8 2.3     No results for input(s): CKTOTAL, CKMB, TROPONINI in the last 72 hours. No results for input(s): BNP in the last 72 hours. Troponin: No results for input(s): TROPONINI in the last 72 hours. CPK:  Lab Results   Component Value Date    CKTOTAL 35 09/11/2018      BNP: No results for input(s): BNP in the last 72 hours. ABGs: No results found for: PHART, PO2ART, HMQ9GLM  INR:   Recent Labs     12/13/21  0333 12/14/21  0348 12/15/21  0420   INR 5.3* 3.8 2.3         -----------------------------------------------------------------  RAD:   CXR12/12/2021:  The lungs are without acute focal process. Stable small left pleural  effusion. No pneumothorax. The cardiomediastinal silhouette is without  acute process. The osseous structures are without acute process. Impression  Stable appearance of the chest compared to 12/10/2021    CXR 12/15/21: Persistent bilateral parenchymal opacities with diminish the pleural effusion mainly on the right-side. Micro:  No results for input(s): BC in the last 72 hours. 12/12 Resp Culture:   Group 5: >25 PMN's/LPF and <10 Epithelial cells/LPF   Moderate Polymorphonuclear leukocytes   Moderate yeast   Few Gram negative rods   Rare Gram positive rods   Rare Gram positive cocci in clusters       No results for input(s): ORG in the last 72 hours. No results for input(s): Willett Kappa in the last 72 hours. No results for input(s): STREPPNEUMAG in the last 72 hours. No results for input(s): LEGUR in the last 72 hours. No results for input(s): ORG in the last 72 hours. No results for input(s): ASO in the last 72 hours. No results for input(s): CULTRESP in the last 72 hours. No results for input(s): LABURIN in the last 72 hours.   Vent Information  SpO2: 95 %    Additional Respiratory Assessments  Pulse: 85  Resp: 26  SpO2: 95 %    Objective:   Vitals:   Vitals:    12/15/21 0830   BP: 112/75   Pulse: 85   Resp: 26   Temp: 98.9 °F (37.2 °C)   SpO2: 95%      TEMP:Current: Temp: 98.9 °F (37.2 °C)  Max: Temp  Av.6 °F (37 °C)  Min: 98 °F (36.7 °C)  Max: 98.9 °F (37.2 °C)    BP Range: Systolic (25EYO), FUE:324 , Min:100 , BEM:805     Diastolic (64RWK), LJS:58, Min:49, Max:75      General appearance: alert, appears stated age and cooperative. In no acute distress  Skin: No rashes or lesions  HEENT: mucous membranes are moist  Neck: No JVD  Lungs: symmetrical expansion, diminished with crackles, exp wheeze scattered,  no use of accessory muscles. Heart: S1S2,  +murmurs,  Abdomen: soft, non tender,   Extremities: no peripheral edema; R arm with significant ecchymosis from fingers up to bicep area.  BKA with prosthesis  Neurologic: Alert, oriented times 3,  Affect: pleasant      Assessment:   Patient Active Problem List:     Atrial fibrillation ([facility] [address]     Hyperlipemia     Anticoagulant long-term use     Claudication of left lower extremity (HCC)     Pneumonia of both lower lobes due to infectious organism     Paroxysmal atrial fibrillation ([facility])     Warfarin anticoagulation     Warfarin overdosage     Critical lower limb ischemia ([facility])     Hyperlipidemia     Hypertension     Lymphocytosis     Skin ulcer of left knee with fat layer exposed ([address]     Syncope and collapse     Wound infection     Blurry vision, bilateral     Anticoagulated on Coumadin     Aortic stenosis, moderate     Sinus node dysfunction ([facility])     Sepsis due to pneumonia ([facility])     Chest pain     Precordial pain     Acute rhinitis     Closed nondisplaced intertrochanteric fracture of right femur ([address]     Aortic valve disease     Closed right hip fracture, initial encounter ([facility])     S/P right hip fracture     Diarrhea     Weight loss     Lacerations of multiple sites of right arm     Acute respiratory disease due to COVID-19 virus    Plan:   · Now on 10L HFNC - decreased while in room to 6L with pox 95%. CXR with no change, appears slightly worse. · MBS WITH penetration and aspiration - speech rec with modified diet Soft and bite size consistency solids with  nectar consistency liquids-NO STRAWS  · Pro calcitonin 0.34 - repeat 0.19- repeat procal today. Off Cefepime, on doxy - will add unasyn. · Increase Dexa to BID and change to IV  · Baricitinib 2mg gfr 47, absol lymph 0.91 - 12/12 - check bmp and cbc with diff  · Follow inflammatory markers - improving CRP 2.4 increased to 6.8, will repeat LD (307), D dimer (625). Ferritin 323, Sed rate 14  · Effusions small - no indication for thora at this time. Diuresis - rec renal consult for diuresis. BNP 12,557 - not on diuretics. · Continue vitamins  · Albuterol prn, symbicort. Antitussives  · Respiratory culture - as above, strep, Legionella urine antigen - negative    LAURA Tidwell - CNP     Seen and evaluated, and agree with above assessment and plan  Noted to have increased FiO2 needs overnight  Sputum seen with gram-negative rods and staph aureus, will modify antibiotics.   Chest x-ray seen discussed with renal, may benefit of use of diuretics as needed

## 2021-12-15 NOTE — PROGRESS NOTES
Negin Barton,    Your patient is on a medication that requires a renal dose adjustment. Renal Function Assessment:    Date Body Weight IBW Adj. Body Weight SCr CrCl Dialysis status   12/15/2021 49.8 kg 70.7 kg   1.4 24 ml/min         Pharmacy has renally dose-adjusted the following medication(s):    Date Medication Original Dosing Regimen New Dosing Regimen   12/15/2021 Unasyn 1.5 gm IV every 6 hours 3 gm IV every 12 hours           These changes were made per protocol according to the [facility] Renal Function-Based Dose Adjustments Policy    *Please note this dose may need readjusted if your patient's renal function significantly improves. Please contact pharmacy with any questions regarding these changes.

## 2021-12-15 NOTE — PROGRESS NOTES
Negin Izquierdo,    Your patient is on a medication that requires a renal dose adjustment. Renal Function Assessment:    Date Body Weight IBW Adj. Body Weight SCr CrCl Dialysis status   12/15/2021 49.8 kg 70.7 kg   1.4 24 ml/min         Pharmacy has renally dose-adjusted the following medication(s):    Date Medication Original Dosing Regimen New Dosing Regimen   12/15/2021 Unasyn 1.5 gm IV every 6 hours 3 gm IV every 12 hours           These changes were made per protocol according to the [facility] Renal Function-Based Dose Adjustments Policy    *Please note this dose may need readjusted if your patient's renal function significantly improves. Please contact pharmacy with any questions regarding these changes.

## 2021-12-15 NOTE — PROGRESS NOTES
Admit Date: 12/10/2021    Subjective:     Dyspnea last night O2 supplement increased     Objective:     Patient Vitals for the past 8 hrs:   BP Temp Temp src Pulse Resp SpO2   12/15/21 0540 (!) 100/49   81 24 96 %   12/15/21 0100 (!) 102/52 98 °F (36.7 °C) Axillary 86 28 91 %     No intake/output data recorded. No intake/output data recorded. HEENT: Normal  NECK: Thyroid normal. No carotid bruit. No lymphphadenopathy. CVS: RRR  RS: rales   ABD: Soft. Non tender. No mass. Normal BS. EXT: No edema. Non tender.  Bilateral BKA   NEURO: no focal deficit       Scheduled Meds:   doxycycline hyclate  100 mg Oral 2 times per day    dexamethasone  6 mg Oral Daily    atorvastatin  40 mg Oral Nightly    isosorbide mononitrate  30 mg Oral Daily    metoprolol succinate  50 mg Oral Daily    mirtazapine  7.5 mg Oral Nightly    zinc sulfate  50 mg Oral Daily    Vitamin D  2,000 Units Oral Daily    guaiFENesin  400 mg Oral TID    ascorbic acid  1,000 mg Oral Daily    budesonide-formoterol  2 puff Inhalation BID    baricitinib  2 mg Oral Daily     Continuous Infusions:   sodium chloride      sodium chloride         CBC with Differential:    Lab Results   Component Value Date    WBC 30.2 12/12/2021    RBC 2.21 12/12/2021    HGB 9.0 12/12/2021    HCT 28.9 12/12/2021    PLT 49 12/12/2021    .8 12/12/2021    MCH 40.7 12/12/2021    MCHC 31.1 12/12/2021    RDW 14.9 12/12/2021    NRBC 0.0 12/12/2021    BANDSPCT 2 10/12/2016    BLASTSPCT 0.9 08/18/2020    METASPCT 0.9 07/29/2020    LYMPHOPCT 2.6 12/12/2021    MONOPCT 4.4 12/12/2021    MYELOPCT 0.9 01/14/2020    BASOPCT 0.0 12/12/2021    MONOSABS 1.21 12/12/2021    LYMPHSABS 0.91 12/12/2021    EOSABS 0.00 12/12/2021    BASOSABS 0.00 12/12/2021     CMP:    Lab Results   Component Value Date     12/12/2021    K 3.6 12/12/2021    K 3.9 08/06/2020     12/12/2021    CO2 18 12/12/2021    BUN 43 12/12/2021    CREATININE 1.4 12/12/2021    GFRAA 57 12/12/2021 LABGLOM 47 12/12/2021    PROT 6.2 12/10/2021    LABALBU 3.6 12/10/2021    LABALBU 4.1 01/24/2012    CALCIUM 8.0 12/12/2021    BILITOT 1.1 12/10/2021    ALKPHOS 89 12/10/2021    AST 26 12/10/2021    ALT 15 12/10/2021     PT/INR:    Lab Results   Component Value Date    PROTIME 25.3 12/15/2021    PROTIME 28.4 02/18/2020    INR 2.3 12/15/2021     CXR worsening     Assessment:     Active Problems:  Acute hypoxic respiratory failure  Pneumonia  Covid 19 infection   Pleural effusion   Leukocytosis worsening due to steroid       Plan:    Will diurese ,continue ATB ,support ,monitor

## 2021-12-16 NOTE — CARE COORDINATION
COVID + 12/8. Increased O2 demand to 15l high flow NC along w/ 15 liters non rebreather- [employer] D[facility] following. On po/iv  abx. [facility]- [facility] order on chart- notify when pt is discharging. [facility]door referrals to [facility]( requires 20 day wait for start of symptoms) and [facility]- O2 demand still too great to accept- both will follow. From home w/ wife PTA.   Best Ryan, RN case manager

## 2021-12-16 NOTE — CONSULTS
[facility]  Hematology/Oncology  Consult      Patient Name: Ernesto Leary  YOB: 1929  PCP: Parveen Valle DO   Referring Provider:      Reason for Consultation:   Chief Complaint   Patient presents with    Shortness of Breath     covid + on tuesday, increased sob last several days, from home        History of Present Illness:  51-year-old man well-known to me with history of myeloproliferative neoplasm/MDS overlap syndrome  He has had chronic thrombocytosis with positive JAK2 mutation as well as chronic leukocytosis with significantly elevated absolute monocyte count consistent also with CMML and he has done very well over the past few years on cytoreductive therapy with Hydrea  He is now hospitalized with dyspnea and cough and COVID-19 pneumonia despite the fact he had been fully vaccinated but did not get a booster  He has been seen by ID and has been initiated on dexamethasone /remdesivir as well as baricitinib  Sputum cultures showing MSSA and he is currently on Unasyn  His recent CMP shows a stable serum creatinine of 1.3 with a GFR of 52 mL/min  LDH is elevated at 722  CRP elevated at 8.1  CBC shows significant neutrophilic leukocytosis his total WBC count was 72.7 with ANC of 60.3 and significant monocytosis with an absolute monocyte count of 10.1  Atypical lymphocytes were also described.   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 ([address] 12/28/2015    Blood circulation, collateral     Cancer ([facility] [address] 2004    prostate, seed implant    Chronic kidney disease     COPD (chronic obstructive pulmonary disease) ([address]     Critical lower limb ischemia ([address] 12/29/2015    Hyperlipidemia     Hypertension     Pneumonia Patient Active Problem List    Diagnosis Date Noted    Acute respiratory disease due to COVID-19 virus 12/11/2021    Lacerations of multiple sites of right arm 08/09/2021    Diarrhea 05/25/2021    Weight loss 05/25/2021    S/P right hip fracture 08/18/2020    Closed right hip fracture, initial encounter ([facility] [address] 07/29/2020    Aortic valve disease     Closed nondisplaced intertrochanteric fracture of right femur ([address] 07/24/2020    Acute rhinitis 02/08/2019    Precordial pain 10/02/2018    Chest pain 08/17/2018    Sepsis due to pneumonia ([address] 06/23/2018    Blurry vision, bilateral     Anticoagulated on Coumadin     Aortic stenosis, moderate     Sinus node dysfunction (HCC)     Syncope and collapse 02/24/2018    Wound infection 02/24/2018    Skin ulcer of left knee with fat layer exposed ([address] 03/15/2017    Lymphocytosis 02/14/2017    Critical lower limb ischemia (HCC) 12/29/2015    Pneumonia of both lower lobes due to infectious organism     Paroxysmal atrial fibrillation (HCC)     Warfarin anticoagulation     Warfarin overdosage     Hyperlipidemia 07/24/2014    Hypertension 07/24/2014    Atrial fibrillation ([address] 10/30/2013    Hyperlipemia 10/30/2013    Anticoagulant long-term use 10/30/2013    Claudication of left lower extremity ([address] 10/30/2013        Past Surgical History:   Procedure Laterality Date    APPENDECTOMY      COLONOSCOPY      ECHOCARDIOGRAM COMPLETE 2D W DOPPLER W COLOR  1/25/2012         HIP FRACTURE SURGERY Right 7/25/2020    RIGHT HIP CEPHALO-MEDULLARY NAIL performed by Luis Lo MD at [address] [place] [address] Bilateral     Right    SKIN BIOPSY      TOOTH EXTRACTION      VASCULAR SURGERY         Family History  Family History   Problem Relation Age of Onset    Stroke Father     Heart Disease Father     Cancer Sister        Social History    TOBACCO:   reports that he quit smoking about 42 years ago.  His smoking use included cigars and cigarettes. He started smoking about 73 years ago. He has a 30.00 pack-year smoking history. He has never used smokeless tobacco.  ETOH:   reports current alcohol use of about 5.0 standard drinks of alcohol per week. Home Medications  Prior to Admission medications    Medication Sig Start Date End Date Taking? Authorizing Provider   isosorbide mononitrate (IMDUR) 30 MG extended release tablet Take 1 tablet by mouth daily 7/27/21 12/10/21 Yes Dulce Maria Villegas DO   warfarin (COUMADIN) 5 MG tablet  3/9/21  Yes Historical Provider, MD   warfarin (COUMADIN) 7.5 MG tablet  3/16/21  Yes Historical Provider, MD   mirtazapine (REMERON) 7.5 MG tablet Take 1 tablet by mouth nightly 8/18/20  Yes Dulce Maria Villegas DO   Cholecalciferol (VITAMIN D3) 2000 units CAPS Take by mouth daily   Yes Historical Provider, MD   Coenzyme Q10 (CO Q-10) 100 MG CAPS Take by mouth daily   Yes Historical Provider, MD   metoprolol succinate (TOPROL XL) 50 MG extended release tablet Take 1 tablet by mouth daily 9/13/18  Yes Dulce Maria Villegas DO   Melatonin 5 MG CAPS Take 1 capsule by mouth nightly as needed   Yes Historical Provider, MD   pantoprazole (PROTONIX) 40 MG tablet Take 40 mg by mouth every morning (before breakfast)   Yes Historical Provider, MD   nitroGLYCERIN (NITROSTAT) 0.4 MG SL tablet up to max of 3 total doses. If no relief after 1 dose, call 911. Patient taking differently: Place 0.4 mg under the tongue every 5 minutes as needed for Chest pain up to max of 3 total doses. If no relief after 1 dose, call 911. 8/18/18  Yes Myles Cifuentes,    tamsulosin (FLOMAX) 0.4 MG capsule Take 1 capsule by mouth daily 10/6/21   Dulce Maria Villegas DO   gabapentin (NEURONTIN) 100 MG capsule Take 1 capsule by mouth 3 times daily for 30 days. Patient taking differently: Take 100 mg by mouth 2 times daily.   8/10/20 8/9/21  Lisa Borges MD   sennosides-docusate sodium (SENOKOT-S) 8.6-50 MG tablet Take 1 tablet by mouth 2 times daily 7/29/20   Dulce Maria DO Bakari   atorvastatin (LIPITOR) 40 MG tablet Take 1 tablet by mouth nightly 7/8/20   Dulce Maria Bakari DO   hydroxyurea (HYDREA) 500 MG chemo capsule Take 500 mg by mouth every morning     Historical Provider, MD       Allergies  No Known Allergies    Review of Systems: Relevant for recent decreased appetite, weight loss cough and worsening dyspnea        Objective  /66   Pulse 87   Temp 97.5 °F (36.4 °C) (Infrared)   Resp 20   Wt 109 lb 12.8 oz (49.8 kg)   SpO2 97%   BMI 16.21 kg/m²     Physical Exam:     General: Alert on high flow oxygen  Head and neck : PERRLA, EOMI . Sclera non icteric. Oropharynx : Clear  Neck: no JVD,  no adenopathy, no carotid bruit  Heart: Regular rate and regular rhythm, no murmur  Lungs: Clear to auscultation   Extremities: Status post bilateral BKA  Abdomen: Soft, non-tender;no masses, no organomegaly  Neurologic:Cranial nerves grossly intact. No focal motor or sensory deficits .     Recent Laboratory Data-   Lab Results   Component Value Date    WBC 72.7 (H) 12/15/2021    HGB 9.3 (L) 12/15/2021    HCT 28.3 (L) 12/15/2021    .3 (H) 12/15/2021    PLT 82 (L) 12/15/2021    LYMPHOPCT 0.0 (LL) 12/15/2021    RBC 2.24 (L) 12/15/2021    MCH 41.5 (H) 12/15/2021    MCHC 32.9 12/15/2021    RDW 14.7 12/15/2021    NEUTOPHILPCT 83.0 (H) 12/15/2021    MONOPCT 14.0 (H) 12/15/2021    BASOPCT 0.0 12/15/2021    NEUTROABS 60.34 (H) 12/15/2021    LYMPHSABS 2.18 12/15/2021    MONOSABS 10.18 (H) 12/15/2021    EOSABS 0.00 (L) 12/15/2021    BASOSABS 0.00 12/15/2021       Lab Results   Component Value Date     12/16/2021    K 3.8 12/16/2021     (H) 12/16/2021    CO2 24 12/16/2021    BUN 45 (H) 12/16/2021    CREATININE 1.3 (H) 12/16/2021    GLUCOSE 154 (H) 12/16/2021    CALCIUM 9.2 12/16/2021    PROT 6.2 (L) 12/10/2021    LABALBU 3.6 12/10/2021    BILITOT 1.1 12/10/2021    ALKPHOS 89 12/10/2021    AST 26 12/10/2021    ALT 15 12/10/2021    LABGLOM 52 12/16/2021    GFRAA >60 component of pulmonary edema, findings are concerning for multifocal pneumonia. Small bilateral pleural effusions. Underlying pulmonary hyperinflation. XR CHEST PORTABLE    Result Date: 12/12/2021  EXAMINATION: ONE XRAY VIEW OF THE CHEST 12/12/2021 8:26 am COMPARISON: 12/10/2021 HISTORY: ORDERING SYSTEM PROVIDED HISTORY: resp failure, effusions TECHNOLOGIST PROVIDED HISTORY: Reason for exam:->resp failure, effusions FINDINGS: The lungs are without acute focal process. Stable small left pleural effusion. No pneumothorax. The cardiomediastinal silhouette is without acute process. The osseous structures are without acute process. Stable appearance of the chest compared to 12/10/2021     XR CHEST PORTABLE    Result Date: 12/10/2021  EXAMINATION: ONE XRAY VIEW OF THE CHEST 12/10/2021 9:42 pm COMPARISON: 07/24/2020 portable chest. HISTORY: ORDERING SYSTEM PROVIDED HISTORY: shortness of breath, known covid TECHNOLOGIST PROVIDED HISTORY: Reason for exam:->shortness of breath, known covid FINDINGS: Stable normal cardiopericardial silhouette size. Stable arch calcification. No pneumothorax. Bibasilar opacities compatible with subsegmental atelectasis and or low-density infiltrate. Possible subtle infiltrate within the lateral right mid lung zone as well. Overlying oxygen tubing. No acute osseous abnormality. Trace pleural effusions not excluded. Patchy opacities within the lung bases and possibly lateral right mid lung zone, suspicious for multifocal pneumonia. Element of mild bibasilar subsegmental atelectasis and/or trace pleural effusions not excluded. CTA PULMONARY W CONTRAST    Result Date: 12/11/2021  EXAMINATION: CTA OF THE CHEST 12/11/2021 12:54 am TECHNIQUE: CTA of the chest was performed after the administration of intravenous contrast.  Multiplanar reformatted images are provided for review. MIP images are provided for review.  Dose modulation, iterative reconstruction, and/or weight based adjustment of the mA/kV was utilized to reduce the radiation dose to as low as reasonably achievable. COMPARISON: No prior CT. Correlation is made with portable chest radiographs from a few hours ago, as well as 07/24/2020. HISTORY: ORDERING SYSTEM PROVIDED HISTORY: eval for PE TECHNOLOGIST PROVIDED HISTORY: Reason for exam:->eval for PE Decision Support Exception - unselect if not a suspected or confirmed emergency medical condition->Emergency Medical Condition (MA) FINDINGS: Pulmonary Arteries: Pulmonary arteries are adequately opacified for evaluation. No evidence of intraluminal filling defect to suggest pulmonary embolism. Main pulmonary artery is normal in caliber. Mediastinum: No evidence of mediastinal lymphadenopathy. The heart and pericardium demonstrate no acute abnormality. There is no acute abnormality of the thoracic aorta. Left-sided coronary arterial calcifications. Moderate calcific atherosclerosis of the arch and descending aorta. Partial fluid opacification of the dependent bilateral mainstem bronchi, extending into the lower lobe bronchi on the left. Lungs/pleura: No pneumothorax. Small to moderate right and small left pleural effusions. Small calcified granuloma is seen within each upper lobe. There is an element of subsegmental dependent atelectasis bilaterally, with additional calcified granulomas seen within the atelectatic dependent basal left lower lobe. Consolidation within portions of the basal left lower lobe may be somewhat out of proportion to perceived volume loss, raising the question of superimposed pneumonia. Scattered patchy areas of increased attenuation within the upper lobes may also have components of both subsegmental atelectasis and infiltrate. No significant thickening of secondary interlobular septa. Tracheobronchial tree calcification noted. Upper Abdomen: Visualized upper abdominal viscera demonstrate no acute abnormality.  Soft Tissues/Bones: No acute bone or soft tissue abnormality. No evidence of pulmonary embolism. Small to moderate right pleural effusion and small left pleural effusion. In addition to associated element of dependent/compressive atelectasis within the bilateral lower lobes, consolidation may be somewhat out of proportion to perceived volume loss in areas of the lower left base, concerning for superimposed pneumonia, with dependent secretions seen within bilateral mainstem bronchi extending into the left lower lobe bronchi. Although some areas may simply represent simple atelectasis, there also several scattered low density infiltrates present within the bilateral upper lobes which could represent multifocal bronchopneumonia. A few tiny peripheral opacities within predominantly the lateral right upper lobe have somewhat nodular morphology, however these are nonspecific, particularly in current context, and require no surveillance, in the absence of risk factors for bronchogenic carcinoma, such as smoking. Fluoroscopy modified barium swallow with video    Result Date: 12/14/2021  EXAMINATION: MODIFIED BARIUM SWALLOW WAS PERFORMED IN CONJUNCTION WITH SPEECH PATHOLOGY SERVICES TECHNIQUE: Fluoroscopic evaluation of the swallowing mechanism was performed using cineradiography with multiple consistency of barium product in conjunction with speech pathology services. FLUOROSCOPY DOSE AND TYPE OR TIME AND EXPOSURES: Fluoro time: 2 minutes Images: 14 TD: 3.9 3 M Gy D AP: 0.601 Gy cm square COMPARISON: None HISTORY: ORDERING SYSTEM PROVIDED HISTORY: aspiration pneumonia TECHNOLOGIST PROVIDED HISTORY: Reason for exam:->aspiration pneumonia FINDINGS: Presence of penetration and aspiration to thin consistencies of barium contrast. No cough mechanism was triggered by the aspiration.  There is no aspiration penetration to nectar, pudding and cookie consistencies of barium contrast.     Penetration and aspiration to thin consistencies of barium contrast. Please see separate speech pathology report for full discussion of findings and recommendations. RECOMMENDATIONS: Unavailable         ASSESSMENT/PLAN : 80year-old        Myeloproliferative/myelodysplastic overlap syndrome  Patient with positive JAK2 mutation and prior thrombocytosis as well as history of chronic leukocytosis and monocytosis with CMML/MDS  He has been doing well for several years on Hydrea      His thrombocytopenia is likely related to myelosuppression by Covid pneumonia  His leukocytosis is chronic as well as his monocytosis but worsened by recent Covid infection  To continue Covid treatment as per ID  Resume Hydrea 500 mg twice daily  Trend daily CBC  We will follow      Alexis Christie M.D., F.A.C.P.   Electronically signed 12/16/2021 at 4:35 PM

## 2021-12-16 NOTE — PROGRESS NOTES
Admit Date: 12/10/2021    Subjective:     Awake feels fine on 14 L NC     Objective:     Patient Vitals for the past 8 hrs:   BP Temp Temp src Pulse Resp SpO2   12/16/21 0800 (!) 101/58 97.2 °F (36.2 °C) Infrared 86 26 (!) 89 %     I/O last 3 completed shifts:  In: -   Out: 425 [Urine:425]  No intake/output data recorded. HEENT: Normal  NECK: Thyroid normal. No carotid bruit. No lymphphadenopathy. CVS: RRR  RS: rales   ABD: Soft. Non tender. No mass. Normal BS. EXT: No edema. Non tender.  BKA   NEURO: no focal deficit       Scheduled Meds:   dexamethasone  6 mg IntraVENous Q12H    ampicillin-sulbactam  3,000 mg IntraVENous Q12H    doxycycline hyclate  100 mg Oral 2 times per day    atorvastatin  40 mg Oral Nightly    isosorbide mononitrate  30 mg Oral Daily    metoprolol succinate  50 mg Oral Daily    mirtazapine  7.5 mg Oral Nightly    zinc sulfate  50 mg Oral Daily    Vitamin D  2,000 Units Oral Daily    guaiFENesin  400 mg Oral TID    ascorbic acid  1,000 mg Oral Daily    budesonide-formoterol  2 puff Inhalation BID    baricitinib  2 mg Oral Daily     Continuous Infusions:   sodium chloride      sodium chloride         CBC with Differential:    Lab Results   Component Value Date    WBC 72.7 12/15/2021    RBC 2.24 12/15/2021    HGB 9.3 12/15/2021    HCT 28.3 12/15/2021    PLT 82 12/15/2021    .3 12/15/2021    MCH 41.5 12/15/2021    MCHC 32.9 12/15/2021    RDW 14.7 12/15/2021    NRBC 0.0 12/12/2021    BANDSPCT 2 10/12/2016    BLASTSPCT 0.9 08/18/2020    METASPCT 0.9 07/29/2020    LYMPHOPCT 0.0 12/15/2021    MONOPCT 14.0 12/15/2021    MYELOPCT 0.9 01/14/2020    BASOPCT 0.0 12/15/2021    MONOSABS 10.18 12/15/2021    LYMPHSABS 2.18 12/15/2021    EOSABS 0.00 12/15/2021    BASOSABS 0.00 12/15/2021     CMP:    Lab Results   Component Value Date     12/15/2021    K 3.6 12/15/2021    K 3.9 08/06/2020     12/15/2021    CO2 17 12/15/2021    BUN 41 12/15/2021    CREATININE 1.3 12/15/2021 GFRAA >60 12/15/2021    LABGLOM 52 12/15/2021    PROT 6.2 12/10/2021    LABALBU 3.6 12/10/2021    LABALBU 4.1 01/24/2012    CALCIUM 8.6 12/15/2021    BILITOT 1.1 12/10/2021    ALKPHOS 89 12/10/2021    AST 26 12/10/2021    ALT 15 12/10/2021     PT/INR:    Lab Results   Component Value Date    PROTIME 25.3 12/15/2021    PROTIME 28.4 02/18/2020    INR 2.3 12/15/2021       Assessment:     Active Problems:  Acute hypoxic respiratory failure  Pneumonia  Covid 19 infection   Pleural effusion   Leukocytosis worsening due to steroid       Plan:   Continue supportive care ,diurese per renal

## 2021-12-16 NOTE — PROGRESS NOTES
Patient helped to bedside commode by PCA. This RN made aware that pt O2 was 83% upon returning to bed with 4L NC. Pt O2 increased to 14L HiFlo with O2 at 90% without exertion.

## 2021-12-16 NOTE — PROGRESS NOTES
Pt pule ox 83% at rest on 14 l  Increased to 15 l  Pulse ox 87%  Added 15 liters nrb and pulse ox now 96%  Pt resting quietly in bed with weak dry cough

## 2021-12-16 NOTE — PROGRESS NOTES
SPEECH LANGUAGE PATHOLOGY  DAILY PROGRESS NOTE        PATIENT NAME:  Yuniel Ca      :  1929          TODAY'S DATE:  2021 ROOM:  [address] [place] [address]        SWALLOWING    Pt in bed, RN reports decline in respiratory status and pt now requiring NRB. SLP reassessed bedside. RN initially did not have NRB on pt when she left room and encouraged SLP to place as needed. Prior to oral trials, pt desated to 86% and NRB placed for remaining of session. Left in place when SLP left room and charge RN notified of above. Pt reports increase in coughing today with meals. Oral trials completed resulting in wet cough post swallow of NTL and HTL. Good toleration clinically of puree items and pudding thick liquids. Pt educated on new diet recommendation of puree with extremely thick liquids with SLP to reassess with improved medical/respiratory status. Pt may require another means of hydration at this time due to pudding thick liquid recommendation. DYSPHAGIA DIAGNOSIS:  moderate oropharyngeal phase dysphagia including silent aspiration of thin liquid      DIET RECOMMENDATIONS:  Puree, pudding thick liquids as tolerated until improved medical/respiratory status-NO STRAWS     Administer medication crushed, as able, with pudding/applesauce     FEEDING RECOMMENDATIONS:               Assistance level:  No assistance needed                 Compensatory strategies recommended: Multiple swallow, Effortful swallow, Small bites/sips, Alternate solids and liquids and No straw        Education- Pt educated on results and POC. Pt trained on compensatory strategies for safe swallow with good outcome. Questions answered. Will continue SP intervention as per previously established POC. Monique DIAZ CCC/SLP [id number]  Speech Pathologist              CPT code(s) 01195  dysphagia tx  Total minutes :  30 minutes

## 2021-12-16 NOTE — CONSULTS
[address] [place] [address] [facility]  Consultation Note     Admit Date: 12/10/2021  9:15 PM    Reason for Consult:   Pneumonia. Pseudomonas and Staphylococcus. Covid +    Attending Physician:  Jen Elliott MD    HISTORY OF PRESENT ILLNESS:             The history is obtained from extensive review of available past medical records. The patient is a 80 y.o. male who is known to the ID service. The patient presents to the ED at [facility] on 12/10/2021. He has been fully vaccinated against SARS-CoV-2 but did not get a booster. He tested positive for SARS-CoV-2 3 days prior to the presentation. He was short of breath and the dyspnea has been increasing for the last few days at home. He was afebrile but tachycardic. He also reported a productive sputum. White count was 19. Creatinine was 1.5. Admitted with acute respiratory disease due to COVID-19 virus and MARCIAL. Remdesivir and Baricitinib were started. He reported a productive sputum. Respiratory cultures are showing MSSA and Stenotrophomonas maltophilia. He is currently on Unasyn. 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 ([address] 12/28/2015    Blood circulation, collateral     Cancer ([facility] 2004    prostate, seed implant    Chronic kidney disease     COPD (chronic obstructive pulmonary disease) [facility]     Critical lower limb ischemia ([facility] [address] [facility] 12/29/2015    Hyperlipidemia     Hypertension     Pneumonia      February 2018. Admitted to [facility] with a syncopal episode. 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 Treated with Clindamycin. Seen by ID. There was no evidence of infection of the biopsy sites. He was observe off antibiotics. January 2016. Admitted to [facility] and seen by Dr. Sugey Castro for leukocytosis.  This was thought to be secondary to a mild low proliferative disorder. Past Surgical History:        Procedure Laterality Date    APPENDECTOMY      COLONOSCOPY      ECHOCARDIOGRAM COMPLETE 2D W DOPPLER W COLOR  2012         HIP FRACTURE SURGERY Right 2020    RIGHT HIP CEPHALO-MEDULLARY NAIL performed by Shy Mao MD at [address] [place] [address] Bilateral     Right    SKIN BIOPSY      TOOTH EXTRACTION      VASCULAR SURGERY       Current Medications:   Scheduled Meds:   dexamethasone  6 mg IntraVENous Q12H    ampicillin-sulbactam  3,000 mg IntraVENous Q12H    doxycycline hyclate  100 mg Oral 2 times per day    atorvastatin  40 mg Oral Nightly    isosorbide mononitrate  30 mg Oral Daily    metoprolol succinate  50 mg Oral Daily    mirtazapine  7.5 mg Oral Nightly    zinc sulfate  50 mg Oral Daily    Vitamin D  2,000 Units Oral Daily    guaiFENesin  400 mg Oral TID    ascorbic acid  1,000 mg Oral Daily    budesonide-formoterol  2 puff Inhalation BID    baricitinib  2 mg Oral Daily     Continuous Infusions:   sodium chloride      sodium chloride       PRN Meds:sodium chloride, sodium chloride, EPINEPHrine, acetaminophen, guaiFENesin-dextromethorphan, benzonatate, sodium chloride flush    Allergies:  Patient has no known allergies. Social History:   Social History     Socioeconomic History    Marital status:      Spouse name: None    Number of children: None    Years of education: None    Highest education level: None   Occupational History    None   Tobacco Use    Smoking status: Former Smoker     Packs/day: 1.00     Years: 30.00     Pack years: 30.00     Types: Cigars, Cigarettes     Start date:      Quit date: 1979     Years since quittin.3    Smokeless tobacco: Never Used   Vaping Use    Vaping Use: Never used   Substance and Sexual Activity    Alcohol use:  Yes     Alcohol/week: 5.0 standard drinks     Types: 5 Glasses of wine per week    Drug use: Yes     Types: Marijuana Shadi Delgado     Comment: Negative  Respiratory: As in the HPI  Cardiovascular: Negative  GI: Negative  : Negative  Musculoskeletal: Negative  Skin: No rashes. PHYSICAL EXAM:    Vitals:   BP (!) 101/58   Pulse 86   Temp 97.2 °F (36.2 °C) (Infrared)   Resp 26   Wt 109 lb 12.8 oz (49.8 kg)   SpO2 (!) 89%   BMI 16.21 kg/m²   Constitutional: The patient is awake, alert, and oriented. Sitting up in bed. No distress. 14 L nasal cannula. Skin: Warm and dry. No rashes were noted. HEENT: Eyes show round, and reactive pupils. No jaundice. Moist mucous membranes, no ulcerations, no thrush. Neck: Supple to movements. No lymphadenopathy. Chest: No use of accessory muscles to breathe. Symmetrical expansion. Auscultation reveals scattered rhonchi and crackles, especially the left base posteriorly. Cardiovascular: S1 and S2 are rhythmic and regular. No murmurs appreciated. Abdomen: Positive bowel sounds to auscultation. Benign to palpation. No masses felt. No hepatosplenomegaly.   Extremities: Bilateral BKA stumps unremarkable  Lines: peripheral      CBC+dif:  Recent Labs     12/15/21  1130   WBC 72.7*   HGB 9.3*   HCT 28.3*   .3*   PLT 82*   NEUTROABS 60.34*     Lab Results   Component Value Date    CRP 6.8 (H) 12/15/2021    CRP 2.4 (H) 12/14/2021    CRP 3.5 (H) 12/13/2021      No results found for: [facility]  Lab Results   Component Value Date    SEDRATE 14 12/10/2021    SEDRATE 56 (H) 01/17/2016    SEDRATE 120 (H) 01/11/2016     Lab Results   Component Value Date    ALT 15 12/10/2021    AST 26 12/10/2021    ALKPHOS 89 12/10/2021    BILITOT 1.1 12/10/2021     Lab Results   Component Value Date     12/16/2021    K 3.8 12/16/2021    K 3.9 08/06/2020     12/16/2021    CO2 24 12/16/2021    BUN 45 12/16/2021    CREATININE 1.3 12/16/2021    GFRAA >60 12/16/2021    LABGLOM 52 12/16/2021    GLUCOSE 154 12/16/2021    GLUCOSE 94 01/24/2012    PROT 6.2 12/10/2021    LABALBU 3.6 12/10/2021    LABALBU 4.1 01/24/2012    CALCIUM 9.2 12/16/2021    BILITOT 1.1 12/10/2021    ALKPHOS 89 12/10/2021    AST 26 12/10/2021    ALT 15 12/10/2021       Lab Results   Component Value Date    PROTIME 17.7 12/16/2021    PROTIME 28.4 02/18/2020    INR 1.6 12/16/2021       Lab Results   Component Value Date    TSH 1.070 09/26/2018       Lab Results   Component Value Date    COLORU Yellow 10/08/2021    PHUR 5.5 10/08/2021    LABCAST FEW 02/14/2017    WBCUA 0-1 10/08/2021    RBCUA 0-1 10/08/2021    MUCUS Present 11/19/2015    BACTERIA RARE 10/08/2021    CLARITYU Clear 10/08/2021    SPECGRAV 1.020 10/08/2021    LEUKOCYTESUR TRACE 10/08/2021    UROBILINOGEN 0.2 10/08/2021    BILIRUBINUR Negative 10/08/2021    BLOODU TRACE 10/08/2021    GLUCOSEU Negative 10/08/2021    AMORPHOUS RARE 10/08/2021       Lab Results   Component Value Date    HCO3 23.3 06/23/2018    BE 1.2 06/23/2018    O2SAT 92.2 06/23/2018    PH 7.499 06/23/2018    PCO2 30.7 06/23/2018    PO2 60.2 06/23/2018     Radiology:      Microbiology:  Pending  No results for input(s): BC in the last 72 hours. No results for input(s): ORG in the last 72 hours. No results for input(s): Marinus Risk in the last 72 hours. No results for input(s): STREPNEUMAGU in the last 72 hours. No results for input(s): LP1UAG in the last 72 hours. No results for input(s): ASO in the last 72 hours. No results for input(s): CULTRESP in the last 72 hours. Recent Labs     12/14/21  0348 12/15/21  1130   PROCAL 0.19* 0.34*       Assessment:  · SARS-CoV-2 infection with moderate pneumonia in a fully vaccinated patient  · Aspiration pneumonia left lung. Culture growing MSSA.   Stenotrophomonas is in small amount and no gram-negative rods were seen in the Gram stain, therefore it is a colonizer  · Probable myeloproliferative disorder  · Leukemoid reaction associated to the above    Plan:    · Consolidate antibiotics to Cefazolin  · No need to treat stenotrophomonas in the sputum  · Check cultures, baseline ESR, CRP  · PICC for IV antibiotic  · Consider hematology consult for myelodysplasia versus leukemia    Informed Consent for PICC:  I explained the risks, benefits, alternative options, and potential complications associated with insertion of Peripherally Inserted Central Catheter (PICC) with the patient prior to the procedure. Electronically signed by Robb Marcial MD on 12/16/2021 at 12:41 PM     Thank you for having us see this patient in consultation.   Discussed with Dr. Karin Quintanilla MD  12:08 PM  12/16/2021

## 2021-12-16 NOTE — PROGRESS NOTES
Department of Internal Medicine  Nephrology Attending Progress Note        SUBJECTIVE:  Mr Celina Pastrana having worsening o2 requirements, bladder scan not done. Floor having trouble finding. PMH  Bilateral below the knee amputation  History of renal lithiasis uric acid stone  Hypertension  Hyperlipidemia  History of tobacco use, still smoking some cigars  History of chronic A. fib on anticoagulation  history of JAK2 mutation with essential thrombocytosis  Vit D deficiency    Physical Exam:    Vitals:    12/16/21 1523   BP:    Pulse:    Resp:    Temp:    SpO2: 97%       I/O last 24 hours:  Intake/Output inaccurate     Weight: not done    General Appearance:  awake, alert, oriented, in mild respiratory distress on 4 L  Skin: Multiple brain  Neck:  neck- supple, no mass, non-tender and no bruits  Lungs: Decreased breath sounds in both base  Heart: Irregular rhythm     Abdominal: Abdomen soft, non-tender. BS normal. No masses,  No organomegaly  Extremities: Extremities warm to touch, pink, with no edema.   Bilateral BKA  Peripheral Pulses: Decreased    DATA:    CBC with Differential:    Lab Results   Component Value Date    WBC 72.7 12/15/2021    RBC 2.24 12/15/2021    HGB 9.3 12/15/2021    HCT 28.3 12/15/2021    PLT 82 12/15/2021    .3 12/15/2021    MCH 41.5 12/15/2021    MCHC 32.9 12/15/2021    RDW 14.7 12/15/2021    NRBC 0.0 12/12/2021    BANDSPCT 2 10/12/2016    BLASTSPCT 0.9 08/18/2020    METASPCT 0.9 07/29/2020    LYMPHOPCT 0.0 12/15/2021    MONOPCT 14.0 12/15/2021    MYELOPCT 0.9 01/14/2020    BASOPCT 0.0 12/15/2021    MONOSABS 10.18 12/15/2021    LYMPHSABS 2.18 12/15/2021    EOSABS 0.00 12/15/2021    BASOSABS 0.00 12/15/2021     BMP:    Lab Results   Component Value Date     12/16/2021    K 3.8 12/16/2021    K 3.9 08/06/2020     12/16/2021    CO2 24 12/16/2021    BUN 45 12/16/2021    LABALBU 3.6 12/10/2021    LABALBU 4.1 01/24/2012    CREATININE 1.3 12/16/2021    CALCIUM 9.2 12/16/2021 GFRAA >60 12/16/2021    LABGLOM 52 12/16/2021    GLUCOSE 154 12/16/2021    GLUCOSE 94 01/24/2012     Uric Acid:    Lab Results   Component Value Date    LABURIC 10.1 12/16/2021            lidocaine PF  5 mL IntraDERmal Once    sodium chloride flush  5-40 mL IntraVENous 2 times per day    heparin flush  3 mL IntraVENous 2 times per day    ceFAZolin  2,000 mg IntraVENous Q8H    hydroxyurea  500 mg Oral BID    dexamethasone  6 mg IntraVENous Q12H    atorvastatin  40 mg Oral Nightly    isosorbide mononitrate  30 mg Oral Daily    metoprolol succinate  50 mg Oral Daily    mirtazapine  7.5 mg Oral Nightly    zinc sulfate  50 mg Oral Daily    Vitamin D  2,000 Units Oral Daily    guaiFENesin  400 mg Oral TID    ascorbic acid  1,000 mg Oral Daily    budesonide-formoterol  2 puff Inhalation BID    baricitinib  2 mg Oral Daily      sodium chloride      sodium chloride      sodium chloride       sodium chloride flush, sodium chloride, heparin flush, sodium chloride, sodium chloride, EPINEPHrine, acetaminophen, guaiFENesin-dextromethorphan, benzonatate, sodium chloride flush    IMPRESSION/RECOMMENDATIONS:      CKD 4 suspect serum creatinine under reflecting actual GFR as he has very little muscle mass  Aspiration pneumonia with some superimposed volume overload, bed scale not  working have spoken to staff , will give some additional IV Lasix today  History of uric acid stones in view of a history of essential thrombocytosis and need for hydroxyurea with elevated  uric acid will start  allopurinol   Discussed with Dr. Ambar Kruse  History of BPH check bladder scan for PVR in view of history of nocturia and frequency      Toy García MD  12/16/2021 5:02 PM

## 2021-12-16 NOTE — PROGRESS NOTES
Pulmonary Progress Note  12/16/2021 9:41 AM  Subjective:   Admit Date: 12/10/2021  PCP: Franck Villegas DO  Interval History:   Tested + for COVID 12/8 with desaturation at home to 87% with pox 92% on 2L. CT pulmonary angiogram was performed showing bilateral pleural effusions, basilar consolidation, and few peripheral opacities. 12/15: pt up to use BR and pox down to 70%. Placed on 10l HFNC; Decreased O2 to 6L with pox 95%. Feels dyspneic, coughing a lot    12/16: Now on 14L HFNC, pox 89-91% at bedside. Reports mild cough and dyspnea. Asking about his Wes order. Diet: ADULT ORAL NUTRITION SUPPLEMENT; Breakfast, Lunch, Dinner; Standard High Calorie/High Protein Oral Supplement  ADULT DIET; Dysphagia - Soft and Bite Sized; Mildly Thick (Nectar)      Data:   Scheduled Meds:    dexamethasone  6 mg IntraVENous Q12H    ampicillin-sulbactam  3,000 mg IntraVENous Q12H    doxycycline hyclate  100 mg Oral 2 times per day    atorvastatin  40 mg Oral Nightly    isosorbide mononitrate  30 mg Oral Daily    metoprolol succinate  50 mg Oral Daily    mirtazapine  7.5 mg Oral Nightly    zinc sulfate  50 mg Oral Daily    Vitamin D  2,000 Units Oral Daily    guaiFENesin  400 mg Oral TID    ascorbic acid  1,000 mg Oral Daily    budesonide-formoterol  2 puff Inhalation BID    baricitinib  2 mg Oral Daily       Continuous Infusions:    sodium chloride      sodium chloride         PRN Meds: sodium chloride, sodium chloride, EPINEPHrine, acetaminophen, guaiFENesin-dextromethorphan, benzonatate, sodium chloride flush    I/O last 3 completed shifts:  In: -   Out: 425 [Urine:425]    No intake/output data recorded.       Intake/Output Summary (Last 24 hours) at 12/16/2021 0941  Last data filed at 12/15/2021 1515  Gross per 24 hour   Intake    Output 425 ml   Net -425 ml       Patient Vitals for the past 96 hrs (Last 3 readings):   Weight   12/14/21 0624 109 lb 12.8 oz (49.8 kg)         Recent Labs     12/15/21  1130 WBC 72.7*   HGB 9.3*   HCT 28.3*   .3*   PLT 82*     Recent Labs     12/15/21  1130      K 3.6      CO2 17*   BUN 41*   CREATININE 1.3*     No results for input(s): PROT, ALB, ALKPHOS, ALT, AST, BILITOT, AMYLASE, LIPASE in the last 72 hours. Recent Labs     12/14/21  0348 12/15/21  0420   INR 3.8 2.3     No results for input(s): CKTOTAL, CKMB, TROPONINI in the last 72 hours. No results for input(s): BNP in the last 72 hours. Troponin: No results for input(s): TROPONINI in the last 72 hours. CPK:  Lab Results   Component Value Date    CKTOTAL 35 09/11/2018      BNP: No results for input(s): BNP in the last 72 hours. ABGs: No results found for: PHART, PO2ART, [id number]  INR:   Recent Labs     12/14/21  0348 12/15/21  0420   INR 3.8 2.3         -----------------------------------------------------------------  RAD:   CXR12/12/2021:  The lungs are without acute focal process. Stable small left pleural  effusion. No pneumothorax. The cardiomediastinal silhouette is without  acute process. The osseous structures are without acute process. Impression  Stable appearance of the chest compared to 12/10/2021    CXR 12/15/21: Persistent bilateral parenchymal opacities with diminish the pleural effusion mainly on the right-side. Micro:  No results for input(s): BC in the last 72 hours. 12/12 Resp Culture:   Group 5: >25 PMN's/LPF and <10 Epithelial cells/LPF   Moderate Polymorphonuclear leukocytes   Moderate yeast   Few Gram negative rods   Rare Gram positive rods   Rare Gram positive cocci in clusters       No results for input(s): ORG in the last 72 hours. No results for input(s): Derryl Drum in the last 72 hours. No results for input(s): STREPPNEUMAG in the last 72 hours. No results for input(s): LEGUR in the last 72 hours. No results for input(s): ORG in the last 72 hours. No results for input(s): ASO in the last 72 hours. No results for input(s): CULTRESP in the last 72 hours.   No results for input(s): Braydon Marcos in the last 72 hours. Vent Information  SpO2: (!) 89 %    Additional Respiratory  Assessments  Pulse: 86  Resp: 26  SpO2: (!) 89 %    Objective:   Vitals:   Vitals:    21 0800   BP: (!) 101/58   Pulse: 86   Resp: 26   Temp: 97.2 °F (36.2 °C)   SpO2: (!) 89%      TEMP:Current: Temp: 97.2 °F (36.2 °C)  Max: Temp  Av.7 °F (36.5 °C)  Min: 97.2 °F (36.2 °C)  Max: 98.4 °F (36.9 °C)    BP Range: Systolic (66RSN), RBS:932 , Min:96 , BYS:855     Diastolic (93TSF), QRZ:84, Min:56, Max:61      General appearance: alert, appears stated age and cooperative. In no acute distress  Skin: No rashes or lesions  HEENT: mucous membranes are moist  Neck: No JVD  Lungs: symmetrical expansion, diminished with crackles, no wheezing, no use of accessory muscles. Heart: S1S2,  +murmurs,  Abdomen: soft, non tender,   Extremities: no peripheral edema; R arm with significant ecchymosis from fingers up to bicep area.  BKA with prosthesis ( currently off)  Neurologic: Alert, oriented times 3,  Affect: pleasant      Assessment:   Patient Active Problem List:     Atrial fibrillation ([address]     Hyperlipemia     Anticoagulant long-term use     Claudication of left lower extremity (HCC)     Pneumonia of both lower lobes due to infectious organism     Paroxysmal atrial fibrillation (HCC)     Warfarin anticoagulation     Warfarin overdosage     Critical lower limb ischemia (HCC)     Hyperlipidemia     Hypertension     Lymphocytosis     Skin ulcer of left knee with fat layer exposed ([address]     Syncope and collapse     Wound infection     Blurry vision, bilateral     Anticoagulated on Coumadin     Aortic stenosis, moderate     Sinus node dysfunction (HCC)     Sepsis due to pneumonia (HCC)     Chest pain     Precordial pain     Acute rhinitis     Closed nondisplaced intertrochanteric fracture of right femur ([address]     Aortic valve disease     Closed right hip fracture, initial encounter ([address]     S/P right hip fracture     Diarrhea     Weight loss     Lacerations of multiple sites of right arm     Acute respiratory disease due to COVID-19 virus    Plan:   · CXR noted as slightly worse. · MBS with penetration and aspiration - speech rec with modified diet Soft and bite size consistency solids with  nectar consistency liquids - NO STRAWS  · Pro calcitonin 0.34 - sputum growing staph. Off Cefepime, on doxy - added unasyn. · Increase Dexa to BID and change to IV  · Baricitinib 2mg gfr 47, absol lymph 0.91 - 12/12   · Follow inflammatory markers - improving CRP 2.4 increased to 6.8, will repeat LD (307-->702), D dimer (625-->549). Ferritin 323, Sed rate 14  · Effusions small - no indication for thora at this time. Diuresis - rec renal consult for diuresis. BNP 12,557 - not on diuretics. · Continue vitamins  · Albuterol prn, symbicort. Antitussives    Respiratory culture - with staph, antibiotics adjusted yesterday. Hope to see improvement in o2 soon. Add IS. Watch diet. Continue to wean O2 as able. Renal consulted. LAURA Gaston - CNP     Seen and evaluated, agree with above assessment and plan. Increasing FiO2 needs up to 15 L  A sputum showing MSSA and Stenotrophomonas. We will consult ID for further antibiotic recommendations. DC doxy DC Unasyn.   Discussed with son via telephone  The patient is known to heme-onc, will consult per ID request

## 2021-12-17 NOTE — PROGRESS NOTES
[facility]  Hematology/Oncology  Consult      Patient Name: Demarcus Loaiza  YOB: 1929  PCP: Mark Messina DO   Referring Provider:      Reason for Consultation:   Chief Complaint   Patient presents with    Shortness of Breath     covid + on tuesday, increased sob last several days, from home      Subjective: Still with dyspnea on exertion. Also complaints of continued productive cough. History of Present Illness:  26-year-old man well-known to me with history of myeloproliferative neoplasm/MDS overlap syndrome  He has had chronic thrombocytosis with positive JAK2 mutation as well as chronic leukocytosis with significantly elevated absolute monocyte count consistent also with CMML and he has done very well over the past few years on cytoreductive therapy with Hydrea  He is now hospitalized with dyspnea and cough and COVID-19 pneumonia despite the fact he had been fully vaccinated but did not get a booster  He has been seen by ID and has been initiated on dexamethasone /remdesivir as well as baricitinib  Sputum cultures showing MSSA and he is currently on Unasyn  His recent CMP shows a stable serum creatinine of 1.3 with a GFR of 52 mL/min  LDH is elevated at 722  CRP elevated at 8.1  CBC shows significant neutrophilic leukocytosis his total WBC count was 72.7 with ANC of 60.3 and significant monocytosis with an absolute monocyte count of 10.1  Atypical lymphocytes were also described.   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 ([address] 12/28/2015    Blood circulation, collateral     Cancer ([facility] 2004    prostate, seed implant    Chronic kidney disease     COPD (chronic obstructive pulmonary disease) ([address]     Critical lower limb ischemia ([address] 12/29/2015    Hyperlipidemia     Hypertension     Pneumonia        Patient Active Problem List    Diagnosis Date Noted    Acute respiratory disease due to COVID-19 virus 12/11/2021    Lacerations of multiple sites of right arm 08/09/2021    Diarrhea 05/25/2021    Weight loss 05/25/2021    S/P right hip fracture 08/18/2020    Closed right hip fracture, initial encounter ([facility] 07/29/2020    Aortic valve disease     Closed nondisplaced intertrochanteric fracture of right femur ([address] 07/24/2020    Acute rhinitis 02/08/2019    Precordial pain 10/02/2018    Chest pain 08/17/2018    Sepsis due to pneumonia ([address] 06/23/2018    Blurry vision, bilateral     Anticoagulated on Coumadin     Aortic stenosis, moderate     Sinus node dysfunction (HCC)     Syncope and collapse 02/24/2018    Wound infection 02/24/2018    Skin ulcer of left knee with fat layer exposed ([address] 03/15/2017    Lymphocytosis 02/14/2017    Critical lower limb ischemia (HCC) 12/29/2015    Pneumonia of both lower lobes due to infectious organism     Paroxysmal atrial fibrillation (HCC)     Warfarin anticoagulation     Warfarin overdosage     Hyperlipidemia 07/24/2014    Hypertension 07/24/2014    Atrial fibrillation ([address] 10/30/2013    Hyperlipemia 10/30/2013    Anticoagulant long-term use 10/30/2013    Claudication of left lower extremity ([address] 10/30/2013        Past Surgical History:   Procedure Laterality Date    APPENDECTOMY      COLONOSCOPY      ECHOCARDIOGRAM COMPLETE 2D W DOPPLER W COLOR  1/25/2012         HIP FRACTURE SURGERY Right 7/25/2020    RIGHT HIP CEPHALO-MEDULLARY NAIL performed by Ed Vega MD at [address] [place] [address] Bilateral     Right    SKIN BIOPSY      TOOTH EXTRACTION      VASCULAR SURGERY         Family History  Family History   Problem Relation Age of Onset    Stroke Father     Heart Disease Father     Cancer Sister        Social History    TOBACCO: reports that he quit smoking about 42 years ago. His smoking use included cigars and cigarettes. He started smoking about 73 years ago. He has a 30.00 pack-year smoking history. He has never used smokeless tobacco.  ETOH:   reports current alcohol use of about 5.0 standard drinks of alcohol per week. Home Medications  Prior to Admission medications    Medication Sig Start Date End Date Taking? Authorizing Provider   isosorbide mononitrate (IMDUR) 30 MG extended release tablet Take 1 tablet by mouth daily 7/27/21 12/10/21 Yes Dulce Maria Villegas DO   warfarin (COUMADIN) 5 MG tablet  3/9/21  Yes Historical Provider, MD   warfarin (COUMADIN) 7.5 MG tablet  3/16/21  Yes Historical Provider, MD   mirtazapine (REMERON) 7.5 MG tablet Take 1 tablet by mouth nightly 8/18/20  Yes Dulce Maria Villegas DO   Cholecalciferol (VITAMIN D3) 2000 units CAPS Take by mouth daily   Yes Historical Provider, MD   Coenzyme Q10 (CO Q-10) 100 MG CAPS Take by mouth daily   Yes Historical Provider, MD   metoprolol succinate (TOPROL XL) 50 MG extended release tablet Take 1 tablet by mouth daily 9/13/18  Yes Dulce Maria Villegas DO   Melatonin 5 MG CAPS Take 1 capsule by mouth nightly as needed   Yes Historical Provider, MD   pantoprazole (PROTONIX) 40 MG tablet Take 40 mg by mouth every morning (before breakfast)   Yes Historical Provider, MD   nitroGLYCERIN (NITROSTAT) 0.4 MG SL tablet up to max of 3 total doses. If no relief after 1 dose, call 911. Patient taking differently: Place 0.4 mg under the tongue every 5 minutes as needed for Chest pain up to max of 3 total doses. If no relief after 1 dose, call 911. 8/18/18  Yes Myles Cifuentes,    tamsulosin (FLOMAX) 0.4 MG capsule Take 1 capsule by mouth daily 10/6/21   Dulce Maria Villegas DO   gabapentin (NEURONTIN) 100 MG capsule Take 1 capsule by mouth 3 times daily for 30 days. Patient taking differently: Take 100 mg by mouth 2 times daily.   8/10/20 8/9/21  Mateo Barnes MD   sennosides-docusate sodium (SENOKOT-S) 8.6-50 MG tablet Take 1 tablet by mouth 2 times daily 7/29/20   Dulce Maria Buccino, DO   atorvastatin (LIPITOR) 40 MG tablet Take 1 tablet by mouth nightly 7/8/20   Dulce Maria Buccino, DO   hydroxyurea (HYDREA) 500 MG chemo capsule Take 500 mg by mouth every morning     Historical Provider, MD       Allergies  No Known Allergies    Review of Systems: Relevant for recent decreased appetite, weight loss cough and worsening dyspnea        Objective  /60   Pulse 88   Temp 97.1 °F (36.2 °C) (Infrared)   Resp 22   Wt 109 lb 12.8 oz (49.8 kg)   SpO2 91%   BMI 16.21 kg/m²     Physical Exam:     General: Alert on high flow oxygen  Head and neck : PERRLA, EOMI . Sclera non icteric. Oropharynx : Clear  Neck: no JVD,  no adenopathy, no carotid bruit  Heart: Regular rate and regular rhythm, no murmur  Lungs: Clear to auscultation   Extremities: Status post bilateral BKA  Abdomen: Soft, non-tender;no masses, no organomegaly  Neurologic:Cranial nerves grossly intact. No focal motor or sensory deficits .     Recent Laboratory Data-   Lab Results   Component Value Date    WBC 72.7 (H) 12/15/2021    HGB 9.3 (L) 12/15/2021    HCT 28.3 (L) 12/15/2021    .3 (H) 12/15/2021    PLT 82 (L) 12/15/2021    LYMPHOPCT 0.0 (LL) 12/15/2021    RBC 2.24 (L) 12/15/2021    MCH 41.5 (H) 12/15/2021    MCHC 32.9 12/15/2021    RDW 14.7 12/15/2021    NEUTOPHILPCT 83.0 (H) 12/15/2021    MONOPCT 14.0 (H) 12/15/2021    BASOPCT 0.0 12/15/2021    NEUTROABS 60.34 (H) 12/15/2021    LYMPHSABS 2.18 12/15/2021    MONOSABS 10.18 (H) 12/15/2021    EOSABS 0.00 (L) 12/15/2021    BASOSABS 0.00 12/15/2021       Lab Results   Component Value Date     (H) 12/17/2021    K 3.7 12/17/2021     (H) 12/17/2021    CO2 22 12/17/2021    BUN 52 (H) 12/17/2021    CREATININE 1.6 (H) 12/17/2021    GLUCOSE 151 (H) 12/17/2021    CALCIUM 9.4 12/17/2021    PROT 6.2 (L) 12/10/2021    LABALBU 3.6 12/10/2021    BILITOT 1.1 12/10/2021    ALKPHOS 89 12/10/2021    AST 26 12/10/2021    ALT 15 12/10/2021    LABGLOM 41 12/17/2021    GFRAA 49 12/17/2021       Lab Results   Component Value Date    FERRITIN 323 12/10/2021           Radiology-    XR CHEST PORTABLE    Result Date: 12/15/2021  EXAMINATION: ONE XRAY VIEW OF THE CHEST 12/15/2021 7:30 am COMPARISON: July 24, 2020-December 14, 2021 reviewed the CT chest December 11, 2021. HISTORY: ORDERING SYSTEM PROVIDED HISTORY: resp failure, effusions TECHNOLOGIST PROVIDED HISTORY: Reason for exam:->resp failure, effusions FINDINGS: There is diminish the right-sided pleural effusion and left-sided pleural effusions since the previous study of December 10 and the CT chest December 11. There is still ill-defined parenchymal opacity in the right para and infrahilar regions and the in the left infrahilar area. Heart has upper normal size. No pneumothorax on the right on the left     Persistent bilateral parenchymal opacities with diminish the pleural effusion mainly on the right-side. XR CHEST PORTABLE    Result Date: 12/15/2021  EXAMINATION: ONE XRAY VIEW OF THE CHEST 12/14/2021 9:46 pm COMPARISON: 12/12/2021 portable chest.  Also PE protocol chest CT from 12/11/2021. HISTORY: ORDERING SYSTEM PROVIDED HISTORY: sob TECHNOLOGIST PROVIDED HISTORY: Reason for exam:->sob FINDINGS: Overlying EKG leads. Cardiopericardial silhouette size remains within normal limits. Aortic arch and tracheobronchial tree calcification again noted. No pneumothorax. Small bilateral pleural effusions. Patchy bilateral airspace disease with relatively low density infiltrates seen within the bilateral mid to lower lung zones. Likely discoid subsegment subsegmental atelectasis within the lateral epiphrenic left base, with small consolidative infiltrate also considered. There is some indistinctness of central pulmonary vessels. No acute osseous abnormality is identified. Contrast is seen within visualized left upper abdominal quadrant bowel. Intervally worse chest with bilateral airspace disease, as described. Though there may be a component of pulmonary edema, findings are concerning for multifocal pneumonia. Small bilateral pleural effusions. Underlying pulmonary hyperinflation. XR CHEST PORTABLE    Result Date: 12/12/2021  EXAMINATION: ONE XRAY VIEW OF THE CHEST 12/12/2021 8:26 am COMPARISON: 12/10/2021 HISTORY: ORDERING SYSTEM PROVIDED HISTORY: resp failure, effusions TECHNOLOGIST PROVIDED HISTORY: Reason for exam:->resp failure, effusions FINDINGS: The lungs are without acute focal process. Stable small left pleural effusion. No pneumothorax. The cardiomediastinal silhouette is without acute process. The osseous structures are without acute process. Stable appearance of the chest compared to 12/10/2021     XR CHEST PORTABLE    Result Date: 12/10/2021  EXAMINATION: ONE XRAY VIEW OF THE CHEST 12/10/2021 9:42 pm COMPARISON: 07/24/2020 portable chest. HISTORY: ORDERING SYSTEM PROVIDED HISTORY: shortness of breath, known covid TECHNOLOGIST PROVIDED HISTORY: Reason for exam:->shortness of breath, known covid FINDINGS: Stable normal cardiopericardial silhouette size. Stable arch calcification. No pneumothorax. Bibasilar opacities compatible with subsegmental atelectasis and or low-density infiltrate. Possible subtle infiltrate within the lateral right mid lung zone as well. Overlying oxygen tubing. No acute osseous abnormality. Trace pleural effusions not excluded. Patchy opacities within the lung bases and possibly lateral right mid lung zone, suspicious for multifocal pneumonia. Element of mild bibasilar subsegmental atelectasis and/or trace pleural effusions not excluded. CTA PULMONARY W CONTRAST    Result Date: 12/11/2021  EXAMINATION: CTA OF THE CHEST 12/11/2021 12:54 am TECHNIQUE: CTA of the chest was performed after the administration of intravenous contrast.  Multiplanar reformatted images are provided for review.   MIP images are provided for review. Dose modulation, iterative reconstruction, and/or weight based adjustment of the mA/kV was utilized to reduce the radiation dose to as low as reasonably achievable. COMPARISON: No prior CT. Correlation is made with portable chest radiographs from a few hours ago, as well as 07/24/2020. HISTORY: ORDERING SYSTEM PROVIDED HISTORY: eval for PE TECHNOLOGIST PROVIDED HISTORY: Reason for exam:->eval for PE Decision Support Exception - unselect if not a suspected or confirmed emergency medical condition->Emergency Medical Condition ([place]) FINDINGS: Pulmonary Arteries: Pulmonary arteries are adequately opacified for evaluation. No evidence of intraluminal filling defect to suggest pulmonary embolism. Main pulmonary artery is normal in caliber. Mediastinum: No evidence of mediastinal lymphadenopathy. The heart and pericardium demonstrate no acute abnormality. There is no acute abnormality of the thoracic aorta. Left-sided coronary arterial calcifications. Moderate calcific atherosclerosis of the arch and descending aorta. Partial fluid opacification of the dependent bilateral mainstem bronchi, extending into the lower lobe bronchi on the left. Lungs/pleura: No pneumothorax. Small to moderate right and small left pleural effusions. Small calcified granuloma is seen within each upper lobe. There is an element of subsegmental dependent atelectasis bilaterally, with additional calcified granulomas seen within the atelectatic dependent basal left lower lobe. Consolidation within portions of the basal left lower lobe may be somewhat out of proportion to perceived volume loss, raising the question of superimposed pneumonia. Scattered patchy areas of increased attenuation within the upper lobes may also have components of both subsegmental atelectasis and infiltrate. No significant thickening of secondary interlobular septa. Tracheobronchial tree calcification noted.  Upper Abdomen: Visualized upper abdominal viscera demonstrate no acute abnormality. Soft Tissues/Bones: No acute bone or soft tissue abnormality. No evidence of pulmonary embolism. Small to moderate right pleural effusion and small left pleural effusion. In addition to associated element of dependent/compressive atelectasis within the bilateral lower lobes, consolidation may be somewhat out of proportion to perceived volume loss in areas of the lower left base, concerning for superimposed pneumonia, with dependent secretions seen within bilateral mainstem bronchi extending into the left lower lobe bronchi. Although some areas may simply represent simple atelectasis, there also several scattered low density infiltrates present within the bilateral upper lobes which could represent multifocal bronchopneumonia. A few tiny peripheral opacities within predominantly the lateral right upper lobe have somewhat nodular morphology, however these are nonspecific, particularly in current context, and require no surveillance, in the absence of risk factors for bronchogenic carcinoma, such as smoking. Fluoroscopy modified barium swallow with video    Result Date: 12/14/2021  EXAMINATION: MODIFIED BARIUM SWALLOW WAS PERFORMED IN CONJUNCTION WITH SPEECH PATHOLOGY SERVICES TECHNIQUE: Fluoroscopic evaluation of the swallowing mechanism was performed using cineradiography with multiple consistency of barium product in conjunction with speech pathology services. FLUOROSCOPY DOSE AND TYPE OR TIME AND EXPOSURES: Fluoro time: 2 minutes Images: 14 TD: 3.9 3 M Gy D AP: 0.601 Gy cm square COMPARISON: None HISTORY: ORDERING SYSTEM PROVIDED HISTORY: aspiration pneumonia TECHNOLOGIST PROVIDED HISTORY: Reason for exam:->aspiration pneumonia FINDINGS: Presence of penetration and aspiration to thin consistencies of barium contrast. No cough mechanism was triggered by the aspiration.  There is no aspiration penetration to nectar, pudding and cookie consistencies of barium contrast.     Penetration and aspiration to thin consistencies of barium contrast. Please see separate speech pathology report for full discussion of findings and recommendations. RECOMMENDATIONS: Unavailable         ASSESSMENT/PLAN : 80year-old        Myeloproliferative/myelodysplastic overlap syndrome  Patient with positive JAK2 mutation and prior thrombocytosis as well as history of chronic leukocytosis and monocytosis with CMML/MDS  He has been doing well for several years on Hydrea      His thrombocytopenia is likely related to myelosuppression by Covid pneumonia  His leukocytosis is chronic as well as his monocytosis but worsened by recent Covid infection  To continue Covid treatment as per ID  Resume Hydrea 500 mg twice daily  Trend daily CBC  We will follow    12/17/21  - CMML/MDS  - CBC ordered daily. Continue to trend CBC. -Thrombocytopenia is likely related to myelosuppression by Covid. Leukocytosis and monocytosis are chronic but worsened by recent Covid infection  - ID following for COVID infection on barcitinib and decadron. Also with aspiration pneumonia left lung secondary to MSSA on Cefazolin   - Continue Hydrea 500 mg twice daily  Worsening leukocytosis partly contributed to by steroids and dexamethasone  To check peripheral blood flow cytometry for AML  - We will continue to follow      Rachel Pearce M.D., F.A.C.P.   Electronically signed 12/17/2021 at 11:24 AM

## 2021-12-17 NOTE — PROGRESS NOTES
Department of Internal Medicine  Nephrology Attending Progress Note        SUBJECTIVE: Mr Theotis Collet continues to remain on 15% high flow, not much improvement post Lasix therapy yesterday.,  Patient frequently not keeping oxygen on, sodium worsening creatinine elevated today deflecting attempts at diuresis may have some sludging secondary to leukocytosis resulting in  decrease in GFR. Bed scale still not working. PMH  Bilateral below the knee amputation  History of renal lithiasis uric acid stone  Hypertension  Hyperlipidemia  History of tobacco use, still smoking some cigars  History of chronic A. fib on anticoagulation  history of JAK2 mutation with essential thrombocytosis  Vit D deficiency  Physical Exam:    Vitals:    12/17/21 1652   BP:    Pulse:    Resp:    Temp:    SpO2: 93%       I/O last 24 hours:  Intake/Output 360/925    Weight: not able to be obtained  General Appearance:  awake, alert, confused in mild respiratory distress on 15   Skin: Multiple ecchymoses  Neck:  neck- supple, no mass, non-tender and no bruits  Lungs: Decreased breath sounds in both base  Heart: Irregular rhythm     Abdominal: Abdomen soft, non-tender.  BS normal. No masses,  No organomegaly  Extremities: Extremities warm to touch, pink, with no edema.  Bilateral BKA  Peripheral Pulses: Decreased    DATA:    CBC with Differential:    Lab Results   Component Value Date    .2 12/17/2021    RBC 2.55 12/17/2021    HGB 10.3 12/17/2021    HCT 32.8 12/17/2021     12/17/2021    .6 12/17/2021    MCH 40.4 12/17/2021    MCHC 31.4 12/17/2021    RDW 14.7 12/17/2021    NRBC 0.0 12/12/2021    BANDSPCT 2 10/12/2016    BLASTSPCT 0.9 08/18/2020    METASPCT 0.9 07/29/2020    LYMPHOPCT 0.0 12/15/2021    MONOPCT 14.0 12/15/2021    MYELOPCT 0.9 01/14/2020    BASOPCT 0.0 12/15/2021    MONOSABS 10.18 12/15/2021    LYMPHSABS 2.18 12/15/2021    EOSABS 0.00 12/15/2021    BASOSABS 0.00 12/15/2021     BMP:    Lab Results   Component Value Date     12/17/2021    K 3.7 12/17/2021    K 3.9 08/06/2020     12/17/2021    CO2 22 12/17/2021    BUN 52 12/17/2021    LABALBU 3.6 12/10/2021    LABALBU 4.1 01/24/2012    CREATININE 1.6 12/17/2021    CALCIUM 9.4 12/17/2021    GFRAA 49 12/17/2021    LABGLOM 41 12/17/2021    GLUCOSE 151 12/17/2021    GLUCOSE 94 01/24/2012     Uric Acid:    Lab Results   Component Value Date    LABURIC 10.1 12/16/2021          warfarin  2.5 mg Oral Daily    [START ON 12/18/2021] dexamethasone  10 mg IntraVENous Q12H    lidocaine PF  5 mL IntraDERmal Once    sodium chloride flush  5-40 mL IntraVENous 2 times per day    heparin flush  3 mL IntraVENous 2 times per day    ceFAZolin  2,000 mg IntraVENous Q8H    hydroxyurea  500 mg Oral BID    allopurinol  100 mg Oral Daily    atorvastatin  40 mg Oral Nightly    isosorbide mononitrate  30 mg Oral Daily    metoprolol succinate  50 mg Oral Daily    mirtazapine  7.5 mg Oral Nightly    zinc sulfate  50 mg Oral Daily    Vitamin D  2,000 Units Oral Daily    guaiFENesin  400 mg Oral TID    ascorbic acid  1,000 mg Oral Daily    budesonide-formoterol  2 puff Inhalation BID    baricitinib  2 mg Oral Daily      sodium chloride      sodium chloride       sodium chloride flush, sodium chloride, heparin flush, sodium chloride, EPINEPHrine, acetaminophen, guaiFENesin-dextromethorphan, benzonatate    IMPRESSION/RECOMMENDATIONS:      CKD 4 suspect serum creatinine underreflecting actual GFR as he has very little muscle mass  Aspiration pneumonia with some superimposed volume overload, bed scale not  working have spoken to staff , will hold on as creatinine slightly worse IV Lasix today   History of uric acid stones in view of a history of essential thrombocytosis and need for hydroxyurea with elevated  uric acid will start  allopurinol   History of BPH  bladder scan showing 137 PVR will repeat in a.m.,  Hypernatremia reflecting poor p.o. intake was component of superimposed diuresis should improve if  patient's p.o. intake improves      William Sullivan MD  12/17/2021 5:20 PM

## 2021-12-17 NOTE — PROGRESS NOTES
Admit Date: 12/10/2021    Subjective: All event reviewed on 15 L O2     Objective:     Patient Vitals for the past 8 hrs:   BP Temp Temp src Pulse Resp SpO2   12/17/21 0445 118/60 98.3 °F (36.8 °C) Oral 88 20 95 %   12/17/21 0210      93 %   12/17/21 0200      (!) 75 %   12/17/21 0012     20 96 %     I/O last 3 completed shifts: In: 360 [P.O.:360]  Out: 550 [Urine:550]  I/O this shift:  In: -   Out: 375 [Urine:375]    HEENT: Normal  NECK: Thyroid normal. No carotid bruit. No lymphphadenopathy. CVS: RRR  RS: Clear. No wheeze. No rhonchi. ABD: Soft. Non tender. No mass. Normal BS. EXT: No edema. Non tender.  BKA   NEURO: no focal deficit       Scheduled Meds:   lidocaine PF  5 mL IntraDERmal Once    sodium chloride flush  5-40 mL IntraVENous 2 times per day    heparin flush  3 mL IntraVENous 2 times per day    ceFAZolin  2,000 mg IntraVENous Q8H    hydroxyurea  500 mg Oral BID    allopurinol  100 mg Oral Daily    dexamethasone  6 mg IntraVENous Q12H    atorvastatin  40 mg Oral Nightly    isosorbide mononitrate  30 mg Oral Daily    metoprolol succinate  50 mg Oral Daily    mirtazapine  7.5 mg Oral Nightly    zinc sulfate  50 mg Oral Daily    Vitamin D  2,000 Units Oral Daily    guaiFENesin  400 mg Oral TID    ascorbic acid  1,000 mg Oral Daily    budesonide-formoterol  2 puff Inhalation BID    baricitinib  2 mg Oral Daily     Continuous Infusions:   sodium chloride      sodium chloride      sodium chloride         CBC with Differential:    Lab Results   Component Value Date    WBC 72.7 12/15/2021    RBC 2.24 12/15/2021    HGB 9.3 12/15/2021    HCT 28.3 12/15/2021    PLT 82 12/15/2021    .3 12/15/2021    MCH 41.5 12/15/2021    MCHC 32.9 12/15/2021    RDW 14.7 12/15/2021    NRBC 0.0 12/12/2021    BANDSPCT 2 10/12/2016    BLASTSPCT 0.9 08/18/2020    METASPCT 0.9 07/29/2020    LYMPHOPCT 0.0 12/15/2021    MONOPCT 14.0 12/15/2021    MYELOPCT 0.9 01/14/2020    BASOPCT 0.0

## 2021-12-17 NOTE — PROGRESS NOTES
Pulmonary Progress Note  12/17/2021 2:03 PM  Subjective:   Admit Date: 12/10/2021  PCP: Ronnie Villegas DO  Interval History:   Tested + for COVID 12/8 with desaturation at home to 87% with pox 92% on 2L. CT pulmonary angiogram was performed showing bilateral pleural effusions, basilar consolidation, and few peripheral opacities. 12/15: pt up to use BR and pox down to 70%. Placed on 10l HFNC; Decreased O2 to 6L with pox 95%. Feels dyspneic, coughing a lot    12/16: Now on 14L HFNC, pox 89-91% at bedside. Reports mild cough and dyspnea. Asking about his Wes order. 12/17 requires 15 L high flow nasal cannula +15 L nonrebreather mask to keep saturation above 88%. Afebrile    Diet: ADULT ORAL NUTRITION SUPPLEMENT; Breakfast, Lunch, Dinner; Fortified Pudding Oral Supplement  ADULT DIET; Dysphagia - Pureed; Extremely Thick (Pudding)      Data:   Scheduled Meds:    warfarin  2.5 mg Oral Daily    [START ON 12/18/2021] dexamethasone  10 mg IntraVENous Q12H    lidocaine PF  5 mL IntraDERmal Once    sodium chloride flush  5-40 mL IntraVENous 2 times per day    heparin flush  3 mL IntraVENous 2 times per day    ceFAZolin  2,000 mg IntraVENous Q8H    hydroxyurea  500 mg Oral BID    allopurinol  100 mg Oral Daily    atorvastatin  40 mg Oral Nightly    isosorbide mononitrate  30 mg Oral Daily    metoprolol succinate  50 mg Oral Daily    mirtazapine  7.5 mg Oral Nightly    zinc sulfate  50 mg Oral Daily    Vitamin D  2,000 Units Oral Daily    guaiFENesin  400 mg Oral TID    ascorbic acid  1,000 mg Oral Daily    budesonide-formoterol  2 puff Inhalation BID    baricitinib  2 mg Oral Daily       Continuous Infusions:    sodium chloride      sodium chloride         PRN Meds: sodium chloride flush, sodium chloride, heparin flush, sodium chloride, EPINEPHrine, acetaminophen, guaiFENesin-dextromethorphan, benzonatate    I/O last 3 completed shifts:   In: 360 [P.O.:360]  Out: 925 [Urine:925]    No Rare Gram positive rods   Rare Gram positive cocci in clusters       No results for input(s): ORG in the last 72 hours. No results for input(s): Archibald Dietz in the last 72 hours. No results for input(s): STREPPNEUMAG in the last 72 hours. No results for input(s): LEGUR in the last 72 hours. No results for input(s): ORG in the last 72 hours. No results for input(s): ASO in the last 72 hours. No results for input(s): CULTRESP in the last 72 hours. No results for input(s): LABURIN in the last 72 hours. Vent Information  SpO2: 91 %    Additional Respiratory  Assessments  Pulse: 88  Resp: 22  SpO2: 91 %    Objective:   Vitals:   Vitals:    21 0830   BP: 106/60   Pulse: 88   Resp: 22   Temp: 97.1 °F (36.2 °C)   SpO2: 91%      TEMP:Current: Temp: 97.1 °F (36.2 °C)  Max: Temp  Av.8 °F (36.6 °C)  Min: 97.1 °F (36.2 °C)  Max: 98.3 °F (36.8 °C)    BP Range: Systolic (22LQY), GSZ:011 , Min:106 , SMC:573     Diastolic (86GYS), ZOB:02, Min:56, Max:70      General appearance: alert, appears stated age and cooperative. In no acute distress  Skin: No rashes or lesions  HEENT: mucous membranes are moist  Neck: No JVD  Lungs: symmetrical expansion, diminished with crackles, no wheezing, no use of accessory muscles. Heart: S1S2,  +murmurs,  Abdomen: soft, non tender,   Extremities: no peripheral edema; R arm with significant ecchymosis from fingers up to bicep area.  BKA with prosthesis   Neurologic: Alert, oriented times 3,  Affect: pleasant      Assessment:   Patient Active Problem List:     Atrial fibrillation ([address]     Hyperlipemia     Anticoagulant long-term use     Claudication of left lower extremity (HCC)     Pneumonia of both lower lobes due to infectious organism     Paroxysmal atrial fibrillation (HCC)     Warfarin anticoagulation     Warfarin overdosage     Critical lower limb ischemia (HCC)     Hyperlipidemia     Hypertension     Lymphocytosis     Skin ulcer of left knee with fat layer exposed ([address] Syncope and collapse     Wound infection     Blurry vision, bilateral     Anticoagulated on Coumadin     Aortic stenosis, moderate     Sinus node dysfunction ([facility])     Sepsis due to pneumonia ([facility])     Chest pain     Precordial pain     Acute rhinitis     Closed nondisplaced intertrochanteric fracture of right femur ([facility])     Aortic valve disease     Closed right hip fracture, initial encounter ([facility])     S/P right hip fracture     Diarrhea     Weight loss     Lacerations of multiple sites of right arm     Acute respiratory disease due to COVID-19 virus    Plan:   · CXR noted as slightly worse. To be repeated  · We will try OptiFlow with the hope to wean FiO2 keeping the saturation above 88% and allow the patient to eat  · MBS with penetration and aspiration - speech rec with modified diet Soft and bite size consistency solids with  nectar consistency liquids - NO STRAWS  · Pro calcitonin 0.34 - sputum growing staph. Started on Ancef by ID  · increase Dexa to 10 mg BID, CODEX  · Baricitinib 2mg gfr 47, absol lymph 0.91 - 12/12   · Follow inflammatory markers - improving CRP 2.4 increased to 8.0, will repeat LD (307-->702), D dimer (625-->549). Not a candidate for Tocilizumab due to thrombocytopenia  · Effusions small - no indication for thora at this time. Diuresis - rec renal consult for diuresis. BNP 12,557 -received diuretics twice by renal unfortunately no much improvement  · Continue vitamins  · Albuterol prn, symbicort. Antitussives.     DNR CCA

## 2021-12-17 NOTE — CARE COORDINATION
COVID + 12/8. Requiring O2 15l high flow NC along w/ 15 liters non rebreather- [employer] DME following. On iv  abx-needs PICC insertion-await final ID plan. [facility] followng for OPT speech therapy- C order on chart- notify when pt is discharging. Backdoor referrals to [facility]( requires 20 day wait for start of symptoms) and [facility]- O2 demand still too great to accept- both will follow. From home w/ wife PTA.   Anamika Velásquez, RN case manager

## 2021-12-17 NOTE — PROGRESS NOTES
[address] [place] [address] [facility]  Progress Note    SUBJECTIVE:  Chief Complaint   Patient presents with    Shortness of Breath     covid + on tuesday, increased sob last several days, from home     The patient feels about the same. He admits to dyspnea. He still has some of the productive sputum. Tolerating antibiotics. Review of systems:  As stated above in the chief complaint, otherwise negative. Medications:  Scheduled Meds:   warfarin  2.5 mg Oral Daily    lidocaine PF  5 mL IntraDERmal Once    sodium chloride flush  5-40 mL IntraVENous 2 times per day    heparin flush  3 mL IntraVENous 2 times per day    ceFAZolin  2,000 mg IntraVENous Q8H    hydroxyurea  500 mg Oral BID    allopurinol  100 mg Oral Daily    dexamethasone  6 mg IntraVENous Q12H    atorvastatin  40 mg Oral Nightly    isosorbide mononitrate  30 mg Oral Daily    metoprolol succinate  50 mg Oral Daily    mirtazapine  7.5 mg Oral Nightly    zinc sulfate  50 mg Oral Daily    Vitamin D  2,000 Units Oral Daily    guaiFENesin  400 mg Oral TID    ascorbic acid  1,000 mg Oral Daily    budesonide-formoterol  2 puff Inhalation BID    baricitinib  2 mg Oral Daily     Continuous Infusions:   sodium chloride      sodium chloride      sodium chloride       PRN Meds:sodium chloride flush, sodium chloride, heparin flush, sodium chloride, sodium chloride, EPINEPHrine, acetaminophen, guaiFENesin-dextromethorphan, benzonatate, sodium chloride flush    OBJECTIVE:  /60   Pulse 88   Temp 97.1 °F (36.2 °C) (Infrared)   Resp 22   Wt 109 lb 12.8 oz (49.8 kg)   SpO2 91%   BMI 16.21 kg/m²   Temp  Av.8 °F (36.6 °C)  Min: 97.1 °F (36.2 °C)  Max: 98.3 °F (36.8 °C)  Constitutional: The patient is awake, alert, and oriented. Sitting up in bed. 100% nonrebreather. Skin: Warm and dry. No rashes were noted. HEENT: Round and reactive pupils. Moist mucous membranes. No ulcerations or thrush.   Neck: Supple to movements. Chest: Using accessory muscles. Rhonchi left base posteriorly  Cardiovascular: S1 and S2 are rhythmic and regular. No murmurs appreciated. Abdomen: Positive bowel sounds to auscultation. Benign to palpation. No masses felt. Extremities: Bilateral AKA stumps. Lines: peripheral    Laboratory and Tests Review:  Lab Results   Component Value Date    WBC 72.7 (H) 12/15/2021    WBC 30.2 (H) 12/12/2021    WBC 19.0 (H) 12/10/2021    HGB 9.3 (L) 12/15/2021    HCT 28.3 (L) 12/15/2021    .3 (H) 12/15/2021    PLT 82 (L) 12/15/2021     Lab Results   Component Value Date    NEUTROABS 60.34 (H) 12/15/2021    NEUTROABS 28.09 (H) 12/12/2021    NEUTROABS 14.82 (H) 12/10/2021     No results found for: CRP  Lab Results   Component Value Date    ALT 15 12/10/2021    AST 26 12/10/2021    ALKPHOS 89 12/10/2021    BILITOT 1.1 12/10/2021     Lab Results   Component Value Date     12/17/2021    K 3.7 12/17/2021    K 3.9 08/06/2020     12/17/2021    CO2 22 12/17/2021    BUN 52 12/17/2021    CREATININE 1.6 12/17/2021    CREATININE 1.3 12/16/2021    CREATININE 1.3 12/15/2021    GFRAA 49 12/17/2021    LABGLOM 41 12/17/2021    GLUCOSE 151 12/17/2021    GLUCOSE 94 01/24/2012    PROT 6.2 12/10/2021    LABALBU 3.6 12/10/2021    LABALBU 4.1 01/24/2012    CALCIUM 9.4 12/17/2021    BILITOT 1.1 12/10/2021    ALKPHOS 89 12/10/2021    AST 26 12/10/2021    ALT 15 12/10/2021     Lab Results   Component Value Date    CRP 8.1 (H) 12/16/2021    CRP 6.8 (H) 12/15/2021    CRP 2.4 (H) 12/14/2021     Lab Results   Component Value Date    SEDRATE 33 (H) 12/17/2021    SEDRATE 14 12/10/2021    SEDRATE 56 (H) 01/17/2016     Radiology:      Microbiology:   Streptococcus pneumoniae/Legionella urine Ag: negative  Nares screen MRSA: Negative  Respiratory culture 12/12/2021:  Moderate MSSA, rare Stenotrophomonas maltophilia    Recent Labs     12/15/21  1130 12/16/21  1104   PROCAL 0.34* 0.32*       ASSESSMENT:  · SARS-CoV-2 infection with moderate viral pneumonia in a fully vaccinated patient  · Aspiration pneumonia left lung secondary to MSSA. 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  Hematology input appreciated    PLAN:  · Continue Cefazolin alone  · Check final cultures  · We will follow with you  · Awaiting for PICC    Spoke with nursing    Jacqui Pradhan MD  10:26 AM  12/17/2021

## 2021-12-17 NOTE — PROCEDURES
PICC   Catheter insertion date: 12/17/2021     Product Number:  [id number]   Lot No: [id number]   Gauge: 4.5 fr   Lumen: single   Rt Brachial    Vein Diameter : 0.4 cm   Mid upper arm circumference: 25 cm   Catheter Length : 34 cm(21 cm cut from a 55 cm line)   Internal Length: 34 cm   External Catheter Length: 0   Ultrasound Used:yes  [facility] [place] confirms PICC tip is placed in the lower 1/3 of the SVC or at the Cavoatrial junction. Floor nurse notified PICC is okay to use.    : Delia Owen RN

## 2021-12-18 NOTE — SIGNIFICANT EVENT
19:38 RR Called      SUBJECTIVE:    Pt immediately seen and examined  Has COVID-19 with acute hypovolemic respiratory failure  Family confirms DNR-CCA but would want intubation      OBJECTIVE:    Saturating 93% on Optiflow with NRB  Tachypneic though to 40s    BP (!) 105/55   Pulse 116   Temp 97.4 °F (36.3 °C) (Axillary)   Resp 22   Wt 109 lb 12.8 oz (49.8 kg)   SpO2 91%   BMI 16.21 kg/m²     Physical Exam  Vitals reviewed. Exam conducted with a chaperone present. Constitutional:       General: He is in acute distress. Appearance: He is ill-appearing. He is not toxic-appearing. HENT:      Head: Normocephalic and atraumatic. Nose: No congestion or rhinorrhea. Eyes:      General:         Right eye: No discharge. Left eye: No discharge. Neck:      Comments: Supple, trachea appears midline  Cardiovascular:      Rate and Rhythm: Tachycardia present. Rhythm irregular. Pulmonary:      Effort: Tachypnea and accessory muscle usage present. No respiratory distress. Breath sounds: No stridor or decreased air movement. No decreased breath sounds, wheezing, rhonchi or rales. Abdominal:      General: Bowel sounds are normal.      Tenderness: There is no abdominal tenderness. There is no guarding or rebound. Musculoskeletal:      Comments: Bilateral below knee amputations, no pretibial edema on stump-shins   Skin:     General: Skin is warm. Comments: nondiaphoetic   Neurological:      Mental Status: He is alert. Motor: No tremor or seizure activity. Psychiatric:         Mood and Affect: Mood is anxious. Behavior: Behavior is agitated. Behavior is cooperative. Judgment: Judgment is impulsive. ASSESSMENTS & PLANS:    Acute Hypoxemic Respiratory Failure:   Anxiety:  Tachypnea: likely from anxiety  Ativan 0.5mg IV x once, will consider a standing PRN order after i see how he responds  elevate HoB  Elevate legs to prevent sliding down  BiPAP for now, may be able to go back to a High Flow with NRB when relaxed  If BiPAP failed would then consider ICU and intubation - BiPAP is doing well for him at this time  pulmonary already on case     ABG reviewed - doubt COPD  Hx Tobacco Abuse:     Other plans as per primary team      40 minutes bedside Critical Care time

## 2021-12-18 NOTE — PROGRESS NOTES
NEPHROLOGY Attending   Progress Note  12/18/2021 11:06 AM  Subjective:   Admit Date: 12/10/2021  PCP: Holly Villegas DO    Interval History:     12/18/21: Condition has continued to deteriorate overnight  - now transferred to the ICU because of hypoxemia - now on vasopressors.     Diet: Diet NPO    Data:   Scheduled Meds:   sodium chloride  500 mL IntraVENous Once    baricitinib  1 mg Oral Daily    hydrocortisone sodium succinate PF  100 mg IntraVENous Q8H    warfarin  2.5 mg Oral Daily    lidocaine PF  5 mL IntraDERmal Once    sodium chloride flush  5-40 mL IntraVENous 2 times per day    heparin flush  3 mL IntraVENous 2 times per day    ceFAZolin  2,000 mg IntraVENous Q8H    hydroxyurea  500 mg Oral BID    allopurinol  100 mg Oral Daily    atorvastatin  40 mg Oral Nightly    isosorbide mononitrate  30 mg Oral Daily    [Held by provider] metoprolol succinate  50 mg Oral Daily    mirtazapine  7.5 mg Oral Nightly    zinc sulfate  50 mg Oral Daily    Vitamin D  2,000 Units Oral Daily    guaiFENesin  400 mg Oral TID    ascorbic acid  1,000 mg Oral Daily    budesonide-formoterol  2 puff Inhalation BID     Continuous Infusions:   sodium chloride 100 mL/hr at 12/18/21 0849    norepinephrine 5 mcg/min (12/18/21 0852)    sodium chloride      sodium chloride       PRN Meds:sodium chloride flush, sodium chloride, heparin flush, sodium chloride, EPINEPHrine, acetaminophen, guaiFENesin-dextromethorphan, benzonatate    Intake/Output Summary (Last 24 hours) at 12/18/2021 1106  Last data filed at 12/18/2021 0845  Gross per 24 hour   Intake 1610 ml   Output    Net 1610 ml     CBC:   Recent Labs     12/15/21  1130 12/17/21  1435 12/18/21  0158   WBC 72.7* 123.2* 95.9*   HGB 9.3* 10.3* 9.3*   PLT 82* 111* 98*     BMP:    Recent Labs     12/16/21  1104 12/17/21  0329 12/18/21  0158    149* 154*   K 3.8 3.7 4.7   * 110* 114*   CO2 24 22 19*   BUN 45* 52* 77*   CREATININE 1.3* 1.6* 3.1*   GLUCOSE 154* 151* 141*     Hepatic: No results for input(s): AST, ALT, ALB, BILITOT, ALKPHOS in the last 72 hours. Troponin: No results for input(s): TROPONINI in the last 72 hours. BNP: No results for input(s): BNP in the last 72 hours. Lipids: No results for input(s): CHOL, HDL in the last 72 hours. Invalid input(s): LDLCALCU  ABGs: No results found for: PHART, PO2ART, ZOL2COB  INR:   Recent Labs     12/16/21  1104 12/17/21  0329 12/18/21  0158   INR 1.6 1.5 1.7     -----------------------------------------------------------------  RAD: XR CHEST PORTABLE    Result Date: 12/12/2021  EXAMINATION: ONE XRAY VIEW OF THE CHEST 12/12/2021 8:26 am COMPARISON: 12/10/2021 HISTORY: ORDERING SYSTEM PROVIDED HISTORY: resp failure, effusions TECHNOLOGIST PROVIDED HISTORY: Reason for exam:->resp failure, effusions FINDINGS: The lungs are without acute focal process. Stable small left pleural effusion. No pneumothorax. The cardiomediastinal silhouette is without acute process. The osseous structures are without acute process. Stable appearance of the chest compared to 12/10/2021     CTA PULMONARY W CONTRAST    Result Date: 12/11/2021  EXAMINATION: CTA OF THE CHEST 12/11/2021 12:54 am TECHNIQUE: CTA of the chest was performed after the administration of intravenous contrast.  Multiplanar reformatted images are provided for review. MIP images are provided for review. Dose modulation, iterative reconstruction, and/or weight based adjustment of the mA/kV was utilized to reduce the radiation dose to as low as reasonably achievable. COMPARISON: No prior CT. Correlation is made with portable chest radiographs from a few hours ago, as well as 07/24/2020.  HISTORY: ORDERING SYSTEM PROVIDED HISTORY: eval for PE TECHNOLOGIST PROVIDED HISTORY: Reason for exam:->eval for PE Decision Support Exception - unselect if not a suspected or confirmed emergency medical condition->Emergency Medical Condition ([place]) FINDINGS: Pulmonary Arteries: Pulmonary arteries are adequately opacified for evaluation. No evidence of intraluminal filling defect to suggest pulmonary embolism. Main pulmonary artery is normal in caliber. Mediastinum: No evidence of mediastinal lymphadenopathy. The heart and pericardium demonstrate no acute abnormality. There is no acute abnormality of the thoracic aorta. Left-sided coronary arterial calcifications. Moderate calcific atherosclerosis of the arch and descending aorta. Partial fluid opacification of the dependent bilateral mainstem bronchi, extending into the lower lobe bronchi on the left. Lungs/pleura: No pneumothorax. Small to moderate right and small left pleural effusions. Small calcified granuloma is seen within each upper lobe. There is an element of subsegmental dependent atelectasis bilaterally, with additional calcified granulomas seen within the atelectatic dependent basal left lower lobe. Consolidation within portions of the basal left lower lobe may be somewhat out of proportion to perceived volume loss, raising the question of superimposed pneumonia. Scattered patchy areas of increased attenuation within the upper lobes may also have components of both subsegmental atelectasis and infiltrate. No significant thickening of secondary interlobular septa. Tracheobronchial tree calcification noted. Upper Abdomen: Visualized upper abdominal viscera demonstrate no acute abnormality. Soft Tissues/Bones: No acute bone or soft tissue abnormality. No evidence of pulmonary embolism. Small to moderate right pleural effusion and small left pleural effusion. In addition to associated element of dependent/compressive atelectasis within the bilateral lower lobes, consolidation may be somewhat out of proportion to perceived volume loss in areas of the lower left base, concerning for superimposed pneumonia, with dependent secretions seen within bilateral mainstem bronchi extending into the left lower lobe bronchi. Although some areas may simply represent simple atelectasis, there also several scattered low density infiltrates present within the bilateral upper lobes which could represent multifocal bronchopneumonia. A few tiny peripheral opacities within predominantly the lateral right upper lobe have somewhat nodular morphology, however these are nonspecific, particularly in current context, and require no surveillance, in the absence of risk factors for bronchogenic carcinoma, such as smoking.          Objective:   Vitals:   Vitals:    12/18/21 1030   BP: (!) 94/48   Pulse: 117   Resp: 28   Temp:    SpO2: 100%     Patient Vitals for the past 24 hrs:   BP Temp Temp src Pulse Resp SpO2   12/18/21 1030 (!) 94/48   117 28 100 %   12/18/21 1000 (!) 100/46 99.7 °F (37.6 °C)  117 (!) 32 100 %   12/18/21 0950     (!) 34 100 %   12/18/21 0930 (!) 96/45   115 (!) 34 100 %   12/18/21 0900 (!) 75/37   105 (!) 33 99 %   12/18/21 0845 (!) 73/39 99.7 °F (37.6 °C) Axillary 107 (!) 35 99 %   12/18/21 0843     (!) 31    12/18/21 0800 (!) 75/45 99.8 °F (37.7 °C) Axillary 106 30 98 %   12/18/21 0705 (!) 85/42   108     12/18/21 0600 (!) 68/48 99.9 °F (37.7 °C) Axillary 110 (!) 32 97 %   12/18/21 0545 (!) 69/45 99.7 °F (37.6 °C) Axillary 109 (!) 34 99 %   12/18/21 0430 (!) 82/48 99.6 °F (37.6 °C) Axillary 108 (!) 38 97 %   12/18/21 0410 (!) 77/49 99.8 °F (37.7 °C) Axillary 108 (!) 36 95 %   12/18/21 0327 (!) 64/46   113 (!) 36 95 %   12/18/21 0315 (!) 82/45   106 (!) 36 96 %   12/18/21 0311     (!) 40    12/18/21 0210 (!) 75/48 98.6 °F (37 °C) Axillary 113 (!) 40 96 %   12/18/21 0015 (!) 80/50 99.3 °F (37.4 °C) Axillary 112 (!) 41 93 %   12/17/21 2210 (!) 131/110 98 °F (36.7 °C) Axillary 117 (!) 44 94 %   12/17/21 2024     (!) 50    12/17/21 2006 (!) 120/106 97.9 °F (36.6 °C) Axillary 120 (!) 46 (!) 89 %   12/17/21 1800 (!) 105/55 97.4 °F (36.3 °C) Axillary 116 22 91 %   12/17/21 1652      93 %     General appearance: alert, appears stated age and cooperative  Skin: Skin color, texture, turgor normal. No rashes or lesions  HEENT: Head: Normocephalic, no lesions, without obvious abnormality. Neck: no adenopathy, no carotid bruit, no JVD, supple, symmetrical, trachea midline and thyroid not enlarged, symmetric, no tenderness/mass/nodules  Lungs: Diminished   Heart: regular rate and rhythm, S1, S2 normal, no murmur, click, rub or gallop  Abdomen: soft, non-tender; bowel sounds normal; no masses,  no organomegaly  Extremities: no peripheral edema; R arm with significant ecchymosis from fingers up to bicep area. BKA with prosthesis   Neurologic: Sedated    Assessment/Plan:     1) MARCIAL on CKD Stage 4  Baseline serum creatinine 0.9-1.0 as of 9/15/2020  Now serum creatinine 1.5 upon admission  Suspect serum creatinine underreflecting actual GFR as he has very little muscle mass  Urine lytes as of 12/16/2021: Sodium 50, creatinine 64, and urea 773  PLAN:   1. Follow now in the ICU setting  2. IV fluids have been initiated as well as vasopressors  3. Strict intake and output as well as daily labs    2) Hypernatremia reflecting poor p.o. intake with component of superimposed diuresis   Follow response to current IV fluids    3) SARS-CoV-2 infection with moderate viral pneumonia in a fully vaccinated patient                 LAURA Carvajal CNP     Patient was not seen in a face-to-face encounter as he is in isolation for COVID-19. Patient's condition discussed in detail with the nurse take care of the patient. Chart reviewed. Patient transferred to the intensive care unit due to progressively worsening shock. Remains oliguric. Agree with  formulation, assessment and plan as outlined above and directed by me. Addition and corrections were done as deemed appropriate. My exam and plan include:     Metabolic acidosis with increased anion gap.   This may be reflection of tissue hypoperfusion related lactic acidosis. Will check lactic acid. Supplement bicarbonate to target a bicarbonate level of 22 mmol/L. Hypotension/shock support with volume and pressors. Prognosis is poor.       Sherice Gutierrez MD  Nephrology        Electronically signed by Sherice Gutierrez MD on 12/18/2021 at 2:53 PM

## 2021-12-18 NOTE — PROGRESS NOTES
Pulse ox in high 70's with 30 liters. 15 liters nrb and 15 liters high flow. Pt removes oxygen, even though very weak and slow to respond.   Respiratory notified for optiflo

## 2021-12-18 NOTE — PROGRESS NOTES
Pt remains unresponsive, hypotensive. Transferred to ICU. Report given  Moshe Louder present and given pt cell phone and .   All belongings transferred with pt

## 2021-12-18 NOTE — CONSULTS
Palliative Care Department  [phone]  Palliative Care Initial Consult  Provider Radha Belle MD      PATIENT: Zahida Worley  : 1929  MRN: [id number]  ADMISSION DATE: 12/10/2021  9:15 PM  Referring Provider: Javier Palomino MD    Palliative Medicine was consulted on hospital day 7 for assistance with Goals of care, Code Status Discussion, Family support    HPI:     Te Andrews is a 80 y.o. y/o male with a history of prostate cancer, CKD, limb ischemia, hyperlipidemia who presented to [facility]) on 12/10/2021 with pneumonia. Patient was found to have Covid, is continued to desaturate. ASSESSMENT/PLAN:     Pertinent Hospital Diagnoses      COVID-19 pneumonia   Pneumonia    Palliative Care Encounter / Counseling Regarding Goals of Care  Please see detailed goals of care discussion as below   At this time, Zahida Worley, Does Not have capacity for medical decision-making. Capacity is time limited and situation/question specific   During encounter wife was surrogate medical decision-maker   Outcome of goals of care meeting: Had a discussion with family, wife son and daughter. Son was very adamant to provide him with Levaquin. He became very emotional, we spoke about compressions. They do not want to have compressions at this time   Code status DNR-CCA   Advanced Directives: no POA or living will in epic   Surrogate/Legal NOK: Toshia Porras, spouse, [phone]    Spiritual assessment: no spiritual distress identified  Bereavement and grief: to be determined  Referrals to: none today    Thank you for the opportunity to participate in the care of Zahida Worley. Radha Belle MD  Palliative Medicine     SUBJECTIVE:     Details of Conversation: Met with family in waiting room, introduced palliative medicine. We had a discussion in regards to how he is currently doing. Explained to them that he is declining, and he want to continue getting better.   Son was very adamant about trying Levaquin, and keeping him a full code. I asked him, what his Levaquin going to do for his deteriorated state. He stepped out of the room, spoke with the rest of the family. Spouse is next of kin, she does not want her  to have compressions to break ribs. We will change his CODE STATUS to DNR CCA, family wants to wait and see if he progresses or declines   In the next few days. Prognosis: Poor    OBJECTIVE:     BP (!) 78/41   Pulse 122   Temp 98.4 °F (36.9 °C)   Resp 25   Wt 109 lb 12.8 oz (49.8 kg)   SpO2 100%   BMI 16.21 kg/m²     Due to the current efforts to prevent transmission of COVID-19 and also the need to preserve PPE for other caregivers, a face-to-face encounter with the patient was not performed. That being said, all relevant records and diagnostic tests were reviewed, including laboratory results and imaging. Please reference any relevant documentation elsewhere. Care will be coordinated with the primary service and other specialties as appropriate. Objective data reviewed: labs, images, records, medication use, vitals and chart    Time/Communication  Greater than 50% of time spent, total 65 minutes in counseling and coordination of care at the bedside regarding goals of care. Thank you for allowing Palliative Medicine to participate in the care of Gaspar Isis. Note: This report was completed using computerize voiced recognition software. Every effort has been made to ensure accuracy; however, inadvertent computerized transcription errors may be present.

## 2021-12-18 NOTE — PROGRESS NOTES
Date: 12/17/2021    Time: 9:04 PM    Patient Placed On BIPAP/CPAP/ Non-Invasive Ventilation? Yes    If no must comment. Facial area red/color change? No           If YES are Blister/Lesion present? No   If yes must notify nursing staff  BIPAP/CPAP skin barrier?   Yes    Skin barrier type:mepilexlite       Comments:        Yesenia Bautista RCP

## 2021-12-18 NOTE — PROGRESS NOTES
Patients wife Altfa Nolasco present for RRT, Dr. Annemarie Chapman paged to notify of RRT.  Dr. Joleen Zaragoza notified patient staying on [facility].

## 2021-12-18 NOTE — PROGRESS NOTES
Pulmonary Progress Note  12/18/2021 9:58 AM  Subjective:   Admit Date: 12/10/2021  PCP: Keaton Villegas DO  Interval History:   Tested + for COVID 12/8 with desaturation at home to 87% with pox 92% on 2L. CT pulmonary angiogram was performed showing bilateral pleural effusions, basilar consolidation, and few peripheral opacities. 12/15: pt up to use BR and pox down to 70%. Placed on 10l HFNC; Decreased O2 to 6L with pox 95%. Feels dyspneic, coughing a lot    12/16: Now on 14L HFNC, pox 89-91% at bedside. Reports mild cough and dyspnea. Asking about his Wes order. 12/17 requires 15 L high flow nasal cannula +15 L nonrebreather mask to keep saturation above 88%. Afebrile    12/18 worsening Hypoxemia O?n and placed on  %. Low BP started on levophed and IVF, and transferred to MICU.  Got ativan last night    Diet: Diet NPO      Data:   Scheduled Meds:    sodium chloride  500 mL IntraVENous Once    baricitinib  1 mg Oral Daily    hydrocortisone sodium succinate PF  100 mg IntraVENous Q8H    warfarin  2.5 mg Oral Daily    lidocaine PF  5 mL IntraDERmal Once    sodium chloride flush  5-40 mL IntraVENous 2 times per day    heparin flush  3 mL IntraVENous 2 times per day    ceFAZolin  2,000 mg IntraVENous Q8H    hydroxyurea  500 mg Oral BID    allopurinol  100 mg Oral Daily    atorvastatin  40 mg Oral Nightly    isosorbide mononitrate  30 mg Oral Daily    [Held by provider] metoprolol succinate  50 mg Oral Daily    mirtazapine  7.5 mg Oral Nightly    zinc sulfate  50 mg Oral Daily    Vitamin D  2,000 Units Oral Daily    guaiFENesin  400 mg Oral TID    ascorbic acid  1,000 mg Oral Daily    budesonide-formoterol  2 puff Inhalation BID       Continuous Infusions:    sodium chloride 100 mL/hr at 12/18/21 0849    norepinephrine 5 mcg/min (12/18/21 0852)    sodium chloride      sodium chloride         PRN Meds: sodium chloride flush, sodium chloride, heparin flush, sodium chloride, EPINEPHrine, acetaminophen, guaiFENesin-dextromethorphan, benzonatate    I/O last 3 completed shifts: In: 1600 [I.V.:1600]  Out: -     I/O this shift:  In: 10 [I.V.:10]  Out: -       Intake/Output Summary (Last 24 hours) at 12/18/2021 0958  Last data filed at 12/18/2021 0845  Gross per 24 hour   Intake 1610 ml   Output    Net 1610 ml       No data found. Recent Labs     12/15/21  1130 12/17/21  1435 12/18/21  0158   WBC 72.7* 123.2* 95.9*   HGB 9.3* 10.3* 9.3*   HCT 28.3* 32.8* 31.1*   .3* 128.6* 135.8*   PLT 82* 111* 98*     Recent Labs     12/16/21  1104 12/17/21  0329 12/18/21  0158    149* 154*   K 3.8 3.7 4.7   * 110* 114*   CO2 24 22 19*   BUN 45* 52* 77*   CREATININE 1.3* 1.6* 3.1*     No results for input(s): PROT, ALB, ALKPHOS, ALT, AST, BILITOT, AMYLASE, LIPASE in the last 72 hours. Recent Labs     12/16/21  1104 12/17/21  0329 12/18/21 0158   INR 1.6 1.5 1.7     No results for input(s): CKTOTAL, CKMB, TROPONINI in the last 72 hours. No results for input(s): BNP in the last 72 hours. Troponin: No results for input(s): TROPONINI in the last 72 hours. CPK:  Lab Results   Component Value Date    CKTOTAL 35 09/11/2018      BNP: No results for input(s): BNP in the last 72 hours. ABGs: No results found for: PHART, PO2ART, VMG5ZYP  INR:   Recent Labs     12/16/21  1104 12/17/21  0329 12/18/21  0158   INR 1.6 1.5 1.7         -----------------------------------------------------------------  RAD:   CXR12/12/2021:  The lungs are without acute focal process. Stable small left pleural  effusion. No pneumothorax. The cardiomediastinal silhouette is without  acute process. The osseous structures are without acute process. Impression  Stable appearance of the chest compared to 12/10/2021    CXR 12/15/21: Persistent bilateral parenchymal opacities with diminish the pleural effusion mainly on the right-side. Micro:  No results for input(s): BC in the last 72 hours.   12/12 Resp Culture:   Group 5: >25 PMN's/LPF and <10 Epithelial cells/LPF   Moderate Polymorphonuclear leukocytes   Moderate yeast   Few Gram negative rods   Rare Gram positive rods   Rare Gram positive cocci in clusters       No results for input(s): ORG in the last 72 hours. No results for input(s): Jaime [place] in the last 72 hours. No results for input(s): STREPPNEUMAG in the last 72 hours. No results for input(s): LEGUR in the last 72 hours. No results for input(s): ORG in the last 72 hours. No results for input(s): ASO in the last 72 hours. No results for input(s): CULTRESP in the last 72 hours. No results for input(s): LABURIN in the last 72 hours. Vent Information  Skin Assessment: Clean, dry, & intact  Vt Ordered: (S) 450 mL  FiO2 : (S) 90 %  SpO2: 100 %  SpO2/FiO2 ratio: 111.11  I Time/ I Time %: 0.9 s  Humidification Source: Heated wire  Humidification Temp: 34  Humidification Temp Measured: 34  Mask Type: Full face mask  Mask Size: Medium    Additional Respiratory  Assessments  Pulse: 105  Resp: (!) 34  SpO2: 100 %  Humidification Source: Heated wire  Humidification Temp: 34  Oral Care: Lip moisturizer applied,Mouth swabbed,Mouth moisturizer,Mouth suctioned    Objective:   Vitals:   Vitals:    21 0950   BP:    Pulse:    Resp: (!) 34   Temp:    SpO2: 100%      TEMP:Current: Temp: 99.7 °F (37.6 °C)  Max: Temp  Av.1 °F (37.3 °C)  Min: 97.4 °F (36.3 °C)  Max: 99.9 °F (37.7 °C)    BP Range: Systolic (15KNG), ISB:12 , Min:64 , BDL:594     Diastolic (05QHJ), CWS:36, Min:37, Max:110      General appearance: lethargic appears stated age and cooperative. In no acute distress  Skin: No rashes or lesions  HEENT: mucous membranes are moist  Neck: No JVD  Lungs: symmetrical expansion, diminished with crackles, no wheezing, no use of accessory muscles. Heart: S1S2,  +murmurs,  Abdomen: soft, non tender,   Extremities: no peripheral edema; R arm with significant ecchymosis from fingers up to bicep area.  BKA with prosthesis   Neurologic: lethargic  Affect: pleasant      Assessment:   Patient Active Problem List:     Atrial fibrillation ([facility] [address]     Hyperlipemia     Anticoagulant long-term use     Claudication of left lower extremity ([facility])     Pneumonia of both lower lobes due to infectious organism     Paroxysmal atrial fibrillation (H[facility])     Warfarin anticoagulation     Warfarin overdosage     Critical lower limb ischemia ([facility])     Hyperlipidemia     Hypertension     Lymphocytosis     Skin ulcer of left knee with fat layer exposed ([facility] [address]     Syncope and collapse     Wound infection     Blurry vision, bilateral     Anticoagulated on Coumadin     Aortic stenosis, moderate     Sinus node dysfunction ([facility])     Sepsis due to pneumonia ([facility])     Chest pain     Precordial pain     Acute rhinitis     Closed nondisplaced intertrochanteric fracture of right femur ([facility])     Aortic valve disease     Closed right hip fracture, initial encounter ([facility])     S/P right hip fracture     Diarrhea     Weight loss     Lacerations of multiple sites of right arm     Acute respiratory disease due to COVID-19 virus  Worsening MARCIAL    Plan:   · CXR worse with B infiltrates  · We will try AVAP, wean fio2. · ABG and LA to follow  · MBS with penetration and aspiration - speech rec with modified diet Soft and bite size consistency solids with  nectar consistency liquids - NO STRAWS  · Pro calcitonin 0.34 - sputum growing staph. Started on Ancef by ID. Increasing PCT. Will panculture  · increase Dexa to 10 mg BID, CODEX. Will change to HCS for better BP  · Levo to keep SBP > 90  · Baricitinib 2mg gfr 47, absol lymph 0.91 - 12/12   · Albuterol prn, symbicort. Antitussives. DNR CCA. Had a long discussion with Wife, son and authorized friend. Understand poor prognosis. However decision has been made to reverse code to Full Code.     CCt 30'

## 2021-12-18 NOTE — PROGRESS NOTES
Dr. Gonzales Kaiser updated patient slower to respond. O2 sat 82% on 30 liters. Now on optiflow 60 Liters 100% plus nonrebreather O2 sat 92%. 1- Dr. Gonzales Kaiser updated family agreeable to intubation.  Instructed to call RRT

## 2021-12-18 NOTE — PATIENT CARE CONFERENCE
Intensive Care Daily Quality Rounding Checklist      ICU Team Members: Dr. Gabino Duran, bedside nurse, charge nurse    ICU Day #: 1    Intubation Date: AVAPS    Ventilator Day #:     Central Line Insertion Date: PICC        Day #:      Arterial Line Insertion Date:       Day #:     Temporary Hemodialysis Catheter Insertion Date:       Day #     DVT Prophylaxis: Coumadin    GI Prophylaxis:     [place] Catheter Insertion Date:        Day #:       Continued need (if yes, reason documented and discussed with physician):     Skin Issues/ Wounds and ordered treatment discussed on rounds:     Goals/ Plans for the Day: Daily labs, wean oxygen as able, wean levo as able. Change decadron to hydrocortisone. Switch oxygen to AVAPS. Continue discussions with family regarding goals of care/code status. 152

## 2021-12-18 NOTE — PLAN OF CARE
Delayed entry  Update  Pt failing to improve,rather not beginning to worsen  Starting to look much sicker whereas he had been looking better earlier in night  Needs pressor support  Spoke with ED CC resident - they have kindly offered to place the C-line  Case d/w CC, they agree for ICU placement  Levophed ordered  Transfer ordered to ICU  Case d/w his PCP

## 2021-12-18 NOTE — PROGRESS NOTES
THIS NOTE IS A CLERICAL ERROR, PLEASE DISREGARD. Death pronouncement note    After brief CODE BLUE for 5 minutes and the decision of the family to withdraw the patient remains unresponsive.   No spontaneous respiration  No heart sounds  No pulses  Corneal reflex absent  No cough    Pronunciation was performed at 2:03 PM  Family at bedside

## 2021-12-18 NOTE — PROGRESS NOTES
Admit Date: 12/10/2021    Subjective: All event reviewed on vasopressor non responsive     Objective:     Patient Vitals for the past 8 hrs:   BP Temp Temp src Pulse Resp SpO2   12/18/21 0900 (!) 75/37   105 (!) 33 99 %   12/18/21 0845 (!) 73/39 99.7 °F (37.6 °C) Axillary 107 (!) 35 99 %   12/18/21 0843     (!) 31    12/18/21 0800 (!) 75/45 99.8 °F (37.7 °C) Axillary 106 30 98 %   12/18/21 0705 (!) 85/42   108     12/18/21 0600 (!) 68/48 99.9 °F (37.7 °C) Axillary 110 (!) 32 97 %   12/18/21 0545 (!) 69/45 99.7 °F (37.6 °C) Axillary 109 (!) 34 99 %   12/18/21 0430 (!) 82/48 99.6 °F (37.6 °C) Axillary 108 (!) 38 97 %   12/18/21 0410 (!) 77/49 99.8 °F (37.7 °C) Axillary 108 (!) 36 95 %   12/18/21 0327 (!) 64/46   113 (!) 36 95 %   12/18/21 0315 (!) 82/45   106 (!) 36 96 %   12/18/21 0311     (!) 40    12/18/21 0210 (!) 75/48 98.6 °F (37 °C) Axillary 113 (!) 40 96 %     I/O last 3 completed shifts: In: 1600 [I.V.:1600]  Out: -   I/O this shift:  In: 10 [I.V.:10]  Out: -     HEENT: Normal  NECK: Thyroid normal. No carotid bruit. CVS: RRR  RS: rales   ABD: Soft. Non tender. No mass. Normal BS. EXT: No edema. Non tender.  Bilateral BKA  NEURO: unresponsive       Scheduled Meds:   sodium chloride  500 mL IntraVENous Once    baricitinib  1 mg Oral Daily    warfarin  2.5 mg Oral Daily    dexamethasone  10 mg IntraVENous Q12H    lidocaine PF  5 mL IntraDERmal Once    sodium chloride flush  5-40 mL IntraVENous 2 times per day    heparin flush  3 mL IntraVENous 2 times per day    ceFAZolin  2,000 mg IntraVENous Q8H    hydroxyurea  500 mg Oral BID    allopurinol  100 mg Oral Daily    atorvastatin  40 mg Oral Nightly    isosorbide mononitrate  30 mg Oral Daily    [Held by provider] metoprolol succinate  50 mg Oral Daily    mirtazapine  7.5 mg Oral Nightly    zinc sulfate  50 mg Oral Daily    Vitamin D  2,000 Units Oral Daily    guaiFENesin  400 mg Oral TID    ascorbic acid  1,000 mg Oral Daily    budesonide-formoterol  2 puff Inhalation BID     Continuous Infusions:   sodium chloride 100 mL/hr at 12/18/21 0849    norepinephrine 5 mcg/min (12/18/21 0852)    sodium chloride      sodium chloride         CBC with Differential:    Lab Results   Component Value Date    WBC 95.9 12/18/2021    RBC 2.29 12/18/2021    HGB 9.3 12/18/2021    HCT 31.1 12/18/2021    PLT 98 12/18/2021    .8 12/18/2021    MCH 40.6 12/18/2021    MCHC 29.9 12/18/2021    RDW 14.9 12/18/2021    NRBC 0.0 12/18/2021    BANDSPCT 2 10/12/2016    BLASTSPCT 0.9 08/18/2020    METASPCT 0.9 12/18/2021    LYMPHOPCT 1.7 12/18/2021    MONOPCT 9.6 12/18/2021    MYELOPCT 0.9 01/14/2020    BASOPCT 0.0 12/18/2021    MONOSABS 9.59 12/18/2021    LYMPHSABS 1.92 12/18/2021    EOSABS 0.00 12/18/2021    BASOSABS 0.00 12/18/2021     CMP:    Lab Results   Component Value Date     12/18/2021    K 4.7 12/18/2021    K 3.9 08/06/2020     12/18/2021    CO2 19 12/18/2021    BUN 77 12/18/2021    CREATININE 3.1 12/18/2021    GFRAA 23 12/18/2021    LABGLOM 19 12/18/2021    PROT 6.2 12/10/2021    LABALBU 3.4 12/18/2021    LABALBU 4.1 01/24/2012    CALCIUM 8.2 12/18/2021    BILITOT 1.1 12/10/2021    ALKPHOS 89 12/10/2021    AST 26 12/10/2021    ALT 15 12/10/2021     PT/INR:    Lab Results   Component Value Date    PROTIME 19.2 12/18/2021    PROTIME 28.4 02/18/2020    INR 1.7 12/18/2021       Assessment:     Active Problems:   Acute hypoxic respiratory failure  Pneumonia  Covid 19 infection   Pleural effusion   Leukocytosis  Myeloproliferative syndrome       Plan:   Transfer to ICU,support prognosis poor

## 2021-12-18 NOTE — PROGRESS NOTES
[facility]  [facility]  Progress Note    SUBJECTIVE:  Chief Complaint   Patient presents with    Shortness of Breath     covid + on tuesday, increased sob last several days, from home     Events noted. The patient was transferred to the ICU because of hypoxemia. He is now on vasopressors. Review of systems:  As stated above in the chief complaint, otherwise negative. Medications:  Scheduled Meds:   sodium chloride  500 mL IntraVENous Once    baricitinib  1 mg Oral Daily    hydrocortisone sodium succinate PF  100 mg IntraVENous Q8H    warfarin  2.5 mg Oral Daily    lidocaine PF  5 mL IntraDERmal Once    sodium chloride flush  5-40 mL IntraVENous 2 times per day    heparin flush  3 mL IntraVENous 2 times per day    ceFAZolin  2,000 mg IntraVENous Q8H    hydroxyurea  500 mg Oral BID    allopurinol  100 mg Oral Daily    atorvastatin  40 mg Oral Nightly    isosorbide mononitrate  30 mg Oral Daily    [Held by provider] metoprolol succinate  50 mg Oral Daily    mirtazapine  7.5 mg Oral Nightly    zinc sulfate  50 mg Oral Daily    Vitamin D  2,000 Units Oral Daily    guaiFENesin  400 mg Oral TID    ascorbic acid  1,000 mg Oral Daily    budesonide-formoterol  2 puff Inhalation BID     Continuous Infusions:   sodium chloride 100 mL/hr at 21 0849    norepinephrine 5 mcg/min (21 0852)    sodium chloride      sodium chloride       PRN Meds:sodium chloride flush, sodium chloride, heparin flush, sodium chloride, EPINEPHrine, acetaminophen, guaiFENesin-dextromethorphan, benzonatate    OBJECTIVE:  BP (!) 93/42   Pulse 117   Temp 99.7 °F (37.6 °C)   Resp 30   Wt 109 lb 12.8 oz (49.8 kg)   SpO2 100%   BMI 16.21 kg/m²   Temp  Av.1 °F (37.3 °C)  Min: 97.4 °F (36.3 °C)  Max: 99.9 °F (37.7 °C)  Constitutional: The patient is lying in bed in the ICU. He is on a BiPAP. He is less responsive today. Skin: Warm and dry. No rashes were noted.    HEENT: Round and reactive pupils. Moist mucous membranes. No ulcerations or thrush. Neck: Supple to movements. Chest: Using accessory muscles. Rhonchi left base posteriorly  Cardiovascular: S1 and S2 are rhythmic and regular. No murmurs appreciated. Abdomen: Positive bowel sounds to auscultation. Benign to palpation. No masses felt. Extremities: Bilateral AKA stumps. Lines: Right PICC 12/17/2021. Laboratory and Tests Review:  Lab Results   Component Value Date    WBC 95.9 (H) 12/18/2021    .2 (H) 12/17/2021    WBC 72.7 (H) 12/15/2021    HGB 9.3 (L) 12/18/2021    HCT 31.1 (L) 12/18/2021    .8 (H) 12/18/2021    PLT 98 (L) 12/18/2021     Lab Results   Component Value Date    NEUTROABS 85.35 (H) 12/18/2021    NEUTROABS 112.11 (HH) 12/17/2021    NEUTROABS 60.34 (H) 12/15/2021     No results found for: CRPHS  Lab Results   Component Value Date    ALT 15 12/10/2021    AST 26 12/10/2021    ALKPHOS 89 12/10/2021    BILITOT 1.1 12/10/2021     Lab Results   Component Value Date     12/18/2021    K 4.7 12/18/2021    K 3.9 08/06/2020     12/18/2021    CO2 19 12/18/2021    BUN 77 12/18/2021    CREATININE 3.1 12/18/2021    CREATININE 1.6 12/17/2021    CREATININE 1.3 12/16/2021    GFRAA 23 12/18/2021    LABGLOM 19 12/18/2021    GLUCOSE 141 12/18/2021    GLUCOSE 94 01/24/2012    PROT 6.2 12/10/2021    LABALBU 3.4 12/18/2021    LABALBU 4.1 01/24/2012    CALCIUM 8.2 12/18/2021    BILITOT 1.1 12/10/2021    ALKPHOS 89 12/10/2021    AST 26 12/10/2021    ALT 15 12/10/2021     Lab Results   Component Value Date    CRP 8.1 (H) 12/16/2021    CRP 6.8 (H) 12/15/2021    CRP 2.4 (H) 12/14/2021     Lab Results   Component Value Date    SEDRATE 33 (H) 12/17/2021    SEDRATE 14 12/10/2021    SEDRATE 56 (H) 01/17/2016     Radiology:  Chest x-ray reviewed    Microbiology:   Streptococcus pneumoniae/Legionella urine Ag: negative  Nares screen MRSA: Negative  Respiratory culture 12/12/2021:  Moderate MSSA, rare Stenotrophomonas maltophilia (sensitive to Bactrim and Levaquin)    Recent Labs     12/15/21  1130 12/16/21  1104 12/18/21  0158   PROCAL 0.34* 0.32* 1.55*       ASSESSMENT:  · SARS-CoV-2 infection with moderate viral pneumonia in a fully vaccinated patient  · Aspiration pneumonia left lung secondary to MSSA. 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 He did not answer. I did not leave a message. Discussed with Dr. Hiram Posey and Dr. Xi Biggs  I had a chance to discuss the case with the son Natali Card. I explained to him that prognosis is poor, given age and underlying disease. Code has appropriately been changed to LK FREEMAN.     Fredy Thompson MD  11:10 AM  12/18/2021

## 2021-12-18 NOTE — PROGRESS NOTES
Intubation Record    Date: 12/18/2021  Time: 1224  Patient identity confirmed: Yes  Indications: unresponsive   Preoxygenation: BVM with 100% O2   Laryngoscope size and type: glidescope  Airway introducer used: Yes  Evac: Yes  Tube size: 7.5  Number of attempts :1  Cords visualized:  [x]Clearly [] Poorly   Breath sounds present bilaterally: Yes  ETCO2 140  ETT to lip: 25  Tube secured with: tube garcia  Chest x-ray ordered: Yes  Difficulties encountered:       Difficult Airway: none          Jose Dario, RCP, RRT

## 2021-12-18 NOTE — PROGRESS NOTES
Pt skin grey, eyes closed, pt slow to respond. optiflo was placed by respiratory. Pulse ox 86%. With optiflo at 100% and 60 liters. non rebreather placed on pt wth optiflo  and pulse ox now 92-94%. Heart rate elevated at 112-125. Charge nurse notified family and wife and daughter are at bedside with appropriate ppe on.

## 2021-12-19 NOTE — PROGRESS NOTES
[address] [place] [address] [facility]  ARIANE  Progress Note    SUBJECTIVE:  Chief Complaint   Patient presents with    Shortness of Breath     covid + on tuesday, increased sob last several days, from home     The patient is still in the ICU. He is now intubated. He is on vasopressors. These are being weaned. Review of systems:  As stated above in the chief complaint, otherwise negative.     Medications:  Scheduled Meds:   sodium chloride  500 mL IntraVENous Once    baricitinib  1 mg Oral Daily    hydrocortisone sodium succinate PF  100 mg IntraVENous Q8H    meropenem  1,000 mg IntraVENous Q24H    warfarin  2.5 mg Oral Daily    lidocaine PF  5 mL IntraDERmal Once    sodium chloride flush  5-40 mL IntraVENous 2 times per day    heparin flush  3 mL IntraVENous 2 times per day    ceFAZolin  2,000 mg IntraVENous Q8H    hydroxyurea  500 mg Oral BID    allopurinol  100 mg Oral Daily    atorvastatin  40 mg Oral Nightly    isosorbide mononitrate  30 mg Oral Daily    [Held by provider] metoprolol succinate  50 mg Oral Daily    mirtazapine  7.5 mg Oral Nightly    zinc sulfate  50 mg Oral Daily    Vitamin D  2,000 Units Oral Daily    guaiFENesin  400 mg Oral TID    ascorbic acid  1,000 mg Oral Daily    budesonide-formoterol  2 puff Inhalation BID     Continuous Infusions:   sodium chloride 100 mL/hr at 21 0525    norepinephrine 2 mcg/min (21 1133)    IV infusion builder 150 mL/hr at 21 0455    sodium chloride 33.3 mL/hr at 21 1728    sodium chloride      sodium chloride       PRN Meds:sodium chloride flush, sodium chloride, heparin flush, sodium chloride, EPINEPHrine, acetaminophen, guaiFENesin-dextromethorphan, benzonatate    OBJECTIVE:  BP (!) 86/42   Pulse 90   Temp 98.5 °F (36.9 °C)   Resp 25   Wt 111 lb 15.9 oz (50.8 kg)   SpO2 92%   BMI 16.54 kg/m²   Temp  Av.1 °F (36.7 °C)  Min: 97.5 °F (36.4 °C)  Max: 98.5 °F (36.9 °C)  Constitutional: The patient is lying in bed in the ICU. He is intubated and sedated. FiO2 45%. PEEP 5. Skin: Warm and dry. No rashes were noted. HEENT: Round and reactive pupils. Moist mucous membranes. No ulcerations or thrush. ET tube. OG tube. Neck: Supple to movements. Chest: Using accessory muscles. Rhonchi left base posteriorly  Cardiovascular: S1 and S2 are rhythmic and regular. No murmurs appreciated. Abdomen: Positive bowel sounds to auscultation. Benign to palpation. No masses felt. Extremities: Bilateral AKA stumps. Lines: Right PICC 12/17/2021. Aponte catheter.     Laboratory and Tests Review:  Lab Results   Component Value Date    WBC 71.3 (H) 12/19/2021    WBC 95.9 (H) 12/18/2021    .2 (H) 12/17/2021    HGB 7.5 (L) 12/19/2021    HCT 23.1 (L) 12/19/2021    .5 (H) 12/19/2021    PLT 50 (L) 12/19/2021     Lab Results   Component Value Date    NEUTROABS 64.17 (H) 12/19/2021    NEUTROABS 85.35 (H) 12/18/2021    NEUTROABS 112.11 (HH) 12/17/2021     No results found for: CRPHS  Lab Results   Component Value Date    ALT <5 12/19/2021     (H) 12/19/2021    ALKPHOS 59 12/19/2021    BILITOT 0.3 12/19/2021     Lab Results   Component Value Date     12/19/2021    K 3.9 12/19/2021    K 3.9 08/06/2020     12/19/2021    CO2 21 12/19/2021     12/19/2021    CREATININE 4.1 12/19/2021    CREATININE 3.1 12/18/2021    CREATININE 1.6 12/17/2021    GFRAA 17 12/19/2021    LABGLOM 14 12/19/2021    GLUCOSE 268 12/19/2021    GLUCOSE 94 01/24/2012    PROT 4.6 12/19/2021    LABALBU 2.4 12/19/2021    LABALBU 4.1 01/24/2012    CALCIUM 6.2 12/19/2021    BILITOT 0.3 12/19/2021    ALKPHOS 59 12/19/2021     12/19/2021    ALT <5 12/19/2021     Lab Results   Component Value Date    CRP 8.1 (H) 12/16/2021    CRP 6.8 (H) 12/15/2021    CRP 2.4 (H) 12/14/2021     Lab Results   Component Value Date    SEDRATE 33 (H) 12/17/2021    SEDRATE 14 12/10/2021    SEDRATE 56 (H) 01/17/2016     Radiology:  Chest x-ray reviewed    Microbiology:   Streptococcus pneumoniae/Legionella urine Ag: negative  Nares screen MRSA: Negative  Respiratory culture 12/12/2021: Moderate MSSA, rare Stenotrophomonas maltophilia (sensitive to Bactrim and Levaquin)    Recent Labs     12/18/21  0158   PROCAL 1.55*       ASSESSMENT:  · SARS-CoV-2 infection with moderate viral pneumonia in a fully vaccinated patient  · Aspiration pneumonia left lung secondary to MSSA. 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 White count is coming down  · Acute respiratory failure. This is most likely secondary to leukemia causing hyperviscosity in blood vessels in the lungs.   Now that white count is coming down, FiO2 requirements are coming down as well  · Possible septic shock  · Acute kidney injury    PLAN:  · Continue Meropenem  · Stop Cefazolin  · We will follow with you  · Prognosis remains poor    Case discussed with Dr. Stephanie Thorne MD  11:41 AM  12/19/2021

## 2021-12-19 NOTE — PROGRESS NOTES
Department of Internal Medicine  Division of Pulmonary, Critical Care and Sleep Medicine  ICU Attending Addendum    Attending Physician Statement  Cruz Moore was seen, examined and discussed with the multi-disciplinary ICU team during rounds. I have personally seen and examined the patient and the key elements of the encounter were performed by me. The medications & laboratory data was discussed and adjusted where necessary. The radiographic images were reviewed either as a group or with radiologist if felt dis-concordant with the exam or history. The above findings were corroborated, plans confirmed and changes made if needed. Family is updated at the bedside if available. Key issues of the case were discussed among consultants. Critical Care time is documented if appropriate. Changes to the notes are place in italicized print.      Briefly,     ARF, pneumonia  MVS #2, improving  Will cut down RR  To start CPAP trials    CV  Off pressors now  To start BB in am?  On coumadin    ID  Merro + Levaquin ( x one )   By ID    C-19   Will cut down IV steroids  Rj #7    GI   To add PPI and start Nepro    MARCIAL, worsening MARCIAL  Marginal U/O  D/c bicarb gtt    Sedation   As needed    CCt 30'

## 2021-12-19 NOTE — PROGRESS NOTES
Admit Date: 12/10/2021    Subjective: All event reviewed off vasopressor     Objective:     Patient Vitals for the past 8 hrs:   BP Temp Temp src Pulse Resp SpO2   12/19/21 0930 (!) 98/48   97 27 91 %   12/19/21 0900 (!) 111/47   95 26 92 %   12/19/21 0830 (!) 101/53   107 (!) 31 96 %   12/19/21 0800 126/63 98 °F (36.7 °C) Axillary 96 26 96 %   12/19/21 0730 124/65   100 25 94 %   12/19/21 0700 135/63   105 25 94 %   12/19/21 0600 134/66   114 29 95 %   12/19/21 0500 (!) 116/51   100 26 96 %   12/19/21 0400 (!) 117/47 98 °F (36.7 °C) Axillary 97 26 95 %   12/19/21 0324    98 25 95 %   12/19/21 0300 (!) 113/49   95 26 96 %   12/19/21 0200 (!) 80/42   91 25 96 %     I/O last 3 completed shifts:   In: 4533 [I.V.:4160; NG/GT:60]  Out: 165 [Urine:165]  I/O this shift:  In: 60 [I.V.:10; NG/GT:50]  Out: 5 [Urine:5]        Scheduled Meds:   sodium chloride  500 mL IntraVENous Once    baricitinib  1 mg Oral Daily    hydrocortisone sodium succinate PF  100 mg IntraVENous Q8H    meropenem  1,000 mg IntraVENous Q24H    warfarin  2.5 mg Oral Daily    lidocaine PF  5 mL IntraDERmal Once    sodium chloride flush  5-40 mL IntraVENous 2 times per day    heparin flush  3 mL IntraVENous 2 times per day    ceFAZolin  2,000 mg IntraVENous Q8H    hydroxyurea  500 mg Oral BID    allopurinol  100 mg Oral Daily    atorvastatin  40 mg Oral Nightly    isosorbide mononitrate  30 mg Oral Daily    [Held by provider] metoprolol succinate  50 mg Oral Daily    mirtazapine  7.5 mg Oral Nightly    zinc sulfate  50 mg Oral Daily    Vitamin D  2,000 Units Oral Daily    guaiFENesin  400 mg Oral TID    ascorbic acid  1,000 mg Oral Daily    budesonide-formoterol  2 puff Inhalation BID     Continuous Infusions:   sodium chloride 100 mL/hr at 12/19/21 0525    norepinephrine Stopped (12/19/21 0738)    IV infusion builder 150 mL/hr at 12/19/21 0455    sodium chloride 33.3 mL/hr at 12/18/21 1728    sodium chloride      sodium chloride         CBC with Differential:    Lab Results   Component Value Date    WBC 71.3 12/19/2021    RBC 1.87 12/19/2021    HGB 7.5 12/19/2021    HCT 23.1 12/19/2021    PLT 50 12/19/2021    .5 12/19/2021    MCH 40.1 12/19/2021    MCHC 32.5 12/19/2021    RDW 14.3 12/19/2021    NRBC 0.9 12/19/2021    BANDSPCT 2 10/12/2016    BLASTSPCT 0.9 08/18/2020    METASPCT 0.9 12/19/2021    LYMPHOPCT 1.7 12/19/2021    MONOPCT 8.8 12/19/2021    MYELOPCT 0.9 12/19/2021    BASOPCT 0.0 12/19/2021    MONOSABS 6.42 12/19/2021    LYMPHSABS 1.43 12/19/2021    EOSABS 0.00 12/19/2021    BASOSABS 0.00 12/19/2021     CMP:    Lab Results   Component Value Date     12/19/2021    K 3.9 12/19/2021    K 3.9 08/06/2020     12/19/2021    CO2 21 12/19/2021     12/19/2021    CREATININE 4.1 12/19/2021    GFRAA 17 12/19/2021    LABGLOM 14 12/19/2021    PROT 4.6 12/19/2021    LABALBU 2.4 12/19/2021    LABALBU 4.1 01/24/2012    CALCIUM 6.2 12/19/2021    BILITOT 0.3 12/19/2021    ALKPHOS 59 12/19/2021     12/19/2021    ALT <5 12/19/2021     PT/INR:    Lab Results   Component Value Date    PROTIME 23.7 12/19/2021    PROTIME 28.4 02/18/2020    INR 2.1 12/19/2021       Assessment:     Active Problems:   Acute hypoxic respiratory failure  Pneumonia  Covid 19 infection   Pleural effusion   Leukocytosis  Myeloproliferative syndrome       Plan:   Continue supportive care ,prognosis poor

## 2021-12-19 NOTE — PATIENT CARE CONFERENCE
Intensive Care Daily Quality Rounding Checklist        ICU Team Members: Dr. Ap Brizuela, bedside nurse, charge nurse, RT     ICU Day #: 2     Intubation Date: 12/18/2021     Ventilator Day #: 2     Central Line Insertion Date: PICC 12/17/2021                                                    Day #: 3      Arterial Line Insertion Date: NA                             Day #: NA     Temporary Hemodialysis Catheter Insertion Date: NA                             Day #: NA     DVT Prophylaxis: Coumadin    GI Prophylaxis: Protonix     Aponte Catheter Insertion Date: 12/18/2021                                        Day #: 2                             Continued need (if yes, reason documented and discussed with physician): Y     Skin Issues/ Wounds and ordered treatment discussed on rounds: wound on buttocks, wound care consulted     Goals/ Plans for the Day: Daily labs, wean oxygen as able, start TF, decrease IVF's, wean steroids. Continue discussions with family regarding goals of care/code status.

## 2021-12-19 NOTE — PROGRESS NOTES
NEPHROLOGY Attending   Progress Note  12/19/2021 9:58 AM  Subjective:   Admit Date: 12/10/2021  PCP: Helen Ho DO    Interval History:     12/19/21: Seen through the window in the ICU setting - not directly examined due to strict Covid isolation - stopped pressors earlier this am - intubated       Diet: Diet NPO    Data:   Scheduled Meds:   sodium chloride  500 mL IntraVENous Once    baricitinib  1 mg Oral Daily    hydrocortisone sodium succinate PF  100 mg IntraVENous Q8H    meropenem  1,000 mg IntraVENous Q24H    warfarin  2.5 mg Oral Daily    lidocaine PF  5 mL IntraDERmal Once    sodium chloride flush  5-40 mL IntraVENous 2 times per day    heparin flush  3 mL IntraVENous 2 times per day    ceFAZolin  2,000 mg IntraVENous Q8H    hydroxyurea  500 mg Oral BID    allopurinol  100 mg Oral Daily    atorvastatin  40 mg Oral Nightly    isosorbide mononitrate  30 mg Oral Daily    [Held by provider] metoprolol succinate  50 mg Oral Daily    mirtazapine  7.5 mg Oral Nightly    zinc sulfate  50 mg Oral Daily    Vitamin D  2,000 Units Oral Daily    guaiFENesin  400 mg Oral TID    ascorbic acid  1,000 mg Oral Daily    budesonide-formoterol  2 puff Inhalation BID     Continuous Infusions:   sodium chloride 100 mL/hr at 12/19/21 0525    norepinephrine Stopped (12/19/21 0738)    IV infusion builder 150 mL/hr at 12/19/21 0455    sodium chloride 33.3 mL/hr at 12/18/21 1728    sodium chloride      sodium chloride       PRN Meds:sodium chloride flush, sodium chloride, heparin flush, sodium chloride, EPINEPHrine, acetaminophen, guaiFENesin-dextromethorphan, benzonatate    Intake/Output Summary (Last 24 hours) at 12/19/2021 0958  Last data filed at 12/19/2021 0800  Gross per 24 hour   Intake 4270 ml   Output 170 ml   Net 4100 ml     CBC:   Recent Labs     12/17/21  1435 12/18/21  0158 12/19/21  0530   .2* 95.9* 71.3*   HGB 10.3* 9.3* 7.5*   * 98* 50*     BMP:    Recent Labs exam:->eval for PE Decision Support Exception - unselect if not a suspected or confirmed emergency medical condition->Emergency Medical Condition (MA) FINDINGS: Pulmonary Arteries: Pulmonary arteries are adequately opacified for evaluation. No evidence of intraluminal filling defect to suggest pulmonary embolism. Main pulmonary artery is normal in caliber. Mediastinum: No evidence of mediastinal lymphadenopathy. The heart and pericardium demonstrate no acute abnormality. There is no acute abnormality of the thoracic aorta. Left-sided coronary arterial calcifications. Moderate calcific atherosclerosis of the arch and descending aorta. Partial fluid opacification of the dependent bilateral mainstem bronchi, extending into the lower lobe bronchi on the left. Lungs/pleura: No pneumothorax. Small to moderate right and small left pleural effusions. Small calcified granuloma is seen within each upper lobe. There is an element of subsegmental dependent atelectasis bilaterally, with additional calcified granulomas seen within the atelectatic dependent basal left lower lobe. Consolidation within portions of the basal left lower lobe may be somewhat out of proportion to perceived volume loss, raising the question of superimposed pneumonia. Scattered patchy areas of increased attenuation within the upper lobes may also have components of both subsegmental atelectasis and infiltrate. No significant thickening of secondary interlobular septa. Tracheobronchial tree calcification noted. Upper Abdomen: Visualized upper abdominal viscera demonstrate no acute abnormality. Soft Tissues/Bones: No acute bone or soft tissue abnormality. No evidence of pulmonary embolism. Small to moderate right pleural effusion and small left pleural effusion.  In addition to associated element of dependent/compressive atelectasis within the bilateral lower lobes, consolidation may be somewhat out of proportion to perceived volume loss in areas of the lower left base, concerning for superimposed pneumonia, with dependent secretions seen within bilateral mainstem bronchi extending into the left lower lobe bronchi. Although some areas may simply represent simple atelectasis, there also several scattered low density infiltrates present within the bilateral upper lobes which could represent multifocal bronchopneumonia. A few tiny peripheral opacities within predominantly the lateral right upper lobe have somewhat nodular morphology, however these are nonspecific, particularly in current context, and require no surveillance, in the absence of risk factors for bronchogenic carcinoma, such as smoking.          Objective:   Vitals:   Vitals:    12/19/21 0930   BP: (!) 98/48   Pulse: 97   Resp: 27   Temp:    SpO2: 91%     Patient Vitals for the past 24 hrs:   BP Temp Temp src Pulse Resp SpO2 Weight   12/19/21 0930 (!) 98/48   97 27 91 %    12/19/21 0900 (!) 111/47   95 26 92 %    12/19/21 0830 (!) 101/53   107 (!) 31 96 %    12/19/21 0800 126/63 98 °F (36.7 °C) Axillary 96 26 96 %    12/19/21 0730 124/65   100 25 94 %    12/19/21 0700 135/63   105 25 94 %    12/19/21 0600 134/66   114 29 95 %    12/19/21 0500 (!) 116/51   100 26 96 %    12/19/21 0400 (!) 117/47 98 °F (36.7 °C) Axillary 97 26 95 %    12/19/21 0324    98 25 95 %    12/19/21 0300 (!) 113/49   95 26 96 %    12/19/21 0200 (!) 80/42   91 25 96 %    12/19/21 0100 (!) 100/51   100 26 96 % 111 lb 15.9 oz (50.8 kg)   12/19/21 0011    98 26 99 %    12/19/21 0000 (!) 103/58 97.9 °F (36.6 °C) Axillary 101 26 100 %    12/18/21 2300 (!) 109/59   103 25 100 %    12/18/21 2235 (!) 99/56   106 25 100 %    12/18/21 2200 (!) 111/56   94 25 100 %    12/18/21 2105    98 25 100 %    12/18/21 2100 (!) 116/57   102 25 100 %    12/18/21 2000 115/63 98.5 °F (36.9 °C) Axillary 105 25 100 %    12/18/21 1900 (!) 117/57   107 26 100 %    12/18/21 1830 (!) 110/54   113 25 100 %    12/18/21 1800 (!) 114/57 97.5 °F (36.4 °C)  112 25 100 %    12/18/21 1730 (!) 115/58   115 25 100 %    12/18/21 1700 (!) 125/54   121 25 100 %    12/18/21 1658    117 26 100 %    12/18/21 1630 (!) 113/52   121 25 100 %    12/18/21 1600 (!) 114/52 98 °F (36.7 °C)  117 25 100 %    12/18/21 1530 (!) 113/54   121 26 100 %    12/18/21 1500 (!) 106/51   123 25 100 %    12/18/21 1430 (!) 84/50   120 25 100 %    12/18/21 1400 (!) 99/54 98 °F (36.7 °C)  125 26 100 %    12/18/21 1330 (!) 84/44   122 25 100 %    12/18/21 1300 (!) 78/41   122 25     12/18/21 1243    109 29     12/18/21 1230 (!) 110/46   122 25 100 %    12/18/21 1226    124 25 100 %    12/18/21 1200 (!) 115/51 98.4 °F (36.9 °C)  91 (!) 35 (!) 89 %    12/18/21 1130 (!) 104/44   103 (!) 32 97 %    12/18/21 1112     29 100 %    12/18/21 1100 (!) 93/42   117 30 100 %    12/18/21 1030 (!) 94/48   117 28 100 %    12/18/21 1000 (!) 100/46 99.7 °F (37.6 °C)  117 (!) 32 100 %      According to institutional recommendations and guidelines, a face-to-face encounter was not performed due to the current efforts to prevent transmission of COVID-19 and the need to preserve PPE for other caregivers. [facility] records, nursing assessment, and diagnostic testing including laboratory results and imaging were reviewed. Please reference any relevant documentation elsewhere. Pardeep Angelo will be coordinated with the primary services and consultants.       Assessment/Plan:     1) MARCIAL on CKD Stage 4  Baseline serum creatinine 0.9-1.0 as of 9/15/2020  Now serum creatinine 1.5 upon admission  Suspect serum creatinine underreflecting actual GFR as he has very little muscle mass  Urine lytes as of 12/16/2021: Sodium 50, creatinine 64, and urea 773  SCr past 72 hrs: 1.6 -> 3.1 -> 4.1   PLAN:   1. Follow very closely in the ICU setting  2. IV fluids have been initiated as well initial pressor support   3.   Strict intake and output as well as daily labs    2) Hypernatremia reflecting poor p.o. intake with component of superimposed diuresis   Follow response to current IV fluids    3) Metabolic acidosis with increased anion gap. May be reflection of tissue hypoperfusion related lactic acidosis. 12/19/21: lactic acid elevated at 3.2  PLAN:   1. Supplement bicarbonate to target a bicarbonate level of 22 mmol/L.    4) Hypotension/shock   PLAN:   1. Good response to volume and pressors. 5) SARS-CoV-2 infection with moderate viral pneumonia in a fully vaccinated patient                 LAURA Alvarez CNP             Patient patient was not seen in a face-to-face encounter as he he is in isolation for COVID-19. Chart reviewed. Discussed the patient's condition in detail with the nurse taking care of the patient. Patient is somewhat more awake today. Pressor support is being weaned down. Urine output is marginally improved. Agree with  formulation, assessment and plan as outlined above and directed by me. Addition and corrections were done as deemed appropriate. Continue current treatment for now. .  Patient's condition was discussed in detail with the patient's son and other family members in the presence of the nurse taking care of the patient. Did discuss with him regarding possibility of needing renal placement therapy if the patient does not have improvement in the renal function. In light of ongoing hypotension and fairly acceptable serum electrolytes will hold off on intervention with renal placement therapy at this time today.     Jacques Almeida MD  Nephrology        Electronically signed by Jacques Almeida MD on 12/19/2021 at 4:54 PM

## 2021-12-19 NOTE — PROGRESS NOTES
[facility]  Hematology/Oncology  Consult      Patient Name: Sandra Kc  YOB: 1929  PCP: Bob Vidal DO   Referring Provider:      Reason for Consultation:   Chief Complaint   Patient presents with    Shortness of Breath     covid + on tuesday, increased sob last several days, from home      Subjective: Remains in ICU, on pressor support    History of Present Illness:  59-year-old man well-known to me with history of myeloproliferative neoplasm/MDS overlap syndrome  He has had chronic thrombocytosis with positive JAK2 mutation as well as chronic leukocytosis with significantly elevated absolute monocyte count consistent also with CMML and he has done very well over the past few years on cytoreductive therapy with Hydrea  He is now hospitalized with dyspnea and cough and COVID-19 pneumonia despite the fact he had been fully vaccinated but did not get a booster  He has been seen by ID and has been initiated on dexamethasone /remdesivir as well as baricitinib  Sputum cultures showing MSSA and he is currently on Unasyn  His recent CMP shows a stable serum creatinine of 1.3 with a GFR of 52 mL/min  LDH is elevated at 722  CRP elevated at 8.1  CBC shows significant neutrophilic leukocytosis his total WBC count was 72.7 with ANC of 60.3 and significant monocytosis with an absolute monocyte count of 10.1  Atypical lymphocytes were also described.   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 ([address] 12/28/2015    Blood circulation, collateral     Cancer ([facility] 2004    prostate, seed implant    Chronic kidney disease     COPD (chronic obstructive pulmonary disease) ([address]     Critical lower limb ischemia ([address] 12/29/2015    Hyperlipidemia     Hypertension     Pneumonia        Patient Active Problem List    Diagnosis Date Noted    Acute respiratory disease due to COVID-19 virus 12/11/2021    Lacerations of multiple sites of right arm 08/09/2021    Diarrhea 05/25/2021    Weight loss 05/25/2021    S/P right hip fracture 08/18/2020    Closed right hip fracture, initial encounter ([facility] 07/29/2020    Aortic valve disease     Closed nondisplaced intertrochanteric fracture of right femur ([address] 07/24/2020    Acute rhinitis 02/08/2019    Precordial pain 10/02/2018    Chest pain 08/17/2018    Sepsis due to pneumonia ([address] 06/23/2018    Blurry vision, bilateral     Anticoagulated on Coumadin     Aortic stenosis, moderate     Sinus node dysfunction (HCC)     Syncope and collapse 02/24/2018    Wound infection 02/24/2018    Skin ulcer of left knee with fat layer exposed ([address] 03/15/2017    Lymphocytosis 02/14/2017    Critical lower limb ischemia (HCC) 12/29/2015    Pneumonia of both lower lobes due to infectious organism     Paroxysmal atrial fibrillation (HCC)     Warfarin anticoagulation     Warfarin overdosage     Hyperlipidemia 07/24/2014    Hypertension 07/24/2014    Atrial fibrillation ([address] 10/30/2013    Hyperlipemia 10/30/2013    Anticoagulant long-term use 10/30/2013    Claudication of left lower extremity ([address] 10/30/2013        Past Surgical History:   Procedure Laterality Date    APPENDECTOMY      COLONOSCOPY      ECHOCARDIOGRAM COMPLETE 2D W DOPPLER W COLOR  1/25/2012         HC INSERT PICC CATH, 5/> YRS  12/17/2021         HIP FRACTURE SURGERY Right 7/25/2020    RIGHT HIP CEPHALO-MEDULLARY NAIL performed by Jorge Newman MD at [address] Bilateral     Right    SKIN BIOPSY      TOOTH EXTRACTION      VASCULAR SURGERY         Family History  Family History   Problem Relation Age of Onset    Stroke Father     Heart Disease Father     Cancer Sister        Social History    TOBACCO:   reports that he quit smoking about 42 years ago. His smoking use included cigars and cigarettes. He started smoking about 73 years ago. He has a 30.00 pack-year smoking history. He has never used smokeless tobacco.  ETOH:   reports current alcohol use of about 5.0 standard drinks of alcohol per week. Home Medications  Prior to Admission medications    Medication Sig Start Date End Date Taking? Authorizing Provider   isosorbide mononitrate (IMDUR) 30 MG extended release tablet Take 1 tablet by mouth daily 7/27/21 12/10/21 Yes Dulce Maria Villegas DO   warfarin (COUMADIN) 5 MG tablet  3/9/21  Yes Historical Provider, MD   warfarin (COUMADIN) 7.5 MG tablet  3/16/21  Yes Historical Provider, MD   mirtazapine (REMERON) 7.5 MG tablet Take 1 tablet by mouth nightly 8/18/20  Yes Dulce Maria Villegas DO   Cholecalciferol (VITAMIN D3) 2000 units CAPS Take by mouth daily   Yes Historical Provider, MD   Coenzyme Q10 (CO Q-10) 100 MG CAPS Take by mouth daily   Yes Historical Provider, MD   metoprolol succinate (TOPROL XL) 50 MG extended release tablet Take 1 tablet by mouth daily 9/13/18  Yes Dulce Maria Villegas DO   Melatonin 5 MG CAPS Take 1 capsule by mouth nightly as needed   Yes Historical Provider, MD   pantoprazole (PROTONIX) 40 MG tablet Take 40 mg by mouth every morning (before breakfast)   Yes Historical Provider, MD   nitroGLYCERIN (NITROSTAT) 0.4 MG SL tablet up to max of 3 total doses. If no relief after 1 dose, call 911. Patient taking differently: Place 0.4 mg under the tongue every 5 minutes as needed for Chest pain up to max of 3 total doses. If no relief after 1 dose, call 911. 8/18/18  Yes Myles Cifuentes,    tamsulosin (FLOMAX) 0.4 MG capsule Take 1 capsule by mouth daily 10/6/21   Dulce Maria Villegas DO   gabapentin (NEURONTIN) 100 MG capsule Take 1 capsule by mouth 3 times daily for 30 days. Patient taking differently: Take 100 mg by mouth 2 times daily.   8/10/20 8/9/21  Savi Majano MD sennosides-docusate sodium (SENOKOT-S) 8.6-50 MG tablet Take 1 tablet by mouth 2 times daily 7/29/20   Dulce Maria Heo, DO   atorvastatin (LIPITOR) 40 MG tablet Take 1 tablet by mouth nightly 7/8/20   Dulce Maria Buccino, DO   hydroxyurea (HYDREA) 500 MG chemo capsule Take 500 mg by mouth every morning     Historical Provider, MD       Allergies  No Known Allergies    Review of Systems: Relevant for recent decreased appetite, weight loss cough and worsening dyspnea        Objective  BP (!) 111/47   Pulse 95   Temp 98 °F (36.7 °C) (Axillary)   Resp 26   Wt 111 lb 15.9 oz (50.8 kg)   SpO2 92%   BMI 16.54 kg/m²     Physical Exam:     General: Alert on high flow oxygen  Head and neck : PERRLA, EOMI . Sclera non icteric. Oropharynx : Clear  Neck: no JVD,  no adenopathy, no carotid bruit  Heart: Regular rate and regular rhythm, no murmur  Lungs: Clear to auscultation   Extremities: Status post bilateral BKA  Abdomen: Soft, non-tender;no masses, no organomegaly  Neurologic:Cranial nerves grossly intact. No focal motor or sensory deficits .     Recent Laboratory Data-   Lab Results   Component Value Date    WBC 71.3 (H) 12/19/2021    HGB 7.5 (L) 12/19/2021    HCT 23.1 (L) 12/19/2021    .5 (H) 12/19/2021    PLT 50 (L) 12/19/2021    LYMPHOPCT 1.7 (L) 12/19/2021    RBC 1.87 (L) 12/19/2021    MCH 40.1 (H) 12/19/2021    MCHC 32.5 12/19/2021    RDW 14.3 12/19/2021    NEUTOPHILPCT 87.7 (H) 12/19/2021    MONOPCT 8.8 12/19/2021    BASOPCT 0.0 12/19/2021    NEUTROABS 64.17 (H) 12/19/2021    LYMPHSABS 1.43 (L) 12/19/2021    MONOSABS 6.42 (H) 12/19/2021    EOSABS 0.00 (L) 12/19/2021    BASOSABS 0.00 12/19/2021       Lab Results   Component Value Date     12/19/2021    K 3.9 12/19/2021     12/19/2021    CO2 21 (L) 12/19/2021     (HH) 12/19/2021    CREATININE 4.1 (H) 12/19/2021    GLUCOSE 268 (H) 12/19/2021    CALCIUM 6.2 (LL) 12/19/2021    PROT 4.6 (L) 12/19/2021    LABALBU 2.4 (L) 12/19/2021    BILITOT 0.3 12/19/2021    ALKPHOS 59 12/19/2021     (H) 12/19/2021    ALT <5 12/19/2021    LABGLOM 14 12/19/2021    GFRAA 17 12/19/2021       Lab Results   Component Value Date    FERRITIN 323 12/10/2021           Radiology-    XR CHEST PORTABLE    Result Date: 12/15/2021  EXAMINATION: ONE XRAY VIEW OF THE CHEST 12/15/2021 7:30 am COMPARISON: July 24, 2020-December 14, 2021 reviewed the CT chest December 11, 2021. HISTORY: ORDERING SYSTEM PROVIDED HISTORY: resp failure, effusions TECHNOLOGIST PROVIDED HISTORY: Reason for exam:->resp failure, effusions FINDINGS: There is diminish the right-sided pleural effusion and left-sided pleural effusions since the previous study of December 10 and the CT chest December 11. There is still ill-defined parenchymal opacity in the right para and infrahilar regions and the in the left infrahilar area. Heart has upper normal size. No pneumothorax on the right on the left     Persistent bilateral parenchymal opacities with diminish the pleural effusion mainly on the right-side. XR CHEST PORTABLE    Result Date: 12/15/2021  EXAMINATION: ONE XRAY VIEW OF THE CHEST 12/14/2021 9:46 pm COMPARISON: 12/12/2021 portable chest.  Also PE protocol chest CT from 12/11/2021. HISTORY: ORDERING SYSTEM PROVIDED HISTORY: sob TECHNOLOGIST PROVIDED HISTORY: Reason for exam:->sob FINDINGS: Overlying EKG leads. Cardiopericardial silhouette size remains within normal limits. Aortic arch and tracheobronchial tree calcification again noted. No pneumothorax. Small bilateral pleural effusions. Patchy bilateral airspace disease with relatively low density infiltrates seen within the bilateral mid to lower lung zones. Likely discoid subsegment subsegmental atelectasis within the lateral epiphrenic left base, with small consolidative infiltrate also considered. There is some indistinctness of central pulmonary vessels. No acute osseous abnormality is identified.  Contrast is seen within visualized left upper abdominal quadrant bowel. Intervally worse chest with bilateral airspace disease, as described. Though there may be a component of pulmonary edema, findings are concerning for multifocal pneumonia. Small bilateral pleural effusions. Underlying pulmonary hyperinflation. XR CHEST PORTABLE    Result Date: 12/12/2021  EXAMINATION: ONE XRAY VIEW OF THE CHEST 12/12/2021 8:26 am COMPARISON: 12/10/2021 HISTORY: ORDERING SYSTEM PROVIDED HISTORY: resp failure, effusions TECHNOLOGIST PROVIDED HISTORY: Reason for exam:->resp failure, effusions FINDINGS: The lungs are without acute focal process. Stable small left pleural effusion. No pneumothorax. The cardiomediastinal silhouette is without acute process. The osseous structures are without acute process. Stable appearance of the chest compared to 12/10/2021     XR CHEST PORTABLE    Result Date: 12/10/2021  EXAMINATION: ONE XRAY VIEW OF THE CHEST 12/10/2021 9:42 pm COMPARISON: 07/24/2020 portable chest. HISTORY: ORDERING SYSTEM PROVIDED HISTORY: shortness of breath, known covid TECHNOLOGIST PROVIDED HISTORY: Reason for exam:->shortness of breath, known covid FINDINGS: Stable normal cardiopericardial silhouette size. Stable arch calcification. No pneumothorax. Bibasilar opacities compatible with subsegmental atelectasis and or low-density infiltrate. Possible subtle infiltrate within the lateral right mid lung zone as well. Overlying oxygen tubing. No acute osseous abnormality. Trace pleural effusions not excluded. Patchy opacities within the lung bases and possibly lateral right mid lung zone, suspicious for multifocal pneumonia. Element of mild bibasilar subsegmental atelectasis and/or trace pleural effusions not excluded.      CTA PULMONARY W CONTRAST    Result Date: 12/11/2021  EXAMINATION: CTA OF THE CHEST 12/11/2021 12:54 am TECHNIQUE: CTA of the chest was performed after the administration of intravenous contrast.  Multiplanar reformatted images are provided for review. MIP images are provided for review. Dose modulation, iterative reconstruction, and/or weight based adjustment of the mA/kV was utilized to reduce the radiation dose to as low as reasonably achievable. COMPARISON: No prior CT. Correlation is made with portable chest radiographs from a few hours ago, as well as 07/24/2020. HISTORY: ORDERING SYSTEM PROVIDED HISTORY: eval for PE TECHNOLOGIST PROVIDED HISTORY: Reason for exam:->eval for PE Decision Support Exception - unselect if not a suspected or confirmed emergency medical condition->Emergency Medical Condition (MA) FINDINGS: Pulmonary Arteries: Pulmonary arteries are adequately opacified for evaluation. No evidence of intraluminal filling defect to suggest pulmonary embolism. Main pulmonary artery is normal in caliber. Mediastinum: No evidence of mediastinal lymphadenopathy. The heart and pericardium demonstrate no acute abnormality. There is no acute abnormality of the thoracic aorta. Left-sided coronary arterial calcifications. Moderate calcific atherosclerosis of the arch and descending aorta. Partial fluid opacification of the dependent bilateral mainstem bronchi, extending into the lower lobe bronchi on the left. Lungs/pleura: No pneumothorax. Small to moderate right and small left pleural effusions. Small calcified granuloma is seen within each upper lobe. There is an element of subsegmental dependent atelectasis bilaterally, with additional calcified granulomas seen within the atelectatic dependent basal left lower lobe. Consolidation within portions of the basal left lower lobe may be somewhat out of proportion to perceived volume loss, raising the question of superimposed pneumonia. Scattered patchy areas of increased attenuation within the upper lobes may also have components of both subsegmental atelectasis and infiltrate. No significant thickening of secondary interlobular septa.   Tracheobronchial tree calcification noted. Upper Abdomen: Visualized upper abdominal viscera demonstrate no acute abnormality. Soft Tissues/Bones: No acute bone or soft tissue abnormality. No evidence of pulmonary embolism. Small to moderate right pleural effusion and small left pleural effusion. In addition to associated element of dependent/compressive atelectasis within the bilateral lower lobes, consolidation may be somewhat out of proportion to perceived volume loss in areas of the lower left base, concerning for superimposed pneumonia, with dependent secretions seen within bilateral mainstem bronchi extending into the left lower lobe bronchi. Although some areas may simply represent simple atelectasis, there also several scattered low density infiltrates present within the bilateral upper lobes which could represent multifocal bronchopneumonia. A few tiny peripheral opacities within predominantly the lateral right upper lobe have somewhat nodular morphology, however these are nonspecific, particularly in current context, and require no surveillance, in the absence of risk factors for bronchogenic carcinoma, such as smoking. Fluoroscopy modified barium swallow with video    Result Date: 12/14/2021  EXAMINATION: MODIFIED BARIUM SWALLOW WAS PERFORMED IN CONJUNCTION WITH SPEECH PATHOLOGY SERVICES TECHNIQUE: Fluoroscopic evaluation of the swallowing mechanism was performed using cineradiography with multiple consistency of barium product in conjunction with speech pathology services. FLUOROSCOPY DOSE AND TYPE OR TIME AND EXPOSURES: Fluoro time: 2 minutes Images: 14 TD: 3.9 3 M Gy D AP: 0.601 Gy cm square COMPARISON: None HISTORY: ORDERING SYSTEM PROVIDED HISTORY: aspiration pneumonia TECHNOLOGIST PROVIDED HISTORY: Reason for exam:->aspiration pneumonia FINDINGS: Presence of penetration and aspiration to thin consistencies of barium contrast. No cough mechanism was triggered by the aspiration.  There is no aspiration penetration to nectar, pudding and cookie consistencies of barium contrast.     Penetration and aspiration to thin consistencies of barium contrast. Please see separate speech pathology report for full discussion of findings and recommendations. RECOMMENDATIONS: Unavailable         ASSESSMENT/PLAN : 80year-old        Myeloproliferative/myelodysplastic overlap syndrome  Patient with positive JAK2 mutation and prior thrombocytosis as well as history of chronic leukocytosis and monocytosis with CMML/MDS  He has been doing well for several years on Hydrea      His thrombocytopenia is likely related to myelosuppression by Covid pneumonia  His leukocytosis is chronic as well as his monocytosis but worsened by recent Covid infection  To continue Covid treatment as per ID  Resume Hydrea 500 mg twice daily  Trend daily CBC  We will follow    12/17/21  - CMML/MDS  - CBC ordered daily. Continue to trend CBC. -Thrombocytopenia is likely related to myelosuppression by Covid. Leukocytosis and monocytosis are chronic but worsened by recent Covid infection  - ID following for COVID infection on barcitinib and decadron. Also with aspiration pneumonia left lung secondary to MSSA on Cefazolin   - Continue Hydrea 500 mg twice daily  Worsening leukocytosis partly contributed to by steroids and dexamethasone  To check peripheral blood flow cytometry for AML  - We will continue to follow    12/19/21  - CMML/MDS  - CBC with slowly improving leukocytosis, 71 today. Hgb down to 7.5. Platelets 50  - Transfuse for Hgb <7  -Thrombocytopenia and anemia most likely related to myelosuppression by Covid  - Leukocytosis and monocytosis are chronic but worsened by recent Covid infection and steroid exposure  - ID following for COVID infection on barcitinib and decadron.   Also with aspiration pneumonia left lung secondary to MSSA on Cefazolin as well merrem   - Continue Hydrea 500 mg twice daily  - Pending peripheral blood flow cytometry for AML  - We will continue to follow.  Overall prognosis is poor given his comorbidities including immunocompromised state due to above and age      Rosina Mckay MD  Electronically signed 12/19/2021 at 9:41 AM

## 2021-12-19 NOTE — PROGRESS NOTES
Pulmonary Progress Note  12/19/2021 11:46 AM  Subjective:   Admit Date: 12/10/2021  PCP: Jaylene Villegas DO  Interval History:   Tested + for COVID 12/8 with desaturation at home to 87% with pox 92% on 2L. CT pulmonary angiogram was performed showing bilateral pleural effusions, basilar consolidation, and few peripheral opacities. 12/15: pt up to use BR and pox down to 70%. Placed on 10l HFNC; Decreased O2 to 6L with pox 95%. Feels dyspneic, coughing a lot    12/16: Now on 14L HFNC, pox 89-91% at bedside. Reports mild cough and dyspnea. Asking about his Wes order. 12/17 requires 15 L high flow nasal cannula +15 L nonrebreather mask to keep saturation above 88%. Afebrile    12/18 worsening Hypoxemia O?n and placed on  %. Low BP started on levophed and IVF, and transferred to MICU. Got ativan last night    12/19: Patient was intubated overnight. He is off levo. Plan to D/C bicarb drip. Start nepro tube feeds. Add fentanyl prn.      Diet: Diet NPO      Data:   Scheduled Meds:    sodium chloride  500 mL IntraVENous Once    baricitinib  1 mg Oral Daily    hydrocortisone sodium succinate PF  100 mg IntraVENous Q8H    meropenem  1,000 mg IntraVENous Q24H    warfarin  2.5 mg Oral Daily    lidocaine PF  5 mL IntraDERmal Once    sodium chloride flush  5-40 mL IntraVENous 2 times per day    heparin flush  3 mL IntraVENous 2 times per day    hydroxyurea  500 mg Oral BID    allopurinol  100 mg Oral Daily    atorvastatin  40 mg Oral Nightly    isosorbide mononitrate  30 mg Oral Daily    [Held by provider] metoprolol succinate  50 mg Oral Daily    mirtazapine  7.5 mg Oral Nightly    zinc sulfate  50 mg Oral Daily    Vitamin D  2,000 Units Oral Daily    guaiFENesin  400 mg Oral TID    ascorbic acid  1,000 mg Oral Daily    budesonide-formoterol  2 puff Inhalation BID       Continuous Infusions:    sodium chloride 100 mL/hr at 12/19/21 0525    norepinephrine 2 mcg/min (12/19/21 1133)    IV pneumothorax. The cardiomediastinal silhouette is without  acute process. The osseous structures are without acute process. Impression  Stable appearance of the chest compared to 12/10/2021    CXR 12/15/21: Persistent bilateral parenchymal opacities with diminish the pleural effusion mainly on the right-side. Micro:  No results for input(s): BC in the last 72 hours. 12/12 Resp Culture:   Group 5: >25 PMN's/LPF and <10 Epithelial cells/LPF   Moderate Polymorphonuclear leukocytes   Moderate yeast   Few Gram negative rods   Rare Gram positive rods   Rare Gram positive cocci in clusters       No results for input(s): ORG in the last 72 hours. No results for input(s): Derryl Drum in the last 72 hours. No results for input(s): STREPPNEUMAG in the last 72 hours. No results for input(s): LEGUR in the last 72 hours. No results for input(s): ORG in the last 72 hours. No results for input(s): ASO in the last 72 hours. No results for input(s): CULTRESP in the last 72 hours. No results for input(s): LABURIN in the last 72 hours.   Vent Information  $Ventilation: $Subsequent Day  Skin Assessment: Clean, dry, & intact  Vent Type: 980  Vent Mode: AC/VC  Vt Ordered: 500 mL  Rate Set: 26 bmp  Peak Flow: 70 L/min  Pressure Support: 0 cmH20  FiO2 : 45 %  SpO2: 92 %  SpO2/FiO2 ratio: 204.44  Sensitivity: 3  PEEP/CPAP: 5  I Time/ I Time %: 0 s  Humidification Source: Heated wire  Humidification Temp: 37  Humidification Temp Measured: 36.7  Circuit Condensation: Drained  Mask Type: Full face mask  Mask Size: Medium    Additional Respiratory  Assessments  Pulse: 90  Resp: 25  SpO2: 92 %  Position: Semi-Pena's  Humidification Source: Heated wire  Humidification Temp: 37  Circuit Condensation: Drained  Oral Care: Mouth suctioned  Subglottic Suction Done?: Yes  Cuff Pressure (cm H2O): 29 cm H2O    Objective:   Vitals:   Vitals:    12/19/21 1130   BP: (!) 86/42   Pulse: 90   Resp: 25   Temp:    SpO2: 92%      TEMP:Current: Temp: 98.5 °F (36.9 °C)  Max: Temp  Av.1 °F (36.7 °C)  Min: 97.5 °F (36.4 °C)  Max: 98.5 °F (36.9 °C)    BP Range: Systolic (38URG), FPB:471 , Min:78 , BIS:600     Diastolic (44UVQ), NZX:17, Min:41, Max:66      General appearance: lethargic appears stated age and cooperative. In no acute distress  Skin: No rashes or lesions  HEENT: mucous membranes are moist  Neck: No JVD  Lungs: symmetrical expansion, diminished with crackles, no wheezing, no use of accessory muscles. Heart: S1S2,  +murmurs,  Abdomen: soft, non tender,   Extremities: no peripheral edema; R arm with significant ecchymosis from fingers up to bicep area.  BKA with prosthesis   Neurologic: lethargic  Affect: pleasant      Assessment:   Patient Active Problem List:     Atrial fibrillation ([facility]     Hyperlipemia     Anticoagulant long-term use     Claudication of left lower extremity (HCC)     Pneumonia of both lower lobes due to infectious organism     Paroxysmal atrial fibrillation (HCC)     Warfarin anticoagulation     Warfarin overdosage     Critical lower limb ischemia ([facility])     Hyperlipidemia     Hypertension     Lymphocytosis     Skin ulcer of left knee with fat layer exposed ([address]     Syncope and collapse     Wound infection     Blurry vision, bilateral     Anticoagulated on Coumadin     Aortic stenosis, moderate     Sinus node dysfunction (HCC)     Sepsis due to pneumonia ([facility])     Chest pain     Precordial pain     Acute rhinitis     Closed nondisplaced intertrochanteric fracture of right femur ([address]     Aortic valve disease     Closed right hip fracture, initial encounter ([facility])     S/P right hip fracture     Diarrhea     Weight loss     Lacerations of multiple sites of right arm     Acute respiratory disease due to COVID-19 virus  Worsening MARCIAL    Plan:   Acute respiratory failure, Covid pneumonia  MVS #2, improving  Will cut down RR  To start CPAP trials     CV  Off pressors now  To start BB in am?  On coumadin     ID  Merro + Levaquin ( x one )   By ID     C-19   Will cut down IV steroids  Rj #7     GI   To add PPI and start Nepro     MARCIAL, worsening MARCIAL  Marginal U/O  D/c bicarb gtt     Sedation   As needed fentanyl    DNR CCA.

## 2021-12-19 NOTE — PLAN OF CARE
Problem: Falls - Risk of:  Goal: Will remain free from falls  Description: Will remain free from falls  12/19/2021 0742 by Yuri Duque RN  Outcome: Met This Shift  12/19/2021 0331 by Martínez Palacios RN  Outcome: Met This Shift  Goal: Absence of physical injury  Description: Absence of physical injury  12/19/2021 0742 by Yuri Duque RN  Outcome: Met This Shift  12/19/2021 0331 by Martínez Palacios RN  Outcome: Met This Shift     Problem: Airway Clearance - Ineffective  Goal: Achieve or maintain patent airway  12/19/2021 0331 by Martínez Palacios RN  Outcome: Met This Shift     Problem: Gas Exchange - Impaired  Goal: Absence of hypoxia  12/19/2021 0331 by Martínez Palacios RN  Outcome: Met This Shift  Goal: Promote optimal lung function  12/19/2021 0331 by Martínez Palacios RN  Outcome: Met This Shift     Problem: Breathing Pattern - Ineffective  Goal: Ability to achieve and maintain a regular respiratory rate  12/19/2021 0331 by Martínez Palacios RN  Outcome: Met This Shift     Problem:  Body Temperature -  Risk of, Imbalanced  Goal: Ability to maintain a body temperature within defined limits  12/19/2021 0331 by Martínez Palacios RN  Outcome: Met This Shift     Problem: Isolation Precautions - Risk of Spread of Infection  Goal: Prevent transmission of infection  12/19/2021 0331 by Martínez Palacios RN  Outcome: Met This Shift     Problem: Risk for Fluid Volume Deficit  Goal: Maintain absence of muscle cramping  12/19/2021 0331 by Martínez Palacios RN  Outcome: Met This Shift  Goal: Maintain normal serum potassium, sodium, calcium, phosphorus, and pH  12/19/2021 0331 by Martínez Palacios RN  Outcome: Met This Shift     Problem: Patient Education: Go to Patient Education Activity  Goal: Patient/Family Education  12/19/2021 0331 by Martínez Palacios RN  Outcome: Met This Shift     Problem: Skin Integrity:  Goal: Will show no infection signs and symptoms  Description: Will show no infection signs and symptoms  12/19/2021 0742 by Tristan Castro RN  Outcome: Met This Shift  12/19/2021 0331 by Barbara Berry RN  Outcome: Met This Shift  Goal: Absence of new skin breakdown  Description: Absence of new skin breakdown  12/19/2021 0742 by Tristan Castro RN  Outcome: Met This Shift  12/19/2021 0331 by Barbara Berry RN  Outcome: Met This Shift     Problem: Non-Violent Restraints  Goal: No harm/injury to patient while restraints in use  12/19/2021 0742 by Tristan Castro RN  Outcome: Met This Shift  12/19/2021 0331 by Barbara Berry RN  Outcome: Met This Shift  Goal: Patient's dignity will be maintained  12/19/2021 0742 by Tristan Castro RN  Outcome: Met This Shift  12/19/2021 0331 by Barbara Berry RN  Outcome: Met This Shift

## 2021-12-20 NOTE — PROGRESS NOTES
Dr Filomena Kumari,   Pharmacy placed Baricitinib on hold for GFR less than 15. We will evaluate daily. Thank Lena Lima Pharm. D 12/20/2021 6:51 AM

## 2021-12-20 NOTE — PROGRESS NOTES
[address] [place] [address] [facility]  ARIANE  Progress Note    SUBJECTIVE:  Chief Complaint   Patient presents with    Shortness of Breath     covid + on tuesday, increased sob last several days, from home     The patient remains intubated. She is still in the ICU. He is on vasopressors. Review of systems:  As stated above in the chief complaint, otherwise negative. Medications:  Scheduled Meds:   midodrine  5 mg Oral TID WC    [START ON 2021] pantoprazole  40 mg IntraVENous Daily    Arformoterol Tartrate  15 mcg Nebulization BID    budesonide  0.5 mg Nebulization BID    hydrocortisone sodium succinate PF  50 mg IntraVENous Q8H    [Held by provider] baricitinib  1 mg Oral Daily    meropenem  1,000 mg IntraVENous Q24H    lidocaine PF  5 mL IntraDERmal Once    sodium chloride flush  5-40 mL IntraVENous 2 times per day    heparin flush  3 mL IntraVENous 2 times per day    hydroxyurea  500 mg Oral BID    allopurinol  100 mg Oral Daily    atorvastatin  40 mg Oral Nightly    isosorbide mononitrate  30 mg Oral Daily    [Held by provider] metoprolol succinate  50 mg Oral Daily    mirtazapine  7.5 mg Oral Nightly    zinc sulfate  50 mg Oral Daily    Vitamin D  2,000 Units Oral Daily    guaiFENesin  400 mg Oral TID    ascorbic acid  1,000 mg Oral Daily     Continuous Infusions:   sodium chloride 150 mL/hr at 21 2000    norepinephrine 1 mcg/min (21 0944)    sodium chloride 33.3 mL/hr at 21 1551    sodium chloride       PRN Meds:fentanNYL, sodium chloride flush, sodium chloride, heparin flush, acetaminophen, guaiFENesin-dextromethorphan, benzonatate    OBJECTIVE:  BP (!) 105/53   Pulse 114   Temp 98 °F (36.7 °C) (Axillary)   Resp (!) 32   Wt 125 lb 10.6 oz (57 kg)   SpO2 94%   BMI 18.56 kg/m²   Temp  Av °F (36.7 °C)  Min: 97.8 °F (36.6 °C)  Max: 98.2 °F (36.8 °C)  Constitutional: The patient is lying in bed in the ICU. He is intubated and sedated. FiO2 40%. PEEP 5. Skin: Warm and dry. No rashes were noted. HEENT: Round and reactive pupils. Moist mucous membranes. No ulcerations or thrush. ET tube. OG tube. Neck: Supple to movements. Chest: Using accessory muscles. Rhonchi left base posteriorly  Cardiovascular: S1 and S2 are rhythmic and regular. 2/6 midsystolic murmur. Abdomen: Positive bowel sounds to auscultation. Benign to palpation. No masses felt. Extremities: Bilateral AKA stumps. Lines: Right PICC 12/17/2021. Aponte catheter.     Laboratory and Tests Review:  Lab Results   Component Value Date    .2 (H) 12/20/2021    WBC 71.3 (H) 12/19/2021    WBC 95.9 (H) 12/18/2021    HGB 7.4 (L) 12/20/2021    HCT 23.8 (L) 12/20/2021    .3 (H) 12/20/2021    PLT 46 (L) 12/20/2021     Lab Results   Component Value Date    NEUTROABS 89.33 (H) 12/20/2021    NEUTROABS 64.17 (H) 12/19/2021    NEUTROABS 85.35 (H) 12/18/2021     No results found for: [facility]  Lab Results   Component Value Date    ALT <5 12/20/2021     (H) 12/20/2021    ALKPHOS 82 12/20/2021    BILITOT 0.3 12/20/2021     Lab Results   Component Value Date     12/20/2021    K 4.0 12/20/2021    K 3.9 08/06/2020     12/20/2021    CO2 18 12/20/2021     12/20/2021    CREATININE 4.7 12/20/2021    CREATININE 4.1 12/19/2021    CREATININE 3.1 12/18/2021    GFRAA 14 12/20/2021    LABGLOM 12 12/20/2021    GLUCOSE 145 12/20/2021    GLUCOSE 94 01/24/2012    PROT 4.5 12/20/2021    LABALBU 2.4 12/20/2021    LABALBU 4.1 01/24/2012    CALCIUM 5.9 12/20/2021    BILITOT 0.3 12/20/2021    ALKPHOS 82 12/20/2021     12/20/2021    ALT <5 12/20/2021     Lab Results   Component Value Date    CRP 8.1 (H) 12/16/2021    CRP 6.8 (H) 12/15/2021    CRP 2.4 (H) 12/14/2021     Lab Results   Component Value Date    SEDRATE 33 (H) 12/17/2021    SEDRATE 14 12/10/2021    SEDRATE 56 (H) 01/17/2016     Radiology:  Chest x-ray reviewed    Microbiology:   Streptococcus pneumoniae/Legionella urine Ag: negative  Nares screen MRSA: Negative  Respiratory culture 12/12/2021: Moderate MSSA, rare Stenotrophomonas maltophilia (sensitive to Bactrim and Levaquin)  Respiratory culture 12/18/2021: Pending    Recent Labs     12/18/21  0158 12/20/21  0600   PROCAL 1.55* 19.42*       ASSESSMENT:  · SARS-CoV-2 infection with moderate viral pneumonia in a fully vaccinated patient  · Aspiration pneumonia left lung secondary to MSSA. 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 White count is coming down  · Acute respiratory failure. This is most likely secondary to leukemia causing hyperviscosity in blood vessels in the lungs. Now that white count is coming down, FiO2 requirements are coming down as well  · Possible septic shock  · Acute kidney injury    PLAN:  · Continue Meropenem  · We will follow with you  · Prognosis remains poor    Case discussed with ICU team and spoke with son.     Danelle Adams MD  11:07 AM  12/20/2021

## 2021-12-20 NOTE — CARE COORDINATION
Int/vent; iv levophed; iv merrem ; solucortef; [facility] DME following; back door  [employer] [facility]  Following. Int/vent fio2  cpap trials . Needs Savannah Juarez RN,CM.

## 2021-12-20 NOTE — PROGRESS NOTES
NEPHROLOGY Attending   Progress Note  12/20/2021 1:49 PM  Subjective:   Admit Date: 12/10/2021  PCP: Mattie Whitney DO    Interval History:     12/20/21: Seen through the window in the ICU setting - not directly examined due to strict Covid isolation - requiring pressors - intubated - family in waiting room       Diet: Diet NPO  ADULT TUBE FEEDING; Nasogastric; Renal Formula; Continuous; 20; Yes; 20; Q 4 hours; 50; 100; Q 4 hours    Data:   Scheduled Meds:   midodrine  5 mg Oral TID WC    [START ON 12/21/2021] pantoprazole  40 mg IntraVENous Daily    Arformoterol Tartrate  15 mcg Nebulization BID    budesonide  0.5 mg Nebulization BID    hydrocortisone sodium succinate PF  50 mg IntraVENous Q8H    [Held by provider] baricitinib  1 mg Oral Daily    meropenem  1,000 mg IntraVENous Q24H    lidocaine PF  5 mL IntraDERmal Once    sodium chloride flush  5-40 mL IntraVENous 2 times per day    heparin flush  3 mL IntraVENous 2 times per day    hydroxyurea  500 mg Oral BID    allopurinol  100 mg Oral Daily    atorvastatin  40 mg Oral Nightly    isosorbide mononitrate  30 mg Oral Daily    [Held by provider] metoprolol succinate  50 mg Oral Daily    mirtazapine  7.5 mg Oral Nightly    zinc sulfate  50 mg Oral Daily    Vitamin D  2,000 Units Oral Daily    guaiFENesin  400 mg Oral TID    ascorbic acid  1,000 mg Oral Daily     Continuous Infusions:   sodium chloride 150 mL/hr at 12/19/21 2000    norepinephrine 1 mcg/min (12/20/21 0944)    sodium chloride 33.3 mL/hr at 12/19/21 1551    sodium chloride       PRN Meds:fentanNYL, sodium chloride flush, sodium chloride, heparin flush, acetaminophen, guaiFENesin-dextromethorphan, benzonatate    Intake/Output Summary (Last 24 hours) at 12/20/2021 1349  Last data filed at 12/20/2021 0700  Gross per 24 hour   Intake 5200 ml   Output 111 ml   Net 5089 ml     CBC:   Recent Labs     12/18/21  0158 12/19/21  0530 12/20/21  0600   WBC 95.9* 71.3* 105.2*   HGB 9.3* 7.5* 7.4*   PLT 98* 50* 46*     BMP:    Recent Labs     12/18/21  0158 12/19/21  0530 12/20/21  0600   * 146 146   K 4.7 3.9 4.0   * 105 105   CO2 19* 21* 18*   BUN 77* 111* 118*   CREATININE 3.1* 4.1* 4.7*   GLUCOSE 141* 268* 145*     Hepatic:   Recent Labs     12/19/21  0530 12/20/21  0600   * 157*   ALT <5 <5   BILITOT 0.3 0.3   ALKPHOS 59 82     Troponin: No results for input(s): TROPONINI in the last 72 hours. BNP: No results for input(s): BNP in the last 72 hours. Lipids: No results for input(s): CHOL, HDL in the last 72 hours. Invalid input(s): LDLCALCU  ABGs: No results found for: PHART, PO2ART, TBC0SHG  INR:   Recent Labs     12/18/21  0158 12/19/21  0530 12/20/21  0600   INR 1.7 2.1 4.5     -----------------------------------------------------------------  RAD: XR CHEST PORTABLE    Result Date: 12/12/2021  EXAMINATION: ONE XRAY VIEW OF THE CHEST 12/12/2021 8:26 am COMPARISON: 12/10/2021 HISTORY: ORDERING SYSTEM PROVIDED HISTORY: resp failure, effusions TECHNOLOGIST PROVIDED HISTORY: Reason for exam:->resp failure, effusions FINDINGS: The lungs are without acute focal process. Stable small left pleural effusion. No pneumothorax. The cardiomediastinal silhouette is without acute process. The osseous structures are without acute process. Stable appearance of the chest compared to 12/10/2021     CTA PULMONARY W CONTRAST    Result Date: 12/11/2021  EXAMINATION: CTA OF THE CHEST 12/11/2021 12:54 am TECHNIQUE: CTA of the chest was performed after the administration of intravenous contrast.  Multiplanar reformatted images are provided for review. MIP images are provided for review. Dose modulation, iterative reconstruction, and/or weight based adjustment of the mA/kV was utilized to reduce the radiation dose to as low as reasonably achievable. COMPARISON: No prior CT. Correlation is made with portable chest radiographs from a few hours ago, as well as 07/24/2020.  HISTORY: ORDERING SYSTEM PROVIDED HISTORY: eval for PE TECHNOLOGIST PROVIDED HISTORY: Reason for exam:->eval for PE Decision Support Exception - unselect if not a suspected or confirmed emergency medical condition->Emergency Medical Condition (MA) FINDINGS: Pulmonary Arteries: Pulmonary arteries are adequately opacified for evaluation. No evidence of intraluminal filling defect to suggest pulmonary embolism. Main pulmonary artery is normal in caliber. Mediastinum: No evidence of mediastinal lymphadenopathy. The heart and pericardium demonstrate no acute abnormality. There is no acute abnormality of the thoracic aorta. Left-sided coronary arterial calcifications. Moderate calcific atherosclerosis of the arch and descending aorta. Partial fluid opacification of the dependent bilateral mainstem bronchi, extending into the lower lobe bronchi on the left. Lungs/pleura: No pneumothorax. Small to moderate right and small left pleural effusions. Small calcified granuloma is seen within each upper lobe. There is an element of subsegmental dependent atelectasis bilaterally, with additional calcified granulomas seen within the atelectatic dependent basal left lower lobe. Consolidation within portions of the basal left lower lobe may be somewhat out of proportion to perceived volume loss, raising the question of superimposed pneumonia. Scattered patchy areas of increased attenuation within the upper lobes may also have components of both subsegmental atelectasis and infiltrate. No significant thickening of secondary interlobular septa. Tracheobronchial tree calcification noted. Upper Abdomen: Visualized upper abdominal viscera demonstrate no acute abnormality. Soft Tissues/Bones: No acute bone or soft tissue abnormality. No evidence of pulmonary embolism. Small to moderate right pleural effusion and small left pleural effusion.  In addition to associated element of dependent/compressive atelectasis within the bilateral lower lobes, consolidation may be somewhat out of proportion to perceived volume loss in areas of the lower left base, concerning for superimposed pneumonia, with dependent secretions seen within bilateral mainstem bronchi extending into the left lower lobe bronchi. Although some areas may simply represent simple atelectasis, there also several scattered low density infiltrates present within the bilateral upper lobes which could represent multifocal bronchopneumonia. A few tiny peripheral opacities within predominantly the lateral right upper lobe have somewhat nodular morphology, however these are nonspecific, particularly in current context, and require no surveillance, in the absence of risk factors for bronchogenic carcinoma, such as smoking.          Objective:   Vitals:   Vitals:    12/20/21 1200   BP: (!) 112/56   Pulse: 113   Resp: (!) 31   Temp:    SpO2: 91%     Patient Vitals for the past 24 hrs:   BP Temp Temp src Pulse Resp SpO2 Height Weight   12/20/21 1200 (!) 112/56   113 (!) 31 91 %     12/20/21 1156       5' 9\" (1.753 m)    12/20/21 1100 (!) 100/54   101 30 90 %     12/20/21 0900 (!) 105/53   114 (!) 32 94 %     12/20/21 0800 112/68   108 (!) 31 95 %     12/20/21 0700 (!) 113/56   110 (!) 34 94 %     12/20/21 0600 (!) 110/52   112 30 91 %     12/20/21 0500 (!) 109/56   105 (!) 31 91 %     12/20/21 0400 129/76 98 °F (36.7 °C) Axillary 103 (!) 34 98 %  125 lb 10.6 oz (57 kg)   12/20/21 0328    107 25 91 %     12/20/21 0300 (!) 115/58   122 (!) 33 92 %     12/20/21 0200 114/60   118 29 90 %     12/20/21 0100 (!) 90/46   110 27 94 %     12/20/21 0017    94 26 91 %     12/20/21 0000 (!) 107/51 97.9 °F (36.6 °C) Axillary 112 28 91 %     12/19/21 2300 (!) 98/54   108 24 93 %     12/19/21 2200 (!) 107/53   100 26 94 %     12/19/21 2100 127/61   95 26 94 %     12/19/21 2032    105 25 (!) 86 %     12/19/21 2000 117/62 97.8 °F (36.6 °C) Axillary 105 23 90 %     12/19/21 1900 (!) 111/52   103 18 90 %     12/19/21 1830 116/60   96 23 90 %     12/19/21 1800 114/67 97.8 °F (36.6 °C)  96 24 92 %     12/19/21 1730 (!) 98/52   93 22 93 %     12/19/21 1700 113/63   92 22 (!) 89 %     12/19/21 1656    89 22 93 %     12/19/21 1641    92 16 93 %     12/19/21 1630 108/63   119 23 94 %     12/19/21 1600 111/60 98.2 °F (36.8 °C)  89 18 94 %     12/19/21 1530 112/67   96 24 94 %     12/19/21 1500 (!) 103/54   103 29 94 %     12/19/21 1430 (!) 102/56   109 27 93 %     12/19/21 1400 (!) 108/59 98.2 °F (36.8 °C)  98 23 98 %       According to institutional recommendations and guidelines, a face-to-face encounter was not performed due to the current efforts to prevent transmission of COVID-19 and the need to preserve PPE for other caregivers. [facility] records, nursing assessment, and diagnostic testing including laboratory results and imaging were reviewed. Please reference any relevant documentation elsewhere. Dorita Parson will be coordinated with the primary services and consultants.       Assessment/Plan:     1) MARCIAL on CKD Stage 4  Baseline serum creatinine 0.9-1.0 as of 9/15/2020  Now serum creatinine 1.5 upon admission  Suspect serum creatinine underreflecting actual GFR as he has very little muscle mass  Urine lytes as of 12/16/2021: Sodium 50, creatinine 64, and urea 773  SCr past 72 hrs: 1.6 -> 3.1 -> 4.1   PLAN:   1.  IV fluids have been initiated as well initial pressor support w/ no improvement    2.   Patient's condition was discussed in detail with the patient's wife and daughter - we did discuss needing renal placement therapy as the patient does not have improvement in the renal function - family wishes to proceed with dialysis - discussed verbally with ICU staff as well as dialysis RN     2) Hypernatremia reflecting poor p.o. intake with component of superimposed diuresis   Follow response to current IV

## 2021-12-20 NOTE — PROGRESS NOTES
Admit Date: 12/10/2021    Subjective: All notes event reviewed  On vent vasopressor     Objective:     Patient Vitals for the past 8 hrs:   BP Temp Temp src Pulse Resp SpO2 Weight   12/20/21 0500 (!) 109/56   105 (!) 31 91 %    12/20/21 0400 129/76 98 °F (36.7 °C) Axillary 103 (!) 34 98 % 125 lb 10.6 oz (57 kg)   12/20/21 0328    107 25 91 %    12/20/21 0300 (!) 115/58   122 (!) 33 92 %    12/20/21 0200 114/60   118 29 90 %    12/20/21 0100 (!) 90/46   110 27 94 %    12/20/21 0017    94 26 91 %    12/20/21 0000 (!) 107/51 97.9 °F (36.6 °C) Axillary 112 28 91 %      I/O last 3 completed shifts: In: 270 [I.V.:10; NG/GT:260]  Out: 76 [Urine:75; Stool:1]  No intake/output data recorded.         Scheduled Meds:   pantoprazole  40 mg IntraVENous Daily    And    sodium chloride (PF)  10 mL IntraVENous Daily    hydrocortisone sodium succinate PF  50 mg IntraVENous Q8H    sodium chloride  500 mL IntraVENous Once    [Held by provider] baricitinib  1 mg Oral Daily    meropenem  1,000 mg IntraVENous Q24H    warfarin  2.5 mg Oral Daily    lidocaine PF  5 mL IntraDERmal Once    sodium chloride flush  5-40 mL IntraVENous 2 times per day    heparin flush  3 mL IntraVENous 2 times per day    hydroxyurea  500 mg Oral BID    allopurinol  100 mg Oral Daily    atorvastatin  40 mg Oral Nightly    isosorbide mononitrate  30 mg Oral Daily    [Held by provider] metoprolol succinate  50 mg Oral Daily    mirtazapine  7.5 mg Oral Nightly    zinc sulfate  50 mg Oral Daily    Vitamin D  2,000 Units Oral Daily    guaiFENesin  400 mg Oral TID    ascorbic acid  1,000 mg Oral Daily    budesonide-formoterol  2 puff Inhalation BID     Continuous Infusions:   sodium chloride 150 mL/hr at 12/19/21 2000    norepinephrine 2 mcg/min (12/19/21 2000)    sodium chloride 33.3 mL/hr at 12/19/21 1551    sodium chloride      sodium chloride         CBC with Differential:    Lab Results   Component Value Date WBC 71.3 12/19/2021    RBC 1.87 12/19/2021    HGB 7.5 12/19/2021    HCT 23.1 12/19/2021    PLT 50 12/19/2021    .5 12/19/2021    MCH 40.1 12/19/2021    MCHC 32.5 12/19/2021    RDW 14.3 12/19/2021    NRBC 0.9 12/19/2021    BANDSPCT 2 10/12/2016    BLASTSPCT 0.9 08/18/2020    METASPCT 0.9 12/19/2021    LYMPHOPCT 1.7 12/19/2021    MONOPCT 8.8 12/19/2021    MYELOPCT 0.9 12/19/2021    BASOPCT 0.0 12/19/2021    MONOSABS 6.42 12/19/2021    LYMPHSABS 1.43 12/19/2021    EOSABS 0.00 12/19/2021    BASOSABS 0.00 12/19/2021     CMP:    Lab Results   Component Value Date     12/20/2021    K 4.0 12/20/2021    K 3.9 08/06/2020     12/20/2021    CO2 18 12/20/2021     12/20/2021    CREATININE 4.7 12/20/2021    GFRAA 14 12/20/2021    LABGLOM 12 12/20/2021    PROT 4.5 12/20/2021    LABALBU 2.4 12/20/2021    LABALBU 4.1 01/24/2012    CALCIUM 5.9 12/20/2021    BILITOT 0.3 12/20/2021    ALKPHOS 82 12/20/2021     12/20/2021    ALT <5 12/20/2021     PT/INR:    Lab Results   Component Value Date    PROTIME 48.6 12/20/2021    PROTIME 28.4 02/18/2020    INR 4.5 12/20/2021       Assessment:     Active Problems:   Acute hypoxic respiratory failure  Pneumonia  Covid 19 infection   Pleural effusion   Leukocytosis  Myeloproliferative syndrome       Plan:   Continue same as per ICU team

## 2021-12-20 NOTE — PROGRESS NOTES
Critical Care Team - Daily Progress Note         Date and time: 12/20/2021 12:12 PM  Patient's name:  Erin Cote Record Number: [id number]  Patient's account/billing number: [de-identified]  Patient's YOB: 1929  Age: 80 y.o. Date of Admission: 12/10/2021  9:15 PM  Length of stay during current admission: 9      Primary Care Physician: Jerson Vyas DO  ICU Attending Physician: Dr. Plaza Comfort    Code Status: DNR-CCA    Reason for ICU admission:   Acute hypoxic respiratory failure secondary to Covid pneumonia      SUBJECTIVE:     OVERNIGHT EVENTS:           Tested + for COVID 12/8 with desaturation at home to 87% with pox 92% on 2L. CT pulmonary angiogram was performed showing bilateral pleural effusions, basilar consolidation, and few peripheral opacities. 12/15: pt up to use BR and pox down to 70%. Placed on 10l HFNC; Decreased O2 to 6L with pox 95%. Feels dyspneic, coughing a lot    12/16: Now on 14L HFNC, pox 89-91% at bedside. Reports mild cough and dyspnea. Asking about his Wes order. 12/17 requires 15 L high flow nasal cannula +15 L nonrebreather mask to keep saturation above 88%. Afebrile    12/18 worsening Hypoxemia O?n and placed on  %. Low BP started on levophed and IVF, and transferred to MICU. Got ativan last night    12/19: Patient was intubated overnight. He is off levo. Plan to D/C bicarb drip. Start nepro tube feeds. Add fentanyl prn.     12/20: No acute issues. Much more awake.  Patient is following commands      CURRENT VENTILATION STATUS:     [x] Ventilator  [] BIPAP  [] Nasal Cannula [] Room Air      IF INTUBATED, ET TUBE MARKING AT LOWER LIP:       cms    SECRETIONS Amount:  [] Small [] Moderate  [] Large  [x] None  Color:     [] White [] Colored  [] Bloody    SEDATION:  RAAS Score:  [] Propofol gtt  [] Versed gtt  [] Ativan gtt   [] No Sedation    PARALYZED:  [x] No    [] Yes      VASOPRESSORS:  [x] No    [x] Yes    If yes -   [x] Levophed [] Dopamine     [] Vasopressin       [] Dobutamine  [] Phenylephrine         [] Epinephrine    CENTRAL LINES:     [x] No   [] Yes   (Date of Insertion:   )           If yes -     [] Right IJ     [] Left IJ [] Right Femoral [] Left Femoral                   [] Right Subclavian [] Left Subclavian       VELA'S CATHETER:   [] No   [x] Yes  (Date of Insertion:   )     URINE OUTPUT:            [] Good   [x] Low              [] Anuric      OBJECTIVE:     VITAL SIGNS:  BP (!) 105/53   Pulse 114   Temp 98 °F (36.7 °C) (Axillary)   Resp (!) 32   Wt 125 lb 10.6 oz (57 kg)   SpO2 94%   BMI 18.56 kg/m²   Tmax over 24 hours:  Temp (24hrs), Av °F (36.7 °C), Min:97.8 °F (36.6 °C), Max:98.2 °F (36.8 °C)      Patient Vitals for the past 6 hrs:   BP Pulse Resp SpO2   21 0900 (!) 105/53 114 (!) 32 94 %   21 0800 112/68 108 (!) 31 95 %   21 0700 (!) 113/56 110 (!) 34 94 %         Intake/Output Summary (Last 24 hours) at 2021 1212  Last data filed at 2021 0700  Gross per 24 hour   Intake 5200 ml   Output 111 ml   Net 5089 ml     Wt Readings from Last 2 Encounters:   21 125 lb 10.6 oz (57 kg)   21 130 lb (59 kg)     Body mass index is 18.56 kg/m². PHYSICAL EXAMINATION:    General appearance: lethargic appears stated age and cooperative. In no acute distress, intubated  Skin: No rashes or lesions  HEENT: mucous membranes are moist  Neck: No JVD  Lungs: symmetrical expansion, diminished with crackles, no wheezing, no use of accessory muscles. Heart: S1S2,  +murmurs,  Abdomen: soft, non tender,   Extremities: no peripheral edema; R arm with significant ecchymosis from fingers up to bicep area.  BKA with prosthesis   Neurologic: lethargic, following commands            MEDICATIONS:    Scheduled Meds:   midodrine  5 mg Oral TID WC    [START ON 2021] pantoprazole  40 mg IntraVENous Daily    Arformoterol Tartrate  15 mcg Nebulization BID    budesonide  0.5 mg Nebulization BID    hydrocortisone sodium succinate PF  50 mg IntraVENous Q8H    [Held by provider] baricitinib  1 mg Oral Daily    meropenem  1,000 mg IntraVENous Q24H    lidocaine PF  5 mL IntraDERmal Once    sodium chloride flush  5-40 mL IntraVENous 2 times per day    heparin flush  3 mL IntraVENous 2 times per day    hydroxyurea  500 mg Oral BID    allopurinol  100 mg Oral Daily    atorvastatin  40 mg Oral Nightly    isosorbide mononitrate  30 mg Oral Daily    [Held by provider] metoprolol succinate  50 mg Oral Daily    mirtazapine  7.5 mg Oral Nightly    zinc sulfate  50 mg Oral Daily    Vitamin D  2,000 Units Oral Daily    guaiFENesin  400 mg Oral TID    ascorbic acid  1,000 mg Oral Daily     Continuous Infusions:   sodium chloride 150 mL/hr at 12/19/21 2000    norepinephrine 1 mcg/min (12/20/21 0944)    sodium chloride 33.3 mL/hr at 12/19/21 1551    sodium chloride       PRN Meds:   fentanNYL, 25 mcg, Q2H PRN  sodium chloride flush, 5-40 mL, PRN  sodium chloride, 25 mL, PRN  heparin flush, 3 mL, PRN  acetaminophen, 650 mg, Q4H PRN  guaiFENesin-dextromethorphan, 5 mL, Q4H PRN  benzonatate, 200 mg, TID PRN          VENT SETTINGS (Comprehensive) (if applicable):  Vent Information  $Ventilation: $Subsequent Day  Skin Assessment: Clean, dry, & intact  Equipment ID: [id number]  Vent Type: [phone]  Vent Mode: (S) PS  Vt Ordered: 500 mL  Rate Set: 16 bmp  Peak Flow: 0 L/min  Pressure Support: (S) 15 cmH20  FiO2 : 60 %  SpO2: 94 %  SpO2/FiO2 ratio: 156.67  Sensitivity: 3  PEEP/CPAP: 5  I Time/ I Time %: 0 s  Humidification Source: Heated wire  Humidification Temp: 37  Humidification Temp Measured: 36.9  Circuit Condensation: Drained  Mask Type: Full face mask  Mask Size: Medium  Additional Respiratory  Assessments  Pulse: 114  Resp: (!) 32  SpO2: 94 %  Position: Semi-Pena's  Humidification Source: Heated wire  Humidification Temp: 37  Circuit Condensation: Drained  Oral Care: Mouth swabbed,Mouth moisturizer,Mouth suctioned,Suction toothette  Subglottic Suction Done?: Yes  Airway Type: ET  Airway Size: 7.5  Cuff Pressure (cm H2O): 29 cm H2O    ABGs:   Recent Labs     12/20/21  0604   PH 7.431   PCO2 29.3*   PO2 72.3*   HCO3 19.1*   BE -4.5*   O2SAT 93.2       Laboratory findings:    Complete Blood Count:   Recent Labs     12/18/21  0158 12/19/21  0530 12/20/21  0600   WBC 95.9* 71.3* 105.2*   HGB 9.3* 7.5* 7.4*   HCT 31.1* 23.1* 23.8*   PLT 98* 50* 46*        Last 3 Blood Glucose:   Recent Labs     12/18/21 0158 12/19/21  0530 12/20/21  0600   GLUCOSE 141* 268* 145*        PT/INR:    Lab Results   Component Value Date    PROTIME 48.6 12/20/2021    PROTIME 28.4 02/18/2020    INR 4.5 12/20/2021     PTT:    Lab Results   Component Value Date    APTT 38.0 07/24/2020       Comprehensive Metabolic Profile:   Recent Labs     12/18/21  0158 12/18/21 0158 12/19/21  0530 12/19/21  0530 12/20/21  0600   *  --  146  --  146   K 4.7  --  3.9  --  4.0   *  --  105  --  105   CO2 19*  --  21*  --  18*   BUN 77*  --  111*  --  118*   CREATININE 3.1*  --  4.1*  --  4.7*   GLUCOSE 141*  --  268*  --  145*   CALCIUM 8.2*  --  6.2*  --  5.9*   PROT  --   --  4.6*   < > 4.5*   LABALBU 3.4*   < > 2.4*   < > 2.4*   BILITOT  --   --  0.3   < > 0.3   ALKPHOS  --   --  59   < > 82   AST  --   --  159*   < > 157*   ALT  --   --  <5   < > <5    < > = values in this interval not displayed. Magnesium:   Lab Results   Component Value Date    MG 1.5 12/20/2021     Phosphorus:   Lab Results   Component Value Date    PHOS 5.1 12/20/2021     Ionized Calcium: No results found for: CAION     Urinalysis:     Troponin: No results for input(s): TROPONINI in the last 72 hours.     Microbiology:    Cultures during this admission:     Blood cultures:                 [] None drawn      [x] Negative             []  Positive (Details:  )  Urine Culture:                   [] None drawn      [x] Negative             []  Positive (Details:  )  Sputum Culture:               [] None drawn       [x] Negative             []  Positive (Details:  )   Endotracheal aspirate:     [] None drawn       [x] Negative             []  Positive (Details:  )     Other pertinent Labs:       Radiology/Imaging:     Chest Xray (12/20/2021): Impression   Stable bilateral pneumonia or interstitial pulmonary edema. ASSESSMENT:     Active Problems:    Acute respiratory disease due to COVID-19 virus  Resolved Problems:    * No resolved hospital problems. *      Additional assessment:        Neuro   Fentanyl PRN  Following commands  Remeron nightly      Respiratory   Elevated FiO2 overnight   Following commands   Attempt to lower FIO2  Will trial PS and attempt to wean today 12/20  Wean oxygen as tolerated. Keep O2 sat 90-92%    Cardiovascular   Intermitently requiring a small amount of Levophed   Will start midodrine  Lopressor 50 mg daily  History of a fib      Gastrointestinal   Protonix daily    Renal   Very poor unrine output  Worsening MARCIAL   Discontinued Bicarb drip   Creatinine 4.7  Nephrology on board appreciate recs    Hypocalcemia  NS 100cc/hr    May require dialysis          Infectious Disease   Covid  Given Merro and levaquin per ID  Stopped cefazolin per id  Nelwyn Bellis day 8 (12/20)  Respiratory cultures with Staph and    Stenotrophomonas maltophilia      ID on board appreciate recs    Hematology/Oncology   Anemia, 7.4 today continue to monitor  Replace hemoglobin below 7  Warfarin 2.5 qd   INR 4.5   Will hold cumadin today 12/20      Endocrine   Solucortef 50mg q8 hours  Maintain blood sugar 140-180        Social/Spiritual/DNR/Other   DNR CCA  Son is coming up from [place] and was updated    Sonja Hernandez MD  PGY -2        PLAN:     WEAN PER PROTOCOL:  [] No   [x] Yes  [] N/A    DISCONTINUE ANY LABS:   [x] No   [] Yes    ICU PROPHYLAXIS:  Stress ulcer:  [x] PPI Agent  [] [id number] [] Sucralfate  [] Other:  VTE:   [x] Enoxaparin  [] Unfract.  Heparin Subcut  [] EPC Cuffs    NUTRITION:  [] NPO [] Tube Feeding (Specify: ) [] TPN  [x] PO (Diet: Diet NPO  ADULT TUBE FEEDING; Nasogastric; Renal Formula; Continuous; 20; Yes; 20; Q 4 hours; 50; 100; Q 4 hours)    HOME MEDICATIONS RECONCILED: [] No  [x] Yes    INSULIN DRIP:   [x] No   [] Yes    CONSULTATION NEEDED:  [x] No   [] Yes    FAMILY UPDATED:    [] No   [x] Yes    TRANSFER OUT OF ICU:   [x] No   [] Yes            Michelle Sagastume MD,                 12/20/2021, 12:12 PM    Critical Care Attending Addendum:    Patient seen and examined with the house staff. X-rays personally reviewed through the PACS. Family is updated at the bedside as available. Additional findings listed below as necessary. Additional comments:  1. Acute respiratory failure from COVID and Stenotrophomonas pneumonia, continue full support. 2. Septic shock on norepi and midodrine. 3. MARCIAL for HD today. 4. Stenotrophomonas on levoflox and meropenem  5. Coagulopathy hold warfarin. 6. Son updated on rounds.     >30 min CCT

## 2021-12-20 NOTE — PROGRESS NOTES
Comprehensive Nutrition Assessment    Type and Reason for Visit:  Initial,RD Nutrition Re-Screen/LOS    Nutrition Recommendations/Plan: Continue NPO. Recommend to Modify Current EN to appropriately meet estimated needs. TF Recommendations:  Renal @ 35 ml/hr Continuous x 24 hrs/d to provide 840ml TV, 1512kcal, 68gm Pro, 609ml free water. Flush per critical care mgmt. Nutrition Assessment:  Pt adm w/ worsening SOB x ~2-3d pta. PMHx COPD, CKD, CMML, B/L BKA; adm w/ MARCIAL, COVID19+ PNA and Possible Asp. PNA w/ noted dysphagia s/p BSE 12/14 and RRT/intubation 12/18. Multiple possible pressure wounds noted. At risk d/t poor intake since adm d/t poor appetite, dysphagia and altered resp. function 2/2 COVID19+ PNA w/ CMML/COPD. Will provide EN rec's and monitor. Malnutrition Assessment:  Malnutrition Status:   At risk for malnutrition (Comment)    Context:  Chronic Illness     Findings of the 6 clinical characteristics of malnutrition:  Energy Intake:  7 - 75% or less estimated energy requirements for 1 month or longer  Weight Loss:  Unable to assess (2/2 poor EMR wt hx pta)     Body Fat Loss:  Unable to assess (2/2 CV19 Iso)     Muscle Mass Loss:  Unable to assess    Fluid Accumulation:  No significant fluid accumulation     Strength:  Not Performed    Estimated Daily Nutrient Needs:  Energy (kcal):  PS3B 1557; 6604-7674; Weight Used for Energy Requirements:  Admission     Protein (g):  1.3-1.5= 65-75; as tolerated w/ MARCIAL/CKD w/ elevated Cr/BUN currently; Weight Used for Protein Requirements:  Admission        Fluid (ml/day):  per critical care mgmt; Method Used for Fluid Requirements:  Other (Comment)      Nutrition Related Findings:  Intubated/Sedated, MAP 80, pressor x 1, Abd WDL, Hypo BS, +NGT, no edema, +I/O's      Wounds:  Multiple,Pressure Injury,Wound Consult Pending       Current Nutrition Therapies:    Current Tube Feeding (TF) Orders:  · Feeding Route: Nasogastric  · Formula: Renal Formula  · Schedule: Continuous (50 ml/hr; 1200 ml TV)  · Additives/Modulars:    · Water Flushes: 100 ml q 4 hrs= 600 ml total flush  · Current TF & Flush Orders Provides: Same as Goal  · Goal TF & Flush Orders Provides: 1200 ml TV, 2160 kcals, 97 gm pro, 870 ml free water, 1470 ml total water (TF+Flush). Additional Calorie Sources:   none currently    Anthropometric Measures:  · Height: 5' 9\" (175.3 cm)  · Current Body Weight: 125 lb (56.7 kg) (actual 12/20; +I/O's noted at this time however w/ noted wt gain since adm)   · Admission Body Weight: 109 lb (49.4 kg) (bed 12/14)    · Usual Body Weight:  (UTO UBW 2/2 poor EMR wt hx pta)     · Ideal Body Weight: 160 lbs; % Ideal Body Weight 78.1 %   · BMI: 18.5  · Adjusted Body Weight: 139.8; Amputation   · Adjusted BMI: 20.7    · BMI Categories: Underweight (BMI less than 22) age over 72       Nutrition Diagnosis:   · Inadequate oral intake related to impaired respiratory function (2/2 COVID19+ PNA) as evidenced by NPO or clear liquid status due to medical condition,intubation,nutrition support - enteral nutrition,poor intake prior to admission      Nutrition Interventions:   Food and/or Nutrient Delivery:  Continue NPO,Modify Tube Feeding (Continue NPO. Recommend to Modify Current EN to appropriately meet estimated needs. TF Rec:  Renal @ 35 ml/hr Continuous x 24 hrs/d to provide 840ml TV, 1512kcal, 68gm Pro, 609ml free water.   Flush per critical care mgmt.)  Nutrition Education/Counseling:  No recommendation at this time   Coordination of Nutrition Care:  Continue to monitor while inpatient    Goals:  Pt to tolerate EN at goal.       Nutrition Monitoring and Evaluation:   Behavioral-Environmental Outcomes:  None Identified   Food/Nutrient Intake Outcomes:  Diet Advancement/Tolerance,Enteral Nutrition Intake/Tolerance  Physical Signs/Symptoms Outcomes:  Biochemical Data,GI Status,Fluid Status or Edema,Hemodynamic Status,Nutrition Focused Physical

## 2021-12-21 NOTE — PROGRESS NOTES
Speech Language Pathology      NAME:  Sushant Hicks  :  [id number]  DATE: 2021  ROOM:  0214/0214-A     Chart reviewed. Decline in status and pt intubated. D/C from dysphagia therapy at this time. Reconsult with improved medical status. MARCIAL (acute kidney injury) ([facility] [address] [N17.9]  Acute respiratory disease due to COVID-19 virus [U07.1, J06.9]            Gelacio DIAZ CCC/SLP [id number]  Speech-Language Pathologist

## 2021-12-21 NOTE — PROGRESS NOTES
NEPHROLOGY Attending   Progress Note  12/21/2021 8:38 AM  Subjective:   Admit Date: 12/10/2021  PCP: Jhon Argueta DO    Interval History:     12/21/21: Seen through the window in the ICU setting - not directly examined due to strict Covid isolation - remains intubated and on pressor support - discussed w/ ICU staff       Diet: Diet NPO  ADULT TUBE FEEDING; Nasogastric; Renal Formula; Continuous; 20; Yes; 20; Q 4 hours; 50; 100; Q 4 hours    Data:   Scheduled Meds:   magnesium sulfate  2,000 mg IntraVENous Once    midodrine  5 mg Oral TID WC    pantoprazole  40 mg IntraVENous Daily    Arformoterol Tartrate  15 mcg Nebulization BID    budesonide  0.5 mg Nebulization BID    chlorhexidine  15 mL Mouth/Throat BID    hydrocortisone sodium succinate PF  50 mg IntraVENous Q8H    [Held by provider] baricitinib  1 mg Oral Daily    meropenem  1,000 mg IntraVENous Q24H    lidocaine PF  5 mL IntraDERmal Once    sodium chloride flush  5-40 mL IntraVENous 2 times per day    heparin flush  3 mL IntraVENous 2 times per day    hydroxyurea  500 mg Oral BID    allopurinol  100 mg Oral Daily    atorvastatin  40 mg Oral Nightly    isosorbide mononitrate  30 mg Oral Daily    [Held by provider] metoprolol succinate  50 mg Oral Daily    mirtazapine  7.5 mg Oral Nightly    zinc sulfate  50 mg Oral Daily    Vitamin D  2,000 Units Oral Daily    guaiFENesin  400 mg Oral TID    ascorbic acid  1,000 mg Oral Daily     Continuous Infusions:   sodium chloride      sodium chloride      fentaNYL 5 mcg/ml in 0.9%  ml infusion 50 mcg/hr (12/21/21 0029)    sodium chloride 150 mL/hr at 12/21/21 0441    norepinephrine 4 mcg/min (12/21/21 0616)    sodium chloride 33.3 mL/hr at 12/20/21 1851    sodium chloride       PRN Meds:sodium chloride, sodium chloride, fentanNYL, sodium chloride flush, sodium chloride, heparin flush, acetaminophen, guaiFENesin-dextromethorphan, benzonatate    Intake/Output Summary (Last 24 hours) at 12/21/2021 0838  Last data filed at 12/21/2021 0600  Gross per 24 hour   Intake 3643.9 ml   Output 59 ml   Net 3584.9 ml     CBC:   Recent Labs     12/19/21  0530 12/20/21  0600 12/21/21  0450   WBC 71.3* 105.2* 123.9*   HGB 7.5* 7.4* 6.5*   PLT 50* 46* 47*     BMP:    Recent Labs     12/19/21  0530 12/20/21  0600 12/21/21  0450    146 143   K 3.9 4.0 3.7    105 105   CO2 21* 18* 16*   * 118* 119*   CREATININE 4.1* 4.7* 5.0*   GLUCOSE 268* 145* 178*     Hepatic:   Recent Labs     12/19/21  0530 12/20/21  0600 12/21/21  0450   * 157* 171*   ALT <5 <5 <5   BILITOT 0.3 0.3 0.2   ALKPHOS 59 82 130*     Troponin: No results for input(s): TROPONINI in the last 72 hours. BNP: No results for input(s): BNP in the last 72 hours. Lipids: No results for input(s): CHOL, HDL in the last 72 hours. Invalid input(s): LDLCALCU  ABGs: No results found for: PHART, PO2ART, [id number]  INR:   Recent Labs     12/20/21  0600 12/21/21  0000 12/21/21  0450   INR 4.5 2.0 1.5     -----------------------------------------------------------------  RAD: XR CHEST PORTABLE    Result Date: 12/12/2021  EXAMINATION: ONE XRAY VIEW OF THE CHEST 12/12/2021 8:26 am COMPARISON: 12/10/2021 HISTORY: ORDERING SYSTEM PROVIDED HISTORY: resp failure, effusions TECHNOLOGIST PROVIDED HISTORY: Reason for exam:->resp failure, effusions FINDINGS: The lungs are without acute focal process. Stable small left pleural effusion. No pneumothorax. The cardiomediastinal silhouette is without acute process. The osseous structures are without acute process. Stable appearance of the chest compared to 12/10/2021     CTA PULMONARY W CONTRAST    Result Date: 12/11/2021  EXAMINATION: CTA OF THE CHEST 12/11/2021 12:54 am TECHNIQUE: CTA of the chest was performed after the administration of intravenous contrast.  Multiplanar reformatted images are provided for review. MIP images are provided for review.  Dose modulation, iterative reconstruction, Tissues/Bones: No acute bone or soft tissue abnormality. No evidence of pulmonary embolism. Small to moderate right pleural effusion and small left pleural effusion. In addition to associated element of dependent/compressive atelectasis within the bilateral lower lobes, consolidation may be somewhat out of proportion to perceived volume loss in areas of the lower left base, concerning for superimposed pneumonia, with dependent secretions seen within bilateral mainstem bronchi extending into the left lower lobe bronchi. Although some areas may simply represent simple atelectasis, there also several scattered low density infiltrates present within the bilateral upper lobes which could represent multifocal bronchopneumonia. A few tiny peripheral opacities within predominantly the lateral right upper lobe have somewhat nodular morphology, however these are nonspecific, particularly in current context, and require no surveillance, in the absence of risk factors for bronchogenic carcinoma, such as smoking.          Objective:   Vitals:   Vitals:    12/21/21 0730   BP: (!) 113/54   Pulse: 141   Resp: 21   Temp:    SpO2: 90%     Patient Vitals for the past 24 hrs:   BP Temp Temp src Pulse Resp SpO2 Height Weight   12/21/21 0730 (!) 113/54   141 21 90 %     12/21/21 0715 119/60   132 27 91 %     12/21/21 0700 84/66   141 25 91 %     12/21/21 0645 (!) 112/48   156 18 90 %     12/21/21 0630 (!) 93/57   140 22 (!) 89 %     12/21/21 0615 114/60   117 20 (!) 89 %     12/21/21 0600 (!) 95/59   139 19 (!) 88 %     12/21/21 0545 (!) 86/53   143 18 97 %     12/21/21 0530 (!) 101/52   130 19 (!) 88 %     12/21/21 0515 (!) 97/51   149 20 (!) 88 %     12/21/21 0503    163 20 (!) 88 %     12/21/21 0500 109/60   126 18 (!) 88 %     12/21/21 0445 112/61   150 23 (!) 88 %     12/21/21 0430 (!) 118/58   153 26 (!) 89 %     12/21/21 0415 (!) 96/55   104 22 (!) 89 %     12/21/21 0400 (!) 96/52 98.7 °F (37.1 °C) Axillary 141 21 (!) 89 %     12/21/21 0345 (!) 102/47   151 22 (!) 89 %     12/21/21 0330 (!) 113/55   142 20 (!) 89 %     12/21/21 0315 (!) 101/57   148 21 (!) 89 %     12/21/21 0300 (!) 101/47   137 18 (!) 88 %     12/21/21 0245 (!) 103/53   158 20 (!) 89 %     12/21/21 0230 (!) 103/55   121 27 90 %     12/21/21 0215 (!) 96/52   141 19 90 %     12/21/21 0200 (!) 114/53   132 22 90 %     12/21/21 0145 (!) 112/51   133 20 (!) 89 %     12/21/21 0130 (!) 79/53   135 21 90 %     12/21/21 0115 (!) 79/45   134 21 90 %     12/21/21 0105    125 18 91 %     12/21/21 0100 (!) 72/43   122 20 90 %     12/21/21 0045 (!) 85/49   109 18 (!) 89 %     12/21/21 0043 (!) 85/49   110       12/21/21 0030 (!) 120/53   162 18 93 %     12/21/21 0015 (!) 114/52   140 22 (!) 88 %     12/21/21 0000 108/62 98.7 °F (37.1 °C) Oral 119 30 93 % 5' 9\" (1.753 m) 134 lb 0.6 oz (60.8 kg)   12/20/21 2345 117/61   148 (!) 31 94 %     12/20/21 2330 (!) 98/49   123 26 94 %     12/20/21 2315 (!) 74/43   105 24 95 %     12/20/21 2300 (!) 72/40   104 22 94 %     12/20/21 2245 (!) 87/40   103 26 94 %     12/20/21 2230 (!) 90/45   119 25 94 %     12/20/21 2215 (!) 91/45 99 °F (37.2 °C) Oral 135 23 96 %     12/20/21 2204 (!) 90/48 98.9 °F (37.2 °C) Oral 114 28 95 %     12/20/21 2200 (!) 90/48 99 °F (37.2 °C) Oral 122 29 94 %     12/20/21 2149 (!) 95/53 99 °F (37.2 °C) Oral 111 22 93 %     12/20/21 2115 (!) 92/45   106 27 93 %     12/20/21 2100 123/63 100.4 °F (38 °C) Oral 138 29 93 %     12/20/21 2045 123/63   124 (!) 32 93 %     12/20/21 2033 (!) 113/52   134 (!) 34 92 %     12/20/21 2030 (!) 113/52   118 (!) 32 93 %     12/20/21 2015 (!) 113/52   112 29 94 %     12/20/21 2013    108 26 94 %     12/20/21 2000 (!) 106/47 100.7 °F (38.2 °C) Oral 113 26 94 %     12/20/21 1945 (!) 106/47   112 25 94 %     12/20/21 1930 (!) 77/44   100 27 94 %     12/20/21 1915 (!) 77/44   96 29 93 %     12/20/21 1900 (!) 98/51   104 27 91 %     12/20/21 1800 (!) 103/49   129 (!) 35 94 %     12/20/21 1700 104/66   103 27 91 %     12/20/21 1600 (!) 133/58 98.9 °F (37.2 °C) Axillary 119 29 100 %     12/20/21 1557    129 (!) 37 (!) 88 %     12/20/21 1500 (!) 116/59   110 (!) 34 91 %     12/20/21 1400 (!) 107/50   112 (!) 32 91 %     12/20/21 1300 (!) 111/57   123 (!) 37 91 %     12/20/21 1200 (!) 112/56 98 °F (36.7 °C) Axillary 113 (!) 31 91 %     12/20/21 1156       5' 9\" (1.753 m)    12/20/21 1100 (!) 100/54   101 30 90 %     12/20/21 1000 (!) 108/57   98 30 96 %     12/20/21 0900 (!) 105/53   114 (!) 32 94 %       According to institutional recommendations and guidelines, a face-to-face encounter was not performed due to the current efforts to prevent transmission of COVID-19 and the need to preserve PPE for other caregivers. [facility] records, nursing assessment, and diagnostic testing including laboratory results and imaging were reviewed. Please reference any relevant documentation elsewhere. Manjit Escobar will be coordinated with the primary services and consultants.       Assessment/Plan:     1) MARCIAL on CKD Stage 4  Baseline serum creatinine 0.9-1.0 as of 9/15/2020  Now serum creatinine 1.5 upon admission  Suspect serum creatinine underreflecting actual GFR as he has very little muscle mass  Urine lytes as of 12/16/2021: Sodium 50, creatinine 64, and urea 773  SCr past 72 hrs: 1.6->3.1->4.1->4.7->5.0   PLAN:   1.  IV fluids have been initiated as well initial pressor support w/ no improvement    2.   Patient's condition was discussed in detail with the patient's wife and daughter on 12/20 - - we did discuss needing renal placement therapy as the patient does not have improvement in the renal function - family wishes to proceed with dialysis - plan for temp line insertion today - vascular consulted - discussed w/ ICU and dialysis staff      2) Hypernatremia  reflecting poor p.o. intake with component of superimposed diuresis   Follow response to current IV fluids    3) Metabolic acidosis with increased anion gap. May be reflection of tissue hypoperfusion related lactic acidosis. 12/19/21: lactic acid elevated at 3.2  PLAN:   1. Supplement bicarbonate to target a bicarbonate level of 22 mmol/L.  2. Expect improvement w/ IHD    4) Hypotension/shock   PLAN:   1. Requiring volume and pressors.     5) SARS-CoV-2 infection with moderate viral pneumonia in a fully vaccinated patient               Tia Marquez, APRN - CNP   Pt seen through window of icu due to covid  Chart reviewed  Agree with above  First hd today  Line delayed due to high inr  Luz Ramírez MD

## 2021-12-21 NOTE — CONSULTS
Vascular Surgery Consultation Note    Reason for Consult:  Temporary dialysis catheter placement    HPI :    This is a 80 y.o. male who is admitted to the hospital for treatment of COVID respiratory failure. Vascular surgery is consulted for evaluation and placement of temporary dialysis catheter. Patient was on Coumadin for Afib with INR of 4.5 yesterday. INR was reversed and is now 1.5. He has histroy of bilateral AKA.      ROS : Negative if blank [], Positive if [x]  General Vascular   [] Fevers [] Claudication (Blocks)   [] Chills [] Rest Pain   [] Weight Loss [x] Tissue Loss   [] Chest Pain [] Clotting Disorder    [] SOB at rest [] Leg Swelling   [] SOB with exertion [] DVT/PE      [] Nausea    [] Vomitting [] Stroke/TIA   [] Abdominal Pain [] Focal weakness   [] Melena [] Slurred Speech   [] Hematochezia [] Vision Changes   [] Hematuria    [] Dysuria [] Hx of Central Catheters   [] Wears Glasses/Contacts  [] Dialysis and If so date initiated   [] Blindness     [] Hand Dominant   [] Difficulty swallowing        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 ([facility] [address] 12/28/2015    Blood circulation, collateral     Cancer ([facility] [address] 2004    prostate, seed implant    Chronic kidney disease     COPD (chronic obstructive pulmonary disease) ([facility] [address]     Critical lower limb ischemia ([facility] [address] 12/29/2015    Hyperlipidemia     Hypertension     Pneumonia         Past Surgical History:   Procedure Laterality Date    APPENDECTOMY      COLONOSCOPY      ECHOCARDIOGRAM COMPLETE 2D W DOPPLER W COLOR  1/25/2012         HC INSERT PICC CATH, 5/> YRS  12/17/2021         HIP FRACTURE SURGERY Right 7/25/2020    RIGHT HIP CEPHALO-MEDULLARY NAIL performed by Brandi Fink MD at [address] [place] [address] Bilateral     Right    SKIN BIOPSY      TOOTH EXTRACTION      VASCULAR SURGERY       Current Medications:    sodium chloride      amiodarone 450mg/250ml D5W infusion 0.5 mg/min (12/21/21 1241)    sodium chloride      fentaNYL 5 mcg/ml in 0.9%  ml infusion 50 mcg/hr (12/21/21 0029)    sodium chloride 100 mL/hr at 12/21/21 1356    norepinephrine 3 mcg/min (12/21/21 1130)    sodium chloride 33.3 mL/hr at 12/20/21 1851    sodium chloride        sodium chloride, sodium chloride, fentanNYL, sodium chloride flush, sodium chloride, heparin flush, acetaminophen, guaiFENesin-dextromethorphan, benzonatate    fluconazole  200 mg IntraVENous Q24H    midodrine  5 mg Oral TID WC    pantoprazole  40 mg IntraVENous Daily    Arformoterol Tartrate  15 mcg Nebulization BID    budesonide  0.5 mg Nebulization BID    chlorhexidine  15 mL Mouth/Throat BID    hydrocortisone sodium succinate PF  50 mg IntraVENous Q8H    [Held by provider] baricitinib  1 mg Oral Daily    meropenem  1,000 mg IntraVENous Q24H    lidocaine PF  5 mL IntraDERmal Once    sodium chloride flush  5-40 mL IntraVENous 2 times per day    heparin flush  3 mL IntraVENous 2 times per day    hydroxyurea  500 mg Oral BID    [Held by provider] allopurinol  100 mg Oral Daily    atorvastatin  40 mg Oral Nightly    [Held by provider] isosorbide mononitrate  30 mg Oral Daily    [Held by provider] metoprolol succinate  50 mg Oral Daily    mirtazapine  7.5 mg Oral Nightly    zinc sulfate  50 mg Oral Daily    Vitamin D  2,000 Units Oral Daily    guaiFENesin  400 mg Oral TID    ascorbic acid  1,000 mg Oral Daily        Allergies:  Patient has no known allergies.     Social History     Socioeconomic History    Marital status:      Spouse name: Not on file    Number of children: Not on file    Years of education: Not on file    Highest education level: Not on file   Occupational History    Not on file   Tobacco Use    Smoking status: Former Smoker     Packs/day: 1.00     Years: 30.00     Pack years: 30.00     Types: Cigars, Cigarettes     Start date: 80     Quit date: 8/25/1979 Years since quittin.3    Smokeless tobacco: Never Used   Vaping Use    Vaping Use: Never used   Substance and Sexual Activity    Alcohol use: Yes     Alcohol/week: 5.0 standard drinks     Types: 5 Glasses of wine per week    Drug use: Yes     Types: Marijuana (Weed)     Comment: uses to help with appetite    Sexual activity: Never   Other Topics Concern    Not on file   Social History Narrative    Not on file     Social Determinants of Health     Financial Resource Strain: Low Risk     Difficulty of Paying Living Expenses: Not hard at all   Food Insecurity: No Food Insecurity    Worried About [facility] in the Last Year: Never true    0 [facility] in the Last Year: Never true   Transportation Needs:     Lack of Transportation (Medical): Not on file    Lack of Transportation (Non-Medical):  Not on file   Physical Activity:     Days of Exercise per Week: Not on file    Minutes of Exercise per Session: Not on file   Stress:     Feeling of Stress : Not on file   Social Connections:     Frequency of Communication with Friends and Family: Not on file    Frequency of Social Gatherings with Friends and Family: Not on file    Attends Yazidi Services: Not on file    Active Member of [address] [place] [address] or Organizations: Not on file    Attends Club or Organization Meetings: Not on file    Marital Status: Not on file   Intimate Partner Violence:     Fear of Current or Ex-Partner: Not on file    Emotionally Abused: Not on file    Physically Abused: Not on file    Sexually Abused: Not on file   Housing Stability:     Unable to Pay for Housing in the Last Year: Not on file    Number of [place] in the Last Year: Not on file    Unstable Housing in the Last Year: Not on file        Family History   Problem Relation Age of Onset    Stroke Father     Heart Disease Father     Cancer Sister        PHYSICAL EXAM:    BP (!) 104/44   Pulse 105   Temp 98.1 °F (36.7 °C)   Resp 20   Ht 5' 9\" (1.753 m)   Wt 134 lb 0.6 oz (60.8 kg)   SpO2 93%   BMI 19.79 kg/m²   CONSTITUTIONAL:  Intubated and sedated  ENT:  normocepalic, without obvious abnormality   NECK:  supple, symmetrical, trachea midline,no jugular venous distension  LUNGS: on ventilator  CARDIOVASCULAR:  Tachycardic, normotensive  ABDOMEN:  soft, non-distended  EXTREMITIES:   R UE Swelling present Incisions absent       unknown/5 Strength  L UE Swelling present Incisions absent       unknown/5 Strength  R LE AKA  L LE AKA    LABS:    Lab Results   Component Value Date    .9 (H) 12/21/2021    HGB 7.2 (L) 12/21/2021    HCT 23.4 (L) 12/21/2021    PLT 47 (L) 12/21/2021    PROTIME 16.6 (H) 12/21/2021    INR 1.5 12/21/2021    K 3.7 12/21/2021     (HH) 12/21/2021    CREATININE 5.0 (H) 12/21/2021       RADIOLOGY:  XR CHEST PORTABLE    Result Date: 12/12/2021  EXAMINATION: ONE XRAY VIEW OF THE CHEST 12/12/2021 8:26 am COMPARISON: 12/10/2021 HISTORY: ORDERING SYSTEM PROVIDED HISTORY: resp failure, effusions TECHNOLOGIST PROVIDED HISTORY: Reason for exam:->resp failure, effusions FINDINGS: The lungs are without acute focal process. Stable small left pleural effusion. No pneumothorax. The cardiomediastinal silhouette is without acute process. The osseous structures are without acute process. Stable appearance of the chest compared to 12/10/2021     CTA PULMONARY W CONTRAST    Result Date: 12/11/2021  EXAMINATION: CTA OF THE CHEST 12/11/2021 12:54 am TECHNIQUE: CTA of the chest was performed after the administration of intravenous contrast.  Multiplanar reformatted images are provided for review. MIP images are provided for review. Dose modulation, iterative reconstruction, and/or weight based adjustment of the mA/kV was utilized to reduce the radiation dose to as low as reasonably achievable. COMPARISON: No prior CT. Correlation is made with portable chest radiographs from a few hours ago, as well as 07/24/2020.  HISTORY: ORDERING SYSTEM PROVIDED HISTORY: eval for PE TECHNOLOGIST PROVIDED HISTORY: Reason for exam:->eval for PE Decision Support Exception - unselect if not a suspected or confirmed emergency medical condition->Emergency Medical Condition (MA) FINDINGS: Pulmonary Arteries: Pulmonary arteries are adequately opacified for evaluation. No evidence of intraluminal filling defect to suggest pulmonary embolism. Main pulmonary artery is normal in caliber. Mediastinum: No evidence of mediastinal lymphadenopathy. The heart and pericardium demonstrate no acute abnormality. There is no acute abnormality of the thoracic aorta. Left-sided coronary arterial calcifications. Moderate calcific atherosclerosis of the arch and descending aorta. Partial fluid opacification of the dependent bilateral mainstem bronchi, extending into the lower lobe bronchi on the left. Lungs/pleura: No pneumothorax. Small to moderate right and small left pleural effusions. Small calcified granuloma is seen within each upper lobe. There is an element of subsegmental dependent atelectasis bilaterally, with additional calcified granulomas seen within the atelectatic dependent basal left lower lobe. Consolidation within portions of the basal left lower lobe may be somewhat out of proportion to perceived volume loss, raising the question of superimposed pneumonia. Scattered patchy areas of increased attenuation within the upper lobes may also have components of both subsegmental atelectasis and infiltrate. No significant thickening of secondary interlobular septa. Tracheobronchial tree calcification noted. Upper Abdomen: Visualized upper abdominal viscera demonstrate no acute abnormality. Soft Tissues/Bones: No acute bone or soft tissue abnormality. No evidence of pulmonary embolism. Small to moderate right pleural effusion and small left pleural effusion.  In addition to associated element of dependent/compressive atelectasis within the bilateral lower lobes, consolidation may be somewhat out of proportion to perceived volume loss in areas of the lower left base, concerning for superimposed pneumonia, with dependent secretions seen within bilateral mainstem bronchi extending into the left lower lobe bronchi. Although some areas may simply represent simple atelectasis, there also several scattered low density infiltrates present within the bilateral upper lobes which could represent multifocal bronchopneumonia. A few tiny peripheral opacities within predominantly the lateral right upper lobe have somewhat nodular morphology, however these are nonspecific, particularly in current context, and require no surveillance, in the absence of risk factors for bronchogenic carcinoma, such as smoking. Assesment/Plan  Marycarmen Araujo is a 80year old male in need of dialysis. Will place temporary dialysis catheter. Please see procedure note for more detail.     Litzy Murillo MD

## 2021-12-21 NOTE — PROGRESS NOTES
[address] [place] [address] [facility]  ARIANE  Progress Note    SUBJECTIVE:  Chief Complaint   Patient presents with    Shortness of Breath     covid + on tuesday, increased sob last several days, from home     The patient remains intubated. She is still in the ICU. He is on vasopressors. Review of systems:  As stated above in the chief complaint, otherwise negative.     Medications:  Scheduled Meds:   midodrine  5 mg Oral TID WC    pantoprazole  40 mg IntraVENous Daily    Arformoterol Tartrate  15 mcg Nebulization BID    budesonide  0.5 mg Nebulization BID    chlorhexidine  15 mL Mouth/Throat BID    hydrocortisone sodium succinate PF  50 mg IntraVENous Q8H    [Held by provider] baricitinib  1 mg Oral Daily    meropenem  1,000 mg IntraVENous Q24H    lidocaine PF  5 mL IntraDERmal Once    sodium chloride flush  5-40 mL IntraVENous 2 times per day    heparin flush  3 mL IntraVENous 2 times per day    hydroxyurea  500 mg Oral BID    allopurinol  100 mg Oral Daily    atorvastatin  40 mg Oral Nightly    [Held by provider] isosorbide mononitrate  30 mg Oral Daily    [Held by provider] metoprolol succinate  50 mg Oral Daily    mirtazapine  7.5 mg Oral Nightly    zinc sulfate  50 mg Oral Daily    Vitamin D  2,000 Units Oral Daily    guaiFENesin  400 mg Oral TID    ascorbic acid  1,000 mg Oral Daily     Continuous Infusions:   sodium chloride      sodium chloride      fentaNYL 5 mcg/ml in 0.9%  ml infusion 50 mcg/hr (12/21/21 0029)    sodium chloride 150 mL/hr at 12/21/21 0441    norepinephrine 4 mcg/min (12/21/21 0616)    sodium chloride 33.3 mL/hr at 12/20/21 1851    sodium chloride       PRN Meds:sodium chloride, sodium chloride, fentanNYL, sodium chloride flush, sodium chloride, heparin flush, acetaminophen, guaiFENesin-dextromethorphan, benzonatate    OBJECTIVE:  /82   Pulse 114   Temp 98.8 °F (37.1 °C) (Axillary)   Resp 22   Ht 5' 9\" (1.753 m)   Wt 134 lb 0.6 oz (60.8 kg)   SpO2 91%   BMI 19.79 kg/m²   Temp  Av.1 °F (37.3 °C)  Min: 98 °F (36.7 °C)  Max: 100.7 °F (38.2 °C)  Constitutional: The patient is lying in bed in the ICU. He is intubated and sedated. FiO2 55%. PEEP 5. Skin: Warm and dry. No rashes were noted. HEENT: Round and reactive pupils. Moist mucous membranes. No ulcerations or thrush. ET tube. OG tube. Neck: Supple to movements. Chest: Using accessory muscles. Rhonchi left base posteriorly  Cardiovascular: S1 and S2 are rhythmic and regular. 2/6 midsystolic murmur. Abdomen: Positive bowel sounds to auscultation. Benign to palpation. No masses felt. Extremities: Bilateral AKA stumps. Lines: Right PICC 2021. Aponte catheter.     Laboratory and Tests Review:  Lab Results   Component Value Date    .9 (H) 2021    .2 (H) 2021    WBC 71.3 (H) 2021    HGB 6.5 (LL) 2021    HCT 20.9 (L) 2021    .4 (H) 2021    PLT 47 (L) 2021     Lab Results   Component Value Date    NEUTROABS 89.33 (H) 2021    NEUTROABS 64.17 (H) 2021    NEUTROABS 85.35 (H) 2021     No results found for: CRP  Lab Results   Component Value Date    ALT <5 2021     (H) 2021    ALKPHOS 130 (H) 2021    BILITOT 0.2 2021     Lab Results   Component Value Date     2021    K 3.7 2021    K 3.9 2020     2021    CO2 16 2021     2021    CREATININE 5.0 2021    CREATININE 4.7 2021    CREATININE 4.1 2021    GFRAA 13 2021    LABGLOM 11 2021    GLUCOSE 178 2021    GLUCOSE 94 2012    PROT 4.4 2021    LABALBU 2.4 2021    LABALBU 4.1 2012    CALCIUM 5.7 2021    BILITOT 0.2 2021    ALKPHOS 130 2021     2021    ALT <5 2021     Lab Results   Component Value Date    CRP 8.1 (H) 2021    CRP 6.8 (H) 12/15/2021    CRP 2.4 (H) 12/14/2021     Lab Results   Component Value Date    SEDRATE 33 (H) 12/17/2021    SEDRATE 14 12/10/2021    SEDRATE 56 (H) 01/17/2016     Radiology:  Chest x-ray reviewed    Microbiology:   Streptococcus pneumoniae/Legionella urine Ag: negative  Nares screen MRSA: Negative  Respiratory culture 12/12/2021: Moderate MSSA, rare Stenotrophomonas maltophilia (sensitive to Bactrim and Levaquin)  Respiratory culture 12/18/2021: Pending    Recent Labs     12/20/21  0600   PROCAL 19.42*       ASSESSMENT:  · SARS-CoV-2 infection with moderate viral pneumonia in a fully vaccinated patient  · Aspiration pneumonia left lung secondary to MSSA. 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 White count is still high  · Acute respiratory failure. This is most likely secondary to leukemia causing hyperviscosity in blood vessels in the lungs.   Now that white count is coming down, FiO2 requirements are coming down as well  · Possible septic shock  · Acute kidney injury    PLAN:  · Continue Meropenem  · Add Fluconazole  · We will follow with you  · Prognosis remains poor    Case discussed with ICU team.    Aleena Smith MD  10:08 AM  12/21/2021

## 2021-12-21 NOTE — PROCEDURES
Ann Constantino is a 80 y.o. male patient. 1. Acute respiratory disease due to COVID-19 virus    2. 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 rate 20, height 5' 9\" (1.753 m), weight 134 lb 0.6 oz (60.8 kg), SpO2 93 %. Temporary dialysis catheter    Date/Time: 12/21/2021 4:20 PM  Performed by: Jack Powell MD  Authorized by: Sonja Christianson MD   Consent: Written consent obtained.   Consent given by: power of   Procedure consent: procedure consent matches procedure scheduled  Relevant documents: relevant documents present and verified  Test results: test results available and properly labeled  Imaging studies: imaging studies available  Required items: required blood products, implants, devices, and special equipment available  Patient identity confirmed: anonymous protocol, patient vented/unresponsive  Indications: vascular access  Anesthesia: see MAR for details    Anesthesia:  Local Anesthetic: lidocaine 1% with epinephrine    Sedation:  Patient sedated: yes    Preparation: skin prepped with 2% chlorhexidine  Skin prep agent dried: skin prep agent completely dried prior to procedure  Sterile barriers: all five maximum sterile barriers used - cap, mask, sterile gown, sterile gloves, and large sterile sheet  Hand hygiene: hand hygiene performed prior to central venous catheter insertion  Location details: right femoral  Patient position: flat  Catheter type: double lumen  Catheter size: 14 Fr  Pre-procedure:

## 2021-12-21 NOTE — PLAN OF CARE
Problem: Falls - Risk of:  Goal: Will remain free from falls  Description: Will remain free from falls  12/21/2021 0738 by Vickie Lomeli RN  Outcome: Met This Shift  12/21/2021 0045 by Kaur Davis RN  Outcome: Met This Shift  12/20/2021 2120 by Najma Walker RN  Outcome: Met This Shift  Goal: Absence of physical injury  Description: Absence of physical injury  12/21/2021 0738 by Vickie Lomeli RN  Outcome: Met This Shift  12/21/2021 0045 by Kaur Davis RN  Outcome: Met This Shift  12/20/2021 2120 by Najma Walker RN  Outcome: Met This Shift     Problem: Airway Clearance - Ineffective  Goal: Achieve or maintain patent airway  12/21/2021 0045 by Kaur Davis RN  Outcome: Met This Shift  12/20/2021 2120 by Najma Walker RN  Outcome: Met This Shift     Problem: Gas Exchange - Impaired  Goal: Absence of hypoxia  12/21/2021 0738 by Vickie Lomeli RN  Outcome: Met This Shift  12/21/2021 0045 by Kaur Davis RN  Outcome: Met This Shift  12/20/2021 2120 by Najma Walker RN  Outcome: Met This Shift  Goal: Promote optimal lung function  12/21/2021 0738 by Vickie Lomeli RN  Outcome: Met This Shift  12/21/2021 0045 by Kaur Davis RN  Outcome: Met This Shift  12/20/2021 2120 by Najma Walker RN  Outcome: Met This Shift     Problem: Breathing Pattern - Ineffective  Goal: Ability to achieve and maintain a regular respiratory rate  12/21/2021 0045 by Kaur Davis RN  Outcome: Met This Shift  12/20/2021 2120 by Najma Walker RN  Outcome: Met This Shift     Problem:  Body Temperature -  Risk of, Imbalanced  Goal: Ability to maintain a body temperature within defined limits  12/21/2021 0045 by Kaur Davis RN  Outcome: Met This Shift  12/20/2021 2120 by Najma Walker RN  Outcome: Met This Shift     Problem: Isolation Precautions - Risk of Spread of Infection  Goal: Prevent transmission of infection  12/21/2021 0045 by Kaur Davis RN  Outcome: Met This Shift  12/20/2021 2120 by Katlin Lloyd Candy Valladares RN  Outcome: Met This Shift     Problem: Nutrition Deficits  Goal: Optimize nutritional status  12/21/2021 0045 by Reyna Sandoval RN  Outcome: Met This Shift  12/20/2021 2120 by Manley Lombard, RN  Outcome: Met This Shift     Problem: Risk for Fluid Volume Deficit  Goal: Maintain normal heart rhythm  12/21/2021 0045 by Reyna Sandoval RN  Outcome: Met This Shift  12/20/2021 2120 by Manley Lombard, RN  Outcome: Met This Shift  Goal: Maintain absence of muscle cramping  12/21/2021 0045 by Reyna Sandoval RN  Outcome: Met This Shift  12/20/2021 2120 by Manley Lombard, RN  Outcome: Met This Shift  Goal: Maintain normal serum potassium, sodium, calcium, phosphorus, and pH  12/21/2021 0045 by Reyna Sandoval RN  Outcome: Met This Shift  12/20/2021 2120 by Manley Lombard, RN  Outcome: Met This Shift     Problem: Loneliness or Risk for Loneliness  Goal: Demonstrate positive use of time alone when socialization is not possible  12/21/2021 0738 by Greg Sawyer RN  Outcome: Met This Shift  12/21/2021 0045 by Reyna Sandoval RN  Outcome: Met This Shift  12/20/2021 2120 by Manley Lombard, RN  Outcome: Met This Shift     Problem: Fatigue  Goal: Verbalize increase energy and improved vitality  12/21/2021 0045 by Reyna Sandoval RN  Outcome: Met This Shift  12/20/2021 2120 by Manley Lombard, RN  Outcome: Met This Shift     Problem: Skin Integrity:  Goal: Will show no infection signs and symptoms  Description: Will show no infection signs and symptoms  12/21/2021 0738 by Greg Sawyer RN  Outcome: Met This Shift  12/21/2021 0045 by Reyna Sandoval RN  Outcome: Met This Shift  12/20/2021 2120 by Manley Lombard, RN  Outcome: Met This Shift  Goal: Absence of new skin breakdown  Description: Absence of new skin breakdown  12/21/2021 0738 by Greg Sawyer RN  Outcome: Met This Shift  12/21/2021 0045 by Reyna Sandoval RN  Outcome: Met This Shift  12/20/2021 2120 by Gray Lombard, RN  Outcome: Met This Shift     Problem: Non-Violent Restraints  Goal: No harm/injury to patient while restraints in use  12/21/2021 0738 by Judy Salgado RN  Outcome: Met This Shift  12/21/2021 0045 by Jesús Moctezuma RN  Outcome: Met This Shift  12/20/2021 2120 by Yasmin Hagan RN  Outcome: Met This Shift  Goal: Patient's dignity will be maintained  12/21/2021 0738 by Judy Salgado RN  Outcome: Met This Shift  12/21/2021 0045 by Jesús Moctezuma RN  Outcome: Met This Shift  12/20/2021 2120 by Yasmin Hagan RN  Outcome: Met This Shift     Problem: Pain:  Goal: Pain level will decrease  Description: Pain level will decrease  12/21/2021 0045 by Jesús Moctezuma RN  Outcome: Met This Shift  12/20/2021 2120 by Yasmin Hagan RN  Outcome: Met This Shift  Goal: Control of acute pain  Description: Control of acute pain  12/21/2021 0045 by Jesús Moctezuma RN  Outcome: Met This Shift  12/20/2021 2120 by Yasmin Hagan RN  Outcome: Met This Shift  Goal: Control of chronic pain  Description: Control of chronic pain  12/21/2021 0045 by Jesús Moctezuma RN  Outcome: Met This Shift  12/20/2021 2120 by Yasmin Hagan RN  Outcome: Met This Shift

## 2021-12-21 NOTE — PROGRESS NOTES
`         Critical Care Team - Daily Progress Note         Date and time: 12/21/2021 7:26 AM  Patient's name:  Neetu Brice  Medical Record Number: [id number]  Patient's account/billing number: [de-identified]  Patient's YOB: 1929  Age: 80 y.o. Date of Admission: 12/10/2021  9:15 PM  Length of stay during current admission: 10      Primary Care Physician: Marvin Boxer, DO  ICU Attending Physician: Dr. Cannon Fitting    Code Status: DNR-CCA    Reason for ICU admission:   Acute hypoxic respiratory failure secondary to Covid pneumonia      SUBJECTIVE:     OVERNIGHT EVENTS:           Tested + for COVID 12/8 with desaturation at home to 87% with pox 92% on 2L. CT pulmonary angiogram was performed showing bilateral pleural effusions, basilar consolidation, and few peripheral opacities. 12/15: pt up to use BR and pox down to 70%. Placed on 10l HFNC; Decreased O2 to 6L with pox 95%. Feels dyspneic, coughing a lot    12/16: Now on 14L HFNC, pox 89-91% at bedside. Reports mild cough and dyspnea. Asking about his Wes order. 12/17 requires 15 L high flow nasal cannula +15 L nonrebreather mask to keep saturation above 88%. Afebrile    12/18 worsening Hypoxemia O?n and placed on  %. Low BP started on levophed and IVF, and transferred to MICU. Got ativan last night    12/19: Patient was intubated overnight. He is off levo. Plan to D/C bicarb drip. Start nepro tube feeds. Add fentanyl prn.     12/20: No acute issues. Much more awake. Patient is following commands    12/21: No events overnight. Low hemoglobin will get one unit today. afib rvr and low bp. Will give unit of blood.   Patient is scheduled to get a dialysis catheter today    CURRENT VENTILATION STATUS:     [x] Ventilator  [] BIPAP  [] Nasal Cannula [] Room Air      IF INTUBATED, ET TUBE MARKING AT LOWER LIP:       cms    SECRETIONS Amount:  [] Small [] Moderate  [] Large  [x] None  Color:     [] White [] Colored  [] Bloody    SEDATION:  RAAS Score:  [] Propofol gtt  [] Versed gtt  [] Ativan gtt   [] No Sedation    PARALYZED:  [x] No    [] Yes      VASOPRESSORS:  [x] No    [x] Yes    If yes -   [x] Levophed       [] Dopamine     [] Vasopressin       [] Dobutamine  [] Phenylephrine         [] Epinephrine    CENTRAL LINES:     [x] No   [] Yes   (Date of Insertion:   )           If yes -     [] Right IJ     [] Left IJ [] Right Femoral [] Left Femoral                   [] Right Subclavian [] Left Subclavian       VELA'S CATHETER:   [] No   [x] Yes  (Date of Insertion:   )     URINE OUTPUT:            [] Good   [x] Low              [] Anuric      OBJECTIVE:     VITAL SIGNS:  /60   Pulse 132   Temp 98.7 °F (37.1 °C) (Axillary)   Resp 27   Ht 5' 9\" (1.753 m)   Wt 134 lb 0.6 oz (60.8 kg)   SpO2 91%   BMI 19.79 kg/m²   Tmax over 24 hours:  Temp (24hrs), Av °F (37.2 °C), Min:97.8 °F (36.6 °C), Max:100.7 °F (38.2 °C)      Patient Vitals for the past 6 hrs:   BP Temp Temp src Pulse Resp SpO2   21 0715 119/60   132 27 91 %   21 0700 84/66   141 25 91 %   21 0645 (!) 112/48   156 18 90 %   21 0630 (!) 93/57   140 22 (!) 89 %   21 0615 114/60   117 20 (!) 89 %   21 0600 (!) 95/59   139 19 (!) 88 %   21 0545 (!) 86/53   143 18 97 %   21 0530 (!) 101/52   130 19 (!) 88 %   21 0515 (!) 97/51   149 20 (!) 88 %   21 0503    163 20 (!) 88 %   21 0500 109/60   126 18 (!) 88 %   21 0445 112/61   150 23 (!) 88 %   21 0430 (!) 118/58   153 26 (!) 89 %   21 0415 (!) 96/55   104 22 (!) 89 %   21 0400 (!) 96/52 98.7 °F (37.1 °C) Axillary 141 21 (!) 89 %   21 0345 (!) 102/47   151 22 (!) 89 %   21 0330 (!) 113/55   142 20 (!) 89 %   21 0315 (!) 101/57   148 21 (!) 89 %   21 0300 (!) 101/47   137 18 (!) 88 %   21 0245 (!) 103/53   158 20 (!) 89 %   21 0230 (!) 103/55   121 27 90 %   21 0215 (!) 96/52   141 19 90 %   12/21/21 0200 (!) 114/53   132 22 90 %   12/21/21 0145 (!) 112/51   133 20 (!) 89 %   12/21/21 0130 (!) 79/53   135 21 90 %         Intake/Output Summary (Last 24 hours) at 12/21/2021 0726  Last data filed at 12/21/2021 0600  Gross per 24 hour   Intake 3643.9 ml   Output 69 ml   Net 3574.9 ml     Wt Readings from Last 2 Encounters:   12/21/21 134 lb 0.6 oz (60.8 kg)   08/09/21 130 lb (59 kg)     Body mass index is 19.79 kg/m². PHYSICAL EXAMINATION:    General appearance: lethargic appears stated age and cooperative. In no acute distress, intubated  Skin: No rashes or lesions  HEENT: mucous membranes are moist  Neck: No JVD  Lungs: symmetrical expansion, diminished with crackles, no wheezing, no use of accessory muscles. Heart: S1S2,  +murmurs,  Abdomen: soft, non tender,   Extremities: no peripheral edema; R arm with significant ecchymosis from fingers up to bicep area.  BKA with prosthesis   Neurologic: lethargic, following commands            MEDICATIONS:    Scheduled Meds:   midodrine  5 mg Oral TID WC    pantoprazole  40 mg IntraVENous Daily    Arformoterol Tartrate  15 mcg Nebulization BID    budesonide  0.5 mg Nebulization BID    chlorhexidine  15 mL Mouth/Throat BID    hydrocortisone sodium succinate PF  50 mg IntraVENous Q8H    [Held by provider] baricitinib  1 mg Oral Daily    meropenem  1,000 mg IntraVENous Q24H    lidocaine PF  5 mL IntraDERmal Once    sodium chloride flush  5-40 mL IntraVENous 2 times per day    heparin flush  3 mL IntraVENous 2 times per day    hydroxyurea  500 mg Oral BID    allopurinol  100 mg Oral Daily    atorvastatin  40 mg Oral Nightly    isosorbide mononitrate  30 mg Oral Daily    [Held by provider] metoprolol succinate  50 mg Oral Daily    mirtazapine  7.5 mg Oral Nightly    zinc sulfate  50 mg Oral Daily    Vitamin D  2,000 Units Oral Daily    guaiFENesin  400 mg Oral TID    ascorbic acid  1,000 mg Oral Daily Continuous Infusions:   sodium chloride      sodium chloride      fentaNYL 5 mcg/ml in 0.9%  ml infusion 50 mcg/hr (12/21/21 0029)    sodium chloride 150 mL/hr at 12/21/21 0441    norepinephrine 4 mcg/min (12/21/21 0616)    sodium chloride 33.3 mL/hr at 12/20/21 1851    sodium chloride       PRN Meds:   sodium chloride, , PRN  sodium chloride, , PRN  fentanNYL, 25 mcg, Q2H PRN  sodium chloride flush, 5-40 mL, PRN  sodium chloride, 25 mL, PRN  heparin flush, 3 mL, PRN  acetaminophen, 650 mg, Q4H PRN  guaiFENesin-dextromethorphan, 5 mL, Q4H PRN  benzonatate, 200 mg, TID PRN          VENT SETTINGS (Comprehensive) (if applicable):  Vent Information  $Ventilation: $Subsequent Day  Skin Assessment: Clean, dry, & intact  Equipment ID: [id number]  Vent Type: [phone]  Vent Mode: AC/VC  Vt Ordered: 500 mL  Rate Set: 16 bmp  Peak Flow: 68 L/min  Pressure Support: 0 cmH20  FiO2 : 50 %  SpO2: 91 %  SpO2/FiO2 ratio: 182  Sensitivity: 3  PEEP/CPAP: 5  I Time/ I Time %: 0 s  Humidification Source: Heated wire  Humidification Temp: 37  Humidification Temp Measured: 36.8  Circuit Condensation: Drained  Mask Type: Full face mask  Mask Size: Medium  Additional Respiratory  Assessments  Pulse: 132  Resp: 27  SpO2: 91 %  Position: Semi-Pena's  Humidification Source: Heated wire  Humidification Temp: 37  Circuit Condensation: Drained  Oral Care: Mouth swabbed,Mouth moisturizer,Mouth suctioned,Suction toothette  Subglottic Suction Done?: Yes  Airway Type: ET  Airway Size: 7.5  Cuff Pressure (cm H2O): 29 cm H2O    ABGs:   Recent Labs     12/21/21  0551   PH 7.309*   PCO2 33.1*   PO2 50.4*   HCO3 16.3*   BE -9.2*   O2SAT 79.5*       Laboratory findings:    Complete Blood Count:   Recent Labs     12/19/21  0530 12/20/21  0600 12/21/21  0450   WBC 71.3* 105.2* 123.9*   HGB 7.5* 7.4* 6.5*   HCT 23.1* 23.8* 20.9*   PLT 50* 46* 47*        Last 3 Blood Glucose:   Recent Labs     12/19/21  0530 12/20/21  0600 12/21/21  0450   GLUCOSE 268* 145* 178*        PT/INR:    Lab Results   Component Value Date    PROTIME 16.6 12/21/2021    PROTIME 28.4 02/18/2020    INR 1.5 12/21/2021     PTT:    Lab Results   Component Value Date    APTT 38.0 07/24/2020       Comprehensive Metabolic Profile:   Recent Labs     12/19/21  0530 12/19/21  0530 12/20/21  0600 12/20/21  0600 12/21/21  0450     --  146  --  143   K 3.9  --  4.0  --  3.7     --  105  --  105   CO2 21*  --  18*  --  16*   *  --  118*  --  119*   CREATININE 4.1*  --  4.7*  --  5.0*   GLUCOSE 268*  --  145*  --  178*   CALCIUM 6.2*  --  5.9*  --  5.7*   PROT 4.6*   < > 4.5*   < > 4.4*   LABALBU 2.4*   < > 2.4*   < > 2.4*   BILITOT 0.3   < > 0.3   < > 0.2   ALKPHOS 59   < > 82   < > 130*   *   < > 157*   < > 171*   ALT <5   < > <5   < > <5    < > = values in this interval not displayed. Magnesium:   Lab Results   Component Value Date    MG 1.4 12/21/2021     Phosphorus:   Lab Results   Component Value Date    PHOS 5.0 12/21/2021     Ionized Calcium: No results found for: CAION     Urinalysis:     Troponin: No results for input(s): TROPONINI in the last 72 hours. Microbiology:    Cultures during this admission:     Blood cultures:                 [] None drawn      [x] Negative             []  Positive (Details:  )  Urine Culture:                   [] None drawn      [x] Negative             []  Positive (Details:  )  Sputum Culture:               [] None drawn       [] Negative             [x]  Positive (Details: Staph)   Endotracheal aspirate:     [] None drawn       [x] Negative             []  Positive (Details:  )     Other pertinent Labs:       Radiology/Imaging:     Chest Xray (12/21/2021): Impression   Stable bilateral pneumonia or interstitial pulmonary edema. ASSESSMENT:     Active Problems:    Acute respiratory disease due to COVID-19 virus  Resolved Problems:    * No resolved hospital problems.  *      Additional assessment:        Neuro   Fentanyl Yes    CONSULTATION NEEDED:  [x] No   [] Yes    FAMILY UPDATED:    [] No   [x] Yes    TRANSFER OUT OF ICU:   [x] No   [] Yes            Leia Hartman MD,                 12/21/2021, 7:26 AM    Critical Care Attending Addendum:    Patient seen and examined with the house staff. X-rays personally reviewed through the PACS. Family is updated at the bedside as available. Additional findings listed below as necessary. Additional comments:  1. Now anemic and given 1 unit prbc. 2. Afib with rvr started on amio.gtt.  3. MARCIAL for HD today. 4. Acute respiratory failure not ready to wean ventilator today. 5. On abx per ID.     30 min CCT

## 2021-12-21 NOTE — PATIENT CARE CONFERENCE
.  Intensive Care Daily Quality Rounding Checklist      ICU Team Members: n/a    ICU Day #: NUMBER: 4    Intubation Date: December 18    Ventilator Day #: NUMBER: 4    Central Line Insertion Date: December 17        Day #: NUMBER: 5     Arterial Line Insertion Date:  n/a      Day #: n/a    Temporary Hemodialysis Catheter Insertion Date:  n/a      Day # n/a    DVT Prophylaxis: contraindicated inr was 4.5 and vitamin k and ffp given    GI Prophylaxis: protonix    Aponte Catheter Insertion Date: December 18       Day #: 4      Continued need (if yes, reason documented and discussed with physician): yes, strict I/o    Skin Issues/ Wounds and ordered treatment discussed on rounds: y wound care consulted    Goals/ Plans for the Day: wean 02 as able/maintain airway/monitor elytes/plan for dialysis catheter insertion and dialysis and keep family updated

## 2021-12-21 NOTE — PROGRESS NOTES
Admit Date: 12/10/2021    Subjective:     Remain on vent. Vasopressor     Objective:     Patient Vitals for the past 8 hrs:   BP Temp Temp src Pulse Resp SpO2 Height Weight   12/21/21 0630 (!) 93/57   140 22 (!) 89 %     12/21/21 0615 114/60   117 20 (!) 89 %     12/21/21 0600 (!) 95/59   139 19 (!) 88 %     12/21/21 0545 (!) 86/53   143 18 97 %     12/21/21 0530 (!) 101/52   130 19 (!) 88 %     12/21/21 0515 (!) 97/51   149 20 (!) 88 %     12/21/21 0503    163 20 (!) 88 %     12/21/21 0500 109/60   126 18 (!) 88 %     12/21/21 0445 112/61   150 23 (!) 88 %     12/21/21 0430 (!) 118/58   153 26 (!) 89 %     12/21/21 0415 (!) 96/55   104 22 (!) 89 %     12/21/21 0400 (!) 96/52 98.7 °F (37.1 °C) Axillary 141 21 (!) 89 %     12/21/21 0345 (!) 102/47   151 22 (!) 89 %     12/21/21 0330 (!) 113/55   142 20 (!) 89 %     12/21/21 0315 (!) 101/57   148 21 (!) 89 %     12/21/21 0300 (!) 101/47   137 18 (!) 88 %     12/21/21 0245 (!) 103/53   158 20 (!) 89 %     12/21/21 0230 (!) 103/55   121 27 90 %     12/21/21 0215 (!) 96/52   141 19 90 %     12/21/21 0200 (!) 114/53   132 22 90 %     12/21/21 0145 (!) 112/51   133 20 (!) 89 %     12/21/21 0130 (!) 79/53   135 21 90 %     12/21/21 0115 (!) 79/45   134 21 90 %     12/21/21 0105    125 18 91 %     12/21/21 0100 (!) 72/43   122 20 90 %     12/21/21 0045 (!) 85/49   109 18 (!) 89 %     12/21/21 0043 (!) 85/49   110       12/21/21 0030 (!) 120/53   162 18 93 %     12/21/21 0015 (!) 114/52   140 22 (!) 88 %     12/21/21 0000 108/62 98.7 °F (37.1 °C) Oral 119 30 93 % 5' 9\" (1.753 m) 134 lb 0.6 oz (60.8 kg)   12/20/21 2345 117/61   148 (!) 31 94 %     12/20/21 2330 (!) 98/49   123 26 94 %     12/20/21 2315 (!) 74/43   105 24 95 %     12/20/21 2300 (!) 72/40   104 22 94 %       I/O last 3 completed shifts:   In: 6442.9 [I.V.:4340; Blood:432.9; NG/GT:1470; IV Piggyback:200]  Out: 110 [Urine:110]  I/O this shift:  In: 2311 [I.V.:1711; NG/GT:600]  Out: 9 [Urine:9]      Scheduled Meds:   midodrine  5 mg Oral TID WC    pantoprazole  40 mg IntraVENous Daily    Arformoterol Tartrate  15 mcg Nebulization BID    budesonide  0.5 mg Nebulization BID    chlorhexidine  15 mL Mouth/Throat BID    hydrocortisone sodium succinate PF  50 mg IntraVENous Q8H    [Held by provider] baricitinib  1 mg Oral Daily    meropenem  1,000 mg IntraVENous Q24H    lidocaine PF  5 mL IntraDERmal Once    sodium chloride flush  5-40 mL IntraVENous 2 times per day    heparin flush  3 mL IntraVENous 2 times per day    hydroxyurea  500 mg Oral BID    allopurinol  100 mg Oral Daily    atorvastatin  40 mg Oral Nightly    isosorbide mononitrate  30 mg Oral Daily    [Held by provider] metoprolol succinate  50 mg Oral Daily    mirtazapine  7.5 mg Oral Nightly    zinc sulfate  50 mg Oral Daily    Vitamin D  2,000 Units Oral Daily    guaiFENesin  400 mg Oral TID    ascorbic acid  1,000 mg Oral Daily     Continuous Infusions:   sodium chloride      sodium chloride      fentaNYL 5 mcg/ml in 0.9%  ml infusion 50 mcg/hr (12/21/21 0029)    sodium chloride 150 mL/hr at 12/21/21 0441    norepinephrine 4 mcg/min (12/21/21 0616)    sodium chloride 33.3 mL/hr at 12/20/21 1851    sodium chloride         CBC with Differential:    Lab Results   Component Value Date    .9 12/21/2021    RBC 1.59 12/21/2021    HGB 6.5 12/21/2021    HCT 20.9 12/21/2021    PLT 47 12/21/2021    .4 12/21/2021    MCH 40.9 12/21/2021    MCHC 31.1 12/21/2021    RDW 14.6 12/21/2021    NRBC 0.9 12/19/2021    BANDSPCT 2 10/12/2016    BLASTSPCT 0.9 08/18/2020    METASPCT 0.9 12/19/2021    LYMPHOPCT 0.8 12/20/2021    MONOPCT 9.4 12/20/2021    MYELOPCT 0.9 12/19/2021    BASOPCT 0.4 12/20/2021    MONOSABS 9.89 12/20/2021    LYMPHSABS 0.79 12/20/2021    EOSABS 0.00 12/20/2021    BASOSABS 0.38 12/20/2021     CMP:    Lab Results   Component Value Date     12/21/2021    K 3.7 12/21/2021    K 3.9 08/06/2020     12/21/2021    CO2 16 12/21/2021     12/20/2021    CREATININE 5.0 12/21/2021    GFRAA 13 12/21/2021    LABGLOM 11 12/21/2021    PROT 4.4 12/21/2021    LABALBU 2.4 12/21/2021    LABALBU 4.1 01/24/2012    CALCIUM 5.9 12/20/2021    BILITOT 0.2 12/21/2021    ALKPHOS 130 12/21/2021     12/21/2021    ALT <5 12/21/2021     PT/INR:    Lab Results   Component Value Date    PROTIME 16.6 12/21/2021    PROTIME 28.4 02/18/2020    INR 1.5 12/21/2021       Assessment:     Active Problems:   Acute hypoxic respiratory failure  Pneumonia  Covid 19 infection   Pleural effusion   Leukocytosis  Myeloproliferative syndrome       Plan:   Continue support ,prognosis poor

## 2021-12-21 NOTE — PLAN OF CARE
Problem: Falls - Risk of:  Goal: Will remain free from falls  Description: Will remain free from falls  12/21/2021 0045 by Sunita Barbour RN  Outcome: Met This Shift  12/20/2021 2120 by Heidi Donovan RN  Outcome: Met This Shift  Goal: Absence of physical injury  Description: Absence of physical injury  12/21/2021 0045 by Sunita Barbour RN  Outcome: Met This Shift  12/20/2021 2120 by Heidi Donovan RN  Outcome: Met This Shift     Problem: Airway Clearance - Ineffective  Goal: Achieve or maintain patent airway  12/21/2021 0045 by Sunita Barbour RN  Outcome: Met This Shift  12/20/2021 2120 by Heidi Donovan RN  Outcome: Met This Shift     Problem: Gas Exchange - Impaired  Goal: Absence of hypoxia  12/21/2021 0045 by Sunita Barbour RN  Outcome: Met This Shift  12/20/2021 2120 by Heidi Donovan RN  Outcome: Met This Shift  Goal: Promote optimal lung function  12/21/2021 0045 by Sunita Barbour RN  Outcome: Met This Shift  12/20/2021 2120 by Heidi Donovan RN  Outcome: Met This Shift     Problem: Breathing Pattern - Ineffective  Goal: Ability to achieve and maintain a regular respiratory rate  12/21/2021 0045 by Sunita Barbour RN  Outcome: Met This Shift  12/20/2021 2120 by Heidi Donovan RN  Outcome: Met This Shift     Problem:  Body Temperature -  Risk of, Imbalanced  Goal: Ability to maintain a body temperature within defined limits  12/21/2021 0045 by Sunita Barbour RN  Outcome: Met This Shift  12/20/2021 2120 by Heidi Donovan RN  Outcome: Met This Shift     Problem: Isolation Precautions - Risk of Spread of Infection  Goal: Prevent transmission of infection  12/21/2021 0045 by Sunita Barbour RN  Outcome: Met This Shift  12/20/2021 2120 by Heidi Donovan RN  Outcome: Met This Shift     Problem: Nutrition Deficits  Goal: Optimize nutritional status  12/21/2021 0045 by Sunita Barbour RN  Outcome: Met This Shift  12/20/2021 2120 by Heidi Donovan RN  Outcome: Met This Shift     Problem: Risk for Fluid Volume Deficit  Goal: Maintain normal heart rhythm  12/21/2021 0045 by Rafael Christina RN  Outcome: Met This Shift  12/20/2021 2120 by Erna Burgess RN  Outcome: Met This Shift  Goal: Maintain absence of muscle cramping  12/21/2021 0045 by Rafael Christina RN  Outcome: Met This Shift  12/20/2021 2120 by Erna Burgess RN  Outcome: Met This Shift  Goal: Maintain normal serum potassium, sodium, calcium, phosphorus, and pH  12/21/2021 0045 by Rafael Christina RN  Outcome: Met This Shift  12/20/2021 2120 by Erna Burgess RN  Outcome: Met This Shift     Problem: Loneliness or Risk for Loneliness  Goal: Demonstrate positive use of time alone when socialization is not possible  12/21/2021 0045 by Rafael Christina RN  Outcome: Met This Shift  12/20/2021 2120 by Erna Burgess RN  Outcome: Met This Shift     Problem: Fatigue  Goal: Verbalize increase energy and improved vitality  12/21/2021 0045 by Rafael Christina RN  Outcome: Met This Shift  12/20/2021 2120 by Erna Burgess RN  Outcome: Met This Shift     Problem: Skin Integrity:  Goal: Will show no infection signs and symptoms  Description: Will show no infection signs and symptoms  12/21/2021 0045 by Rafael Christina RN  Outcome: Met This Shift  12/20/2021 2120 by Erna Burgess RN  Outcome: Met This Shift  Goal: Absence of new skin breakdown  Description: Absence of new skin breakdown  12/21/2021 0045 by Rafael Christina RN  Outcome: Met This Shift  12/20/2021 2120 by Erna Burgess RN  Outcome: Met This Shift     Problem: Non-Violent Restraints  Goal: No harm/injury to patient while restraints in use  12/21/2021 0045 by Rafael Christina RN  Outcome: Met This Shift  12/20/2021 2120 by Erna Burgess RN  Outcome: Met This Shift  Goal: Patient's dignity will be maintained  12/21/2021 0045 by Rafael Christina RN  Outcome: Met This Shift  12/20/2021 2120 by Erna Burgess RN  Outcome: Met This Shift     Problem: Pain:  Goal: Pain level will decrease  Description: Pain level will decrease  12/21/2021 0045 by Khai Queen RN  Outcome: Met This Shift  12/20/2021 2120 by Denisah Mcnulty RN  Outcome: Met This Shift  Goal: Control of acute pain  Description: Control of acute pain  12/21/2021 0045 by Khai Queen RN  Outcome: Met This Shift  12/20/2021 2120 by Denisha Mcnulty RN  Outcome: Met This Shift  Goal: Control of chronic pain  Description: Control of chronic pain  12/21/2021 0045 by Khai Queen RN  Outcome: Met This Shift  12/20/2021 2120 by Denisha Mcnulty RN  Outcome: Met This Shift     Problem: Inadequate oral food/beverage intake (NI-2.1)  Goal: Food and/or Nutrient Delivery  Description: Individualized approach for food/nutrient provision.   12/20/2021 1219 by Michael Paulino RD, LD  Outcome: Met This Shift

## 2021-12-22 NOTE — PROGRESS NOTES
`         Critical Care Team - Daily Progress Note         Date and time: 12/22/2021 7:35 AM  Patient's name:  Rick To  Medical Record Number: [id number]  Patient's account/billing number: [de-identified]  Patient's YOB: 1929  Age: 80 y.o. Date of Admission: 12/10/2021  9:15 PM  Length of stay during current admission: 11      Primary Care Physician: Andrew Cárdenas DO  ICU Attending Physician: Dr. Kali Reyes    Code Status: DNR-CCA    Reason for ICU admission:   Acute hypoxic respiratory failure secondary to Covid pneumonia      SUBJECTIVE:     OVERNIGHT EVENTS:           Tested + for COVID 12/8 with desaturation at home to 87% with pox 92% on 2L. CT pulmonary angiogram was performed showing bilateral pleural effusions, basilar consolidation, and few peripheral opacities. 12/15: pt up to use BR and pox down to 70%. Placed on 10l HFNC; Decreased O2 to 6L with pox 95%. Feels dyspneic, coughing a lot    12/16: Now on 14L HFNC, pox 89-91% at bedside. Reports mild cough and dyspnea. Asking about his Wes order. 12/17 requires 15 L high flow nasal cannula +15 L nonrebreather mask to keep saturation above 88%. Afebrile    12/18 worsening Hypoxemia O?n and placed on  %. Low BP started on levophed and IVF, and transferred to MICU. Got ativan last night    12/19: Patient was intubated overnight. He is off levo. Plan to D/C bicarb drip. Start nepro tube feeds. Add fentanyl prn.     12/20: No acute issues. Much more awake. Patient is following commands    12/21: No events overnight. Low hemoglobin will get one unit today. afib rvr and low bp. Will give unit of blood. Patient is scheduled to get a dialysis catheter today    12/22: Patient received one unit of blood yesterday after receiving dialysis.  Patient remains on Levophed and amiodirone    CURRENT VENTILATION STATUS:     [x] Ventilator  [] BIPAP  [] Nasal Cannula [] Room Air      IF INTUBATED, ET TUBE MARKING AT LOWER LIP: cms    SECRETIONS Amount:  [] Small [] Moderate  [] Large  [x] None  Color:     [] White [] Colored  [] Bloody    SEDATION:  RAAS Score:  [] Propofol gtt  [] Versed gtt  [] Ativan gtt   [] No Sedation    PARALYZED:  [x] No    [] Yes      VASOPRESSORS:  [x] No    [x] Yes    If yes -   [x] Levophed       [] Dopamine     [] Vasopressin       [] Dobutamine  [] Phenylephrine         [] Epinephrine    CENTRAL LINES:     [x] No   [] Yes   (Date of Insertion:   )           If yes -     [] Right IJ     [] Left IJ [] Right Femoral [] Left Femoral                   [] Right Subclavian [] Left Subclavian       VELA'S CATHETER:   [] No   [x] Yes  (Date of Insertion:   )     URINE OUTPUT:            [] Good   [x] Low              [] Anuric      OBJECTIVE:     VITAL SIGNS:  BP (!) 97/40   Pulse 112   Temp 99.2 °F (37.3 °C) (Axillary)   Resp 18   Ht 5' 9\" (1.753 m)   Wt 146 lb 9.7 oz (66.5 kg)   SpO2 94%   BMI 21.65 kg/m²   Tmax over 24 hours:  Temp (24hrs), Av.6 °F (37 °C), Min:98 °F (36.7 °C), Max:99.2 °F (37.3 °C)      Patient Vitals for the past 6 hrs:   BP Temp src Pulse Resp SpO2 Weight   21 0730 (!) 97/40  112 18 94 %    21 0700 (!) 89/38  113 17 97 %    21 0600 (!) 87/39  112 22 91 % 146 lb 9.7 oz (66.5 kg)   21 0500 (!) 93/39  109 19 91 %    21 0427   108 19 91 %    21 0400 (!) 98/40 Axillary 108 18 91 %    21 0300 (!) 93/42  109 18 91 %    21 0200 (!) 95/39  106 17 92 %          Intake/Output Summary (Last 24 hours) at 2021 0735  Last data filed at 2021 0612  Gross per 24 hour   Intake 5566 ml   Output 937 ml   Net 4629 ml     Wt Readings from Last 2 Encounters:   21 146 lb 9.7 oz (66.5 kg)   21 130 lb (59 kg)     Body mass index is 21.65 kg/m². PHYSICAL EXAMINATION:    General appearance: lethargic appears stated age and cooperative.  In no acute distress, intubated  Skin: No rashes or lesions  HEENT: mucous membranes are moist  Neck: No JVD  Lungs: symmetrical expansion, diminished with crackles, no wheezing, no use of accessory muscles. Heart: S1S2,  +murmurs,  Abdomen: soft, non tender,   Extremities: no peripheral edema; R arm with significant ecchymosis from fingers up to bicep area.  BKA with prosthesis   Neurologic: lethargic, following commands            MEDICATIONS:    Scheduled Meds:   fluconazole  200 mg IntraVENous Q24H    midodrine  5 mg Oral TID WC    pantoprazole  40 mg IntraVENous Daily    Arformoterol Tartrate  15 mcg Nebulization BID    budesonide  0.5 mg Nebulization BID    chlorhexidine  15 mL Mouth/Throat BID    hydrocortisone sodium succinate PF  50 mg IntraVENous Q8H    [Held by provider] baricitinib  1 mg Oral Daily    meropenem  1,000 mg IntraVENous Q24H    lidocaine PF  5 mL IntraDERmal Once    sodium chloride flush  5-40 mL IntraVENous 2 times per day    heparin flush  3 mL IntraVENous 2 times per day    hydroxyurea  500 mg Oral BID    [Held by provider] allopurinol  100 mg Oral Daily    atorvastatin  40 mg Oral Nightly    [Held by provider] isosorbide mononitrate  30 mg Oral Daily    [Held by provider] metoprolol succinate  50 mg Oral Daily    mirtazapine  7.5 mg Oral Nightly    zinc sulfate  50 mg Oral Daily    Vitamin D  2,000 Units Oral Daily    guaiFENesin  400 mg Oral TID    ascorbic acid  1,000 mg Oral Daily     Continuous Infusions:   sodium chloride      amiodarone 450mg/250ml D5W infusion 0.5 mg/min (12/21/21 2243)    sodium chloride      sodium chloride      fentaNYL 5 mcg/ml in 0.9%  ml infusion 75 mcg/hr (12/21/21 2220)    sodium chloride 100 mL/hr at 12/22/21 0150    norepinephrine 8 mcg/min (12/22/21 0727)    sodium chloride 33.3 mL/hr at 12/21/21 1618    sodium chloride       PRN Meds:   sodium chloride, , PRN  sodium chloride, , PRN  anticoagulant sodium citrate, 100 mL/hr, PRN  sodium chloride, , PRN  fentanNYL, 25 mcg, Q2H PRN  sodium chloride flush, 5-40 mL, PRN  sodium chloride, 25 mL, PRN  heparin flush, 3 mL, PRN  acetaminophen, 650 mg, Q4H PRN  guaiFENesin-dextromethorphan, 5 mL, Q4H PRN  benzonatate, 200 mg, TID PRN          VENT SETTINGS (Comprehensive) (if applicable):  Vent Information  $Ventilation: $Subsequent Day  Skin Assessment: Clean, dry, & intact  Suction Catheter Diameter: 14  Equipment ID: [id number]  Equipment Changed: Humidification  Vent Type: [id number]  Vent Mode: AC/VC  Vt Ordered: 500 mL  Rate Set: 16 bmp  Peak Flow: 68 L/min  Pressure Support: 0 cmH20  FiO2 : 80 %  SpO2: 94 %  SpO2/FiO2 ratio: 117.5  Sensitivity: 3  PEEP/CPAP: 5  I Time/ I Time %: 0 s  Humidification Source: Heated wire  Humidification Temp: 37  Humidification Temp Measured: 37  Circuit Condensation: Drained  Mask Type: Full face mask  Mask Size: Medium  Additional Respiratory  Assessments  Pulse: 112  Resp: 18  SpO2: 94 %  Position: Semi-Pena's  Humidification Source: Heated wire  Humidification Temp: 37  Circuit Condensation: Drained  Oral Care: Mouth swabbed,Mouth moisturizer,Mouth suctioned  Subglottic Suction Done?: Yes  Airway Type: ET  Airway Size: 7.5  Cuff Pressure (cm H2O): 29 cm H2O    ABGs:   Recent Labs     12/21/21  0737   PH 7.309*   PCO2 33.1*   PO2 67.0*   HCO3 16.3*   BE -9.1*   O2SAT 90.1*       Laboratory findings:    Complete Blood Count:   Recent Labs     12/20/21  0600 12/20/21  0600 12/21/21  0450 12/21/21  0450 12/21/21  1430 12/21/21  2115 12/22/21  0600   .2*  --  123.9*  --   --   --  110.0*   HGB 7.4*   < > 6.5*   < > 7.2* 8.3* 7.2*   HCT 23.8*   < > 20.9*   < > 23.4* 27.1* 23.0*   PLT 46*  --  47*  --   --   --  50*    < > = values in this interval not displayed.         Last 3 Blood Glucose:   Recent Labs     12/20/21  0600 12/21/21  0450   GLUCOSE 145* 178*        PT/INR:    Lab Results   Component Value Date    PROTIME 16.6 12/21/2021    PROTIME 28.4 02/18/2020    INR 1.5 12/21/2021     PTT:    Lab Results   Component Value Date    APTT 38.0 07/24/2020       Comprehensive Metabolic Profile:   Recent Labs     12/20/21  0600 12/20/21  0600 12/21/21  0450 12/21/21  0450 12/22/21  0600     --  143  --  135   K 4.0  --  3.7  --  4.0     --  105  --  103   CO2 18*  --  16*  --  12*   *  --  119*  --  114*   CREATININE 4.7*  --  5.0*  --  5.0*   GLUCOSE 145*  --  178*  --  248*   CALCIUM 5.9*  --  5.7*  --  5.7*   PROT 4.5*   < > 4.4*   < > 3.9*   LABALBU 2.4*   < > 2.4*   < > 2.1*   BILITOT 0.3   < > 0.2   < > <0.2   ALKPHOS 82   < > 130*   < > 195*   *   < > 171*   < > 137*   ALT <5   < > <5   < > <5    < > = values in this interval not displayed. Magnesium:   Lab Results   Component Value Date    MG 1.4 12/21/2021     Phosphorus:   Lab Results   Component Value Date    PHOS 5.0 12/21/2021     Ionized Calcium: No results found for: CAION     Urinalysis:     Troponin: No results for input(s): TROPONINI in the last 72 hours. Microbiology:    Cultures during this admission:     Blood cultures:                 [] None drawn      [x] Negative             []  Positive (Details:  )  Urine Culture:                   [] None drawn      [x] Negative             []  Positive (Details:  )  Sputum Culture:               [] None drawn       [] Negative             [x]  Positive (Details: Staph)   Endotracheal aspirate:     [] None drawn       [x] Negative             []  Positive (Details:  )     Other pertinent Labs:       Radiology/Imaging:     Chest Xray (12/22/2021): Impression   Stable bilateral pneumonia or interstitial pulmonary edema. ASSESSMENT:     Active Problems:    Acute respiratory disease due to COVID-19 virus  Resolved Problems:    * No resolved hospital problems. *      Additional assessment:        Neuro   Fentanyl GGT  Intubated  Remeron nightly      Respiratory   Significant ventilatory requirements   Patient's ventilatory requirements continue to increase   Wean oxygen as tolerated.  Keep O2 sat 90-92%    Cardiovascular   Intermitently requiring a small amount of Levophed   On midodrine   Increasing Levophed and now hemoglobin down  Lopressor 50 mg daily we will hold  A. fib RVR   amiodarone drip  History of a fib    Requiring increasing Levophed for blood pressure support      Gastrointestinal   Protonix daily  Advance diet as able    Renal   Very poor unrine output  Worsening MARCIAL   Discontinued Bicarb drip   Creatinine 4.7  Nephrology on board appreciate recs  Dialysis today       Infectious Disease   Covid  Given Merro and levaquin per ID  Stopped cefazolin per id  Ana M Mutton day 8 (12/20)  Respiratory cultures with Staph and    Stenotrophomonas maltophilia    Add fluconazole per infectious disease  ID on board appreciate recs    Hematology/Oncology   Anemia, 7.4 today continue to monitor  Replace hemoglobin below 7  Warfarin 2.5 qd   Was held for dialysis catheter placement 12/21    Endocrine   Solucortef 50mg q8 hours  Maintain blood sugar 140-180        Social/Spiritual/DNR/Other   DNR CCA  Son is coming up from [place] and was updated    Leon Reilly MD  PGY -2        PLAN:     WEAN PER PROTOCOL:  [] No   [x] Yes  [] N/A    DISCONTINUE ANY LABS:   [x] No   [] Yes    ICU PROPHYLAXIS:  Stress ulcer:  [x] PPI Agent  [] [id number] [] Sucralfate  [] Other:  VTE:   [x] Enoxaparin  [] Unfract. Heparin Subcut  [] EPC Cuffs    NUTRITION:  [] NPO [] Tube Feeding (Specify: ) [] TPN  [x] PO (Diet: Diet NPO  ADULT TUBE FEEDING; Nasogastric; Renal Formula; Continuous; 20; Yes; 20; Q 4 hours; 35; 100; Q 4 hours)    HOME MEDICATIONS RECONCILED: [] No  [x] Yes    INSULIN DRIP:   [x] No   [] Yes    CONSULTATION NEEDED:  [x] No   [] Yes    FAMILY UPDATED:    [] No   [x] Yes    TRANSFER OUT OF ICU:   [x] No   [] Yes            Leon Reilly MD,                 12/22/2021, 7:35 AM    Critical Care Attending Addendum:    Patient seen and examined with the house staff. X-rays personally reviewed through the PACS.   Family is updated at the bedside as available. Additional findings listed below as necessary. Additional comments:  1. Multisystem organ failure   2. Worsening septic shock with higher norepi requirements. 3. Worsening hilaria with inability to tolerate HD with resultant hypotension and worsening afib with rvr. 4. Worsening acute hypoxic respiratory failure from COVID and possible bacterial pneumonia. Eraxis added by Dr. Soo Golden. 5. Afib with rvr on amio gtt.   6.  Will discuss worsening condition with family  >30 min CCT

## 2021-12-22 NOTE — PROGRESS NOTES
Patient asystole on monitor, absent of pulses/blood pressure, absent heart sounds, and without respiration at 1749.

## 2021-12-22 NOTE — FLOWSHEET NOTE
Pt ran 2 hours; 3301 bath; 900 mL removed; stable/tolerated fair, labile BP; HD CVC citrate locked per lumen fill volume, capped & clamped post Tx. poor access flow; issues achieving prescribed BFR. Had to pause dialysis for temporary line to be fixed/looked at by floor resident. System ended up clotting, reset up system and restarted dialysis 30 minutes after initial start. Temporary line starting giving issues towards last 10 minutes of tx, able to finish full tx. Next Tx per nephrologist.      12/21/21 1915   Vital Signs   BP (!) 130/54   Temp 98.1 °F (36.7 °C)   Pulse 126   Resp 19   SpO2 93 %   Pain Assessment   Pain Assessment CPOT   Post-Hemodialysis Assessment   Post-Treatment Procedures Blood returned;Catheter capped, clamped with Citrate x 2 ports   Machine Disinfection Process Acid/Vinegar Clean;Heat Disinfect; Exterior Machine Disinfection   Rinseback Volume (ml) 300 ml   Total Liters Processed (l/min) 20.1 l/min   Dialyzer Clearance Heavily streaked   Duration of Treatment (minutes) 120 minutes   Hemodialysis Intake (ml) 300 ml   Hemodialysis Output (ml) 900 ml   NET Removed (ml) 1500 ml   Tolerated Treatment Fair   Patient Response to Treatment labile BP throughout tx; temporary line starting having issues towards end of tx   Bilateral Breath Sounds Clear   Edema Generalized +2   RUE Edema +3   LUE Edema +3   Perineal Edema +3   Physician Notified?  Yes

## 2021-12-22 NOTE — PROGRESS NOTES
Admit Date: 12/10/2021    Subjective: All notes reviewed  On vent vasopressor   Renal function worsening     Objective:     Patient Vitals for the past 8 hrs:   BP Temp Temp src Pulse Resp SpO2 Weight   12/22/21 0600       146 lb 9.7 oz (66.5 kg)   12/22/21 0500 (!) 93/39   109 19 91 %    12/22/21 0427    108 19 91 %    12/22/21 0400 (!) 98/40  Axillary 108 18 91 %    12/22/21 0300 (!) 93/42   109 18 91 %    12/22/21 0200 (!) 95/39   106 17 92 %    12/22/21 0100 (!) 96/43   102 18 92 %    12/22/21 0023    108 18 91 %    12/22/21 0000 (!) 90/42 99.2 °F (37.3 °C) Axillary 108 18 91 %    12/21/21 2300 (!) 93/44   109 16 91 %      I/O last 3 completed shifts: In: 7402 [I.V.:4415; Blood:775; NG/GT:1912]  Out: 946 [Urine:44; Stool:2]  I/O this shift:   In: 475 [NG/GT:475]  Out: -     Scheduled Meds:   fluconazole  200 mg IntraVENous Q24H    midodrine  5 mg Oral TID WC    pantoprazole  40 mg IntraVENous Daily    Arformoterol Tartrate  15 mcg Nebulization BID    budesonide  0.5 mg Nebulization BID    chlorhexidine  15 mL Mouth/Throat BID    hydrocortisone sodium succinate PF  50 mg IntraVENous Q8H    [Held by provider] baricitinib  1 mg Oral Daily    meropenem  1,000 mg IntraVENous Q24H    lidocaine PF  5 mL IntraDERmal Once    sodium chloride flush  5-40 mL IntraVENous 2 times per day    heparin flush  3 mL IntraVENous 2 times per day    hydroxyurea  500 mg Oral BID    [Held by provider] allopurinol  100 mg Oral Daily    atorvastatin  40 mg Oral Nightly    [Held by provider] isosorbide mononitrate  30 mg Oral Daily    [Held by provider] metoprolol succinate  50 mg Oral Daily    mirtazapine  7.5 mg Oral Nightly    zinc sulfate  50 mg Oral Daily    Vitamin D  2,000 Units Oral Daily    guaiFENesin  400 mg Oral TID    ascorbic acid  1,000 mg Oral Daily     Continuous Infusions:   sodium chloride      amiodarone 450mg/250ml D5W infusion 0.5 mg/min (12/21/21 5881)    sodium chloride      sodium chloride      fentaNYL 5 mcg/ml in 0.9%  ml infusion 75 mcg/hr (12/21/21 2220)    sodium chloride 100 mL/hr at 12/22/21 0150    norepinephrine 7 mcg/min (12/21/21 2242)    sodium chloride 33.3 mL/hr at 12/21/21 1618    sodium chloride         CBC with Differential:    Lab Results   Component Value Date    .9 12/21/2021    RBC 1.59 12/21/2021    HGB 8.3 12/21/2021    HCT 27.1 12/21/2021    PLT 47 12/21/2021    .4 12/21/2021    MCH 40.9 12/21/2021    MCHC 31.1 12/21/2021    RDW 14.6 12/21/2021    NRBC 0.9 12/19/2021    BANDSPCT 2 10/12/2016    BLASTSPCT 0.9 08/18/2020    METASPCT 0.9 12/19/2021    LYMPHOPCT 1.0 12/21/2021    MONOPCT 8.0 12/21/2021    MYELOPCT 0.9 12/19/2021    BASOPCT 0.0 12/21/2021    MONOSABS 9.91 12/21/2021    LYMPHSABS 1.24 12/21/2021    EOSABS 0.00 12/21/2021    BASOSABS 0.00 12/21/2021     CMP:    Lab Results   Component Value Date     12/21/2021    K 3.7 12/21/2021    K 3.9 08/06/2020     12/21/2021    CO2 16 12/21/2021     12/21/2021    CREATININE 5.0 12/21/2021    GFRAA 13 12/21/2021    LABGLOM 11 12/21/2021    PROT 4.4 12/21/2021    LABALBU 2.4 12/21/2021    LABALBU 4.1 01/24/2012    CALCIUM 5.7 12/21/2021    BILITOT 0.2 12/21/2021    ALKPHOS 130 12/21/2021     12/21/2021    ALT <5 12/21/2021     PT/INR:    Lab Results   Component Value Date    PROTIME 16.6 12/21/2021    PROTIME 28.4 02/18/2020    INR 1.5 12/21/2021       Assessment:     Active Problems:   Acute hypoxic respiratory failure  Pneumonia  Covid 19 infection   MARCIAL   Pleural effusion   Leukocytosis  Myeloproliferative syndrome       Plan:   Continue supportive care ,for dialysis ,prognosis poor

## 2021-12-22 NOTE — PROCEDURES
Central Line    Date/Time: 12/22/2021 5:17 PM  Performed by: Lynnette Maldonado MD  Authorized by: Lynnette Maldonado MD   Consent: Written consent obtained. Consent given by: power of   Patient understanding: patient states understanding of the procedure being performed  Patient consent: the patient's understanding of the procedure matches consent given  Procedure consent: procedure consent matches procedure scheduled  Relevant documents: relevant documents present and verified  Test results: test results available and properly labeled  Site marked: the operative site was marked  Imaging studies: imaging studies available  Required items: required blood products, implants, devices, and special equipment available  Patient identity confirmed: hospital-assigned identification number, provided demographic data and arm band  Time out: Immediately prior to procedure a \"time out\" was called to verify the correct patient, procedure, equipment, support staff and site/side marked as required.   Indications: vascular access  Anesthesia: local infiltration    Anesthesia:  Local Anesthetic: lidocaine 1% without epinephrine  Preparation: skin prepped with ChloraPrep  Skin prep agent dried: skin prep agent completely dried prior to procedure  Sterile barriers: all five maximum sterile barriers used - cap, mask, sterile gown, sterile gloves, and large sterile sheet  Hand hygiene: hand hygiene performed prior to central venous catheter insertion  Location details: right femoral  Site selection rationale: Unable to pass wire through IJ  Patient position: flat  Catheter type: triple lumen  Catheter size: 7 Fr  Ultrasound guidance: yes  Sterile ultrasound techniques: sterile gel and sterile probe covers were used  Number of attempts: 2  Successful placement: yes  Post-procedure: line sutured and dressing applied  Assessment: free fluid flow and blood return through all ports  Patient tolerance: patient tolerated the procedure well with no immediate complications

## 2021-12-22 NOTE — FLOWSHEET NOTE
12/22/21 1345   Vital Signs   BP (!) 113/50   Temp 97.9 °F (36.6 °C)   Pulse 125   Resp 15   SpO2 92 %   Weight 147 lb 11.3 oz (67 kg)   Weight Method Bed scale   Percent Weight Change 0.75   Pain Assessment   Pain Assessment CPOT   Pain Level 0   Post-Hemodialysis Assessment   Post-Treatment Procedures Blood returned;Catheter capped, clamped with Citrate x 2 ports   Machine Disinfection Process Acid/Vinegar Clean;Heat Disinfect; Exterior Machine Disinfection   Rinseback Volume (ml) 322 ml   Dialyzer Clearance Lightly streaked   Duration of Treatment (minutes) 30 minutes   Hemodialysis Intake (ml) 400 ml   NET Removed (ml) 0 ml   Tolerated Treatment Poor   Patient Response to Treatment Pt unstable; tx terminated after 30 min. Bilateral Breath Sounds Diminished   Edema Generalized   Edema Generalized +2   Pt tolerated HD tx very poorly. Pt HD tx terminated after 30 min due to systolic pressure in the 48-98'B and P- 160's. Even with increased pressor support BP would not stabilize and 02 sat dropped to 79%. Dr. Kassi Johnston notified.

## 2021-12-22 NOTE — PROCEDURES
Insert Arterial Line    Date/Time: 12/22/2021 5:18 PM  Performed by: Anisha Fisher MD  Authorized by: Anisha Fisher MD   Consent: Written consent obtained. Consent given by: power of   Patient understanding: patient states understanding of the procedure being performed  Patient consent: the patient's understanding of the procedure matches consent given  Procedure consent: procedure consent matches procedure scheduled  Relevant documents: relevant documents present and verified  Test results: test results available and properly labeled  Site marked: the operative site was marked  Imaging studies: imaging studies available  Required items: required blood products, implants, devices, and special equipment available  Patient identity confirmed: provided demographic data, hospital-assigned identification number and arm band  Time out: Immediately prior to procedure a \"time out\" was called to verify the correct patient, procedure, equipment, support staff and site/side marked as required. Preparation: Patient was prepped and draped in the usual sterile fashion.   Indications: multiple ABGs and respiratory failure  Location: left radial  Anesthesia: local infiltration  Mathew's test normal: no  Needle gauge: 18  Number of attempts: 1  Post-procedure: line sutured and dressing applied  Post-procedure CMS: unchanged

## 2021-12-22 NOTE — PROGRESS NOTES
NEPHROLOGY Attending   Progress Note  12/22/2021 2:14 PM  Subjective:   Admit Date: 12/10/2021  PCP: Mattie Whitney DO    Interval History:     12/22/21: Seen through the window in the ICU setting - not directly examined due to strict Covid isolation - remains intubated and on pressor support - discussed w/ ICU and dialysis staff - 1st HD last [place] - poor catheter flow - plan for new catheter this am        Diet: Diet NPO  ADULT TUBE FEEDING; Nasogastric; Renal Formula; Continuous; 20; Yes; 20; Q 4 hours; 35; 100; Q 4 hours    Data:   Scheduled Meds:   anidulafungin  200 mg IntraVENous Once    And    [START ON 12/23/2021] anidulafungin  100 mg IntraVENous Q24H    ampicillin-sulbactam  3,000 mg IntraVENous Q12H    midodrine  5 mg Oral TID WC    pantoprazole  40 mg IntraVENous Daily    Arformoterol Tartrate  15 mcg Nebulization BID    budesonide  0.5 mg Nebulization BID    chlorhexidine  15 mL Mouth/Throat BID    hydrocortisone sodium succinate PF  50 mg IntraVENous Q8H    [Held by provider] baricitinib  1 mg Oral Daily    lidocaine PF  5 mL IntraDERmal Once    sodium chloride flush  5-40 mL IntraVENous 2 times per day    heparin flush  3 mL IntraVENous 2 times per day    hydroxyurea  500 mg Oral BID    [Held by provider] allopurinol  100 mg Oral Daily    atorvastatin  40 mg Oral Nightly    [Held by provider] isosorbide mononitrate  30 mg Oral Daily    [Held by provider] metoprolol succinate  50 mg Oral Daily    mirtazapine  7.5 mg Oral Nightly    zinc sulfate  50 mg Oral Daily    Vitamin D  2,000 Units Oral Daily    guaiFENesin  400 mg Oral TID    ascorbic acid  1,000 mg Oral Daily     Continuous Infusions:   amiodarone 450mg/250ml D5W infusion 0.5 mg/min (12/22/21 0937)    sodium chloride      fentaNYL 5 mcg/ml in 0.9%  ml infusion 75 mcg/hr (12/21/21 2220)    sodium chloride 100 mL/hr at 12/22/21 0150    norepinephrine 50 mcg/min (12/22/21 1353)    sodium chloride 33.3 mL/hr at 12/21/21 1618    sodium chloride       PRN Meds:sodium chloride, anticoagulant sodium citrate, fentanNYL, sodium chloride flush, sodium chloride, heparin flush, acetaminophen, guaiFENesin-dextromethorphan, benzonatate    Intake/Output Summary (Last 24 hours) at 12/22/2021 1414  Last data filed at 12/22/2021 1345  Gross per 24 hour   Intake 3866 ml   Output 947 ml   Net 2919 ml     CBC:   Recent Labs     12/20/21  0600 12/20/21  0600 12/21/21  0450 12/21/21  0450 12/21/21  1430 12/21/21  2115 12/22/21  0600   .2*  --  123.9*  --   --   --  110.0*   HGB 7.4*   < > 6.5*   < > 7.2* 8.3* 7.2*   PLT 46*  --  47*  --   --   --  50*    < > = values in this interval not displayed. BMP:    Recent Labs     12/20/21  0600 12/21/21  0450 12/22/21  0600    143 135   K 4.0 3.7 4.0    105 103   CO2 18* 16* 12*   * 119* 114*   CREATININE 4.7* 5.0* 5.0*   GLUCOSE 145* 178* 248*     Hepatic:   Recent Labs     12/20/21  0600 12/21/21  0450 12/22/21  0600   * 171* 137*   ALT <5 <5 <5   BILITOT 0.3 0.2 <0.2   ALKPHOS 82 130* 195*     Troponin: No results for input(s): TROPONINI in the last 72 hours. BNP: No results for input(s): BNP in the last 72 hours. Lipids: No results for input(s): CHOL, HDL in the last 72 hours. Invalid input(s): LDLCALCU  ABGs: No results found for: PHART, PO2ART, TQB0LOM  INR:   Recent Labs     12/20/21  0600 12/21/21  0000 12/21/21  0450   INR 4.5 2.0 1.5     -----------------------------------------------------------------  RAD: XR CHEST PORTABLE    Result Date: 12/12/2021  EXAMINATION: ONE XRAY VIEW OF THE CHEST 12/12/2021 8:26 am COMPARISON: 12/10/2021 HISTORY: ORDERING SYSTEM PROVIDED HISTORY: resp failure, effusions TECHNOLOGIST PROVIDED HISTORY: Reason for exam:->resp failure, effusions FINDINGS: The lungs are without acute focal process. Stable small left pleural effusion. No pneumothorax. The cardiomediastinal silhouette is without acute process.  The osseous structures are without acute process. Stable appearance of the chest compared to 12/10/2021     CTA PULMONARY W CONTRAST    Result Date: 12/11/2021  EXAMINATION: CTA OF THE CHEST 12/11/2021 12:54 am TECHNIQUE: CTA of the chest was performed after the administration of intravenous contrast.  Multiplanar reformatted images are provided for review. MIP images are provided for review. Dose modulation, iterative reconstruction, and/or weight based adjustment of the mA/kV was utilized to reduce the radiation dose to as low as reasonably achievable. COMPARISON: No prior CT. Correlation is made with portable chest radiographs from a few hours ago, as well as 07/24/2020. HISTORY: ORDERING SYSTEM PROVIDED HISTORY: eval for PE TECHNOLOGIST PROVIDED HISTORY: Reason for exam:->eval for PE Decision Support Exception - unselect if not a suspected or confirmed emergency medical condition->Emergency Medical Condition (MA) FINDINGS: Pulmonary Arteries: Pulmonary arteries are adequately opacified for evaluation. No evidence of intraluminal filling defect to suggest pulmonary embolism. Main pulmonary artery is normal in caliber. Mediastinum: No evidence of mediastinal lymphadenopathy. The heart and pericardium demonstrate no acute abnormality. There is no acute abnormality of the thoracic aorta. Left-sided coronary arterial calcifications. Moderate calcific atherosclerosis of the arch and descending aorta. Partial fluid opacification of the dependent bilateral mainstem bronchi, extending into the lower lobe bronchi on the left. Lungs/pleura: No pneumothorax. Small to moderate right and small left pleural effusions. Small calcified granuloma is seen within each upper lobe. There is an element of subsegmental dependent atelectasis bilaterally, with additional calcified granulomas seen within the atelectatic dependent basal left lower lobe.   Consolidation within portions of the basal left lower lobe may be somewhat out of proportion to perceived volume loss, raising the question of superimposed pneumonia. Scattered patchy areas of increased attenuation within the upper lobes may also have components of both subsegmental atelectasis and infiltrate. No significant thickening of secondary interlobular septa. Tracheobronchial tree calcification noted. Upper Abdomen: Visualized upper abdominal viscera demonstrate no acute abnormality. Soft Tissues/Bones: No acute bone or soft tissue abnormality. No evidence of pulmonary embolism. Small to moderate right pleural effusion and small left pleural effusion. In addition to associated element of dependent/compressive atelectasis within the bilateral lower lobes, consolidation may be somewhat out of proportion to perceived volume loss in areas of the lower left base, concerning for superimposed pneumonia, with dependent secretions seen within bilateral mainstem bronchi extending into the left lower lobe bronchi. Although some areas may simply represent simple atelectasis, there also several scattered low density infiltrates present within the bilateral upper lobes which could represent multifocal bronchopneumonia. A few tiny peripheral opacities within predominantly the lateral right upper lobe have somewhat nodular morphology, however these are nonspecific, particularly in current context, and require no surveillance, in the absence of risk factors for bronchogenic carcinoma, such as smoking.          Objective:   Vitals:   Vitals:    12/22/21 1345   BP: (!) 113/50   Pulse: 125   Resp: 15   Temp: 97.9 °F (36.6 °C)   SpO2: 92%     Patient Vitals for the past 24 hrs:   BP Temp Temp src Pulse Resp SpO2 Weight   12/22/21 1345 (!) 113/50 97.9 °F (36.6 °C)  125 15 92 % 147 lb 11.3 oz (67 kg)   12/22/21 1330 (!) 50/30   160      12/22/21 1316    150 17 (!) 84 %    12/22/21 1300 (!) 97/42   125 15 94 %    12/22/21 1230 (!) 95/44 97.9 °F (36.6 °C)  123 16 91 % 146 lb 9.7 oz (66.5 kg) 12/22/21 1200 (!) 87/43 99.7 °F (37.6 °C)  120 16 (!) 87 %    12/22/21 1130 (!) 89/41   124 22 (!) 86 %    12/22/21 1100 (!) 93/44   126 20 94 %    12/22/21 1030 (!) 100/43   125 17 93 %    12/22/21 1000 (!) 104/43 99 °F (37.2 °C)  126 16 94 %    12/22/21 0933     16 95 %    12/22/21 0931    132 20 95 %    12/22/21 0930 (!) 110/44   125 18 96 %    12/22/21 0900 (!) 93/39   117 17 94 %    12/22/21 0830 (!) 78/37   113 18 94 %    12/22/21 0800 (!) 97/40 99.1 °F (37.3 °C) Axillary 113 18 94 %    12/22/21 0730 (!) 97/40   112 18 94 %    12/22/21 0700 (!) 89/38   113 17 97 %    12/22/21 0600 (!) 87/39   112 22 91 % 146 lb 9.7 oz (66.5 kg)   12/22/21 0500 (!) 93/39   109 19 91 %    12/22/21 0427    108 19 91 %    12/22/21 0400 (!) 98/40  Axillary 108 18 91 %    12/22/21 0300 (!) 93/42   109 18 91 %    12/22/21 0200 (!) 95/39   106 17 92 %    12/22/21 0100 (!) 96/43   102 18 92 %    12/22/21 0023    108 18 91 %    12/22/21 0000 (!) 90/42 99.2 °F (37.3 °C) Axillary 108 18 91 %    12/21/21 2300 (!) 93/44   109 16 91 %    12/21/21 2200 (!) 82/39   111 19 (!) 89 %    12/21/21 2100 (!) 107/44   118 18 (!) 89 %    12/21/21 2000 (!) 114/50 99 °F (37.2 °C) Axillary 117 18 91 %    12/21/21 1947     19 91 %    12/21/21 1946    123 20 91 %    12/21/21 1915 (!) 130/54 98.1 °F (36.7 °C)  126 19 93 %    12/21/21 1900 (!) 79/53   161 17 90 %    12/21/21 1845 (!) 92/51   148      12/21/21 1830 113/61   163 19 93 %    12/21/21 1815 (!) 86/58   163      12/21/21 1800 (!) 76/41 98.7 °F (37.1 °C)  119 19 (!) 89 %    12/21/21 1745 (!) 68/37 98.7 °F (37.1 °C)  123 20 (!) 89 %    12/21/21 1730 (!) 88/42   154 20 (!) 88 %    12/21/21 1715 (!) 84/46   109      12/21/21 1700 (!) 100/51   106 17 90 %    12/21/21 1630 131/62   107 22 (!) 87 %    12/21/21 1627    104 21 (!) 86 %    12/21/21 1625 131/62   108      12/21/21 1610 (!) 117/54 98 °F (36.7 °C)  101 30 (!) 80 %    12/21/21 1600 (!) 115/58 98 °F (36.7 °C)  111 22 90 %    12/21/21 1530 (!) 106/57   121 20 94 %    12/21/21 1500 (!) 104/44   105 20 93 %    12/21/21 1430 (!) 97/51   101 22 94 %      According to institutional recommendations and guidelines, a face-to-face encounter was not performed due to the current efforts to prevent transmission of COVID-19 and the need to preserve PPE for other caregivers. [facility] records, nursing assessment, and diagnostic testing including laboratory results and imaging were reviewed. Please reference any relevant documentation elsewhere. Darris Boas will be coordinated with the primary services and consultants.       Assessment/Plan:     1) MARCIAL on CKD Stage 4  Baseline serum creatinine 0.9-1.0 as of 9/15/2020  Now serum creatinine 1.5 upon admission  Suspect serum creatinine underreflecting actual GFR as he has very little muscle mass  Urine lytes as of 12/16/2021: Sodium 50, creatinine 64, and urea 773  SCr past 72 hrs: 1.6->3.1->4.1->4.7->5.0   IV fluids initiated as well initial pressor support w/ no improvement upon arrival ICU  S/P temp line insertion 12/21 w/ HD Day #1  12/22: HD Day #2 - plan for treatment tomorrow as BP allows - follow for recovery      2) Hypernatremia  reflecting poor p.o. intake with component of superimposed diuresis   Improving response to current IV fluids    3) Metabolic acidosis with increased anion gap. May be reflection of tissue hypoperfusion related lactic acidosis. 12/19/21: lactic acid elevated at 3.2  PLAN:   1. Supplement bicarbonate to target a bicarbonate level of 22 mmol/L.  2. Expect improvement w/ IHD    4) Hypotension/shock   PLAN:   1. Requiring volume and pressors.   2. Mngt per the ICU Team     5) SARS-CoV-2 infection with moderate viral pneumonia in a fully vaccinated patient          Mukund Stone, APRN - CNP   Pt seen through window of icu due to covid  Chart reveiwed  Agree with above  Did not tolerate hd today due to elevated hr and hypotension  Temp line changed due to poor function 12/21  Will try hd again in am  John Waters MD

## 2021-12-22 NOTE — PROGRESS NOTES
[address] [place] [address] [facility]  [facility]  Progress Note    SUBJECTIVE:  Chief Complaint   Patient presents with    Shortness of Breath     covid + on tuesday, increased sob last several days, from home     The patient remains intubated. FiO2 75%  afebrile  sedated with fentanyl  Levophed      Review of systems:  As stated above in the chief complaint, otherwise negative.     Medications:  Scheduled Meds:   fluconazole  200 mg IntraVENous Q24H    midodrine  5 mg Oral TID WC    pantoprazole  40 mg IntraVENous Daily    Arformoterol Tartrate  15 mcg Nebulization BID    budesonide  0.5 mg Nebulization BID    chlorhexidine  15 mL Mouth/Throat BID    hydrocortisone sodium succinate PF  50 mg IntraVENous Q8H    [Held by provider] baricitinib  1 mg Oral Daily    meropenem  1,000 mg IntraVENous Q24H    lidocaine PF  5 mL IntraDERmal Once    sodium chloride flush  5-40 mL IntraVENous 2 times per day    heparin flush  3 mL IntraVENous 2 times per day    hydroxyurea  500 mg Oral BID    [Held by provider] allopurinol  100 mg Oral Daily    atorvastatin  40 mg Oral Nightly    [Held by provider] isosorbide mononitrate  30 mg Oral Daily    [Held by provider] metoprolol succinate  50 mg Oral Daily    mirtazapine  7.5 mg Oral Nightly    zinc sulfate  50 mg Oral Daily    Vitamin D  2,000 Units Oral Daily    guaiFENesin  400 mg Oral TID    ascorbic acid  1,000 mg Oral Daily     Continuous Infusions:   sodium chloride      amiodarone 450mg/250ml D5W infusion 0.5 mg/min (12/22/21 0937)    sodium chloride      sodium chloride      fentaNYL 5 mcg/ml in 0.9%  ml infusion 75 mcg/hr (12/21/21 2220)    sodium chloride 100 mL/hr at 12/22/21 0150    norepinephrine 18 mcg/min (12/22/21 0905)    sodium chloride 33.3 mL/hr at 12/21/21 1618    sodium chloride       PRN Meds:sodium chloride, sodium chloride, anticoagulant sodium citrate, sodium chloride, fentanNYL, sodium chloride flush, sodium

## 2021-12-22 NOTE — PATIENT CARE CONFERENCE
Intensive Care Daily Quality Rounding Checklist        ICU Team Members: bedside nurse, charge nurse, clinical pharmacist, , residents     ICU Day #: NUMBER: 5     Intubation Date: December 18,2021     Ventilator Day #: NUMBER: 5     Central Line Insertion Date: December 17,2021                                                    Day #: NUMBER: 6      Arterial Line Insertion Date:  n/a                             Day #: n/a     Temporary Hemodialysis Catheter Insertion Date:  December 21,2021                             Day # 2     DVT Prophylaxis:     GI Prophylaxis: protonix     [place] Catheter Insertion Date: December 18,2021                                        Day #: 5                             Continued need (if yes, reason documented and discussed with physician): yes, strict I/o     Skin Issues/ Wounds and ordered treatment discussed on rounds: y wound care consulted     Goals/ Plans for the Day: wean vent as able, HD per nephrology, labs, place central line and arterial line

## 2021-12-22 NOTE — PROGRESS NOTES
RN gradually increasing fi02, now at 100%.      12/22/21 1433   Vent Information   Vent Type 980   Vent Mode AC/VC   Vt Ordered 500 mL   Rate Set 16 bmp   Peak Flow 68 L/min   Pressure Support 0 cmH20   FiO2  100 %   SpO2 93 %   SpO2/FiO2 ratio 93   Sensitivity 3   PEEP/CPAP 5   I Time/ I Time % 0 s   Humidification Source Heated wire   Humidification Temp 37   Vent Patient Data   High Peep/I Pressure 0   Peak Inspiratory Pressure 32 cmH2O   Mean Airway Pressure 11 cmH20   Rate Measured 16 br/min   Vt Exhaled 586 mL   Minute Volume 8.99 Liters   I:E Ratio 1:3.70   Spontaneous Breathing Trial (SBT) RT Doc   Pulse 131   Additional Respiratory  Assessments   Resp 16   Alarm Settings   High Pressure Alarm 45 cmH2O

## 2021-12-22 NOTE — PLAN OF CARE
Problem: Falls - Risk of:  Goal: Will remain free from falls  Description: Will remain free from falls  Outcome: Met This Shift     Problem: Airway Clearance - Ineffective  Goal: Achieve or maintain patent airway  Outcome: Met This Shift     Problem: Gas Exchange - Impaired  Goal: Promote optimal lung function  Outcome: Met This Shift     Problem: Breathing Pattern - Ineffective  Goal: Ability to achieve and maintain a regular respiratory rate  Outcome: Met This Shift

## 2021-12-23 LAB — BLOOD CULTURE, ROUTINE: NORMAL

## 2022-01-02 NOTE — DISCHARGE SUMMARY
Discharge Summary    Date: 2022  Patient Name: Pat Coronel YOB: 1929 Age: 80 y.o. Admit Date: 12/10/2021  Discharge Date: 2021  Discharge Condition:    Admission Diagnosis  MARCIAL (acute kidney injury) ([facility] [address] (N17.9); Acute respiratory disease due to COVID-19 virus (U07.1, J06.9)     Discharge Diagnosis  Active Problems: Acute respiratory disease due to COVID-19 virusResolved Problems: * No resolved hospital problems. [facility] Stay  Narrative of Hospital Course:  Admitted from ER with increase SOB had Covid 19 pneumonia treated accordingly seen by pulmonary slow deterioration went into MARCIAL respiratory failure transferred to ICU placed on vent started on dialysis seen by ID and in spite of all support continue to deteriorate and      Consultants:  IP CONSULT TO INTERNAL MEDICINEIP CONSULT TO PULMONOLOGYIP CONSULT TO PHARMACYIP CONSULT TO HOME CARE NEEDSIP CONSULT TO NEPHROLOGYIP CONSULT TO INFECTIOUS DISEASESIP CONSULT TO IV TEAMIP CONSULT TO HEM/ONCIP CONSULT TO IV TEAMIP CONSULT TO IV TEAMIP CONSULT TO PALLIATIVE CAREIP CONSULT TO VASCULAR SURGERY    Surgeries/procedures Performed:       Treatments:           Discharge Plan/Disposition:      Hospital/Incidental Findings Requiring Follow Up:    Patient Instructions:    Diet:    Activity:  For number of days (if applicable): Other Instructions:    Provider Follow-Up:   No follow-ups on file. Significant Diagnostic Studies:    Recent Labs:  Admission on 12/10/2021, Discharged on 2021No results displayed because visit has over 200 results. ------------    Radiology last 7 days:  No results found.      Pending Labs Order Current Status Arterial Blood Gas, Respiratory Only Collected (21 1048) Arterial Blood Gas, Respiratory Only Collected (21 1437) Arterial Blood Gas, Respiratory Only Collected (21 0546)  Arterial Blood Gas, Respiratory Only Collected (21 0640)  Lactic acid, plasma Collected (12/22/21 0139)  Hepatitis panel, acute Preliminary result  Hepatitis panel, acute Preliminary result      Discharge Medications    Discharge Medication List as of 12/22/2021 10:44 PM    Discharge Medication List as of 12/22/2021 10:44 PM    Discharge Medication List as of 12/22/2021 10:44 PMCONTINUE these medications which have NOT CHANGEDtamsulosin (FLOMAX) 0.4 MG capsuleTake 1 capsule by mouth daily, Disp-30 capsule, R-5Normalisosorbide mononitrate (IMDUR) 30 MG extended release tabletTake 1 tablet by mouth daily, Disp-30 tablet, R-5Normal!! warfarin (COUMADIN) 5 MG tabletHistorical Med!! warfarin (COUMADIN) 7.5 MG tabletHistorical Medmirtazapine (REMERON) 7.5 MG tabletTake 1 tablet by mouth nightly, Disp-30 tablet,R-3Normalgabapentin (NEURONTIN) 100 MG capsuleTake 1 capsule by mouth 3 times daily for 30 days. , Disp-90 capsule, R-0Normalsennosides-docusate sodium (SENOKOT-S) 8.6-50 MG tabletTake 1 tablet by mouth 2 times daily, Disp-30 tablet,R-0Normalatorvastatin (LIPITOR) 40 MG tabletTake 1 tablet by mouth nightly, Disp-90 tablet,R-1NormalCholecalciferol (VITAMIN D3) 2000 units CAPSTake by mouth dailyHistorical MedCoenzyme Q10 (CO Q-10) 100 MG CAPSTake by mouth dailyHistorical Medmetoprolol succinate (TOPROL XL) 50 MG extended release tabletTake 1 tablet by mouth daily, Disp-30 tablet, R-3NormalMelatonin 5 MG CAPSTake 1 capsule by mouth nightly as neededHistorical Medpantoprazole (PROTONIX) 40 MG tabletTake 40 mg by mouth every morning (before breakfast)Historical MednitroGLYCERIN (NITROSTAT) 0.4 MG SL tabletup to max of 3 total doses. If no relief after 1 dose, call 911., Disp-25 tablet, R-3Normalhydroxyurea (HYDREA) 500 MG chemo capsuleTake 500 mg by mouth every morning Historical Med!! - Potential duplicate medications found. Please discuss with provider.     Discharge Medication List as of 12/22/2021 10:44 PMSTOP taking these medicationsazithromycin (ZITHROMAX) 250 MG tabletComments:Reason for Stopping:    Time Spent on Discharge:3E] minutes were spent in patient examination, evaluation, counseling as well as medication reconciliation, prescriptions for required medications, discharge plan, and follow up.     Electronically signed by Dahiana Vargas MD on 1/2/22 at 11:44 AM EST

## 2023-02-26 NOTE — PROGRESS NOTES
Therapeutic Recreation Assessment     LEISURE INTEREST SURVEY               Key: P = Past Interest C = Current Interest F = Future Interest     Arts/Crafts:  ___Mechanical  ___Woodworking    ___ Painting   ___Floral arranging ___ Ceramics         _ __ Knitting                                                     ___ Crocheting        ___Other: ___________      Cooking:  _ _Baking _P__Cooking    ___   Other: _______   Comments:       Games:  _C__Cards  ___Darts  ___Board games    ___Word games  ___Crossword puzzles    ___Seek-n-find/jumble                   _C__Other: Laymond Max / Dice_______      Horticulture:  ___Vegetables  ___Flowers  ___House plants                                                     ___Other: ___________      House/Yard Care:  ___Ironing  ___Laundry  ___Cleaning                                                      ___Repairs              ___Lawn care                                                     ___Other: ___________      Music Listening/Playing:  ___Classical       ___Big Band       ___Rock-n-Roll                                                     _C__Country          ___R&B             ___Gospel/Hymns                                                     ___Other: ___________      Outdoor Activities:  _P__Hunting          ___Fishing          ___Camping                                                     ___Other: ___________      Pets/Animals:  _C__Dogs             ___Cats                ___Fish                                                     ___Birds             ___Livestock         _C__Other: ___Sister in [place] ______    Reading:   ___Newspapers  ___Magazines ___Other:stories                                                     ___Other: ___________      Sikhism Activities:  Catholic: ___________   [employer] Activities: ___________      Shopping:   ___Grocery  ___Clothing  ___Flea market     ___Other: ___________      Socialization: _C__Family  _C__Friends  ___Neighbors       ___Church ___Volunteer ___Club/Organization                                                     ___Other: ___________    Sports/Exercises:  ___Walking  ___Football  ___Basketball     ___Golf  ___Baseball  ___Tennis       ___Bowling  ___Other: ___________      Traveling:   ___Global  _C__Stateside  ___Local       ___Other: ___________      TV/Radio:   ___Mysteries  ___Comedies ___Romance                                                     ___Game show       _C__Sports       ___News                                                      ___Talk show          _C__Other: __[facility],Cops_________      Other: [place] would like to be addressed as Arlette Albert. Personal Leisure Profile:   Question Response   Attributes Identify a positive quality: []Ambitious  []Appreciative  []Caring  []Courteous  []Considerate  []Compassionate  []Creative  []Dependable   []Generous  []Honest  []Hard working  []Helpful  []Kind  [][place]   [][place]  [][place]annerly  []Patient  []Polite  []Respectful  []Sociable  []Sense of humor  []Thoughtful  [x]Other: __\"I feel for people\"_________    You are very good at [employer]   Awareness Why do you participate in your leisure interest: []Competition  []Creativity  []Concentration  []Exercise  []Enjoyment  []Fun  []Hand/eye Coordination  []Increase confidence  []Learning a new skill  []Relaxation  []Social Interaction  []Supporting one another  []Thinking  [x]Other: __Winning_________    Are your leisure activities limited at this time? [x]Yes  []No  Comments: _________    How often do you participate in your leisure interest? TV   Resources Name a restaurant, store or business you frequent? [employer]   Personal When are you most happy?  Family gatherings     Barriers to Leisure Participation:  []Visual   [][place]  []Cognition/Communication  []Motivation  []Financial  []Transportation  []Time Constraints  [x]Physical Ability  []Leisure Awareness  []Drug/Alcohol  []Other: ___________    Recommendations:  []Group Session [x]Individual Session  []Client Refused Services  []No Therapy  []Other: ___________    Objectives:  []Establish adaptations for leisure involvement   []Increase Self worth/self esteem  []Community reintegration training   []Social interaction  [x]Leisure participation  []Other: ___________    Goals for Therapeutic Recreation Services:   Social Interaction:   Admission Functional [place] Measure: ____6_______  Discharge Functional [place] Measure: ___6/7________   Other:________________________________      Leisure Choice/ Increase [place] Quality of Life: To participate in 1 premorbid leisure activities of choice by discharge to increase leisure choice and independence thus increasing the overall quality of his/her life. Socialization/Social Interaction: To increase social interaction skills by introducing self 1 x per week at the beginning of TR session Socialization/Social Interaction: To initiate conversation 1 x per week during the TR session  Leisure Activity Tolerance: To increase leisure tolerance by attending 1 leisure activities per week for 15 minutes with active participation. Self Expression: To participate in 1 leisure activity(s) of choice, identifying 1 benefits to leisure participation. Dennie Cahill Arsenio Shaw ,[email or web address]

## 2024-08-22 NOTE — PROGRESS NOTES
24 hour urine calcium is <250 which is normal.  Physical Therapy  Daily Treatment Note/Discharge Summary  Evaluating Therapist: Alexandrea Anders DPT [id number]    NAME: Marsha Pierre  ROOM: 0982V  DIAGNOSIS: Closed R hip fracture  PRECAUTIONS: Falls, WBAT RLE, BLE BKA, BLE prosthesis   PROCEDURE(S): 7/25 R intramedullary trochanteric nailing    Social:  Pt lives with his wife and sister in law in a single story floor plan with a ramped entry. Pt did state that he also has an entrance other than the ramp that he normally uses which is 3 steps and 1 rail. Prior to admission pt ambulated in home and short distances in community with bilateral canes and bilateral prosthesis independently. Pt states he owns a wheelchair. Initial Evaluation  7/30/20 AM     PM    Short Term Goals Long Term Goals    Was pt agreeable to Eval/treatment? yes yes D/C     Does pt have pain? R hip pain yessenia weight bearing No c/o pain      Bed Mobility  Rolling: Min A  Supine to sit: Min A  Sit to supine: Min A  Scooting: SBA to EOB Mod I all aspects  Supervision Mod I   Transfers Sit to stand:  Mod A  Stand to sit: Mod A  Stand pivot: Min A with [employer] Sit to stand: Supervision  Stand to sit: Supervision  Stand pivot: Supervision with [employer]  SBA Supervision   Ambulation   10 feet with [employer] with Min A 75 feet with [employer] Supervision  50 feet with [employer] with SBA >100 feet with [employer] with Supervision   Walking 10 feet on uneven surface NT NT  10 feet with [employer] with SBA 10 feet with [employer] with Supervision   Wheel Chair Mobility 150 feet Supervision NT  300 feet Supervision 300 feet Mod I   Car Transfers NT Supervision  SBA Supervision   Stair negotiation: ascended and descended 1 step with 2 rails with Mod - backward descending 4 steps with bilateral rails with Supervision  4 steps with 1 rail with SBA 4 steps with 1 rail with Supervision   Curb Step:   ascended and descended 2 inch step with [employer] and Mod A 4 inch step with [employer] SBA  4 inch step with [employer] and SBA 4 inch step with [employer] and Supervision   Picking up object off the floor NT NT      BLE ROM [facility] WFL      BLE Strength R hip grossly 3-/5 (limited exam due to pain)  L hip grossly 4+/5 R hip grossly 3-/5  L hip grossly 4+/5      Balance  Sitting: Supervision  Standing: Min A with [employer] Sitting: Independent  Standing: SBA with [employer]      Date Family Teach Completed TBA Wife on 8/11/20      Is additional Family Teaching Needed? Y or N Y N      Hindering Progress Pain, weakness Pain, weakness      PT recommended ELOS 2-3 weeks       Team's Discharge Plan        Therapist at Team Meeting          Therapeutic exercises  AM:  - Functional mobility as noted above in chart    Additional Comments: Pt was discharged to home today with 24 hour supervision provided by wife and daughter. Pt met Mod I bed mobility goals and supervision goals for transfers, ambulation, and stair negotiation. Pt's wife was present for family training session this morning and observed pt perform all functional mobility. Pt and pt's wife had all questions/concerns addressed within session. Pt and pt's wife report comfort in pt returning home at this time. Pt will continue with PT services in the form of home health to continue to improve strength, endurance, and safety with functional mobility. Recommended to wife and pt that pt should continue to use WW at this time for all functional mobility.     Patient/family education  Pt/family educated on safety with functional mobility, family training session with wife    Patient/family response to education:   Pt/family verbalized understanding Pt/family demonstrated skill Pt/family requires further education in this area   yes yes yes     AM  Time in: 1045  Time out: [address], [place] [id number]

## (undated) DEVICE — GLOVE ORANGE PI 8   MSG9080

## (undated) DEVICE — GUIDEWIRE ORTH L400MM DIA3.2MM FOR TFN

## (undated) DEVICE — SET ORTHO STD STORTSTD2

## (undated) DEVICE — PADDING CAST W6INXL4YD COT LO LINTING WYTEX

## (undated) DEVICE — DRESSING,GAUZE,XEROFORM,CURAD,5"X9",ST: Brand: CURAD

## (undated) DEVICE — GOWN,BREATHABLE SLV,AURORA,XLG,STRL: Brand: MEDLINE

## (undated) DEVICE — 3M™ TEGADERM™ TRANSPARENT FILM DRESSING FRAME STYLE, 1626W, 4 IN X 4-3/4 IN (10 CM X 12 CM), 50/CT 4CT/CASE: Brand: 3M™ TEGADERM™

## (undated) DEVICE — BIT DRL L330MM DIA4.2MM CALIB L100MM ST 3 FLUT QUIK CPL FOR

## (undated) DEVICE — DRAPE C ARM W41XL74IN UNIV MOB W RUBBERBAND CLP

## (undated) DEVICE — ROD RMR L950MM DIA2.5MM W/ EXTN BALL TIP

## (undated) DEVICE — DRIP REDUCTION MANIFOLD

## (undated) DEVICE — Device

## (undated) DEVICE — CLOTH SURG PREP PREOPERATIVE CHLORHEXIDINE GLUC 2% READYPREP

## (undated) DEVICE — 3M™ COBAN™ NL STERILE NON-LATEX SELF-ADHERENT WRAP, 2084S, 4 IN X 5 YD (10 CM X 4,5 M), 18 ROLLS/CASE: Brand: 3M™ COBAN™

## (undated) DEVICE — SYRINGE MED 50ML LUERLOCK TIP

## (undated) DEVICE — SET ORTHO STD STORTSTD1

## (undated) DEVICE — NEEDLE HYPO 18GA L1.5IN PNK POLYPR HUB S STL REG BVL STR

## (undated) DEVICE — GOWN,AURORA,BRTHSLV,2XL,18/CS: Brand: MEDLINE

## (undated) DEVICE — BANDAGE COMPR W4INXL10YD WHITE/BEIGE E MTRX HK LOOP CLSR

## (undated) DEVICE — DRILL SYSTEM 7

## (undated) DEVICE — COVER,TABLE,60X90,STERILE: Brand: MEDLINE

## (undated) DEVICE — INTENDED FOR TISSUE SEPARATION, AND OTHER PROCEDURES THAT REQUIRE A SHARP SURGICAL BLADE TO PUNCTURE OR CUT.: Brand: BARD-PARKER ® STAINLESS STEEL BLADES

## (undated) DEVICE — PATIENT RETURN ELECTRODE, SINGLE-USE, CONTACT QUALITY MONITORING, ADULT, WITH 9FT CORD, FOR PATIENTS WEIGING OVER 33LBS. (15KG): Brand: MEGADYNE

## (undated) DEVICE — 3M™ IOBAN™ 2 ANTIMICROBIAL INCISE DRAPE 6650EZ: Brand: IOBAN™ 2

## (undated) DEVICE — DRAPE PATIENT ISOL IRRIG POUCH

## (undated) DEVICE — SURGICAL PROCEDURE PACK BASIC

## (undated) DEVICE — APPLICATOR MEDICATED 26 CC SOLUTION HI LT ORNG CHLORAPREP